# Patient Record
Sex: FEMALE | Race: BLACK OR AFRICAN AMERICAN | NOT HISPANIC OR LATINO | Employment: OTHER | URBAN - METROPOLITAN AREA
[De-identification: names, ages, dates, MRNs, and addresses within clinical notes are randomized per-mention and may not be internally consistent; named-entity substitution may affect disease eponyms.]

---

## 2017-01-01 ENCOUNTER — HOSPITAL ENCOUNTER (INPATIENT)
Facility: HOSPITAL | Age: 52
LOS: 6 days | DRG: 871 | End: 2017-05-25
Attending: EMERGENCY MEDICINE | Admitting: HOSPITALIST
Payer: MEDICAID

## 2017-01-01 VITALS
TEMPERATURE: 99 F | OXYGEN SATURATION: 95 % | RESPIRATION RATE: 20 BRPM | WEIGHT: 192 LBS | DIASTOLIC BLOOD PRESSURE: 75 MMHG | BODY MASS INDEX: 32.78 KG/M2 | HEIGHT: 64 IN | SYSTOLIC BLOOD PRESSURE: 129 MMHG | HEART RATE: 76 BPM

## 2017-01-01 DIAGNOSIS — I69.991 DYSPHAGIA AS LATE EFFECT OF CEREBROVASCULAR DISEASE: Chronic | ICD-10-CM

## 2017-01-01 DIAGNOSIS — K92.2 GI BLEED: ICD-10-CM

## 2017-01-01 DIAGNOSIS — R61 DIAPHORESIS: ICD-10-CM

## 2017-01-01 DIAGNOSIS — Z71.89 GOALS OF CARE, COUNSELING/DISCUSSION: ICD-10-CM

## 2017-01-01 DIAGNOSIS — N39.0 SEPSIS DUE TO URINARY TRACT INFECTION: ICD-10-CM

## 2017-01-01 DIAGNOSIS — N30.01 ACUTE CYSTITIS WITH HEMATURIA: ICD-10-CM

## 2017-01-01 DIAGNOSIS — R06.4 HYPERVENTILATING: ICD-10-CM

## 2017-01-01 DIAGNOSIS — G93.40 ACUTE ENCEPHALOPATHY: ICD-10-CM

## 2017-01-01 DIAGNOSIS — N30.00 ACUTE CYSTITIS WITHOUT HEMATURIA: Primary | ICD-10-CM

## 2017-01-01 DIAGNOSIS — A41.9 SEPSIS DUE TO URINARY TRACT INFECTION: ICD-10-CM

## 2017-01-01 DIAGNOSIS — I69.359 CVA, OLD, HEMIPARESIS: Chronic | ICD-10-CM

## 2017-01-01 DIAGNOSIS — A41.9 SEPSIS, DUE TO UNSPECIFIED ORGANISM: ICD-10-CM

## 2017-01-01 DIAGNOSIS — I48.0 PAROXYSMAL ATRIAL FIBRILLATION: Chronic | ICD-10-CM

## 2017-01-01 DIAGNOSIS — K29.01 GASTROINTESTINAL HEMORRHAGE ASSOCIATED WITH ACUTE GASTRITIS: ICD-10-CM

## 2017-01-01 DIAGNOSIS — Z93.1 PEG (PERCUTANEOUS ENDOSCOPIC GASTROSTOMY) STATUS: ICD-10-CM

## 2017-01-01 DIAGNOSIS — R56.9 SEIZURE-LIKE ACTIVITY: ICD-10-CM

## 2017-01-01 LAB
25(OH)D3+25(OH)D2 SERPL-MCNC: 34 NG/ML
ABO + RH BLD: NORMAL
ALBUMIN SERPL BCP-MCNC: 2.8 G/DL
ALBUMIN SERPL BCP-MCNC: 3 G/DL
ALBUMIN SERPL BCP-MCNC: 3.1 G/DL
ALBUMIN SERPL BCP-MCNC: 3.6 G/DL
ALLENS TEST: ABNORMAL
ALLENS TEST: ABNORMAL
ALLENS TEST: NORMAL
ALP SERPL-CCNC: 105 U/L
ALP SERPL-CCNC: 87 U/L
ALP SERPL-CCNC: 88 U/L
ALP SERPL-CCNC: 89 U/L
ALT SERPL W/O P-5'-P-CCNC: 18 U/L
ALT SERPL W/O P-5'-P-CCNC: 24 U/L
ALT SERPL W/O P-5'-P-CCNC: 24 U/L
ALT SERPL W/O P-5'-P-CCNC: 26 U/L
ANION GAP SERPL CALC-SCNC: 10 MMOL/L
ANION GAP SERPL CALC-SCNC: 14 MMOL/L
ANION GAP SERPL CALC-SCNC: 15 MMOL/L
ANION GAP SERPL CALC-SCNC: 17 MMOL/L
ANION GAP SERPL CALC-SCNC: 5 MMOL/L
ANION GAP SERPL CALC-SCNC: 7 MMOL/L
ANION GAP SERPL CALC-SCNC: 8 MMOL/L
ANISOCYTOSIS BLD QL SMEAR: SLIGHT
ANISOCYTOSIS BLD QL SMEAR: SLIGHT
APTT BLDCRRT: <21 SEC
AST SERPL-CCNC: 20 U/L
AST SERPL-CCNC: 26 U/L
AST SERPL-CCNC: 34 U/L
AST SERPL-CCNC: 42 U/L
B-HCG UR QL: NEGATIVE
BACTERIA #/AREA URNS AUTO: ABNORMAL /HPF
BACTERIA BLD CULT: NORMAL
BACTERIA BLD CULT: NORMAL
BACTERIA UR CULT: NORMAL
BASOPHILS # BLD AUTO: 0.01 K/UL
BASOPHILS # BLD AUTO: 0.02 K/UL
BASOPHILS # BLD AUTO: 0.02 K/UL
BASOPHILS # BLD AUTO: 0.03 K/UL
BASOPHILS # BLD AUTO: 0.03 K/UL
BASOPHILS # BLD AUTO: 0.04 K/UL
BASOPHILS NFR BLD: 0.1 %
BASOPHILS NFR BLD: 0.2 %
BASOPHILS NFR BLD: 0.3 %
BASOPHILS NFR BLD: 0.5 %
BASOPHILS NFR BLD: 0.5 %
BASOPHILS NFR BLD: 0.7 %
BILIRUB SERPL-MCNC: 0.4 MG/DL
BILIRUB SERPL-MCNC: 0.6 MG/DL
BILIRUB SERPL-MCNC: 0.7 MG/DL
BILIRUB SERPL-MCNC: 0.8 MG/DL
BILIRUB UR QL STRIP: NEGATIVE
BLD GP AB SCN CELLS X3 SERPL QL: NORMAL
BNP SERPL-MCNC: <10 PG/ML
BUN SERPL-MCNC: 10 MG/DL
BUN SERPL-MCNC: 11 MG/DL
BUN SERPL-MCNC: 16 MG/DL
BUN SERPL-MCNC: 5 MG/DL
BUN SERPL-MCNC: 9 MG/DL
BURR CELLS BLD QL SMEAR: ABNORMAL
CALCIUM SERPL-MCNC: 8.2 MG/DL
CALCIUM SERPL-MCNC: 8.5 MG/DL
CALCIUM SERPL-MCNC: 8.6 MG/DL
CALCIUM SERPL-MCNC: 8.9 MG/DL
CALCIUM SERPL-MCNC: 8.9 MG/DL
CALCIUM SERPL-MCNC: 9 MG/DL
CALCIUM SERPL-MCNC: 9.6 MG/DL
CHLORIDE SERPL-SCNC: 106 MMOL/L
CHLORIDE SERPL-SCNC: 109 MMOL/L
CHLORIDE SERPL-SCNC: 110 MMOL/L
CHLORIDE SERPL-SCNC: 111 MMOL/L
CHLORIDE SERPL-SCNC: 111 MMOL/L
CHLORIDE SERPL-SCNC: 112 MMOL/L
CHLORIDE SERPL-SCNC: 113 MMOL/L
CLARITY UR REFRACT.AUTO: ABNORMAL
CO2 SERPL-SCNC: 16 MMOL/L
CO2 SERPL-SCNC: 18 MMOL/L
CO2 SERPL-SCNC: 23 MMOL/L
CO2 SERPL-SCNC: 24 MMOL/L
CO2 SERPL-SCNC: 26 MMOL/L
CO2 SERPL-SCNC: 28 MMOL/L
CO2 SERPL-SCNC: 29 MMOL/L
COLOR UR AUTO: ABNORMAL
CORTIS SERPL-MCNC: 32.6 UG/DL
CREAT SERPL-MCNC: 0.6 MG/DL
CREAT SERPL-MCNC: 0.6 MG/DL
CREAT SERPL-MCNC: 0.7 MG/DL
CREAT SERPL-MCNC: 0.7 MG/DL
CREAT SERPL-MCNC: 0.8 MG/DL
CTP QC/QA: YES
DELSYS: ABNORMAL
DELSYS: ABNORMAL
DELSYS: NORMAL
DIFFERENTIAL METHOD: ABNORMAL
EOSINOPHIL # BLD AUTO: 0.1 K/UL
EOSINOPHIL # BLD AUTO: 0.4 K/UL
EOSINOPHIL # BLD AUTO: 0.5 K/UL
EOSINOPHIL NFR BLD: 0.5 %
EOSINOPHIL NFR BLD: 4.2 %
EOSINOPHIL NFR BLD: 6.7 %
EOSINOPHIL NFR BLD: 6.8 %
EOSINOPHIL NFR BLD: 8.8 %
EOSINOPHIL NFR BLD: 9.7 %
ERYTHROCYTE [DISTWIDTH] IN BLOOD BY AUTOMATED COUNT: 15.5 %
ERYTHROCYTE [DISTWIDTH] IN BLOOD BY AUTOMATED COUNT: 15.6 %
ERYTHROCYTE [DISTWIDTH] IN BLOOD BY AUTOMATED COUNT: 15.7 %
ERYTHROCYTE [DISTWIDTH] IN BLOOD BY AUTOMATED COUNT: 15.8 %
ERYTHROCYTE [DISTWIDTH] IN BLOOD BY AUTOMATED COUNT: 15.9 %
ERYTHROCYTE [DISTWIDTH] IN BLOOD BY AUTOMATED COUNT: 16 %
ERYTHROCYTE [DISTWIDTH] IN BLOOD BY AUTOMATED COUNT: 16.1 %
ERYTHROCYTE [DISTWIDTH] IN BLOOD BY AUTOMATED COUNT: 16.1 %
ERYTHROCYTE [SEDIMENTATION RATE] IN BLOOD BY WESTERGREN METHOD: 28 MM/H
ERYTHROCYTE [SEDIMENTATION RATE] IN BLOOD BY WESTERGREN METHOD: 28 MM/H
EST. GFR  (AFRICAN AMERICAN): >60 ML/MIN/1.73 M^2
EST. GFR  (NON AFRICAN AMERICAN): >60 ML/MIN/1.73 M^2
FIO2: 28
FIO2: 28
FLOW: 2
FLOW: 2
FLOW: 4.5
GIANT PLATELETS BLD QL SMEAR: PRESENT
GIANT PLATELETS BLD QL SMEAR: PRESENT
GLUCOSE SERPL-MCNC: 104 MG/DL
GLUCOSE SERPL-MCNC: 107 MG/DL
GLUCOSE SERPL-MCNC: 71 MG/DL
GLUCOSE SERPL-MCNC: 83 MG/DL
GLUCOSE SERPL-MCNC: 84 MG/DL
GLUCOSE SERPL-MCNC: 84 MG/DL
GLUCOSE SERPL-MCNC: 85 MG/DL
GLUCOSE UR QL STRIP: NEGATIVE
GRAM STN SPEC: NORMAL
GRAM STN SPEC: NORMAL
HCO3 UR-SCNC: 17.6 MMOL/L (ref 24–28)
HCO3 UR-SCNC: 27.6 MMOL/L (ref 24–28)
HCT VFR BLD AUTO: 30.1 %
HCT VFR BLD AUTO: 30.1 %
HCT VFR BLD AUTO: 32.4 %
HCT VFR BLD AUTO: 32.8 %
HCT VFR BLD AUTO: 32.8 %
HCT VFR BLD AUTO: 33.2 %
HCT VFR BLD AUTO: 33.5 %
HCT VFR BLD AUTO: 35.7 %
HCT VFR BLD AUTO: 37.8 %
HCT VFR BLD AUTO: 37.8 %
HCT VFR BLD AUTO: 37.9 %
HCT VFR BLD AUTO: 37.9 %
HCT VFR BLD AUTO: 38.5 %
HCT VFR BLD AUTO: 38.8 %
HCT VFR BLD AUTO: 38.9 %
HCT VFR BLD AUTO: 39.3 %
HCT VFR BLD AUTO: 39.8 %
HCT VFR BLD AUTO: 46.8 %
HCT VFR BLD AUTO: 47.6 %
HCT VFR BLD CALC: 37 %PCV (ref 36–54)
HGB BLD-MCNC: 10 G/DL
HGB BLD-MCNC: 10.1 G/DL
HGB BLD-MCNC: 10.1 G/DL
HGB BLD-MCNC: 10.3 G/DL
HGB BLD-MCNC: 10.9 G/DL
HGB BLD-MCNC: 11.7 G/DL
HGB BLD-MCNC: 11.8 G/DL
HGB BLD-MCNC: 12 G/DL
HGB BLD-MCNC: 12.1 G/DL
HGB BLD-MCNC: 12.2 G/DL
HGB BLD-MCNC: 12.2 G/DL
HGB BLD-MCNC: 14.6 G/DL
HGB BLD-MCNC: 14.8 G/DL
HGB BLD-MCNC: 9.5 G/DL
HGB BLD-MCNC: 9.5 G/DL
HGB UR QL STRIP: NEGATIVE
HYALINE CASTS UR QL AUTO: 0 /LPF
HYPOCHROMIA BLD QL SMEAR: ABNORMAL
HYPOCHROMIA BLD QL SMEAR: ABNORMAL
INR PPP: 1
KETONES UR QL STRIP: NEGATIVE
LACTATE SERPL-SCNC: 1.1 MMOL/L
LACTATE SERPL-SCNC: 1.5 MMOL/L
LACTATE SERPL-SCNC: 1.7 MMOL/L
LACTATE SERPL-SCNC: 2.7 MMOL/L
LACTATE SERPL-SCNC: 7.3 MMOL/L
LDH SERPL L TO P-CCNC: 0.89 MMOL/L (ref 0.36–1.25)
LDH SERPL L TO P-CCNC: 8.63 MMOL/L (ref 0.5–2.2)
LEUKOCYTE ESTERASE UR QL STRIP: ABNORMAL
LIPASE SERPL-CCNC: 43 U/L
LYMPHOCYTES # BLD AUTO: 1.1 K/UL
LYMPHOCYTES # BLD AUTO: 1.3 K/UL
LYMPHOCYTES # BLD AUTO: 1.4 K/UL
LYMPHOCYTES # BLD AUTO: 1.5 K/UL
LYMPHOCYTES # BLD AUTO: 1.7 K/UL
LYMPHOCYTES # BLD AUTO: 2 K/UL
LYMPHOCYTES NFR BLD: 11.4 %
LYMPHOCYTES NFR BLD: 24.1 %
LYMPHOCYTES NFR BLD: 27.3 %
LYMPHOCYTES NFR BLD: 34.3 %
LYMPHOCYTES NFR BLD: 34.4 %
LYMPHOCYTES NFR BLD: 7.9 %
MAGNESIUM SERPL-MCNC: 1.8 MG/DL
MAGNESIUM SERPL-MCNC: 2 MG/DL
MAGNESIUM SERPL-MCNC: 2 MG/DL
MAGNESIUM SERPL-MCNC: 2.3 MG/DL
MCH RBC QN AUTO: 26.2 PG
MCH RBC QN AUTO: 26.3 PG
MCH RBC QN AUTO: 26.4 PG
MCH RBC QN AUTO: 26.5 PG
MCH RBC QN AUTO: 26.6 PG
MCH RBC QN AUTO: 26.7 PG
MCH RBC QN AUTO: 26.8 PG
MCH RBC QN AUTO: 26.9 PG
MCH RBC QN AUTO: 26.9 PG
MCHC RBC AUTO-ENTMCNC: 30.3 %
MCHC RBC AUTO-ENTMCNC: 30.5 %
MCHC RBC AUTO-ENTMCNC: 30.7 %
MCHC RBC AUTO-ENTMCNC: 30.8 %
MCHC RBC AUTO-ENTMCNC: 30.8 %
MCHC RBC AUTO-ENTMCNC: 30.9 %
MCHC RBC AUTO-ENTMCNC: 31 %
MCHC RBC AUTO-ENTMCNC: 31.1 %
MCHC RBC AUTO-ENTMCNC: 31.2 %
MCHC RBC AUTO-ENTMCNC: 31.4 %
MCHC RBC AUTO-ENTMCNC: 31.6 %
MCHC RBC AUTO-ENTMCNC: 31.6 %
MCV RBC AUTO: 85 FL
MCV RBC AUTO: 86 FL
MCV RBC AUTO: 87 FL
METANEPH FREE SERPL-MCNC: <25 PG/ML
METANEPHS SERPL-MCNC: 43 PG/ML
MICROSCOPIC COMMENT: ABNORMAL
MODE: ABNORMAL
MODE: ABNORMAL
MODE: NORMAL
MONOCYTES # BLD AUTO: 0.3 K/UL
MONOCYTES # BLD AUTO: 0.4 K/UL
MONOCYTES # BLD AUTO: 0.6 K/UL
MONOCYTES # BLD AUTO: 0.7 K/UL
MONOCYTES # BLD AUTO: 0.8 K/UL
MONOCYTES # BLD AUTO: 1.2 K/UL
MONOCYTES NFR BLD: 10.4 %
MONOCYTES NFR BLD: 6.5 %
MONOCYTES NFR BLD: 7.1 %
MONOCYTES NFR BLD: 8.2 %
MONOCYTES NFR BLD: 8.4 %
MONOCYTES NFR BLD: 9.2 %
NEUTROPHILS # BLD AUTO: 1.8 K/UL
NEUTROPHILS # BLD AUTO: 15 K/UL
NEUTROPHILS # BLD AUTO: 3 K/UL
NEUTROPHILS # BLD AUTO: 3.5 K/UL
NEUTROPHILS # BLD AUTO: 3.5 K/UL
NEUTROPHILS # BLD AUTO: 7.3 K/UL
NEUTROPHILS NFR BLD: 47 %
NEUTROPHILS NFR BLD: 51 %
NEUTROPHILS NFR BLD: 55 %
NEUTROPHILS NFR BLD: 57.4 %
NEUTROPHILS NFR BLD: 76.1 %
NEUTROPHILS NFR BLD: 84.7 %
NITRITE UR QL STRIP: NEGATIVE
NORMETANEPH FREE SERPL-MCNC: 43 PG/ML
OVALOCYTES BLD QL SMEAR: ABNORMAL
PCO2 BLDA: 38.6 MMHG (ref 35–45)
PCO2 BLDA: 44.4 MMHG (ref 35–45)
PH SMN: 7.27 [PH] (ref 7.35–7.45)
PH SMN: 7.4 [PH] (ref 7.35–7.45)
PH UR STRIP: 8 [PH] (ref 5–8)
PHOSPHATE SERPL-MCNC: 2 MG/DL
PHOSPHATE SERPL-MCNC: 2.5 MG/DL
PHOSPHATE SERPL-MCNC: 2.8 MG/DL
PHOSPHATE SERPL-MCNC: 2.8 MG/DL
PHOSPHATE SERPL-MCNC: 2.9 MG/DL
PHOSPHATE SERPL-MCNC: 3 MG/DL
PHOSPHATE SERPL-MCNC: 3.2 MG/DL
PLATELET # BLD AUTO: 112 K/UL
PLATELET # BLD AUTO: 126 K/UL
PLATELET # BLD AUTO: 126 K/UL
PLATELET # BLD AUTO: 140 K/UL
PLATELET # BLD AUTO: 140 K/UL
PLATELET # BLD AUTO: 148 K/UL
PLATELET # BLD AUTO: 148 K/UL
PLATELET # BLD AUTO: 153 K/UL
PLATELET # BLD AUTO: 155 K/UL
PLATELET # BLD AUTO: 157 K/UL
PLATELET # BLD AUTO: 160 K/UL
PLATELET # BLD AUTO: 170 K/UL
PLATELET # BLD AUTO: 177 K/UL
PLATELET # BLD AUTO: 194 K/UL
PLATELET BLD QL SMEAR: ABNORMAL
PMV BLD AUTO: 12.2 FL
PMV BLD AUTO: 12.2 FL
PMV BLD AUTO: 12.4 FL
PMV BLD AUTO: 12.7 FL
PMV BLD AUTO: 12.8 FL
PMV BLD AUTO: 13 FL
PMV BLD AUTO: 13.1 FL
PMV BLD AUTO: 13.5 FL
PMV BLD AUTO: ABNORMAL FL
PO2 BLDA: 39 MMHG (ref 40–60)
PO2 BLDA: 76 MMHG (ref 80–100)
POC BE: -9 MMOL/L
POC BE: 3 MMOL/L
POC IONIZED CALCIUM: 1.12 MMOL/L (ref 1.06–1.42)
POC SATURATED O2: 66 % (ref 95–100)
POC SATURATED O2: 95 % (ref 95–100)
POC TCO2: 19 MMOL/L (ref 24–29)
POC TCO2: 29 MMOL/L (ref 23–27)
POCT GLUCOSE: 105 MG/DL (ref 70–110)
POCT GLUCOSE: 111 MG/DL (ref 70–110)
POCT GLUCOSE: 121 MG/DL (ref 70–110)
POCT GLUCOSE: 128 MG/DL (ref 70–110)
POCT GLUCOSE: 133 MG/DL (ref 70–110)
POCT GLUCOSE: 138 MG/DL (ref 70–110)
POCT GLUCOSE: 150 MG/DL (ref 70–110)
POCT GLUCOSE: 62 MG/DL (ref 70–110)
POCT GLUCOSE: 67 MG/DL (ref 70–110)
POCT GLUCOSE: 72 MG/DL (ref 70–110)
POCT GLUCOSE: 77 MG/DL (ref 70–110)
POCT GLUCOSE: 80 MG/DL (ref 70–110)
POCT GLUCOSE: 80 MG/DL (ref 70–110)
POCT GLUCOSE: 82 MG/DL (ref 70–110)
POCT GLUCOSE: 83 MG/DL (ref 70–110)
POCT GLUCOSE: 87 MG/DL (ref 70–110)
POCT GLUCOSE: 95 MG/DL (ref 70–110)
POCT GLUCOSE: 96 MG/DL (ref 70–110)
POCT GLUCOSE: 96 MG/DL (ref 70–110)
POIKILOCYTOSIS BLD QL SMEAR: SLIGHT
POIKILOCYTOSIS BLD QL SMEAR: SLIGHT
POLYCHROMASIA BLD QL SMEAR: ABNORMAL
POTASSIUM BLD-SCNC: 3.7 MMOL/L (ref 3.5–5.1)
POTASSIUM SERPL-SCNC: 3.3 MMOL/L
POTASSIUM SERPL-SCNC: 3.6 MMOL/L
POTASSIUM SERPL-SCNC: 3.9 MMOL/L
POTASSIUM SERPL-SCNC: 3.9 MMOL/L
POTASSIUM SERPL-SCNC: 4.1 MMOL/L
POTASSIUM SERPL-SCNC: 4.2 MMOL/L
POTASSIUM SERPL-SCNC: 5.3 MMOL/L
PROLACTIN SERPL IA-MCNC: 12.7 NG/ML
PROT SERPL-MCNC: 6.8 G/DL
PROT SERPL-MCNC: 7.3 G/DL
PROT SERPL-MCNC: 7.8 G/DL
PROT SERPL-MCNC: 8.7 G/DL
PROT UR QL STRIP: ABNORMAL
PROTHROMBIN TIME: 11 SEC
PYRIDOXAL SERPL-MCNC: 6 UG/L (ref 5–50)
RBC # BLD AUTO: 3.53 M/UL
RBC # BLD AUTO: 3.53 M/UL
RBC # BLD AUTO: 3.81 M/UL
RBC # BLD AUTO: 3.84 M/UL
RBC # BLD AUTO: 3.84 M/UL
RBC # BLD AUTO: 3.9 M/UL
RBC # BLD AUTO: 3.91 M/UL
RBC # BLD AUTO: 4.16 M/UL
RBC # BLD AUTO: 4.43 M/UL
RBC # BLD AUTO: 4.43 M/UL
RBC # BLD AUTO: 4.44 M/UL
RBC # BLD AUTO: 4.44 M/UL
RBC # BLD AUTO: 4.48 M/UL
RBC # BLD AUTO: 4.49 M/UL
RBC # BLD AUTO: 4.52 M/UL
RBC # BLD AUTO: 4.62 M/UL
RBC # BLD AUTO: 5.51 M/UL
RBC # BLD AUTO: 5.56 M/UL
RBC #/AREA URNS AUTO: 8 /HPF (ref 0–4)
SAMPLE: ABNORMAL
SAMPLE: ABNORMAL
SAMPLE: NORMAL
SITE: ABNORMAL
SITE: ABNORMAL
SITE: NORMAL
SODIUM BLD-SCNC: 146 MMOL/L (ref 136–145)
SODIUM SERPL-SCNC: 143 MMOL/L
SODIUM SERPL-SCNC: 145 MMOL/L
SODIUM SERPL-SCNC: 146 MMOL/L
SP GR UR STRIP: 1.03 (ref 1–1.03)
SP02: 95
SP02: 95
SP02: 98
SQUAMOUS #/AREA URNS AUTO: 3 /HPF
TROPONIN I SERPL DL<=0.01 NG/ML-MCNC: <0.006 NG/ML
TROPONIN I SERPL DL<=0.01 NG/ML-MCNC: <0.006 NG/ML
URN SPEC COLLECT METH UR: ABNORMAL
UROBILINOGEN UR STRIP-ACNC: NEGATIVE EU/DL
WBC # BLD AUTO: 12.38 K/UL
WBC # BLD AUTO: 13.02 K/UL
WBC # BLD AUTO: 17.64 K/UL
WBC # BLD AUTO: 21.72 K/UL
WBC # BLD AUTO: 3.81 K/UL
WBC # BLD AUTO: 5.95 K/UL
WBC # BLD AUTO: 6.05 K/UL
WBC # BLD AUTO: 6.05 K/UL
WBC # BLD AUTO: 6.17 K/UL
WBC # BLD AUTO: 6.17 K/UL
WBC # BLD AUTO: 6.33 K/UL
WBC # BLD AUTO: 6.73 K/UL
WBC # BLD AUTO: 6.91 K/UL
WBC # BLD AUTO: 6.91 K/UL
WBC # BLD AUTO: 6.99 K/UL
WBC # BLD AUTO: 6.99 K/UL
WBC # BLD AUTO: 8.31 K/UL
WBC # BLD AUTO: 9.67 K/UL
WBC #/AREA URNS AUTO: 40 /HPF (ref 0–5)

## 2017-01-01 PROCEDURE — 85025 COMPLETE CBC W/AUTO DIFF WBC: CPT

## 2017-01-01 PROCEDURE — 63600175 PHARM REV CODE 636 W HCPCS: Performed by: HOSPITALIST

## 2017-01-01 PROCEDURE — 93005 ELECTROCARDIOGRAM TRACING: CPT

## 2017-01-01 PROCEDURE — 99233 SBSQ HOSP IP/OBS HIGH 50: CPT | Mod: 25,,, | Performed by: INTERNAL MEDICINE

## 2017-01-01 PROCEDURE — 85730 THROMBOPLASTIN TIME PARTIAL: CPT

## 2017-01-01 PROCEDURE — 36556 INSERT NON-TUNNEL CV CATH: CPT | Mod: ,,, | Performed by: INTERNAL MEDICINE

## 2017-01-01 PROCEDURE — 96376 TX/PRO/DX INJ SAME DRUG ADON: CPT

## 2017-01-01 PROCEDURE — 25000003 PHARM REV CODE 250: Performed by: HOSPITALIST

## 2017-01-01 PROCEDURE — 99285 EMERGENCY DEPT VISIT HI MDM: CPT | Mod: 25

## 2017-01-01 PROCEDURE — 84100 ASSAY OF PHOSPHORUS: CPT

## 2017-01-01 PROCEDURE — 25500020 PHARM REV CODE 255: Performed by: EMERGENCY MEDICINE

## 2017-01-01 PROCEDURE — 99233 SBSQ HOSP IP/OBS HIGH 50: CPT | Mod: ,,, | Performed by: PSYCHIATRY & NEUROLOGY

## 2017-01-01 PROCEDURE — 96375 TX/PRO/DX INJ NEW DRUG ADDON: CPT

## 2017-01-01 PROCEDURE — 87186 SC STD MICRODIL/AGAR DIL: CPT

## 2017-01-01 PROCEDURE — 83605 ASSAY OF LACTIC ACID: CPT

## 2017-01-01 PROCEDURE — 96368 THER/DIAG CONCURRENT INF: CPT

## 2017-01-01 PROCEDURE — 96365 THER/PROPH/DIAG IV INF INIT: CPT

## 2017-01-01 PROCEDURE — 99232 SBSQ HOSP IP/OBS MODERATE 35: CPT | Mod: ,,, | Performed by: INTERNAL MEDICINE

## 2017-01-01 PROCEDURE — 81025 URINE PREGNANCY TEST: CPT | Performed by: EMERGENCY MEDICINE

## 2017-01-01 PROCEDURE — 86901 BLOOD TYPING SEROLOGIC RH(D): CPT

## 2017-01-01 PROCEDURE — 25000003 PHARM REV CODE 250: Performed by: INTERNAL MEDICINE

## 2017-01-01 PROCEDURE — 99233 SBSQ HOSP IP/OBS HIGH 50: CPT | Mod: ,,, | Performed by: INTERNAL MEDICINE

## 2017-01-01 PROCEDURE — 96366 THER/PROPH/DIAG IV INF ADDON: CPT

## 2017-01-01 PROCEDURE — 85027 COMPLETE CBC AUTOMATED: CPT | Mod: 91

## 2017-01-01 PROCEDURE — 95822 EEG COMA OR SLEEP ONLY: CPT

## 2017-01-01 PROCEDURE — 99291 CRITICAL CARE FIRST HOUR: CPT | Mod: ,,, | Performed by: EMERGENCY MEDICINE

## 2017-01-01 PROCEDURE — 86900 BLOOD TYPING SEROLOGIC ABO: CPT

## 2017-01-01 PROCEDURE — 36415 COLL VENOUS BLD VENIPUNCTURE: CPT

## 2017-01-01 PROCEDURE — C9113 INJ PANTOPRAZOLE SODIUM, VIA: HCPCS | Performed by: HOSPITALIST

## 2017-01-01 PROCEDURE — 99223 1ST HOSP IP/OBS HIGH 75: CPT | Mod: ,,, | Performed by: INTERNAL MEDICINE

## 2017-01-01 PROCEDURE — 25000003 PHARM REV CODE 250: Performed by: STUDENT IN AN ORGANIZED HEALTH CARE EDUCATION/TRAINING PROGRAM

## 2017-01-01 PROCEDURE — 80069 RENAL FUNCTION PANEL: CPT

## 2017-01-01 PROCEDURE — 84484 ASSAY OF TROPONIN QUANT: CPT

## 2017-01-01 PROCEDURE — 81001 URINALYSIS AUTO W/SCOPE: CPT

## 2017-01-01 PROCEDURE — 97802 MEDICAL NUTRITION INDIV IN: CPT

## 2017-01-01 PROCEDURE — 85027 COMPLETE CBC AUTOMATED: CPT

## 2017-01-01 PROCEDURE — 11000001 HC ACUTE MED/SURG PRIVATE ROOM

## 2017-01-01 PROCEDURE — 80053 COMPREHEN METABOLIC PANEL: CPT

## 2017-01-01 PROCEDURE — 99291 CRITICAL CARE FIRST HOUR: CPT | Mod: ,,, | Performed by: CLINICAL NURSE SPECIALIST

## 2017-01-01 PROCEDURE — 25000242 PHARM REV CODE 250 ALT 637 W/ HCPCS: Performed by: HOSPITALIST

## 2017-01-01 PROCEDURE — 63600175 PHARM REV CODE 636 W HCPCS: Performed by: INTERNAL MEDICINE

## 2017-01-01 PROCEDURE — 63600175 PHARM REV CODE 636 W HCPCS

## 2017-01-01 PROCEDURE — 83835 ASSAY OF METANEPHRINES: CPT

## 2017-01-01 PROCEDURE — 94640 AIRWAY INHALATION TREATMENT: CPT

## 2017-01-01 PROCEDURE — 02HV33Z INSERTION OF INFUSION DEVICE INTO SUPERIOR VENA CAVA, PERCUTANEOUS APPROACH: ICD-10-PCS | Performed by: INTERNAL MEDICINE

## 2017-01-01 PROCEDURE — 87086 URINE CULTURE/COLONY COUNT: CPT

## 2017-01-01 PROCEDURE — 99233 SBSQ HOSP IP/OBS HIGH 50: CPT | Mod: ,,, | Performed by: HOSPITALIST

## 2017-01-01 PROCEDURE — 25000003 PHARM REV CODE 250

## 2017-01-01 PROCEDURE — 84295 ASSAY OF SERUM SODIUM: CPT

## 2017-01-01 PROCEDURE — 87040 BLOOD CULTURE FOR BACTERIA: CPT

## 2017-01-01 PROCEDURE — 27000221 HC OXYGEN, UP TO 24 HOURS

## 2017-01-01 PROCEDURE — 25000003 PHARM REV CODE 250: Performed by: EMERGENCY MEDICINE

## 2017-01-01 PROCEDURE — 99239 HOSP IP/OBS DSCHRG MGMT >30: CPT | Mod: ,,, | Performed by: INTERNAL MEDICINE

## 2017-01-01 PROCEDURE — 80048 BASIC METABOLIC PNL TOTAL CA: CPT

## 2017-01-01 PROCEDURE — 87088 URINE BACTERIA CULTURE: CPT

## 2017-01-01 PROCEDURE — 63600175 PHARM REV CODE 636 W HCPCS: Performed by: EMERGENCY MEDICINE

## 2017-01-01 PROCEDURE — 94760 N-INVAS EAR/PLS OXIMETRY 1: CPT

## 2017-01-01 PROCEDURE — 87205 SMEAR GRAM STAIN: CPT

## 2017-01-01 PROCEDURE — 85014 HEMATOCRIT: CPT

## 2017-01-01 PROCEDURE — 83605 ASSAY OF LACTIC ACID: CPT | Mod: 91

## 2017-01-01 PROCEDURE — 85018 HEMOGLOBIN: CPT

## 2017-01-01 PROCEDURE — 93010 ELECTROCARDIOGRAM REPORT: CPT | Mod: ,,, | Performed by: INTERNAL MEDICINE

## 2017-01-01 PROCEDURE — 83735 ASSAY OF MAGNESIUM: CPT

## 2017-01-01 PROCEDURE — 82803 BLOOD GASES ANY COMBINATION: CPT

## 2017-01-01 PROCEDURE — 51702 INSERT TEMP BLADDER CATH: CPT

## 2017-01-01 PROCEDURE — C9113 INJ PANTOPRAZOLE SODIUM, VIA: HCPCS | Performed by: EMERGENCY MEDICINE

## 2017-01-01 PROCEDURE — 82533 TOTAL CORTISOL: CPT

## 2017-01-01 PROCEDURE — 84207 ASSAY OF VITAMIN B-6: CPT

## 2017-01-01 PROCEDURE — 83690 ASSAY OF LIPASE: CPT

## 2017-01-01 PROCEDURE — 84132 ASSAY OF SERUM POTASSIUM: CPT

## 2017-01-01 PROCEDURE — 99900035 HC TECH TIME PER 15 MIN (STAT)

## 2017-01-01 PROCEDURE — 20000000 HC ICU ROOM

## 2017-01-01 PROCEDURE — 36600 WITHDRAWAL OF ARTERIAL BLOOD: CPT

## 2017-01-01 PROCEDURE — 85610 PROTHROMBIN TIME: CPT

## 2017-01-01 PROCEDURE — 84146 ASSAY OF PROLACTIN: CPT

## 2017-01-01 PROCEDURE — 82330 ASSAY OF CALCIUM: CPT

## 2017-01-01 PROCEDURE — 95816 EEG AWAKE AND DROWSY: CPT | Mod: 26,,, | Performed by: PSYCHIATRY & NEUROLOGY

## 2017-01-01 PROCEDURE — 83880 ASSAY OF NATRIURETIC PEPTIDE: CPT

## 2017-01-01 PROCEDURE — 87077 CULTURE AEROBIC IDENTIFY: CPT

## 2017-01-01 PROCEDURE — 82306 VITAMIN D 25 HYDROXY: CPT

## 2017-01-01 RX ORDER — IBUPROFEN 200 MG
24 TABLET ORAL
Status: DISCONTINUED | OUTPATIENT
Start: 2017-01-01 | End: 2017-01-01 | Stop reason: HOSPADM

## 2017-01-01 RX ORDER — ONDANSETRON 2 MG/ML
INJECTION INTRAMUSCULAR; INTRAVENOUS
Status: COMPLETED
Start: 2017-01-01 | End: 2017-01-01

## 2017-01-01 RX ORDER — DEXTROSE MONOHYDRATE AND SODIUM CHLORIDE 5; .9 G/100ML; G/100ML
INJECTION, SOLUTION INTRAVENOUS CONTINUOUS
Status: DISCONTINUED | OUTPATIENT
Start: 2017-01-01 | End: 2017-01-01

## 2017-01-01 RX ORDER — LORAZEPAM 2 MG/ML
2 INJECTION INTRAMUSCULAR
Status: DISCONTINUED | OUTPATIENT
Start: 2017-01-01 | End: 2017-01-01

## 2017-01-01 RX ORDER — SODIUM BICARBONATE 1 MEQ/ML
10 SYRINGE (ML) INTRAVENOUS ONCE
Status: COMPLETED | OUTPATIENT
Start: 2017-01-01 | End: 2017-01-01

## 2017-01-01 RX ORDER — LEVETIRACETAM 5 MG/ML
500 INJECTION INTRAVASCULAR EVERY 12 HOURS
Status: DISCONTINUED | OUTPATIENT
Start: 2017-01-01 | End: 2017-01-01

## 2017-01-01 RX ORDER — HYDROMORPHONE HYDROCHLORIDE 1 MG/ML
1 INJECTION, SOLUTION INTRAMUSCULAR; INTRAVENOUS; SUBCUTANEOUS EVERY 4 HOURS PRN
Status: DISCONTINUED | OUTPATIENT
Start: 2017-01-01 | End: 2017-01-01

## 2017-01-01 RX ORDER — POTASSIUM CHLORIDE 20 MEQ/15ML
30 SOLUTION ORAL ONCE
Status: COMPLETED | OUTPATIENT
Start: 2017-01-01 | End: 2017-01-01

## 2017-01-01 RX ORDER — BACLOFEN 20 MG/1
20 TABLET ORAL EVERY 4 HOURS
Status: ON HOLD
Start: 2017-01-01 | End: 2018-01-01 | Stop reason: HOSPADM

## 2017-01-01 RX ORDER — IPRATROPIUM BROMIDE AND ALBUTEROL SULFATE 2.5; .5 MG/3ML; MG/3ML
SOLUTION RESPIRATORY (INHALATION)
Status: DISPENSED
Start: 2017-01-01 | End: 2017-01-01

## 2017-01-01 RX ORDER — PANTOPRAZOLE SODIUM 40 MG/1
FOR SUSPENSION ORAL
Status: ON HOLD
Start: 2017-01-01 | End: 2018-01-01 | Stop reason: HOSPADM

## 2017-01-01 RX ORDER — SODIUM CHLORIDE 9 MG/ML
INJECTION, SOLUTION INTRAVENOUS CONTINUOUS
Status: DISCONTINUED | OUTPATIENT
Start: 2017-01-01 | End: 2017-01-01

## 2017-01-01 RX ORDER — NAPROXEN SODIUM 220 MG/1
81 TABLET, FILM COATED ORAL DAILY
Status: DISCONTINUED | OUTPATIENT
Start: 2017-01-01 | End: 2017-01-01

## 2017-01-01 RX ORDER — BACLOFEN 20 MG/1
20 TABLET ORAL EVERY 4 HOURS
COMMUNITY
End: 2017-01-01

## 2017-01-01 RX ORDER — LEVETIRACETAM 500 MG/1
500 TABLET ORAL 2 TIMES DAILY
Status: DISCONTINUED | OUTPATIENT
Start: 2017-01-01 | End: 2017-01-01 | Stop reason: HOSPADM

## 2017-01-01 RX ORDER — DEXTROSE 50 % IN WATER (D50W) INTRAVENOUS SYRINGE
12.5
Status: DISCONTINUED | OUTPATIENT
Start: 2017-01-01 | End: 2017-01-01 | Stop reason: HOSPADM

## 2017-01-01 RX ORDER — LORAZEPAM 2 MG/ML
INJECTION INTRAMUSCULAR
Status: DISCONTINUED
Start: 2017-01-01 | End: 2017-01-01 | Stop reason: WASHOUT

## 2017-01-01 RX ORDER — ONDANSETRON 2 MG/ML
4 INJECTION INTRAMUSCULAR; INTRAVENOUS
Status: COMPLETED | OUTPATIENT
Start: 2017-01-01 | End: 2017-01-01

## 2017-01-01 RX ORDER — DEXTROSE 50 % IN WATER (D50W) INTRAVENOUS SYRINGE
25
Status: DISCONTINUED | OUTPATIENT
Start: 2017-01-01 | End: 2017-01-01 | Stop reason: HOSPADM

## 2017-01-01 RX ORDER — ACETAMINOPHEN 325 MG/1
650 TABLET ORAL EVERY 8 HOURS PRN
Status: DISCONTINUED | OUTPATIENT
Start: 2017-01-01 | End: 2017-01-01

## 2017-01-01 RX ORDER — NAPROXEN SODIUM 220 MG/1
81 TABLET, FILM COATED ORAL DAILY
Status: DISCONTINUED | OUTPATIENT
Start: 2017-01-01 | End: 2017-01-01 | Stop reason: HOSPADM

## 2017-01-01 RX ORDER — DEXTROSE MONOHYDRATE, SODIUM CHLORIDE, AND POTASSIUM CHLORIDE 50; 1.49; 9 G/1000ML; G/1000ML; G/1000ML
INJECTION, SOLUTION INTRAVENOUS CONTINUOUS
Status: DISCONTINUED | OUTPATIENT
Start: 2017-01-01 | End: 2017-01-01

## 2017-01-01 RX ORDER — RAMELTEON 8 MG/1
8 TABLET ORAL NIGHTLY PRN
Status: DISCONTINUED | OUTPATIENT
Start: 2017-01-01 | End: 2017-01-01 | Stop reason: HOSPADM

## 2017-01-01 RX ORDER — POTASSIUM CHLORIDE 20 MEQ/15ML
20 SOLUTION ORAL DAILY
Status: DISCONTINUED | OUTPATIENT
Start: 2017-01-01 | End: 2017-01-01

## 2017-01-01 RX ORDER — PANTOPRAZOLE SODIUM 40 MG/1
40 FOR SUSPENSION ORAL DAILY
Start: 2017-01-01 | End: 2017-01-01

## 2017-01-01 RX ORDER — DOXYCYCLINE HYCLATE 100 MG
100 TABLET ORAL EVERY 12 HOURS
Status: DISCONTINUED | OUTPATIENT
Start: 2017-01-01 | End: 2017-01-01 | Stop reason: HOSPADM

## 2017-01-01 RX ORDER — ACETAMINOPHEN 325 MG/1
TABLET ORAL
Status: COMPLETED
Start: 2017-01-01 | End: 2017-01-01

## 2017-01-01 RX ORDER — PANTOPRAZOLE SODIUM 40 MG/10ML
40 INJECTION, POWDER, LYOPHILIZED, FOR SOLUTION INTRAVENOUS 2 TIMES DAILY
Status: DISCONTINUED | OUTPATIENT
Start: 2017-01-01 | End: 2017-01-01

## 2017-01-01 RX ORDER — INDOMETHACIN 25 MG/1
10 CAPSULE ORAL ONCE
Status: DISCONTINUED | OUTPATIENT
Start: 2017-01-01 | End: 2017-01-01 | Stop reason: RX

## 2017-01-01 RX ORDER — ONDANSETRON 2 MG/ML
8 INJECTION INTRAMUSCULAR; INTRAVENOUS EVERY 8 HOURS PRN
Status: DISCONTINUED | OUTPATIENT
Start: 2017-01-01 | End: 2017-01-01 | Stop reason: HOSPADM

## 2017-01-01 RX ORDER — CHLORHEXIDINE GLUCONATE ORAL RINSE 1.2 MG/ML
15 SOLUTION DENTAL
Status: DISCONTINUED | OUTPATIENT
Start: 2017-01-01 | End: 2017-01-01 | Stop reason: HOSPADM

## 2017-01-01 RX ORDER — FERROUS SULFATE, DRIED 160(50) MG
1 TABLET, EXTENDED RELEASE ORAL 2 TIMES DAILY
Qty: 60 TABLET | Refills: 11 | Status: ON HOLD
Start: 2017-01-01 | End: 2018-01-01 | Stop reason: HOSPADM

## 2017-01-01 RX ORDER — LORAZEPAM 2 MG/ML
INJECTION INTRAMUSCULAR
Status: COMPLETED
Start: 2017-01-01 | End: 2017-01-01

## 2017-01-01 RX ORDER — ACETAMINOPHEN 650 MG/20.3ML
650 LIQUID ORAL EVERY 4 HOURS PRN
Status: DISCONTINUED | OUTPATIENT
Start: 2017-01-01 | End: 2017-01-01 | Stop reason: HOSPADM

## 2017-01-01 RX ORDER — GLUCAGON 1 MG
1 KIT INJECTION
Status: DISCONTINUED | OUTPATIENT
Start: 2017-01-01 | End: 2017-01-01 | Stop reason: HOSPADM

## 2017-01-01 RX ORDER — PANTOPRAZOLE SODIUM 40 MG/10ML
40 INJECTION, POWDER, LYOPHILIZED, FOR SOLUTION INTRAVENOUS
Status: COMPLETED | OUTPATIENT
Start: 2017-01-01 | End: 2017-01-01

## 2017-01-01 RX ORDER — DEXTROSE MONOHYDRATE AND SODIUM CHLORIDE 5; .45 G/100ML; G/100ML
1000 INJECTION, SOLUTION INTRAVENOUS CONTINUOUS
Status: DISCONTINUED | OUTPATIENT
Start: 2017-01-01 | End: 2017-01-01

## 2017-01-01 RX ORDER — PANTOPRAZOLE SODIUM 40 MG/1
40 FOR SUSPENSION ORAL DAILY
Status: DISCONTINUED | OUTPATIENT
Start: 2017-01-01 | End: 2017-01-01

## 2017-01-01 RX ORDER — LORAZEPAM 2 MG/ML
1 INJECTION INTRAMUSCULAR ONCE
Status: DISCONTINUED | OUTPATIENT
Start: 2017-01-01 | End: 2017-01-01

## 2017-01-01 RX ORDER — BACLOFEN 10 MG/1
20 TABLET ORAL 4 TIMES DAILY
Status: DISCONTINUED | OUTPATIENT
Start: 2017-01-01 | End: 2017-01-01 | Stop reason: HOSPADM

## 2017-01-01 RX ORDER — INDOMETHACIN 25 MG/1
10 CAPSULE ORAL
Status: DISCONTINUED | OUTPATIENT
Start: 2017-01-01 | End: 2017-01-01 | Stop reason: HOSPADM

## 2017-01-01 RX ORDER — PROPRANOLOL HYDROCHLORIDE 20 MG/5ML
20 SOLUTION ORAL EVERY 4 HOURS PRN
Status: DISCONTINUED | OUTPATIENT
Start: 2017-01-01 | End: 2017-01-01 | Stop reason: HOSPADM

## 2017-01-01 RX ORDER — CEFEPIME HYDROCHLORIDE 1 G/50ML
1 INJECTION, SOLUTION INTRAVENOUS
Status: DISCONTINUED | OUTPATIENT
Start: 2017-01-01 | End: 2017-01-01

## 2017-01-01 RX ORDER — LEVETIRACETAM 500 MG/1
500 TABLET ORAL 2 TIMES DAILY
Qty: 60 TABLET | Refills: 11 | Status: ON HOLD | OUTPATIENT
Start: 2017-01-01 | End: 2018-01-01 | Stop reason: HOSPADM

## 2017-01-01 RX ORDER — SIMVASTATIN 40 MG/1
40 TABLET, FILM COATED ORAL NIGHTLY
Status: DISCONTINUED | OUTPATIENT
Start: 2017-01-01 | End: 2017-01-01 | Stop reason: HOSPADM

## 2017-01-01 RX ORDER — PROPRANOLOL HYDROCHLORIDE 20 MG/5ML
10 SOLUTION ORAL EVERY 4 HOURS PRN
Status: ON HOLD
Start: 2017-01-01 | End: 2018-01-01 | Stop reason: HOSPADM

## 2017-01-01 RX ORDER — LEVETIRACETAM 500 MG/1
500 TABLET ORAL 2 TIMES DAILY
Status: DISCONTINUED | OUTPATIENT
Start: 2017-01-01 | End: 2017-01-01

## 2017-01-01 RX ORDER — CITALOPRAM 10 MG/1
10 TABLET ORAL DAILY
Status: DISCONTINUED | OUTPATIENT
Start: 2017-01-01 | End: 2017-01-01 | Stop reason: HOSPADM

## 2017-01-01 RX ORDER — ACETAMINOPHEN 10 MG/ML
1000 INJECTION, SOLUTION INTRAVENOUS ONCE
Status: COMPLETED | OUTPATIENT
Start: 2017-01-01 | End: 2017-01-01

## 2017-01-01 RX ORDER — PANTOPRAZOLE SODIUM 40 MG/1
40 FOR SUSPENSION ORAL 2 TIMES DAILY
Status: DISCONTINUED | OUTPATIENT
Start: 2017-01-01 | End: 2017-01-01 | Stop reason: HOSPADM

## 2017-01-01 RX ORDER — LORAZEPAM 2 MG/ML
1 CONCENTRATE ORAL EVERY 8 HOURS PRN
Status: DISCONTINUED | OUTPATIENT
Start: 2017-01-01 | End: 2017-01-01

## 2017-01-01 RX ORDER — ACETAMINOPHEN 650 MG/20.3ML
650 LIQUID ORAL
Status: DISCONTINUED | OUTPATIENT
Start: 2017-01-01 | End: 2017-01-01

## 2017-01-01 RX ORDER — IPRATROPIUM BROMIDE AND ALBUTEROL SULFATE 2.5; .5 MG/3ML; MG/3ML
3 SOLUTION RESPIRATORY (INHALATION) ONCE
Status: COMPLETED | OUTPATIENT
Start: 2017-01-01 | End: 2017-01-01

## 2017-01-01 RX ORDER — LORAZEPAM 2 MG/ML
2 INJECTION INTRAMUSCULAR ONCE
Status: DISCONTINUED | OUTPATIENT
Start: 2017-01-01 | End: 2017-01-01

## 2017-01-01 RX ORDER — CHLORHEXIDINE GLUCONATE ORAL RINSE 1.2 MG/ML
15 SOLUTION DENTAL
Refills: 0
Start: 2017-01-01 | End: 2017-01-01

## 2017-01-01 RX ORDER — IBUPROFEN 200 MG
16 TABLET ORAL
Status: DISCONTINUED | OUTPATIENT
Start: 2017-01-01 | End: 2017-01-01 | Stop reason: HOSPADM

## 2017-01-01 RX ORDER — SODIUM BICARBONATE 650 MG/1
1 TABLET ORAL ONCE
Status: DISCONTINUED | OUTPATIENT
Start: 2017-01-01 | End: 2017-01-01 | Stop reason: HOSPADM

## 2017-01-01 RX ORDER — SIMVASTATIN 40 MG/1
40 TABLET, FILM COATED ORAL NIGHTLY
Qty: 30 TABLET | Refills: 11 | Status: ON HOLD | OUTPATIENT
Start: 2017-01-01 | End: 2018-01-01 | Stop reason: HOSPADM

## 2017-01-01 RX ORDER — DOXYCYCLINE HYCLATE 100 MG
100 TABLET ORAL EVERY 12 HOURS
Status: DISCONTINUED | OUTPATIENT
Start: 2017-01-01 | End: 2017-01-01

## 2017-01-01 RX ORDER — SODIUM BICARBONATE 650 MG/1
1 TABLET ORAL ONCE
Status: DISCONTINUED | OUTPATIENT
Start: 2017-01-01 | End: 2017-01-01

## 2017-01-01 RX ORDER — ONDANSETRON 8 MG/1
8 TABLET, ORALLY DISINTEGRATING ORAL EVERY 8 HOURS PRN
Status: DISCONTINUED | OUTPATIENT
Start: 2017-01-01 | End: 2017-01-01 | Stop reason: HOSPADM

## 2017-01-01 RX ADMIN — PANTOPRAZOLE SODIUM 40 MG: 40 INJECTION, POWDER, FOR SOLUTION INTRAVENOUS at 08:05

## 2017-01-01 RX ADMIN — DEXTROSE MONOHYDRATE 12.5 G: 500 INJECTION PARENTERAL at 02:05

## 2017-01-01 RX ADMIN — SIMVASTATIN 40 MG: 40 TABLET, FILM COATED ORAL at 11:05

## 2017-01-01 RX ADMIN — DAPTOMYCIN 545 MG: 500 INJECTION, POWDER, LYOPHILIZED, FOR SOLUTION INTRAVENOUS at 11:05

## 2017-01-01 RX ADMIN — LORAZEPAM 2 MG: 2 INJECTION INTRAMUSCULAR; INTRAVENOUS at 11:05

## 2017-01-01 RX ADMIN — CEFEPIME HYDROCHLORIDE 1 G: 1 INJECTION, POWDER, FOR SOLUTION INTRAMUSCULAR; INTRAVENOUS at 07:05

## 2017-01-01 RX ADMIN — SODIUM CHLORIDE 1000 ML: 0.9 INJECTION, SOLUTION INTRAVENOUS at 04:05

## 2017-01-01 RX ADMIN — LEVETIRACETAM 500 MG: 5 INJECTION INTRAVENOUS at 11:05

## 2017-01-01 RX ADMIN — PANTOPRAZOLE SODIUM 40 MG: 40 INJECTION, POWDER, FOR SOLUTION INTRAVENOUS at 11:05

## 2017-01-01 RX ADMIN — DEXTROSE MONOHYDRATE 12.5 G: 500 INJECTION PARENTERAL at 07:05

## 2017-01-01 RX ADMIN — ACETAMINOPHEN 1000 MG: 10 INJECTION, SOLUTION INTRAVENOUS at 10:05

## 2017-01-01 RX ADMIN — SODIUM CHLORIDE, SODIUM LACTATE, POTASSIUM CHLORIDE, AND CALCIUM CHLORIDE 1000 ML: .6; .31; .03; .02 INJECTION, SOLUTION INTRAVENOUS at 06:05

## 2017-01-01 RX ADMIN — POTASSIUM CHLORIDE 30 MEQ: 20 SOLUTION ORAL at 04:05

## 2017-01-01 RX ADMIN — DOXYCYCLINE HYCLATE 100 MG: 100 TABLET, COATED ORAL at 08:05

## 2017-01-01 RX ADMIN — BACLOFEN 20 MG: 10 TABLET ORAL at 06:05

## 2017-01-01 RX ADMIN — LORAZEPAM 2 MG: 2 INJECTION INTRAMUSCULAR; INTRAVENOUS at 08:05

## 2017-01-01 RX ADMIN — CEFEPIME HYDROCHLORIDE 1 G: 1 INJECTION, POWDER, FOR SOLUTION INTRAMUSCULAR; INTRAVENOUS at 11:05

## 2017-01-01 RX ADMIN — Medication 1000 MG: at 01:05

## 2017-01-01 RX ADMIN — SODIUM CHLORIDE 1000 ML: 0.9 INJECTION, SOLUTION INTRAVENOUS at 02:05

## 2017-01-01 RX ADMIN — CEFEPIME HYDROCHLORIDE 1 G: 1 INJECTION, POWDER, FOR SOLUTION INTRAMUSCULAR; INTRAVENOUS at 03:05

## 2017-01-01 RX ADMIN — SODIUM BICARBONATE 10 MEQ: 84 INJECTION INTRAVENOUS at 11:05

## 2017-01-01 RX ADMIN — PANTOPRAZOLE SODIUM 40 MG: 40 INJECTION, POWDER, FOR SOLUTION INTRAVENOUS at 09:05

## 2017-01-01 RX ADMIN — ONDANSETRON 4 MG: 2 INJECTION INTRAMUSCULAR; INTRAVENOUS at 02:05

## 2017-01-01 RX ADMIN — ACETAMINOPHEN 650 MG: 160 SOLUTION ORAL at 08:05

## 2017-01-01 RX ADMIN — LORAZEPAM 2 MG: 2 INJECTION INTRAMUSCULAR; INTRAVENOUS at 12:05

## 2017-01-01 RX ADMIN — LORAZEPAM 1 MG: 2 SOLUTION, CONCENTRATE ORAL at 08:05

## 2017-01-01 RX ADMIN — LEVETIRACETAM 500 MG: 5 INJECTION INTRAVENOUS at 09:05

## 2017-01-01 RX ADMIN — ACETAMINOPHEN 650 MG: 325 TABLET ORAL at 02:05

## 2017-01-01 RX ADMIN — PANTOPRAZOLE SODIUM 40 MG: 40 GRANULE, DELAYED RELEASE ORAL at 11:05

## 2017-01-01 RX ADMIN — LORAZEPAM 1 MG: 2 SOLUTION, CONCENTRATE ORAL at 07:05

## 2017-01-01 RX ADMIN — DOXYCYCLINE HYCLATE 100 MG: 100 TABLET, COATED ORAL at 11:05

## 2017-01-01 RX ADMIN — ONDANSETRON 4 MG: 2 INJECTION INTRAMUSCULAR; INTRAVENOUS at 03:05

## 2017-01-01 RX ADMIN — BACLOFEN 20 MG: 10 TABLET ORAL at 11:05

## 2017-01-01 RX ADMIN — PANCRELIPASE 1 CAPSULE: 60000; 12000; 38000 CAPSULE, DELAYED RELEASE PELLETS ORAL at 11:05

## 2017-01-01 RX ADMIN — HYDROMORPHONE HYDROCHLORIDE 1 MG: 1 INJECTION, SOLUTION INTRAMUSCULAR; INTRAVENOUS; SUBCUTANEOUS at 03:05

## 2017-01-01 RX ADMIN — DAPTOMYCIN 525 MG: 500 INJECTION, POWDER, LYOPHILIZED, FOR SOLUTION INTRAVENOUS at 11:05

## 2017-01-01 RX ADMIN — LORAZEPAM 2 MG: 2 INJECTION INTRAMUSCULAR; INTRAVENOUS at 05:05

## 2017-01-01 RX ADMIN — LORAZEPAM 2 MG: 2 INJECTION INTRAMUSCULAR; INTRAVENOUS at 07:05

## 2017-01-01 RX ADMIN — CITALOPRAM HYDROBROMIDE 10 MG: 10 TABLET ORAL at 04:05

## 2017-01-01 RX ADMIN — DEXTROSE, SODIUM CHLORIDE, AND POTASSIUM CHLORIDE: 5; .9; .15 INJECTION INTRAVENOUS at 04:05

## 2017-01-01 RX ADMIN — IPRATROPIUM BROMIDE AND ALBUTEROL SULFATE 3 ML: .5; 3 SOLUTION RESPIRATORY (INHALATION) at 12:05

## 2017-01-01 RX ADMIN — PROPRANOLOL HYDROCHLORIDE 20 MG: 20 SOLUTION ORAL at 11:05

## 2017-01-01 RX ADMIN — CITALOPRAM HYDROBROMIDE 10 MG: 10 TABLET ORAL at 11:05

## 2017-01-01 RX ADMIN — ASPIRIN 81 MG CHEWABLE TABLET 81 MG: 81 TABLET CHEWABLE at 02:05

## 2017-01-01 RX ADMIN — LORAZEPAM 2 MG: 2 INJECTION INTRAMUSCULAR; INTRAVENOUS at 04:05

## 2017-01-01 RX ADMIN — SODIUM CHLORIDE 2000 ML: 0.9 INJECTION, SOLUTION INTRAVENOUS at 11:05

## 2017-01-01 RX ADMIN — RAMELTEON 8 MG: 8 TABLET, FILM COATED ORAL at 08:05

## 2017-01-01 RX ADMIN — PANTOPRAZOLE SODIUM 40 MG: 40 INJECTION, POWDER, FOR SOLUTION INTRAVENOUS at 10:05

## 2017-01-01 RX ADMIN — PANTOPRAZOLE SODIUM 40 MG: 40 GRANULE, DELAYED RELEASE ORAL at 08:05

## 2017-01-01 RX ADMIN — LORAZEPAM 2 MG: 2 INJECTION INTRAMUSCULAR at 11:05

## 2017-01-01 RX ADMIN — DEXTROSE MONOHYDRATE 12.5 G: 500 INJECTION PARENTERAL at 04:05

## 2017-01-01 RX ADMIN — ACETAMINOPHEN 650 MG: 160 SOLUTION ORAL at 03:05

## 2017-01-01 RX ADMIN — CITALOPRAM HYDROBROMIDE 10 MG: 10 TABLET ORAL at 08:05

## 2017-01-01 RX ADMIN — SODIUM CHLORIDE, SODIUM LACTATE, POTASSIUM CHLORIDE, AND CALCIUM CHLORIDE 1000 ML: .6; .31; .03; .02 INJECTION, SOLUTION INTRAVENOUS at 07:05

## 2017-01-01 RX ADMIN — POTASSIUM CHLORIDE 20 MEQ: 20 SOLUTION ORAL at 10:05

## 2017-01-01 RX ADMIN — DEXTROSE, SODIUM CHLORIDE, AND POTASSIUM CHLORIDE: 5; .9; .15 INJECTION INTRAVENOUS at 11:05

## 2017-01-01 RX ADMIN — PANTOPRAZOLE SODIUM 40 MG: 40 INJECTION, POWDER, FOR SOLUTION INTRAVENOUS at 03:05

## 2017-01-01 RX ADMIN — ONDANSETRON 8 MG: 2 INJECTION INTRAMUSCULAR; INTRAVENOUS at 08:05

## 2017-01-01 RX ADMIN — CEFTRIAXONE 2 G: 2 INJECTION, SOLUTION INTRAVENOUS at 12:05

## 2017-01-01 RX ADMIN — BACLOFEN 20 MG: 10 TABLET ORAL at 05:05

## 2017-01-01 RX ADMIN — IOHEXOL 100 ML: 350 INJECTION, SOLUTION INTRAVENOUS at 04:05

## 2017-01-01 RX ADMIN — DEXTROSE, SODIUM CHLORIDE, AND POTASSIUM CHLORIDE: 5; .9; .15 INJECTION INTRAVENOUS at 09:05

## 2017-01-01 RX ADMIN — AZTREONAM 2000 MG: 2 INJECTION, POWDER, LYOPHILIZED, FOR SOLUTION INTRAMUSCULAR; INTRAVENOUS at 02:05

## 2017-01-01 RX ADMIN — SODIUM CHLORIDE: 0.9 INJECTION, SOLUTION INTRAVENOUS at 12:05

## 2017-01-01 RX ADMIN — PANTOPRAZOLE SODIUM 40 MG: 40 GRANULE, DELAYED RELEASE ORAL at 04:05

## 2017-01-01 RX ADMIN — DAPTOMYCIN 905 MG: 500 INJECTION, POWDER, LYOPHILIZED, FOR SOLUTION INTRAVENOUS at 01:05

## 2017-05-19 NOTE — ED TRIAGE NOTES
"Patient arrived via EMS without family and pt is non verbal. Nursing home contacted and was told that pt has one episode of projectile vomiting that appeared to be coffee ground colored. Staff states she had another episode of the same 30 min later and that it was forceful enough to hit wall of room. They also state she has large BM this am that was "dark colored almost like she is taking iron tablets" - they also report she was diaphoretic but was afebrile this am. Daughter is aware mother is in ER.  "

## 2017-05-19 NOTE — ED NOTES
Projectile vomiting of large amt of liquid dark brown emesis.Incontinent of light brown soft stool .

## 2017-05-19 NOTE — ED NOTES
Eyes open and moving making eye contact. Non verbal .Both arms and legs contracted. Indwelling whitmore patent and draining. Alone Side rails up . Maintained on cardiac monitor .Gurgling respirations noted. Reported by nursing home that pt. Had projectile vomiting

## 2017-05-19 NOTE — ED PROVIDER NOTES
Encounter Date: 5/19/2017    SCRIBE #1 NOTE: I, Jenifer Burciaga, am scribing for, and in the presence of,  Dr. Royal. I have scribed the following portions of the note - the Resident attestation.       History     Chief Complaint   Patient presents with    Emesis     resident at [facility], staff reports 2 episodes of vomiting since 10am. patient disoriented/non-verbal per baseline     Review of patient's allergies indicates:   Allergen Reactions    Amoxicillin Other (See Comments)     Per daughter always allergic, unknown rxn     HPI Comments: Pt is a 52 year old female with hx of cva, HTN who presents with nausea and diaphoresis since this morning. The patient is nonverbal but per her nursing home care taker she she had 2 coffee ground emesis and dark stool this morning and was diaphoretic. However her vitals were WNL prior to being sent to the ER for evaluation.     Past Medical History:   Diagnosis Date    Anemia     iron def    Depression     Dysarthria     HTN (hypertension)     Hyperlipemia     Osteomyelitis     Seizures     Stroke      Past Surgical History:   Procedure Laterality Date    TOE AMPUTATION       History reviewed. No pertinent family history.  Social History   Substance Use Topics    Smoking status: Unknown If Ever Smoked    Smokeless tobacco: Not on file    Alcohol use No     Review of Systems   Unable to perform ROS: Patient nonverbal       Physical Exam   Initial Vitals   BP Pulse Resp Temp SpO2   05/19/17 1147 05/19/17 1147 05/19/17 1147 05/19/17 1147 --   119/87 89 22 100.7 °F (38.2 °C)      Physical Exam    Constitutional: She is diaphoretic. No distress.   HENT:   Head: Normocephalic and atraumatic.   Eyes: EOM are normal.   Cardiovascular: Normal rate. Exam reveals no gallop and no friction rub.    No murmur heard.  Pulmonary/Chest: No respiratory distress. She has no wheezes. She has no rhonchi. She has no rales. She exhibits no tenderness.   Abdominal:  Soft. Bowel sounds are normal. She exhibits no distension. There is no guarding.   Unable to evaluate for tenderness as pt is making grunting sounds prior and during the exam.    Genitourinary: Rectal exam shows guaiac positive stool. Guaiac positive stool. : Acceptable.  Genitourinary Comments: Prolapsed uterus   Musculoskeletal:   Lower extremities are contracted. Mild excoriations on the left knee healing well. No fluctuance or drainage.    Lymphadenopathy:     She has no cervical adenopathy.   Neurological: She is alert. No cranial nerve deficit.   Skin: Skin is warm. No rash and no abscess noted.         ED Course   Procedures  Labs Reviewed   CBC W/ AUTO DIFFERENTIAL - Abnormal; Notable for the following:        Result Value    WBC 17.64 (*)     RBC 5.51 (*)     MCH 26.5 (*)     MCHC 31.2 (*)     RDW 15.8 (*)     MPV 13.5 (*)     Gran # 15.0 (*)     Mono # 1.2 (*)     Gran% 84.7 (*)     Lymph% 7.9 (*)     All other components within normal limits   COMPREHENSIVE METABOLIC PANEL - Abnormal; Notable for the following:     Sodium 146 (*)     Total Protein 8.7 (*)     All other components within normal limits   URINALYSIS - Abnormal; Notable for the following:     Appearance, UA Cloudy (*)     Protein, UA 1+ (*)     Leukocytes, UA 2+ (*)     All other components within normal limits   URINALYSIS MICROSCOPIC - Abnormal; Notable for the following:     RBC, UA 8 (*)     WBC, UA 40 (*)     Bacteria, UA Many (*)     All other components within normal limits   LACTIC ACID, PLASMA - Abnormal; Notable for the following:     Lactate (Lactic Acid) 2.7 (*)     All other components within normal limits   ISTAT PROCEDURE - Abnormal; Notable for the following:     POC PO2 76 (*)     POC Sodium 146 (*)     POC TCO2 29 (*)     All other components within normal limits   GRAM STAIN   APTT   B-TYPE NATRIURETIC PEPTIDE   CORTISOL, RANDOM   LACTIC ACID, PLASMA   LIPASE   MAGNESIUM   PHOSPHORUS   PROTIME-INR    TROPONIN I   POCT URINE PREGNANCY   TYPE & SCREEN   ISTAT LACTATE             Medical Decision Making:   History:   Old Medical Records: I decided to obtain old medical records.  Clinical Tests:   Lab Tests: Reviewed and Ordered  Radiological Study: Reviewed and Ordered  Medical Tests: Reviewed and Ordered       APC / Resident Notes:   52 year old female BIBEMS for evaluation of diaphoresis and vomiting, febrile and tachypneic on arrival    DDX: SIRS(2/4), sepsis, UTI, CAP, HCAP, gastroenteritis, dehydration, electrolyte abnormality, UGIB, LGIB    Will evaluate with ct abdomen/pelvis, labs, cultures, antibiotics, fluids 30cc/kg, antiemetic, antipyretic, and I anticipate admission for further work up.    Joe Rubalcava MD   Emergency Medicine PGY 2   1:21 PM 5/19/2017    The patient has evidence of UTI per urinalysis. Her ct abdomen has no evidence of acute findings. Internal medicine has been consulted for inpatient IV antibiotics and hydration.     She had an episode of hypotension 96/52 which responded to IVF. Will continue to monitor.    Joe Rubalcava MD   Emergency Medicine PGY 2   7:13 PM 5/19/2017           Scribe Attestation:   Scribe #1: I performed the above scribed service and the documentation accurately describes the services I performed. I attest to the accuracy of the note.    Attending Attestation:   Physician Attestation Statement for Resident:  As the supervising MD   Physician Attestation Statement: I have personally seen and examined this patient.   I agree with the above history. -: Pt presents with vomiting from nursing home. Febrile.    As the supervising MD I agree with the above PE.   -: Abdominal exam challenging but w/ diffuse tenderness. No focal tenderness.   As the supervising MD I agree with the above treatment, course, plan, and disposition.   -: Will do septic evaluation and CT A/P.        Attending Critical Care:   Critical Care Times:   Direct Patient Care (initial evaluation,  reassessments, and time considering the case)................................................................22 minutes.   Additional History from reviewing old medical records or taking additional history from the family, EMS, PCP, etc.......................3 minutes.   Ordering, Reviewing, and Interpreting Diagnostic Studies...............................................................................................................3 minutes.   Documentation..................................................................................................................................................................................3 minutes.   Consultation with other Physicians. .................................................................................................................................................3 minutes.   ==============================================================  · Total Critical Care Time - exclusive of procedural time: 34 minutes.  ==============================================================    Physician Attestation for Scribe:  Physician Attestation Statement for Scribe #1: I, Dr. Royal, reviewed documentation, as scribed by Jenifer Burciaga in my presence, and it is both accurate and complete.                 ED Course     Clinical Impression:   The primary encounter diagnosis was Acute cystitis without hematuria. Diagnoses of Sepsis, due to unspecified organism, Acute cystitis with hematuria, Paroxysmal atrial fibrillation, CVA, old, hemiparesis, Dysphagia as late effect of cerebrovascular disease, Gastrointestinal hemorrhage associated with acute gastritis, PEG (percutaneous endoscopic gastrostomy) status, Sepsis due to urinary tract infection, Goals of care, counseling/discussion, Hyperventilating, and GI bleed were also pertinent to this visit.     [id number]     Joe Rubalcava MD  Resident  05/21/17 1123       Juan Ramon Royal MD  05/22/17 6156

## 2017-05-20 PROBLEM — K92.0 DARK EMESIS: Status: ACTIVE | Noted: 2017-01-01

## 2017-05-20 PROBLEM — Z71.89 GOALS OF CARE, COUNSELING/DISCUSSION: Status: ACTIVE | Noted: 2017-01-01

## 2017-05-20 PROBLEM — Z71.89 GOALS OF CARE, COUNSELING/DISCUSSION: Chronic | Status: ACTIVE | Noted: 2017-01-01

## 2017-05-20 PROBLEM — R06.4 HYPERVENTILATING: Status: ACTIVE | Noted: 2017-01-01

## 2017-05-20 NOTE — ASSESSMENT & PLAN NOTE
· qSOFA score of 3  · WBC 17 with 85% granylocytes  · BP fluctuating around 100, but patient on antihypertensive therapy  · Lactic acid 1.2 that increased to 2.7.  Will trend, and consult ICU if lactic continues to rise  · S/p 30mL/kg fluid resuscitation:  2,700mL  · Source likely urine, see acute cystitis with hematuria  · PICC team consulted for additional access

## 2017-05-20 NOTE — ASSESSMENT & PLAN NOTE
-patient suffered multiple strokes in past, last one in 2008   -nonverbal at baseline, wheelchair/bedbound

## 2017-05-20 NOTE — ASSESSMENT & PLAN NOTE
Anxiety  - likely secondary to anxiety  - Workup pending (CXR, EKG, troponin, lactic acid)  - will give ativan and tylenol now.

## 2017-05-20 NOTE — CONSULTS
[facility]  Infectious Disease  Consult Note    Patient Name: Jonathan Nieves  MRN: [id number]  Admission Date: 5/19/2017  Hospital Length of Stay: 1 days  Attending Physician: Jessica Mar MD  Primary Care Provider: Primary Doctor No     Isolation Status: Contact    Patient information was obtained from past medical records.      Consults  Assessment/Plan:     * Acute cystitis with hematuria  Concern for UTI based on presentation and labs findings. Given her hx agree with current antibiotics choices    -continue daptomycin  -continue CTX  -await cxs to de-escalate  -plan for 10 day course      Thank you for your consult. I will follow-up with patient. Please contact us if you have any additional questions.    Artemio Duke MD  Infectious Disease  [facility]    Subjective:     Principal Problem: Acute cystitis with hematuria    HPI: 53yo female with PMH of CVA with residual aphasia and dysphagia, s/p PEG tube placement and bed riddenness who comes to the ED from a nursing home for evaluation of hypotension and vomiting. The history was obtained from the chart. The nursing home called and informed the duaghter that her mother started to have projectile, coffee ground emesis earlier on the morning of admission, and a repeat episode later that afternoon. Pt has a history of UTIs with proteus and VRE. No indwelling whitmore. Also has a staghorn calculus which is stable on CT. Work up so far is concerning for UTI with some WBCs on UA and GPC in chains on urine GS. CXR and abd CT without obvious infectious source. Received aztreonam and vanco in the Ed and continued on CTX and daptomycin. Leukocytosis improved         Past Medical History:   Diagnosis Date    Anemia     iron def    Depression     Dysarthria     HTN (hypertension)     Hyperlipemia     Osteomyelitis     Seizures     Stroke        Past Surgical History:   Procedure Laterality Date    TOE AMPUTATION          Review of patient's allergies indicates:   Allergen Reactions    Amoxicillin Other (See Comments)     Per daughter always allergic, unknown rxn       Medications:  Prescriptions Prior to Admission   Medication Sig    ascorbic acid (VITAMIN C) 500 MG tablet 1 tablet (500 mg total) by Per G Tube route once daily.    aspirin 81 MG Chew 1 tablet (81 mg total) by Per G Tube route once daily.    calcium-vitamin D3 500 mg(1,250mg) -200 unit per tablet 1 tablet by Per G Tube route 2 (two) times daily.    citalopram (CELEXA) 10 MG tablet 1 tablet (10 mg total) by Per G Tube route once daily.    docusate (COLACE) 50 mg/5 mL liquid 5 mLs (50 mg total) by Per G Tube route once daily.    famotidine (PEPCID) 20 MG tablet 1 tablet (20 mg total) by Per G Tube route 2 (two) times daily.    lactulose (CEPHULAC) 10 gram packet Take 1 packet (10 g total) by mouth 3 (three) times daily.    metoprolol tartrate (LOPRESSOR) 25 MG tablet Take 12.5 mg by mouth 2 (two) times daily.    omeprazole-sodium bicarbonate (ZEGERID) 40-1,680 mg Pack 1 packet by PEG Tube route before breakfast.    ondansetron (ZOFRAN) 4 mg/5 mL solution 5 mLs (4 mg total) by Per G Tube route 2 (two) times daily as needed for Nausea.    oxycodone (ROXICODONE) 5 mg/5 mL Soln Take 5 mLs (5 mg total) by mouth every 6 (six) hours.    polyethylene glycol (GLYCOLAX) 17 gram/dose powder Take 17 g by mouth once daily.    ranitidine (ZANTAC) 150 MG capsule Take 150 mg by mouth 2 (two) times daily.    simvastatin (ZOCOR) 40 MG tablet Take 1 tablet (40 mg total) by mouth every evening.     Antibiotics     Start     Stop Route Frequency Ordered    05/19/17 2100  cefTRIAXone (ROCEPHIN) 2 g in dextrose 5 % 50 mL IVPB      -- IV Every 24 hours (non-standard times) 05/19/17 2029 05/20/17 2345  daptomycin (CUBICIN) 545 mg in sodium chloride 0.9% IVPB      -- IV Every 24 hours (non-standard times) 05/19/17 2249        Antifungals     None        Antivirals      None             There is no immunization history on file for this patient.    Family History     None        Social History     Social History    Marital status:      Spouse name: N/A    Number of children: N/A    Years of education: N/A     Social History Main Topics    Smoking status: Unknown If Ever Smoked    Smokeless tobacco: None    Alcohol use No    Drug use: No    Sexual activity: Not Asked     Other Topics Concern    None     Social History Narrative     Review of Systems   Unable to perform ROS: Mental status change     Objective:     Vital Signs (Most Recent):  Temp: 98.7 °F (37.1 °C) (05/20/17 0832)  Pulse: (!) 120 (05/20/17 1217)  Resp: 18 (05/20/17 1217)  BP: (!) 146/66 (05/20/17 0832)  SpO2: (!) 94 % (05/20/17 1217) Vital Signs (24h Range):  Temp:  [98.5 °F (36.9 °C)-100.8 °F (38.2 °C)] 98.7 °F (37.1 °C)  Pulse:  [] 120  Resp:  [16-32] 18  SpO2:  [90 %-99 %] 94 %  BP: ()/(53-68) 146/66     Weight: 87.1 kg (192 lb)  Body mass index is 32.96 kg/(m^2).    Estimated Creatinine Clearance: 98.2 mL/min (based on Cr of 0.7).    Physical Exam   Constitutional: She appears well-developed and well-nourished. No distress.   HENT:   Head: Normocephalic and atraumatic.   Eyes: EOM are normal. Pupils are equal, round, and reactive to light. No scleral icterus.   Neck: Normal range of motion. Neck supple. No thyromegaly present.   Cardiovascular: Normal rate, regular rhythm and normal heart sounds.    No murmur heard.  Pulmonary/Chest: Effort normal and breath sounds normal. No respiratory distress.   Abdominal: Soft. Bowel sounds are normal. She exhibits no distension. There is no tenderness.   PEG tube   Musculoskeletal: She exhibits deformity.   Severe contractures   Neurological:   Nonverbal   Skin: Skin is warm and dry. She is not diaphoretic.   Nursing note and vitals reviewed.      Significant Labs: All pertinent labs within the past 24 hours have been reviewed.    Significant  Imaging: I have reviewed all pertinent imaging results/findings within the past 24 hours.

## 2017-05-20 NOTE — ASSESSMENT & PLAN NOTE
· UA notable for many RBC, WBC, and bacteria  · Previous history of UTIs include Proteus and VRE  · No history of indwelling catheter - placed in the ED  · Check urine gram stain and cx  · S/p Aztreonam and Vanc in the ED because of Amoxicillin allergy.  Will give a dose of Ceftriaxone for now.  Pending gram stain results, might need to get ID involved for Linezolid because of history of VRE

## 2017-05-20 NOTE — SIGNIFICANT EVENT
Urine gram stain returned positive for gram positive cocci in chains.  Will call ID for approval for Daptomycin based on her history of VRE in 2014, which was intermediate to Linezolid.    Maggy Orourke MD  Med PGY3  [phone]

## 2017-05-20 NOTE — H&P
[facility]  Critical Care Medicine  History & Physical    Patient Name: Jonathan Nieves  MRN: [id number]  Admission Date: 5/19/2017  Hospital Length of Stay: 1 days  Code Status: Full Code  Attending Physician: Cuca Cisneros MD   Primary Care Provider: Primary Doctor No   Principal Problem: Acute cystitis with hematuria    Subjective:     HPI:  Mr. Jonathan Nieves is a 53yo female with history of CVA with residual aphasia and dysphagia, s/p PEG tube placement and bed riddenness, osteomyelitis s/p amputation of toe, who comes to the ED from [facility] for evaluation of hypotension and vomiting.  The history was obtained from the daughter and POA (Matias Nieves, [phone]).  She said that she doesn't know much - but she mentioned that the [facility] called and informed her that her mother started to have projectile, coffee ground emesis earlier this morning with diaphoresis, and a repeat episode later that afternoon - and that they were taking her to the ED.    ICU is consulted for lactic acid of 7.3. Patient has not been able to get more fluids or antibiotics because of lack of access. PICC team and anesthesia have attempted PIVs, but were unsuccessful. Patient nonverbal, contracted, diaphoretic, eyes track movements, but does not follow commands. Temperature 99.6, BP 98/56, HR 86, saturating 94% on 2L NC. Patient will be admitted to the MICU for central line placement and closer monitoring.     Per daughter on phone, patient suffered multiple strokes, last one in 2008 and since then has been nonverbal, with contractures of all extremities. She states patient has been at [facility] for the past 4-5 years. Does not know of any other medical conditions other prior strokes. Denies seizures. Daughter confirms patient is full code and gave consent for central line.       Hospital/ICU Course:  Pt was found to be septic due to a UTI and was started on  Rocephin and Daptomycin and IV fluids.  Pt greatly improved overnight. However this AM, pt began hyperventilating and became diaphoretic.  Vitals were stable with  and /90, o2 sats 96%. Glucose 87. CXR, EKG, troponin, and repeat lactic acid ordered. Ativan and tylenol also ordered via PEG as pt lost IV access.      Past Medical History:   Diagnosis Date    Anemia     iron def    Depression     Dysarthria     HTN (hypertension)     Hyperlipemia     Osteomyelitis     Seizures     Stroke        Past Surgical History:   Procedure Laterality Date    TOE AMPUTATION         Review of patient's allergies indicates:   Allergen Reactions    Amoxicillin Other (See Comments)     Per daughter always allergic, unknown rxn       Family History     None        Social History Main Topics    Smoking status: Unknown If Ever Smoked    Smokeless tobacco: Not on file    Alcohol use No    Drug use: No    Sexual activity: Not on file      Review of Systems   Unable to perform ROS: Patient nonverbal     Objective:     Vital Signs (Most Recent):  Temp: 99.6 °F (37.6 °C) (05/20/17 1200)  Pulse: 85 (05/20/17 1300)  Resp: 18 (05/20/17 1217)  BP: (!) 98/56 (05/20/17 1300)  SpO2: (!) 94 % (05/20/17 1217) Vital Signs (24h Range):  Temp:  [98.5 °F (36.9 °C)-100.8 °F (38.2 °C)] 99.6 °F (37.6 °C)  Pulse:  [] 85  Resp:  [16-32] 18  SpO2:  [94 %-99 %] 94 %  BP: ()/(53-90) 98/56   Weight: 87.1 kg (192 lb)  Body mass index is 32.96 kg/(m^2).      Intake/Output Summary (Last 24 hours) at 05/20/17 1514  Last data filed at 05/20/17 0400   Gross per 24 hour   Intake              100 ml   Output              250 ml   Net             -150 ml       Physical Exam   Constitutional: She appears well-developed and well-nourished. No distress.   HENT:   Head: Normocephalic.   Eyes: Conjunctivae are normal.   Cardiovascular: Normal rate, regular rhythm and normal heart sounds.  Exam reveals no gallop and no friction rub.    No  murmur heard.  Pulmonary/Chest:   Coarse breath sounds throughout, no wheezes or rales   Abdominal: Soft. She exhibits no distension. There is no tenderness.   PEG in place, clean    Musculoskeletal: She exhibits no edema.   All 4 extremities contracted, no movement. Patient tracks movement with eyes, but does not close eyes when asked to.    Neurological: She is alert.   Skin: Skin is warm. She is diaphoretic.   Psychiatric: She has a normal mood and affect.       Vents:     Lines/Drains/Airways     Drain                 Gastrostomy/Enterostomy 10/25/16 1401 Percutaneous endoscopic gastrostomy (PEG) midline feeding 207 days          Pressure Ulcer                 Pressure Ulcer 10/26/16 1502 Left elbow Stage  days          Peripheral Intravenous Line                 Peripheral IV - Single Lumen 05/19/17 Left Hand 1 day              Significant Labs:    CBC/Anemia Profile:    Recent Labs  Lab 05/19/17  2327 05/20/17  0831 05/20/17  1245   WBC 21.72* 13.02* 12.38   HGB 12.2  12.2 12.0 11.8*   HCT 39.8  39.3 38.8 38.9    140* 160   MCV 86 86 87   RDW 15.8* 16.0* 15.9*        Chemistries:    Recent Labs  Lab 05/19/17  1418 05/20/17  0831 05/20/17  1245   * 143 146*   K 4.2 3.9 3.6    112* 113*   CO2 26 23 16*   BUN 16 11 10   CREATININE 0.8 0.7 0.8   CALCIUM 9.6 8.2* 8.5*   ALBUMIN 3.6 2.8*  --    PROT 8.7* 6.8  --    BILITOT 0.6 0.7  --    ALKPHOS 105 87  --    ALT 24 18  --    AST 26 20  --    MG 2.0 2.0  --    PHOS 3.2 3.0  --        Imaging Results         CT Abdomen Pelvis With Contrast (Final result) Result time:  05/19/17 17:20:03    Final result by Jeronimo Echevarria MD (05/19/17 17:20:03)    Impression:        Stable left staghorn calculus with cystic dilatation of the lower pole calyces with overlying cortical thinning.    Absence of right kidney.  Right adrenal gland not definitively seen.    Multiple vertebral compression fractures, stable from the prior examination.    Additional  findings as above.  ______________________________________     Electronically signed by resident: MOIRA CHARLTON  Date:     05/19/17  Time:    17:02            As the supervising and teaching physician, I personally reviewed the images and resident's interpretation and I agree with the findings.          Electronically signed by: SERGIO PEDRAZA MD  Date:     05/19/17  Time:    17:20     Narrative:    Procedure comments: The patient was surveyed from the lung bases through the pelvis after the administration of 100 cc Omni 350 IV contrast  and data was reconstructed for coronal, sagittal, and axial images.    Comparison: CT abdomen pelvis 10/24/2016.    Findings:    There is minor left-sided atelectasis. There is no pleural fluid.  The visualized portions of the heart appear normal.    The liver is normal in size and attenuation with no focal hepatic abnormality.  Gallbladder surgically absent with clips in the gallbladder fossa.  There is no intra hepatic biliary ductal dilatation.  Mild prominence of common bile duct in keeping with prior instrumentation.    The spleen, pancreas, and left adrenal gland are unremarkable.  Right adrenal gland not seen. There is a gastrostomy, with balloon and tube in the stomach lumen.    The right kidney is absent. There is a 2.4 cm left lower pole staghorn nephrolith which produces cystic dilatation of the calyces with severe overlying cortical thinning.  The ureter is unremarkable. The uterus, and ovaries demonstrate no significant abnormality. The urinary bladder is nondistended with a De Leon catheter in its lumen.    The abdominal aorta is normal in course and caliber.    The visualized loops of small and large bowel show no evidence of obstruction or inflammation.  Moderate stool noted in the rectum.  There is no ascites, free fluid, or intraperitoneal free air. There is no evidence of lymph node enlargement in the abdomen or pelvis.    There multiple stable vertebral compression  fractures in comparison prior examination.  There are neuromuscular changes of the bilateral hips with generalized muscle atrophy.            X-Ray Chest AP Portable (Final result) Result time:  05/19/17 15:08:17    Final result by Blayne Loyd Jr., MD (05/19/17 15:08:17)    Impression:     See above      Electronically signed by: BLAYNE LOYD MD  Date:     05/19/17  Time:    15:08     Narrative:    Chest portable compared to October 2016.  Patient is rotated to the left.  Lungs are hypoaerated.  No confluent consolidation allowing for projection.  There is some parahilar alveolar filling on the left.              Significant Imaging:   Imaging Results         CT Abdomen Pelvis With Contrast (Final result) Result time:  05/19/17 17:20:03    Final result by Jeronimo Echevarria MD (05/19/17 17:20:03)    Impression:        Stable left staghorn calculus with cystic dilatation of the lower pole calyces with overlying cortical thinning.    Absence of right kidney.  Right adrenal gland not definitively seen.    Multiple vertebral compression fractures, stable from the prior examination.    Additional findings as above.  ______________________________________     Electronically signed by resident: JERONIMO ECHEVARRIA  Date:     05/19/17  Time:    17:02            As the supervising and teaching physician, I personally reviewed the images and resident's interpretation and I agree with the findings.          Electronically signed by: SERGIO PEDRAZA MD  Date:     05/19/17  Time:    17:20     Narrative:    Procedure comments: The patient was surveyed from the lung bases through the pelvis after the administration of 100 cc Omni 350 IV contrast  and data was reconstructed for coronal, sagittal, and axial images.    Comparison: CT abdomen pelvis 10/24/2016.    Findings:    There is minor left-sided atelectasis. There is no pleural fluid.  The visualized portions of the heart appear normal.    The liver is normal in size and attenuation  with no focal hepatic abnormality.  Gallbladder surgically absent with clips in the gallbladder fossa.  There is no intra hepatic biliary ductal dilatation.  Mild prominence of common bile duct in keeping with prior instrumentation.    The spleen, pancreas, and left adrenal gland are unremarkable.  Right adrenal gland not seen. There is a gastrostomy, with balloon and tube in the stomach lumen.    The right kidney is absent. There is a 2.4 cm left lower pole staghorn nephrolith which produces cystic dilatation of the calyces with severe overlying cortical thinning.  The ureter is unremarkable. The uterus, and ovaries demonstrate no significant abnormality. The urinary bladder is nondistended with a De Leon catheter in its lumen.    The abdominal aorta is normal in course and caliber.    The visualized loops of small and large bowel show no evidence of obstruction or inflammation.  Moderate stool noted in the rectum.  There is no ascites, free fluid, or intraperitoneal free air. There is no evidence of lymph node enlargement in the abdomen or pelvis.    There multiple stable vertebral compression fractures in comparison prior examination.  There are neuromuscular changes of the bilateral hips with generalized muscle atrophy.            X-Ray Chest AP Portable (Final result) Result time:  05/19/17 15:08:17    Final result by Nael Oliveros Jr., MD (05/19/17 15:08:17)    Impression:     See above      Electronically signed by: NAEL OLIVEROS MD  Date:     05/19/17  Time:    15:08     Narrative:    Chest portable compared to October 2016.  Patient is rotated to the left.  Lungs are hypoaerated.  No confluent consolidation allowing for projection.  There is some parahilar alveolar filling on the left.                Assessment/Plan:     Neuro  CVA, old, hemiparesis  -patient suffered multiple strokes in past, last one in 2008   -nonverbal at baseline, wheelchair/bedbound     Pulmonary  Hyperventilating  -patient  hyperventilates intermittently but maintains normal oxygen saturation   -appears to be related to anxiety   -CXR shows hypoventilation of lungs   -ABG ordered    Renal  * Acute cystitis with hematuria  -UA notable for many RBC, WBC, and bacteria  -Previous history of UTIs include Proteus and VRE  -No history of indwelling catheter - placed in the ED  -S/p Aztreonam and Vanc in the ED because of Amoxicillin allergy.  -ID consulted - recommends rocephin and daptomycin    -UCx growing Enterococcus, sensitivities pending     Fluids/Electrolytes/Nutrition/GI  GIB (gastrointestinal bleeding)  -Hgb 14.6 on admission but baseline around 11. 2 further episodes of coffee ground emesis in the ED  -Type and screen obtained  -Noted to have coffee ground emesis and FOBT +  -Was given Pantoprazole 40mg IV x 1 in the ED.   -GI consulted - no intervention given no signs of GIB  -no BM or emesis during hospitalization     Other  Sepsis due to urinary tract infection  -patient febrile with WBC 17, 85% granulocytes   -lactic acid on admission 1.2, but now 7.3   -received 2.7L NS in ED, but subsequently lost IV access  -admitted to ICU, central line placed   -2L LR, continue rocephin and daptomycin   -repeat lactate       Critical Care Medicine Daily Checklist:    A: Awake: RASS Goal/Actual Goal:    Actual: Krishnamurthy Agitation Sedation Scale (RASS): Agitated   B: Spontaneous Breathing Trial Performed?     C: SAT & SBT Coordinated?  N/A                      D: Delirium: CAM-ICU Overall CAM-ICU: Positive   E: Early Mobility Performed? No   F: Feeding Goal:    Status:     Current Diet Order   No orders of the defined types were placed in this encounter.      AS: Analgesia/Sedation N/A   T: Thromboembolic Prophylaxis Mechanical    H: HOB > 300 Yes   U: Stress Ulcer Prophylaxis (if needed) N/A   G: Glucose Control N/A   B: Bowel Function Stool Occurrence: 0   I: Indwelling Catheter (Lines & De Leon) Necessity De Leon, R CVL   D: De-escalation of  Antimicrobials/Pharmacotherapies Rocephin and daptomycin    Plan for the day/ETD Fluid resuscitation and abx     Code Status:  Family/Goals of Care: Full Code          Italia Dinero MD  Critical Care Medicine  [facility]

## 2017-05-20 NOTE — SUBJECTIVE & OBJECTIVE
Past Medical History:   Diagnosis Date    Anemia     iron def    Depression     Dysarthria     HTN (hypertension)     Hyperlipemia     Osteomyelitis     Seizures     Stroke        Past Surgical History:   Procedure Laterality Date    TOE AMPUTATION         Review of patient's allergies indicates:   Allergen Reactions    Amoxicillin Other (See Comments)     Per daughter always allergic, unknown rxn       No current facility-administered medications on file prior to encounter.      Current Outpatient Prescriptions on File Prior to Encounter   Medication Sig    ascorbic acid (VITAMIN C) 500 MG tablet 1 tablet (500 mg total) by Per G Tube route once daily.    aspirin 81 MG Chew 1 tablet (81 mg total) by Per G Tube route once daily.    calcium-vitamin D3 500 mg(1,250mg) -200 unit per tablet 1 tablet by Per G Tube route 2 (two) times daily.    citalopram (CELEXA) 10 MG tablet 1 tablet (10 mg total) by Per G Tube route once daily.    docusate (COLACE) 50 mg/5 mL liquid 5 mLs (50 mg total) by Per G Tube route once daily.    famotidine (PEPCID) 20 MG tablet 1 tablet (20 mg total) by Per G Tube route 2 (two) times daily.    lactulose (CEPHULAC) 10 gram packet Take 1 packet (10 g total) by mouth 3 (three) times daily.    metoprolol tartrate (LOPRESSOR) 25 MG tablet Take 12.5 mg by mouth 2 (two) times daily.    omeprazole-sodium bicarbonate (ZEGERID) 40-1,680 mg Pack 1 packet by PEG Tube route before breakfast.    ondansetron (ZOFRAN) 4 mg/5 mL solution 5 mLs (4 mg total) by Per G Tube route 2 (two) times daily as needed for Nausea.    oxycodone (ROXICODONE) 5 mg/5 mL Soln Take 5 mLs (5 mg total) by mouth every 6 (six) hours.    polyethylene glycol (GLYCOLAX) 17 gram/dose powder Take 17 g by mouth once daily.    ranitidine (ZANTAC) 150 MG capsule Take 150 mg by mouth 2 (two) times daily.    simvastatin (ZOCOR) 40 MG tablet Take 1 tablet (40 mg total) by mouth every evening.     Family History     None         Social History Main Topics    Smoking status: Unknown If Ever Smoked    Smokeless tobacco: Not on file    Alcohol use No    Drug use: No    Sexual activity: Not on file     Review of Systems   Constitutional: Positive for fever.   HENT: Negative.    Eyes: Negative.    Respiratory: Negative.    Cardiovascular: Negative.    Gastrointestinal: Positive for nausea and vomiting.   Endocrine: Negative.    Genitourinary: Negative.    Musculoskeletal: Negative.    Neurological: Positive for weakness.   Hematological: Negative.    Psychiatric/Behavioral: Negative.    All other systems reviewed and are negative.    Objective:     Vital Signs (Most Recent):  Temp: (!) 100.8 °F (38.2 °C) (05/19/17 1719)  Pulse: 76 (05/19/17 1950)  Resp: 16 (05/19/17 1950)  BP: 109/61 (05/19/17 1800)  SpO2: 95 % (05/19/17 1950) Vital Signs (24h Range):  Temp:  [100.7 °F (38.2 °C)-100.8 °F (38.2 °C)] 100.8 °F (38.2 °C)  Pulse:  [63-89] 76  Resp:  [16-32] 16  SpO2:  [90 %-95 %] 95 %  BP: ()/(53-87) 109/61     Weight: 90.7 kg (200 lb)  Body mass index is 34.33 kg/(m^2).    Physical Exam   Constitutional: She appears well-developed and well-nourished. No distress.   HENT:   Head: Normocephalic and atraumatic.   Eyes: EOM are normal. Pupils are equal, round, and reactive to light. No scleral icterus.   Neck: Normal range of motion. Neck supple. No thyromegaly present.   Cardiovascular: Normal rate, regular rhythm and normal heart sounds.    No murmur heard.  Pulmonary/Chest: Effort normal and breath sounds normal. No respiratory distress.   Abdominal: Soft. Bowel sounds are normal. She exhibits no distension. There is no tenderness.   PEG tube   Musculoskeletal: She exhibits deformity.   Diffuse contractures   Neurological:   Nonverbal   Skin: Skin is warm and dry. She is not diaphoretic.   Nursing note and vitals reviewed.       Significant Labs:   CBC:   Recent Labs  Lab 05/19/17  1418   WBC 17.64*   HGB 14.6   HCT 46.8         CMP:   Recent Labs  Lab 05/19/17  1418   *   K 4.2      CO2 26      BUN 16   CREATININE 0.8   CALCIUM 9.6   PROT 8.7*   ALBUMIN 3.6   BILITOT 0.6   ALKPHOS 105   AST 26   ALT 24   ANIONGAP 14   EGFRNONAA >60.0     Lactic Acid:   Recent Labs  Lab 05/19/17  1418 05/19/17  1725   LACTATE 1.7 2.7*       Significant Imaging: I have reviewed and interpreted all pertinent imaging results/findings within the past 24 hours.

## 2017-05-20 NOTE — ASSESSMENT & PLAN NOTE
· UA notable for many RBC, WBC, and bacteria  · Previous history of UTIs include Proteus and VRE  · No history of indwelling catheter - placed in the ED  · Check urine gram stain and cx - Many Gram positive cocci in chains  · S/p Aztreonam and Vanc in the ED because of Amoxicillin allergy.  Now on Rocephin and Daptomycin  · ID consulted

## 2017-05-20 NOTE — ASSESSMENT & PLAN NOTE
-patient febrile with WBC 17, 85% granulocytes   -lactic acid on admission 1.2, but now 7.3   -received 2.7L NS in ED, but subsequently lost IV access  -admitted to ICU, central line placed   -2L LR, continue rocephin and daptomycin   -repeat lactate

## 2017-05-20 NOTE — NURSING
Pt arrived to ICU at 1615. Vitals stable. Sats 100% on 2L. Critical care to bedside. Plan to put in central line for fluid resuscitation. Will continue to monitor.

## 2017-05-20 NOTE — ASSESSMENT & PLAN NOTE
· Hgb 14.6 today but baseline around 11.  2 further episodes of coffee ground emesis in the ED, will repeat H/H and trend q6h starting at midnight.  Type and screen obtained  · Hemoglobin elevation likely 2/2 dehydration as she looks dry  · Found to have coffee ground emesis and FOBT +  · Was given Pantoprazole 40mg IV x 1 in the ED.  Will give an additional 40mg IV x 1 to meet the 80mg IV and continue PPI IV BID  · Consult GI

## 2017-05-20 NOTE — ASSESSMENT & PLAN NOTE
Concern for UTI based on presentation and labs findings. Given her hx agree with current antibiotics choices    -continue daptomycin  -continue CTX  -await cxs to de-escalate  -plan for 10 day course

## 2017-05-20 NOTE — ED NOTES
Pt had episode of large, projective vomit. Foul, stool odor and coffee ground color. Med K paged for further orders.

## 2017-05-20 NOTE — SUBJECTIVE & OBJECTIVE
Interval History: Pt hyperventilating this AM.  Calms down some with encouragement, but appears very anxious.     Review of Systems   Unable to perform ROS: Patient nonverbal     Objective:     Vital Signs (Most Recent):  Temp: 98.7 °F (37.1 °C) (05/20/17 0832)  Pulse: (!) 120 (05/20/17 1217)  Resp: 18 (05/20/17 1217)  BP: (!) 146/66 (05/20/17 0832)  SpO2: (!) 94 % (05/20/17 1217) Vital Signs (24h Range):  Temp:  [98.5 °F (36.9 °C)-100.8 °F (38.2 °C)] 98.7 °F (37.1 °C)  Pulse:  [] 120  Resp:  [16-32] 18  SpO2:  [90 %-99 %] 94 %  BP: ()/(53-68) 146/66     Weight: 87.1 kg (192 lb)  Body mass index is 32.96 kg/(m^2).    Intake/Output Summary (Last 24 hours) at 05/20/17 1243  Last data filed at 05/20/17 0400   Gross per 24 hour   Intake              100 ml   Output              250 ml   Net             -150 ml      Physical Exam   Constitutional: She appears distressed.   HENT:   Head: Normocephalic and atraumatic.   Cardiovascular: Regular rhythm.    No murmur heard.  tachycardic   Pulmonary/Chest:   Hyperventilation at rate of 26-30. Pt with secretions and junky breath sounds.    Abdominal: Soft. Bowel sounds are normal. She exhibits no distension. There is no tenderness.   G tube in place   Neurological: She is alert.   Non- verbal   Skin: Skin is warm. No rash noted. She is diaphoretic.

## 2017-05-20 NOTE — ASSESSMENT & PLAN NOTE
-UA notable for many RBC, WBC, and bacteria  -Previous history of UTIs include Proteus and VRE  -No history of indwelling catheter - placed in the ED  -S/p Aztreonam and Vanc in the ED because of Amoxicillin allergy.  -ID consulted - recommends rocephin and daptomycin    -UCx growing Enterococcus, sensitivities pending

## 2017-05-20 NOTE — ASSESSMENT & PLAN NOTE
-patient hyperventilates intermittently but maintains normal oxygen saturation   -appears to be related to anxiety   -CXR shows hypoventilation of lungs   -ABG ordered

## 2017-05-20 NOTE — CONSULTS
CHRISTIANA AT BEDSIDE TO ATTEMPT MIDLINE PLACEMENT, PT HAS VERY LABORED BREATHING AND IS VERY CONTRACTED, THUS STOPPING US FROM FINDING A GOOD SITE FOR ACCESS. AFTER LOOKING A RIGHT CEPHALIC VEIN WAS ACCESSED, AFTER TWO ATTEMPTS WITH ANDREW MIX HOLDING PATIENT AND TRYING TO CALM HER, WE WERE UNABLE TO GAIN ACCESS FOR MIDLINE OR PIV AT THIS TIME. I SUGGESTED THAT THE PATIENT MAY HAVE TO BE UNDER SOME TYPE OF SEDATION FOR ACCESS BECAUSE SHE WAS VERY ANXIOUS. RN NOTIFIED, SHE STATED SHE WOULD LET THE DOCTOR KNOW, INFORMED TO RECONSULT IF NEEDED AGAIN.

## 2017-05-20 NOTE — NURSING
Patient hyperventilating, making grunting sounds.  at bedside. Patient VSS, saturating 95% , patient very diaphoretic and agitated. Stat respiratory treatment ordered.MD ordered 1 mg IV ativan,patient IV access lost. Patient gradually calming down. Stat lactic acid drawn,resulted 7.3.  notified. ICU consulted. Orders placed, Anaesthesia at bedside attempting to place an IV, Unsuccessful. Awaiting ICu consult to place a central line. Will continue to monitor. Patient calm at the moment, will continue to monitor,.

## 2017-05-20 NOTE — ASSESSMENT & PLAN NOTE
Admit doctor had a thorough discussion with the daughter regarding her mother's code status.  She mentions that she is the POA and that for right now, the patient is to be full code with all interventions, including central line and intubation.

## 2017-05-20 NOTE — PROGRESS NOTES
[facility] Hospital Medicine  Progress Note    Patient Name: Jonathan Nieves  MRN: [id number]  Patient Class: IP- Inpatient   Admission Date: 5/19/2017  Length of Stay: 1 days  Attending Physician: Jessica Mar MD  Primary Care Provider: Primary Doctor [facility] Medicine Team: [facility] Jessica Mar MD    Subjective:     Principal Problem:Acute cystitis with hematuria    HPI:  Mr. Jonathan Nieves is a 53yo female with history of CVA with residual aphasia and dysphagia, s/p PEG tube placement and bed riddenness who comes to the ED from [facility] for evaluation of hypotension and vomiting.  The history was obtained from the daughter and POA (Matias Nieves, [phone]).  She said that she doesn't know much - but she mentioned that the [facility] called and informed her that her mother started to have projectile, coffee ground emesis earlier this morning with diaphoresis, and a repeat episode later that afternoon - and that were taking her to the ED.    Hospital Course:  Pt was found to be septic due to a UTI and was started on Rocephin and Daptomycin and IV fluids.  Pt greatly improved overnight. However this AM, pt began hyperventilating and became diaphoretic.  Vitals were stable with  and /90, o2 sats 96%. Glucose 87. CXR, EKG, troponin, and repeat lactic acid ordered. Ativan and tylenol also ordered via PEG as pt lost IV access.     Interval History: Pt hyperventilating this AM.  Calms down some with encouragement, but appears very anxious.     Review of Systems   Unable to perform ROS: Patient nonverbal     Objective:     Vital Signs (Most Recent):  Temp: 98.7 °F (37.1 °C) (05/20/17 0832)  Pulse: (!) 120 (05/20/17 1217)  Resp: 18 (05/20/17 1217)  BP: (!) 146/66 (05/20/17 0832)  SpO2: (!) 94 % (05/20/17 1217) Vital Signs (24h Range):  Temp:  [98.5 °F (36.9 °C)-100.8 °F (38.2 °C)] 98.7 °F (37.1 °C)  Pulse:  [] 120  Resp:  [16-32]  18  SpO2:  [90 %-99 %] 94 %  BP: ()/(53-68) 146/66     Weight: 87.1 kg (192 lb)  Body mass index is 32.96 kg/(m^2).    Intake/Output Summary (Last 24 hours) at 05/20/17 1243  Last data filed at 05/20/17 0400   Gross per 24 hour   Intake              100 ml   Output              250 ml   Net             -150 ml      Physical Exam   Constitutional: She appears distressed.   HENT:   Head: Normocephalic and atraumatic.   Cardiovascular: Regular rhythm.    No murmur heard.  tachycardic   Pulmonary/Chest:   Hyperventilation at rate of 26-30. Pt with secretions and junky breath sounds.    Abdominal: Soft. Bowel sounds are normal. She exhibits no distension. There is no tenderness.   G tube in place   Neurological: She is alert.   Non- verbal   Skin: Skin is warm. No rash noted. She is diaphoretic.         Assessment/Plan:      * Acute cystitis with hematuria  · UA notable for many RBC, WBC, and bacteria  · Previous history of UTIs include Proteus and VRE  · No history of indwelling catheter - placed in the ED  · Check urine gram stain and cx - Many Gram positive cocci in chains  · S/p Aztreonam and Vanc in the ED because of Amoxicillin allergy.  Now on Rocephin and Daptomycin  · ID consulted      Sepsis due to urinary tract infection  · WBC 21->13  · BP fluctuating around 100, but patient on antihypertensive therapy  · Lactic acid 1.2 that increased to 2.7 - 1.5.   ·  S/p 30mL/kg fluid resuscitation:  2,700mL  · Source likely urine, see acute cystitis with hematuria  · PICC team consulted for additional access    GIB (gastrointestinal bleeding)  · Hgb 14.6 today but baseline around 11.  2 further episodes of coffee ground emesis in the ED, will repeat H/H and trend q6h starting at midnight.  Type and screen obtained  · Hemoglobin elevation likely 2/2 dehydration as she looks dry  · Found to have coffee ground emesis and FOBT +  · Was given Pantoprazole 40mg IV x 1 in the ED.  Will give an additional 40mg IV x 1 to  meet the 80mg IV and continue PPI IV BID  · Consult GI      Atrial fibrillation  · NSR on exam  · Hold Aspirin for now given coffee ground emesis  · Cardiac monitoring    CVA, old, hemiparesis  · Nonverbal at baseline      Dysphagia as late effect of cerebrovascular disease  · S/p PEG tube placement      PEG (percutaneous endoscopic gastrostomy) status  · 2/2 CVA  · Will hold PEG tube feeds with GIB      Hyperventilating  Anxiety  - likely secondary to anxiety  - Workup pending (CXR, EKG, troponin, lactic acid)  - will give ativan and tylenol now.       Goals of care, counseling/discussion  Admit doctor had a thorough discussion with the daughter regarding her mother's code status.  She mentions that she is the POA and that for right now, the patient is to be full code with all interventions, including central line and intubation.        VTE Risk Mitigation         Ordered     Medium Risk of VTE  Once      05/19/17 2007          Jessica Mar MD  Department of Hospital Medicine   [facility]

## 2017-05-20 NOTE — PROCEDURES
"Jonathan Nieves is a 52 y.o. female patient.    Temp: 98.6 °F (37 °C) (05/20/17 1615)  Pulse: 79 (05/20/17 1700)  Resp: (!) 23 (05/20/17 1700)  BP: (!) 94/54 (05/20/17 1700)  SpO2: 97 % (05/20/17 1700)  Weight: 87.1 kg (192 lb) (05/20/17 0228)  Height: 5' 4" (162.6 cm) (05/19/17 1147)       Central Line  Date/Time: 5/20/2017 6:25 PM  Location procedure was performed: [facility] CRITICAL CARE MEDICINE  Performed by: PAT DINERO  Supervising provider: Dr. Mishel Cisneros   Assisting provider: MISHEL CISNEROS  Pre-operative Diagnosis: urosepsis   Post-operative diagnosis: urosepsis  Consent Done: Yes  Time out: Immediately prior to procedure a "time out" was called to verify the correct patient, procedure, equipment, support staff and site/side marked as required.  Indications: vascular access and med administration  Anesthesia: local infiltration    Anesthesia:  Anesthesia: local infiltration  Anesthetic total: 10 mL  Preparation: skin prepped with ChloraPrep  Skin prep agent dried: skin prep agent completely dried prior to procedure  Sterile barriers: all five maximum sterile barriers used - cap, mask, sterile gown, sterile gloves, and large sterile sheet  Hand hygiene: hand hygiene performed prior to central venous catheter insertion  Location details: right internal jugular  Catheter size: 7 Fr  Catheter Length: 16cm    Ultrasound guidance: yes  Vessel Caliber: medium, patent, compressibility normal  Needle advanced into vessel with real time Ultrasound guidance.  Guidewire confirmed in vessel.  Sterile sheath used.  Manometry: Yes  Number of attempts: 3 (Attempted once by resident. Staff completed procedure.)  Complications: none  Estimated blood loss (mL): 5  Post-procedure: line sutured  Complications: No  Comments: CXR to be done           Pat Dinero  5/20/2017     I performed the procedure.   "

## 2017-05-20 NOTE — CONSULTS
[facility]  Gastroenterology  Consult Note    Patient Name: Jonathan Nieves  MRN: [id number]  Admission Date: 5/19/2017  Hospital Length of Stay: 1 days  Code Status: Full Code   Attending Provider: Jessica Mar MD   Consulting Provider: Nikolay Pressley MD  Primary Care Physician: Primary Doctor No  Principal Problem:Acute cystitis with hematuria    Inpatient consult to Gastroenterology  Consult performed by: NIKOLAY PRESSLEY  Consult ordered by: MIGUEL RUBY  Reason for consult: emesis        Subjective:     HPI:    Jonathan Nieves is a 52 y.o. female with history of CVA with residual aphasia and dysphagia, s/p PEG tube placement and bed riddenness who comes to the ED from [facility] for evaluation of hypotension and vomiting. The history is obtained from chart as patient is nonverbal. She had an episode of dark emesis and found to be hypotensive leading to hospital visit. In the ER, Hgb at baseline 12 with no changes, no bloody output from G-tube. No documented coffee ground emesis or melena in the hospital. She was also found to have UTI and elevated lactic acid concerning for urosepsis. GI consulted for further evaluation. She is on aspirin only at nursing home. CT A/P on admission showed staghorn calculus with moderate stool in rectum. No obstruction. No inflammation. Most recent EGD from 10/16 with PEG exchange and minor MWT.    Review of Systems:  Nonverbal.    Past Medical History: has a past medical history of Anemia; Depression; Dysarthria; HTN (hypertension); Hyperlipemia; Osteomyelitis; Seizures; and Stroke.    Past Surgical History: has a past surgical history that includes Toe amputation.    Family History: No colon cancer.     Allergies: Reviewed in EPIC.     Social History: Nursing home resident, bed bound.     Home medications:   No current facility-administered medications on file prior to encounter.      Current Outpatient  Prescriptions on File Prior to Encounter   Medication Sig Dispense Refill    ascorbic acid (VITAMIN C) 500 MG tablet 1 tablet (500 mg total) by Per G Tube route once daily.      aspirin 81 MG Chew 1 tablet (81 mg total) by Per G Tube route once daily.  0    calcium-vitamin D3 500 mg(1,250mg) -200 unit per tablet 1 tablet by Per G Tube route 2 (two) times daily. 60 tablet 11    citalopram (CELEXA) 10 MG tablet 1 tablet (10 mg total) by Per G Tube route once daily. 30 tablet 11    docusate (COLACE) 50 mg/5 mL liquid 5 mLs (50 mg total) by Per G Tube route once daily.  0    famotidine (PEPCID) 20 MG tablet 1 tablet (20 mg total) by Per G Tube route 2 (two) times daily. 60 tablet 11    lactulose (CEPHULAC) 10 gram packet Take 1 packet (10 g total) by mouth 3 (three) times daily. 30 each 0    metoprolol tartrate (LOPRESSOR) 25 MG tablet Take 12.5 mg by mouth 2 (two) times daily.      omeprazole-sodium bicarbonate (ZEGERID) 40-1,680 mg Pack 1 packet by PEG Tube route before breakfast. 30 each 2    ondansetron (ZOFRAN) 4 mg/5 mL solution 5 mLs (4 mg total) by Per G Tube route 2 (two) times daily as needed for Nausea. 50 mL 0    oxycodone (ROXICODONE) 5 mg/5 mL Soln Take 5 mLs (5 mg total) by mouth every 6 (six) hours. 15 mL 0    polyethylene glycol (GLYCOLAX) 17 gram/dose powder Take 17 g by mouth once daily. 1 Bottle 0    ranitidine (ZANTAC) 150 MG capsule Take 150 mg by mouth 2 (two) times daily.      simvastatin (ZOCOR) 40 MG tablet Take 1 tablet (40 mg total) by mouth every evening. 30 tablet 0       Scheduled Meds:    acetaminophen        albuterol-ipratropium 2.5mg-0.5mg/3mL        cefTRIAXone (ROCEPHIN) IVPB  2 g Intravenous Q24H    DAPTOmycin (CUBICIN)  IV  6 mg/kg Intravenous Q24H    lorazepam        pantoprazole  40 mg Intravenous BID       Continuous Infusions:    dextrose 5 % and 0.45 % NaCl         PRN Meds: acetaminophen, dextrose 50 % in water (D50W), dextrose 50 % in water (D50W),  glucagon (human recombinant), glucose, glucose, HYDROmorphone, lorazepam 2 mg/ml oral conc, ondansetron, ondansetron, ramelteon    Vital signs:  Temp:  [98.5 °F (36.9 °C)-100.8 °F (38.2 °C)]   Pulse:  []   Resp:  [16-32]   BP: ()/(53-68)   SpO2:  [90 %-99 %]     Physical exam:  General: No acute distress  HEENT: Head: Normocephalic, atraumatic.   Eyes: Sclera non-icteric. EOMI.   Neck: Supple  Heart: Regular rate and rhythm, no gallops  Lungs: Non labored breathing, no wheezing  Abdomen: Bowel sounds normal, no organomegaly, masses, or hernia. No tenderness, no rebound or guarding. No distention. G-tube site C/D/I  Rectal: Deferred  Extremities: Bilateral contractures  Neurologic: Unable to follow commands  Skin: No rash    Routine labs:    Recent Labs  Lab 05/20/17  0831   WBC 13.02*   HGB 12.0   HCT 38.8   *   MCV 86       Recent Labs  Lab 05/19/17  1418   INR 1.0   APTT <21.0       Recent Labs  Lab 05/20/17  0831      K 3.9   CO2 23   BUN 11   CREATININE 0.7       Recent Labs  Lab 05/20/17  0831   ALBUMIN 2.8*   ALKPHOS 87   ALT 18   AST 20   BILITOT 0.7     Imaging and prior endoscopies  As above    Assessment/Plan:     Jonathan Nieves is a 52 y.o. female with emesis. Likely due to UTI and developing sepsis. There is no evidence of GI bleeding and CT showed no evidence of intestinal obstruction.    PEG (percutaneous endoscopic gastrostomy) status  .    Dark emesis  .  Please call with questions and updates if any change in clinical status.     Thank you for your consult.     Cindy Paniagua MD  Gastroenterology  [facility]

## 2017-05-20 NOTE — H&P
[facility] Hospital Medicine  History & Physical    Patient Name: Jonathan Nieves  MRN: [id number]  Admission Date: 5/19/2017  Attending Physician: Jsesica Mar MD   Primary Care Provider: Primary Doctor [facility] [place] Medicine Team: [facility] MED  Maggy Orourke MD     Patient information was obtained from relative(s).     Subjective:     Principal Problem:Acute cystitis with hematuria    Chief Complaint:   Chief Complaint   Patient presents with    Emesis     resident at [facility], staff reports 2 episodes of vomiting since 10am. patient disoriented/non-verbal per baseline        HPI: Mr. Jonathan Nieves is a 53yo female with history of CVA with residual aphasia and dysphagia, s/p PEG tube placement and bed riddenness who comes to the ED from [facility] for evaluation of hypotension and vomiting.  The history was obtained from the daughter and POA (Matias Nieves, [phone]).  She said that she doesn't know much - but [facility] that her mother started to have projectile, coffee ground emesis earlier this morning, and a repeat episode later that afternoon - and that were taking her to the ED.    I had a thorough discussion with the daughter regarding her mother's code status.  She mentions that she is the POA and that for right now, the patient is to be full code with all interventions, including central line and intubation.  Additionally, the daughter is unaware of the patient's allergy to Amoxicillin, but says there was no shortness of breath, anaphylaxis, or throat swelling.      Past Medical History:   Diagnosis Date    Anemia     iron def    Depression     Dysarthria     HTN (hypertension)     Hyperlipemia     Osteomyelitis     Seizures     Stroke        Past Surgical History:   Procedure Laterality Date    TOE AMPUTATION         Review of patient's allergies indicates:   Allergen  Reactions    Amoxicillin Other (See Comments)     Per daughter always allergic, unknown rxn       No current facility-administered medications on file prior to encounter.      Current Outpatient Prescriptions on File Prior to Encounter   Medication Sig    ascorbic acid (VITAMIN C) 500 MG tablet 1 tablet (500 mg total) by Per G Tube route once daily.    aspirin 81 MG Chew 1 tablet (81 mg total) by Per G Tube route once daily.    calcium-vitamin D3 500 mg(1,250mg) -200 unit per tablet 1 tablet by Per G Tube route 2 (two) times daily.    citalopram (CELEXA) 10 MG tablet 1 tablet (10 mg total) by Per G Tube route once daily.    docusate (COLACE) 50 mg/5 mL liquid 5 mLs (50 mg total) by Per G Tube route once daily.    famotidine (PEPCID) 20 MG tablet 1 tablet (20 mg total) by Per G Tube route 2 (two) times daily.    lactulose (CEPHULAC) 10 gram packet Take 1 packet (10 g total) by mouth 3 (three) times daily.    metoprolol tartrate (LOPRESSOR) 25 MG tablet Take 12.5 mg by mouth 2 (two) times daily.    omeprazole-sodium bicarbonate (ZEGERID) 40-1,680 mg Pack 1 packet by PEG Tube route before breakfast.    ondansetron (ZOFRAN) 4 mg/5 mL solution 5 mLs (4 mg total) by Per G Tube route 2 (two) times daily as needed for Nausea.    oxycodone (ROXICODONE) 5 mg/5 mL Soln Take 5 mLs (5 mg total) by mouth every 6 (six) hours.    polyethylene glycol (GLYCOLAX) 17 gram/dose powder Take 17 g by mouth once daily.    ranitidine (ZANTAC) 150 MG capsule Take 150 mg by mouth 2 (two) times daily.    simvastatin (ZOCOR) 40 MG tablet Take 1 tablet (40 mg total) by mouth every evening.     Family History     None        Social History Main Topics    Smoking status: Unknown If Ever Smoked    Smokeless tobacco: Not on file    Alcohol use No    Drug use: No    Sexual activity: Not on file     Review of Systems   Constitutional: Positive for fever.   HENT: Negative.    Eyes: Negative.    Respiratory: Negative.     Cardiovascular: Negative.    Gastrointestinal: Positive for nausea and vomiting.   Endocrine: Negative.    Genitourinary: Negative.    Musculoskeletal: Negative.    Neurological: Positive for weakness.   Hematological: Negative.    Psychiatric/Behavioral: Negative.    All other systems reviewed and are negative.    Objective:     Vital Signs (Most Recent):  Temp: (!) 100.8 °F (38.2 °C) (05/19/17 1719)  Pulse: 76 (05/19/17 1950)  Resp: 16 (05/19/17 1950)  BP: 109/61 (05/19/17 1800)  SpO2: 95 % (05/19/17 1950) Vital Signs (24h Range):  Temp:  [100.7 °F (38.2 °C)-100.8 °F (38.2 °C)] 100.8 °F (38.2 °C)  Pulse:  [63-89] 76  Resp:  [16-32] 16  SpO2:  [90 %-95 %] 95 %  BP: ()/(53-87) 109/61     Weight: 90.7 kg (200 lb)  Body mass index is 34.33 kg/(m^2).    Physical Exam   Constitutional: She appears well-developed and well-nourished. No distress.   HENT:   Head: Normocephalic and atraumatic.   Eyes: EOM are normal. Pupils are equal, round, and reactive to light. No scleral icterus.   Neck: Normal range of motion. Neck supple. No thyromegaly present.   Cardiovascular: Normal rate, regular rhythm and normal heart sounds.    No murmur heard.  Pulmonary/Chest: Effort normal and breath sounds normal. No respiratory distress.   Abdominal: Soft. Bowel sounds are normal. She exhibits no distension. There is no tenderness.   PEG tube   Musculoskeletal: She exhibits deformity.   Diffuse contractures   Neurological:   Nonverbal   Skin: Skin is warm and dry. She is not diaphoretic.   Nursing note and vitals reviewed.       Significant Labs:   CBC:   Recent Labs  Lab 05/19/17  1418   WBC 17.64*   HGB 14.6   HCT 46.8        CMP:   Recent Labs  Lab 05/19/17  1418   *   K 4.2      CO2 26      BUN 16   CREATININE 0.8   CALCIUM 9.6   PROT 8.7*   ALBUMIN 3.6   BILITOT 0.6   ALKPHOS 105   AST 26   ALT 24   ANIONGAP 14   EGFRNONAA >60.0     Lactic Acid:   Recent Labs  Lab 05/19/17  1418 05/19/17  5635    LACTATE 1.7 2.7*       Significant Imaging: I have reviewed and interpreted all pertinent imaging results/findings within the past 24 hours.    Assessment/Plan:     * Acute cystitis with hematuria  · UA notable for many RBC, WBC, and bacteria  · Previous history of UTIs include Proteus and VRE  · No history of indwelling catheter - placed in the ED  · Check urine gram stain and cx  · S/p Aztreonam and Vanc in the ED because of Amoxicillin allergy.  Will give a dose of Ceftriaxone for now.  Pending gram stain results, might need to get ID involved for Linezolid because of history of VRE      Sepsis due to urinary tract infection  · qSOFA score of 3  · WBC 17 with 85% granylocytes  · BP fluctuating around 100, but patient on antihypertensive therapy  · Lactic acid 1.2 that increased to 2.7.  Will trend, and consult ICU if lactic continues to rise  · S/p 30mL/kg fluid resuscitation:  2,700mL  · Source likely urine, see acute cystitis with hematuria  · PICC team consulted for additional access    Atrial fibrillation  · NSR on exam  · Hold Aspirin for now given coffee ground emesis  · Cardiac monitoring    CVA, old, hemiparesis  · Nonverbal at baseline      Dysphagia as late effect of cerebrovascular disease  · S/p PEG tube placement      PEG (percutaneous endoscopic gastrostomy) status  · 2/2 CVA  · Will hold PEG tube meds with GIB      GIB (gastrointestinal bleeding)  · Hgb 14.6 today but baseline around 11.  2 further episodes of coffee ground emesis in the ED, will repeat H/H and trend q6h starting at midnight.  Type and screen obtained  · Hemoglobin elevation likely 2/2 dehydration as she looks dry  · Found to have coffee ground emesis and FOBT +  · Was given Pantoprazole 40mg IV x 1 in the ED.  Will give an additional 40mg IV x 1 to meet the 80mg IV and continue PPI IV BID  · Consult GI      VTE Risk Mitigation         Ordered     Medium Risk of VTE  Once      05/19/17 2007        Maggy Orourke MD  Department  [facility] Medicine   [facility]

## 2017-05-20 NOTE — SUBJECTIVE & OBJECTIVE
Past Medical History:   Diagnosis Date    Anemia     iron def    Depression     Dysarthria     HTN (hypertension)     Hyperlipemia     Osteomyelitis     Seizures     Stroke        Past Surgical History:   Procedure Laterality Date    TOE AMPUTATION         Review of patient's allergies indicates:   Allergen Reactions    Amoxicillin Other (See Comments)     Per daughter always allergic, unknown rxn       Family History     None        Social History Main Topics    Smoking status: Unknown If Ever Smoked    Smokeless tobacco: Not on file    Alcohol use No    Drug use: No    Sexual activity: Not on file      Review of Systems   Unable to perform ROS: Patient nonverbal     Objective:     Vital Signs (Most Recent):  Temp: 99.6 °F (37.6 °C) (05/20/17 1200)  Pulse: 85 (05/20/17 1300)  Resp: 18 (05/20/17 1217)  BP: (!) 98/56 (05/20/17 1300)  SpO2: (!) 94 % (05/20/17 1217) Vital Signs (24h Range):  Temp:  [98.5 °F (36.9 °C)-100.8 °F (38.2 °C)] 99.6 °F (37.6 °C)  Pulse:  [] 85  Resp:  [16-32] 18  SpO2:  [94 %-99 %] 94 %  BP: ()/(53-90) 98/56   Weight: 87.1 kg (192 lb)  Body mass index is 32.96 kg/(m^2).      Intake/Output Summary (Last 24 hours) at 05/20/17 1514  Last data filed at 05/20/17 0400   Gross per 24 hour   Intake              100 ml   Output              250 ml   Net             -150 ml       Physical Exam   Constitutional: She appears well-developed and well-nourished. No distress.   HENT:   Head: Normocephalic.   Eyes: Conjunctivae are normal.   Cardiovascular: Normal rate, regular rhythm and normal heart sounds.  Exam reveals no gallop and no friction rub.    No murmur heard.  Pulmonary/Chest:   Coarse breath sounds throughout, no wheezes or rales   Abdominal: Soft. She exhibits no distension. There is no tenderness.   PEG in place, clean    Musculoskeletal: She exhibits no edema.   All 4 extremities contracted, no movement. Patient tracks movement with eyes, but does not close eyes  when asked to.    Neurological: She is alert.   Skin: Skin is warm. She is diaphoretic.   Psychiatric: She has a normal mood and affect.       Vents:     Lines/Drains/Airways     Drain                 Gastrostomy/Enterostomy 10/25/16 1401 Percutaneous endoscopic gastrostomy (PEG) midline feeding 207 days          Pressure Ulcer                 Pressure Ulcer 10/26/16 1502 Left elbow Stage  days          Peripheral Intravenous Line                 Peripheral IV - Single Lumen 05/19/17 Left Hand 1 day              Significant Labs:    CBC/Anemia Profile:    Recent Labs  Lab 05/19/17  2327 05/20/17  0831 05/20/17  1245   WBC 21.72* 13.02* 12.38   HGB 12.2  12.2 12.0 11.8*   HCT 39.8  39.3 38.8 38.9    140* 160   MCV 86 86 87   RDW 15.8* 16.0* 15.9*        Chemistries:    Recent Labs  Lab 05/19/17  1418 05/20/17  0831 05/20/17  1245   * 143 146*   K 4.2 3.9 3.6    112* 113*   CO2 26 23 16*   BUN 16 11 10   CREATININE 0.8 0.7 0.8   CALCIUM 9.6 8.2* 8.5*   ALBUMIN 3.6 2.8*  --    PROT 8.7* 6.8  --    BILITOT 0.6 0.7  --    ALKPHOS 105 87  --    ALT 24 18  --    AST 26 20  --    MG 2.0 2.0  --    PHOS 3.2 3.0  --        Imaging Results         CT Abdomen Pelvis With Contrast (Final result) Result time:  05/19/17 17:20:03    Final result by Jeronimo Charlton MD (05/19/17 17:20:03)    Impression:        Stable left staghorn calculus with cystic dilatation of the lower pole calyces with overlying cortical thinning.    Absence of right kidney.  Right adrenal gland not definitively seen.    Multiple vertebral compression fractures, stable from the prior examination.    Additional findings as above.  ______________________________________     Electronically signed by resident: JERONIMO CHARLTON  Date:     05/19/17  Time:    17:02            As the supervising and teaching physician, I personally reviewed the images and resident's interpretation and I agree with the findings.          Electronically signed  by: SERGIO PEDRAZA MD  Date:     05/19/17  Time:    17:20     Narrative:    Procedure comments: The patient was surveyed from the lung bases through the pelvis after the administration of 100 cc Omni 350 IV contrast  and data was reconstructed for coronal, sagittal, and axial images.    Comparison: CT abdomen pelvis 10/24/2016.    Findings:    There is minor left-sided atelectasis. There is no pleural fluid.  The visualized portions of the heart appear normal.    The liver is normal in size and attenuation with no focal hepatic abnormality.  Gallbladder surgically absent with clips in the gallbladder fossa.  There is no intra hepatic biliary ductal dilatation.  Mild prominence of common bile duct in keeping with prior instrumentation.    The spleen, pancreas, and left adrenal gland are unremarkable.  Right adrenal gland not seen. There is a gastrostomy, with balloon and tube in the stomach lumen.    The right kidney is absent. There is a 2.4 cm left lower pole staghorn nephrolith which produces cystic dilatation of the calyces with severe overlying cortical thinning.  The ureter is unremarkable. The uterus, and ovaries demonstrate no significant abnormality. The urinary bladder is nondistended with a De Leon catheter in its lumen.    The abdominal aorta is normal in course and caliber.    The visualized loops of small and large bowel show no evidence of obstruction or inflammation.  Moderate stool noted in the rectum.  There is no ascites, free fluid, or intraperitoneal free air. There is no evidence of lymph node enlargement in the abdomen or pelvis.    There multiple stable vertebral compression fractures in comparison prior examination.  There are neuromuscular changes of the bilateral hips with generalized muscle atrophy.            X-Ray Chest AP Portable (Final result) Result time:  05/19/17 15:08:17    Final result by Blayne Oliveros Jr., MD (05/19/17 15:08:17)    Impression:     See above      Electronically  signed by: NAEL LOYD MD  Date:     05/19/17  Time:    15:08     Narrative:    Chest portable compared to October 2016.  Patient is rotated to the left.  Lungs are hypoaerated.  No confluent consolidation allowing for projection.  There is some parahilar alveolar filling on the left.              Significant Imaging:   Imaging Results         CT Abdomen Pelvis With Contrast (Final result) Result time:  05/19/17 17:20:03    Final result by Jeronimo Charlton MD (05/19/17 17:20:03)    Impression:        Stable left staghorn calculus with cystic dilatation of the lower pole calyces with overlying cortical thinning.    Absence of right kidney.  Right adrenal gland not definitively seen.    Multiple vertebral compression fractures, stable from the prior examination.    Additional findings as above.  ______________________________________     Electronically signed by resident: JERONIMO CHARLTON  Date:     05/19/17  Time:    17:02            As the supervising and teaching physician, I personally reviewed the images and resident's interpretation and I agree with the findings.          Electronically signed by: SERGIO PEDRAZA MD  Date:     05/19/17  Time:    17:20     Narrative:    Procedure comments: The patient was surveyed from the lung bases through the pelvis after the administration of 100 cc Omni 350 IV contrast  and data was reconstructed for coronal, sagittal, and axial images.    Comparison: CT abdomen pelvis 10/24/2016.    Findings:    There is minor left-sided atelectasis. There is no pleural fluid.  The visualized portions of the heart appear normal.    The liver is normal in size and attenuation with no focal hepatic abnormality.  Gallbladder surgically absent with clips in the gallbladder fossa.  There is no intra hepatic biliary ductal dilatation.  Mild prominence of common bile duct in keeping with prior instrumentation.    The spleen, pancreas, and left adrenal gland are unremarkable.  Right adrenal gland not  seen. There is a gastrostomy, with balloon and tube in the stomach lumen.    The right kidney is absent. There is a 2.4 cm left lower pole staghorn nephrolith which produces cystic dilatation of the calyces with severe overlying cortical thinning.  The ureter is unremarkable. The uterus, and ovaries demonstrate no significant abnormality. The urinary bladder is nondistended with a De Leon catheter in its lumen.    The abdominal aorta is normal in course and caliber.    The visualized loops of small and large bowel show no evidence of obstruction or inflammation.  Moderate stool noted in the rectum.  There is no ascites, free fluid, or intraperitoneal free air. There is no evidence of lymph node enlargement in the abdomen or pelvis.    There multiple stable vertebral compression fractures in comparison prior examination.  There are neuromuscular changes of the bilateral hips with generalized muscle atrophy.            X-Ray Chest AP Portable (Final result) Result time:  05/19/17 15:08:17    Final result by Nael Oliveros Jr., MD (05/19/17 15:08:17)    Impression:     See above      Electronically signed by: NAEL OLIVEROS MD  Date:     05/19/17  Time:    15:08     Narrative:    Chest portable compared to October 2016.  Patient is rotated to the left.  Lungs are hypoaerated.  No confluent consolidation allowing for projection.  There is some parahilar alveolar filling on the left.

## 2017-05-20 NOTE — ASSESSMENT & PLAN NOTE
-Hgb 14.6 on admission but baseline around 11. 2 further episodes of coffee ground emesis in the ED  -Type and screen obtained  -Noted to have coffee ground emesis and FOBT +  -Was given Pantoprazole 40mg IV x 1 in the ED.   -GI consulted - no intervention given no signs of GIB  -no BM or emesis during hospitalization

## 2017-05-20 NOTE — SUBJECTIVE & OBJECTIVE
Past Medical History:   Diagnosis Date    Anemia     iron def    Depression     Dysarthria     HTN (hypertension)     Hyperlipemia     Osteomyelitis     Seizures     Stroke        Past Surgical History:   Procedure Laterality Date    TOE AMPUTATION         Review of patient's allergies indicates:   Allergen Reactions    Amoxicillin Other (See Comments)     Per daughter always allergic, unknown rxn       Medications:  Prescriptions Prior to Admission   Medication Sig    ascorbic acid (VITAMIN C) 500 MG tablet 1 tablet (500 mg total) by Per G Tube route once daily.    aspirin 81 MG Chew 1 tablet (81 mg total) by Per G Tube route once daily.    calcium-vitamin D3 500 mg(1,250mg) -200 unit per tablet 1 tablet by Per G Tube route 2 (two) times daily.    citalopram (CELEXA) 10 MG tablet 1 tablet (10 mg total) by Per G Tube route once daily.    docusate (COLACE) 50 mg/5 mL liquid 5 mLs (50 mg total) by Per G Tube route once daily.    famotidine (PEPCID) 20 MG tablet 1 tablet (20 mg total) by Per G Tube route 2 (two) times daily.    lactulose (CEPHULAC) 10 gram packet Take 1 packet (10 g total) by mouth 3 (three) times daily.    metoprolol tartrate (LOPRESSOR) 25 MG tablet Take 12.5 mg by mouth 2 (two) times daily.    omeprazole-sodium bicarbonate (ZEGERID) 40-1,680 mg Pack 1 packet by PEG Tube route before breakfast.    ondansetron (ZOFRAN) 4 mg/5 mL solution 5 mLs (4 mg total) by Per G Tube route 2 (two) times daily as needed for Nausea.    oxycodone (ROXICODONE) 5 mg/5 mL Soln Take 5 mLs (5 mg total) by mouth every 6 (six) hours.    polyethylene glycol (GLYCOLAX) 17 gram/dose powder Take 17 g by mouth once daily.    ranitidine (ZANTAC) 150 MG capsule Take 150 mg by mouth 2 (two) times daily.    simvastatin (ZOCOR) 40 MG tablet Take 1 tablet (40 mg total) by mouth every evening.     Antibiotics     Start     Stop Route Frequency Ordered    05/19/17 2100  cefTRIAXone (ROCEPHIN) 2 g in dextrose 5 %  50 mL IVPB      -- IV Every 24 hours (non-standard times) 05/19/17 2029 05/20/17 2345  daptomycin (CUBICIN) 545 mg in sodium chloride 0.9% IVPB      -- IV Every 24 hours (non-standard times) 05/19/17 2249        Antifungals     None        Antivirals     None             There is no immunization history on file for this patient.    Family History     None        Social History     Social History    Marital status:      Spouse name: N/A    Number of children: N/A    Years of education: N/A     Social History Main Topics    Smoking status: Unknown If Ever Smoked    Smokeless tobacco: None    Alcohol use No    Drug use: No    Sexual activity: Not Asked     Other Topics Concern    None     Social History Narrative     Review of Systems   Unable to perform ROS: Mental status change     Objective:     Vital Signs (Most Recent):  Temp: 98.7 °F (37.1 °C) (05/20/17 0832)  Pulse: (!) 120 (05/20/17 1217)  Resp: 18 (05/20/17 1217)  BP: (!) 146/66 (05/20/17 0832)  SpO2: (!) 94 % (05/20/17 1217) Vital Signs (24h Range):  Temp:  [98.5 °F (36.9 °C)-100.8 °F (38.2 °C)] 98.7 °F (37.1 °C)  Pulse:  [] 120  Resp:  [16-32] 18  SpO2:  [90 %-99 %] 94 %  BP: ()/(53-68) 146/66     Weight: 87.1 kg (192 lb)  Body mass index is 32.96 kg/(m^2).    Estimated Creatinine Clearance: 98.2 mL/min (based on Cr of 0.7).    Physical Exam   Constitutional: She appears well-developed and well-nourished. No distress.   HENT:   Head: Normocephalic and atraumatic.   Eyes: EOM are normal. Pupils are equal, round, and reactive to light. No scleral icterus.   Neck: Normal range of motion. Neck supple. No thyromegaly present.   Cardiovascular: Normal rate, regular rhythm and normal heart sounds.    No murmur heard.  Pulmonary/Chest: Effort normal and breath sounds normal. No respiratory distress.   Abdominal: Soft. Bowel sounds are normal. She exhibits no distension. There is no tenderness.   PEG tube   Musculoskeletal: She  exhibits deformity.   Severe contractures   Neurological:   Nonverbal   Skin: Skin is warm and dry. She is not diaphoretic.   Nursing note and vitals reviewed.      Significant Labs: All pertinent labs within the past 24 hours have been reviewed.    Significant Imaging: I have reviewed all pertinent imaging results/findings within the past 24 hours.

## 2017-05-20 NOTE — PT/OT/SLP PROGRESS
Physical Therapy      Jonathan Nieves  MRN: [id number]    Patient not seen today secondary to Pt continues with bedrest orders. Did speak to MD in AM. MD stated she would remove orders after seeing pt if appropriate  . Will follow-up next scheduled session..    Maggy Kumar, PT

## 2017-05-21 PROBLEM — N30.00 ACUTE CYSTITIS WITHOUT HEMATURIA: Status: ACTIVE | Noted: 2017-01-01

## 2017-05-21 PROBLEM — K29.01 GASTROINTESTINAL HEMORRHAGE ASSOCIATED WITH ACUTE GASTRITIS: Status: ACTIVE | Noted: 2017-01-01

## 2017-05-21 NOTE — PROGRESS NOTES
[facility]  Critical Care Medicine  Progress Note    Patient Name: Jonathan Nieves  MRN: [id number]  Admission Date: 5/19/2017  Hospital Length of Stay: 2 days  Code Status: Full Code  Attending Provider: Cuca Cisneros MD  Primary Care Provider: Primary Doctor No   Principal Problem: Acute cystitis with hematuria    Subjective:     HPI:  No notes on file    Hospital/ICU Course:  No notes on file    Interval History/Significant Events:   No acute events overnight.  Patient's lactic acid has now normalized.  Was unable to use purewick last night 2/2 to poor patient positioning.    Review of Systems   Unable to perform ROS: Mental status change     Objective:     Vital Signs (Most Recent):  Temp: 97.8 °F (36.6 °C) (05/21/17 0700)  Pulse: 65 (05/21/17 0800)  Resp: 16 (05/21/17 0800)  BP: 109/63 (05/21/17 0800)  SpO2: 99 % (05/21/17 0800) Vital Signs (24h Range):  Temp:  [97.8 °F (36.6 °C)-99.6 °F (37.6 °C)] 97.8 °F (36.6 °C)  Pulse:  [] 65  Resp:  [9-35] 16  SpO2:  [94 %-100 %] 99 %  BP: ()/() 109/63   Weight: 87.1 kg (192 lb)  Body mass index is 32.96 kg/m².      Intake/Output Summary (Last 24 hours) at 05/21/17 0916  Last data filed at 05/21/17 0600   Gross per 24 hour   Intake             3700 ml   Output                0 ml   Net             3700 ml       Physical Exam   Constitutional: She appears well-developed and well-nourished. No distress.   Cardiovascular: Normal rate, regular rhythm and normal heart sounds.  Exam reveals no gallop and no friction rub.    No murmur heard.  Pulmonary/Chest:   Coarse breath sounds throughout, no wheezes or rales   Abdominal: Soft. Bowel sounds are normal. She exhibits no distension. There is no tenderness.   PEG in place, clean    Musculoskeletal: She exhibits no edema.   All 4 extremities contracted, no movement. Patient tracks movement with eyes, but does not close eyes when asked to.        Vents:     Lines/Drains/Airways      Central Venous Catheter Line                 Percutaneous Central Line Insertion/Assessment - triple lumen  05/20/17 1737 right internal jugular less than 1 day          Drain                 Gastrostomy/Enterostomy 10/25/16 1401 Percutaneous endoscopic gastrostomy (PEG) midline feeding 207 days          Pressure Ulcer                 Pressure Ulcer 10/26/16 1502 Left elbow Stage  days              Significant Labs:    CBC/Anemia Profile:    Recent Labs  Lab 05/20/17  0831 05/20/17  1245 05/20/17  2337   WBC 13.02* 12.38 9.67  9.67  9.67   HGB 12.0 11.8* 10.3*  10.3*  10.3*   HCT 38.8 38.9 33.5*  33.5*  33.5*   * 160 157  157  157   MCV 86 87 86  86  86   RDW 16.0* 15.9* 15.7*  15.7*  15.7*        Chemistries:    Recent Labs  Lab 05/19/17  1418 05/20/17  0831 05/20/17  1245 05/21/17  0330   * 143 146* 143   K 4.2 3.9 3.6 5.3*    112* 113* 110   CO2 26 23 16* 18*   BUN 16 11 10 9   CREATININE 0.8 0.7 0.8 0.8   CALCIUM 9.6 8.2* 8.5* 8.9   ALBUMIN 3.6 2.8*  --  3.0*   PROT 8.7* 6.8  --  7.8   BILITOT 0.6 0.7  --  0.8   ALKPHOS 105 87  --  89   ALT 24 18  --  24   AST 26 20  --  42*   MG 2.0 2.0  --  2.3   PHOS 3.2 3.0  --  2.8       Lactic Acid:   Recent Labs  Lab 05/19/17  2327 05/20/17  1245 05/20/17 2011   LACTATE 1.5 7.3* 1.1       Significant Imaging:  I have reviewed all pertinent imaging results/findings within the past 24 hours.    Assessment/Plan:     Neuro   CVA, old, hemiparesis    -patient suffered multiple strokes in past, last one in 2008   -nonverbal at baseline, wheelchair/bedbound         Pulmonary   Hyperventilating    -Patient hyperventilates intermittently but maintains normal oxygen saturation   -Appears to be related to anxiety   -CXR shows hypoventilation of lungs           Renal   * Acute cystitis with hematuria    -UA notable for many RBC, WBC, and bacteria  -Previous history of UTIs include Proteus and VRE  -No history of indwelling catheter - placed in  the ED  -S/p Aztreonam and Vanc in the ED because of Amoxicillin allergy.  -Per ID rec's DC cefepime and continue daptomycin  -UCx growing Enterococcus, sensitivities pending         Fluids/Electrolytes/Nutrition/GI   GIB (gastrointestinal bleeding)    -Hgb 14.6 on admission but baseline around 11. 2 further episodes of coffee ground emesis in the ED  -Type and screen obtained  -Noted to have coffee ground emesis and FOBT +  -Was given Pantoprazole 40mg IV x 1 in the ED.   -GI consulted - no intervention given no signs of GIB  -No BM or emesis during hospitalization        Other   Sepsis due to urinary tract infection    -Received 2.7L NS in ED, but subsequently lost IV access  -Admitted to ICU, central line placed   -Repeat lactate 1.0 from 7.3  -DC cefepime per ID  -Continue dapto           Critical Care Medicine Daily Checklist:    A: Awake: RASS Goal/Actual Goal: RASS Goal: 0-->alert and calm  Actual: Krishnamurthy Agitation Sedation Scale (RASS): Restless   B: Spontaneous Breathing Trial Performed?     C: SAT & SBT Coordinated?  N/A                      D: Delirium: CAM-ICU Overall CAM-ICU: Negative   E: Early Mobility Performed? Yes   F: Feeding Goal:    Status:     Current Diet Order   No orders of the defined types were placed in this encounter.      AS: Analgesia/Sedation N/A   T: Thromboembolic Prophylaxis As above   H: HOB > 300 Yes   U: Stress Ulcer Prophylaxis (if needed) N/A   G: Glucose Control N/A   B: Bowel Function Stool Occurrence: 0   I: Indwelling Catheter (Lines & De Leon) Necessity As above   D: De-escalation of Antimicrobials/Pharmacotherapies As above    Plan for the day/ETD Step down    Code Status:  Family/Goals of Care: Full Code         Critical secondary to Patient has a condition that poses threat to life and bodily function: Severe Respiratory Distress      Critical care was time spent personally by me on the following activities: development of treatment plan with patient or surrogate and  bedside caregivers, discussions with consultants, evaluation of patient's response to treatment, examination of patient, ordering and performing treatments and interventions, ordering and review of laboratory studies, ordering and review of radiographic studies, pulse oximetry, re-evaluation of patient's condition. This critical care time did not overlap with that of any other provider or involve time for any procedures.     Dwight Francois MD  Critical Care Medicine  [facility]

## 2017-05-21 NOTE — ASSESSMENT & PLAN NOTE
-Received 2.7L NS in ED, but subsequently lost IV access  -Admitted to ICU, central line placed   -Repeat lactate 1.0 from 7.3  -DC cefepime per ID  -Continue dapto

## 2017-05-21 NOTE — ASSESSMENT & PLAN NOTE
-UA notable for many RBC, WBC, and bacteria  -Previous history of UTIs include Proteus and VRE  -No history of indwelling catheter - placed in the ED  -S/p Aztreonam and Vanc in the ED because of Amoxicillin allergy.  -Per ID rec's DC cefepime and continue daptomycin  -UCx growing Enterococcus, sensitivities pending

## 2017-05-21 NOTE — PROGRESS NOTES
[facility]  Infectious Disease  Progress Note    Patient Name: Jonathan Nieves  MRN: [id number]  Admission Date: 5/19/2017  Length of Stay: 2 days  Attending Physician: Cuca Cisneros MD  Primary Care Provider: Primary Doctor No    Isolation Status: Contact  Assessment/Plan:      * Acute cystitis with hematuria    Concern for UTI based on presentation and labs findings. Given her hx agree with current antibiotics choices    -continue daptomycin  -would discontinue CTX and no indication based on cxs  -given her med list including a SSRI would avoid linezolid in this pt  -await final cxs  -plan for 10 day course          Thank you for your consult. I will follow-up with patient. Please contact us if you have any additional questions.    Artemio Duke MD  Infectious Disease  [facility]    Subjective:     Principal Problem:Acute cystitis with hematuria    HPI: No notes on file  Interval History: Pt was moved to the ICU for resp status but is now being stepped down. Enterococcus on urine cx    Review of Systems   Unable to perform ROS: Patient nonverbal     Objective:     Vital Signs (Most Recent):  Temp: 97.8 °F (36.6 °C) (05/21/17 0700)  Pulse: (!) 59 (05/21/17 1000)  Resp: 15 (05/21/17 1000)  BP: 109/63 (05/21/17 1000)  SpO2: 100 % (05/21/17 1000) Vital Signs (24h Range):  Temp:  [97.8 °F (36.6 °C)-99.6 °F (37.6 °C)] 97.8 °F (36.6 °C)  Pulse:  [] 59  Resp:  [9-35] 15  SpO2:  [94 %-100 %] 100 %  BP: ()/() 109/63     Weight: 87.1 kg (192 lb)  Body mass index is 32.96 kg/m².    Estimated Creatinine Clearance: 85.9 mL/min (based on Cr of 0.8).    Physical Exam   Constitutional: She appears well-developed and well-nourished. No distress.   HENT:   Head: Normocephalic and atraumatic.   Eyes: EOM are normal. Pupils are equal, round, and reactive to light. No scleral icterus.   Neck: Normal range of motion. Neck supple. No thyromegaly present.    Cardiovascular: Normal rate, regular rhythm and normal heart sounds.    No murmur heard.  Pulmonary/Chest: Effort normal and breath sounds normal. No respiratory distress.   Abdominal: Soft. Bowel sounds are normal. She exhibits no distension. There is no tenderness.   PEG tube   Musculoskeletal: She exhibits deformity.   Severe contractures   Neurological:   Nonverbal   Skin: Skin is warm and dry. She is not diaphoretic.   Nursing note and vitals reviewed.      Significant Labs: All pertinent labs within the past 24 hours have been reviewed.    Significant Imaging: I have reviewed all pertinent imaging results/findings within the past 24 hours.

## 2017-05-21 NOTE — PLAN OF CARE
Problem: Patient Care Overview  Goal: Individualization & Mutuality  Hx: CVA with residual aphasia and dysphagia,osteomyelitis s/p amputation of toe     Dx: hypovolemia     5/20: Admitted to ICU, line placed, 2 L LR bolus

## 2017-05-21 NOTE — ASSESSMENT & PLAN NOTE
Concern for UTI based on presentation and labs findings. Given her hx agree with current antibiotics choices    -continue daptomycin  -would discontinue CTX and no indication based on cxs  -given her med list including a SSRI would avoid linezolid in this pt  -await final cxs  -plan for 10 day course

## 2017-05-21 NOTE — SIGNIFICANT EVENT
Spoke with patient's five daughters and after an extensive discussion they are all in agreement they would like to keep their mother FULL code.  Of note, she does have one son, but per the daughters he is estranged and not involved in the patient's medical care.    Dwight Francois MD  PGY - 1  Pager: [phone]

## 2017-05-21 NOTE — PLAN OF CARE
Problem: Patient Care Overview  Goal: Plan of Care Review  Outcome: Ongoing (interventions implemented as appropriate)  Vs & assessment as documented. Uneventful night. Plan of care reviewed with patient

## 2017-05-21 NOTE — SUBJECTIVE & OBJECTIVE
Interval History/Significant Events:   No acute events overnight.  Patient's lactic acid has now normalized.  Was unable to use purewick last night 2/2 to poor patient positioning.    Review of Systems   Unable to perform ROS: Mental status change     Objective:     Vital Signs (Most Recent):  Temp: 97.8 °F (36.6 °C) (05/21/17 0700)  Pulse: 65 (05/21/17 0800)  Resp: 16 (05/21/17 0800)  BP: 109/63 (05/21/17 0800)  SpO2: 99 % (05/21/17 0800) Vital Signs (24h Range):  Temp:  [97.8 °F (36.6 °C)-99.6 °F (37.6 °C)] 97.8 °F (36.6 °C)  Pulse:  [] 65  Resp:  [9-35] 16  SpO2:  [94 %-100 %] 99 %  BP: ()/() 109/63   Weight: 87.1 kg (192 lb)  Body mass index is 32.96 kg/m².      Intake/Output Summary (Last 24 hours) at 05/21/17 0916  Last data filed at 05/21/17 0600   Gross per 24 hour   Intake             3700 ml   Output                0 ml   Net             3700 ml       Physical Exam   Constitutional: She appears well-developed and well-nourished. No distress.   Cardiovascular: Normal rate, regular rhythm and normal heart sounds.  Exam reveals no gallop and no friction rub.    No murmur heard.  Pulmonary/Chest:   Coarse breath sounds throughout, no wheezes or rales   Abdominal: Soft. Bowel sounds are normal. She exhibits no distension. There is no tenderness.   PEG in place, clean    Musculoskeletal: She exhibits no edema.   All 4 extremities contracted, no movement. Patient tracks movement with eyes, but does not close eyes when asked to.        Vents:     Lines/Drains/Airways     Central Venous Catheter Line                 Percutaneous Central Line Insertion/Assessment - triple lumen  05/20/17 1737 right internal jugular less than 1 day          Drain                 Gastrostomy/Enterostomy 10/25/16 1401 Percutaneous endoscopic gastrostomy (PEG) midline feeding 207 days          Pressure Ulcer                 Pressure Ulcer 10/26/16 1502 Left elbow Stage  days              Significant  Labs:    CBC/Anemia Profile:    Recent Labs  Lab 05/20/17  0831 05/20/17  1245 05/20/17  2337   WBC 13.02* 12.38 9.67  9.67  9.67   HGB 12.0 11.8* 10.3*  10.3*  10.3*   HCT 38.8 38.9 33.5*  33.5*  33.5*   * 160 157  157  157   MCV 86 87 86  86  86   RDW 16.0* 15.9* 15.7*  15.7*  15.7*        Chemistries:    Recent Labs  Lab 05/19/17  1418 05/20/17  0831 05/20/17  1245 05/21/17  0330   * 143 146* 143   K 4.2 3.9 3.6 5.3*    112* 113* 110   CO2 26 23 16* 18*   BUN 16 11 10 9   CREATININE 0.8 0.7 0.8 0.8   CALCIUM 9.6 8.2* 8.5* 8.9   ALBUMIN 3.6 2.8*  --  3.0*   PROT 8.7* 6.8  --  7.8   BILITOT 0.6 0.7  --  0.8   ALKPHOS 105 87  --  89   ALT 24 18  --  24   AST 26 20  --  42*   MG 2.0 2.0  --  2.3   PHOS 3.2 3.0  --  2.8       Lactic Acid:   Recent Labs  Lab 05/19/17  2327 05/20/17  1245 05/20/17 2011   LACTATE 1.5 7.3* 1.1       Significant Imaging:  I have reviewed all pertinent imaging results/findings within the past 24 hours.

## 2017-05-21 NOTE — PROGRESS NOTES
11:16 AM    Spoke to Karolyn haas/ [facility] medicine, patient to step down today pending bed availability. Transfer orders placed.     Valorie Summers MD  PGY-3  [phone]

## 2017-05-21 NOTE — SUBJECTIVE & OBJECTIVE
Interval History: Pt was moved to the ICU for resp status but is now being stepped down. Enterococcus on urine cx    Review of Systems   Unable to perform ROS: Patient nonverbal     Objective:     Vital Signs (Most Recent):  Temp: 97.8 °F (36.6 °C) (05/21/17 0700)  Pulse: (!) 59 (05/21/17 1000)  Resp: 15 (05/21/17 1000)  BP: 109/63 (05/21/17 1000)  SpO2: 100 % (05/21/17 1000) Vital Signs (24h Range):  Temp:  [97.8 °F (36.6 °C)-99.6 °F (37.6 °C)] 97.8 °F (36.6 °C)  Pulse:  [] 59  Resp:  [9-35] 15  SpO2:  [94 %-100 %] 100 %  BP: ()/() 109/63     Weight: 87.1 kg (192 lb)  Body mass index is 32.96 kg/m².    Estimated Creatinine Clearance: 85.9 mL/min (based on Cr of 0.8).    Physical Exam   Constitutional: She appears well-developed and well-nourished. No distress.   HENT:   Head: Normocephalic and atraumatic.   Eyes: EOM are normal. Pupils are equal, round, and reactive to light. No scleral icterus.   Neck: Normal range of motion. Neck supple. No thyromegaly present.   Cardiovascular: Normal rate, regular rhythm and normal heart sounds.    No murmur heard.  Pulmonary/Chest: Effort normal and breath sounds normal. No respiratory distress.   Abdominal: Soft. Bowel sounds are normal. She exhibits no distension. There is no tenderness.   PEG tube   Musculoskeletal: She exhibits deformity.   Severe contractures   Neurological:   Nonverbal   Skin: Skin is warm and dry. She is not diaphoretic.   Nursing note and vitals reviewed.      Significant Labs: All pertinent labs within the past 24 hours have been reviewed.    Significant Imaging: I have reviewed all pertinent imaging results/findings within the past 24 hours.

## 2017-05-21 NOTE — ASSESSMENT & PLAN NOTE
-Patient hyperventilates intermittently but maintains normal oxygen saturation   -Appears to be related to anxiety   -CXR shows hypoventilation of lungs

## 2017-05-21 NOTE — RESIDENT HANDOFF
Handoff     Primary Team: [facility] [place] [facility] Room Number: 6080/6080 A     Patient Name: Jonathan Nieves MRN: [id number]     Date of Birth: [id number] Allergies: Amoxicillin     Age: 52 y.o. Admit Date: 5/19/2017     Sex: female  BMI: Body mass index is 32.96 kg/m².     Code Status: Full Code        Illness Level (current clinical status): Watcher - No    Reason for Admission: Acute cystitis with hematuria    Brief HPI (pertinent PMH and diagnosis or differential diagnosis):   Mr. Jonathan Nieves is a 51yo female with history of CVA with residual aphasia and dysphagia, s/p PEG tube placement and bed riddenness, osteomyelitis s/p amputation of toe, who comes to the ED from [facility] for evaluation of hypotension and vomiting.  The history was obtained from the daughter and POA (Matias Nieves, [phone]).  She said that she doesn't know much - but she mentioned that the skilled nursing called and informed her that her mother started to have projectile, coffee ground emesis earlier this morning with diaphoresis, and a repeat episode later that afternoon - and that they were taking her to the ED.     ICU is consulted for lactic acid of 7.3. Patient has not been able to get more fluids or antibiotics because of lack of access. PICC team and anesthesia have attempted PIVs, but were unsuccessful. Patient nonverbal, contracted, diaphoretic, eyes track movements, but does not follow commands. Temperature 99.6, BP 98/56, HR 86, saturating 94% on 2L NC. Patient will be admitted to the MICU for central line placement and closer monitoring.      Per daughter on phone, patient suffered multiple strokes, last one in 2008 and since then has been nonverbal, with contractures of all extremities. She states patient has been at [facility] for the past 4-5 years. Does not know of any other medical conditions other prior strokes. Denies seizures. Daughter confirms patient is full code and gave consent  for central line.     Hospital Course (updated, brief assessment by system or problem, significant events):   Pt was found to be septic due to a UTI and was started on Rocephin and Daptomycin and IV fluids.  Pt greatly improved overnight. However this AM, pt began hyperventilating and became diaphoretic.  Vitals were stable with  and /90, o2 sats 96%. Glucose 87. CXR, EKG, troponin, and repeat lactic acid ordered. Ativan and tylenol also ordered via PEG as pt lost IV access. Patient started on cefepime and daptomycin 2/2 to h/o VRE and enterococcus on UCx.  The following day the cefepime was held 2/2 to ID rec's and she was stepped down to the floor.    Tasks (specific, using if-then statements):   1. Address code status with entire family present    Discharge Disposition: Nursing Facility

## 2017-05-22 PROBLEM — G93.40 ACUTE ENCEPHALOPATHY: Status: ACTIVE | Noted: 2017-01-01

## 2017-05-22 PROBLEM — R56.9 SEIZURE: Status: ACTIVE | Noted: 2017-01-01

## 2017-05-22 PROBLEM — R56.9 SEIZURE-LIKE ACTIVITY: Status: ACTIVE | Noted: 2017-01-01

## 2017-05-22 PROBLEM — R61 DIAPHORESIS: Status: ACTIVE | Noted: 2017-01-01

## 2017-05-22 NOTE — HPI
52 y.o. F with PMHx of multiple CVAs (most recent 2008) with residual aphasia/dysphagia/contractures (nonverbal at baseline) s/p PEG presented to ED 5/19 via EMS from [facility] after projectile coffee ground emesis, dark BM and hypotension. Found to have UTI, elevated lactic acid c/w urosepsis, started on Abx and IV fluids. Currently being treated for VRE UTI. Hospital course complicated by difficulty obtaining IV access. Collateral info obtained from daughter. She has been a resident at NH for 4-5 years and denies hx of seizures. Neuro consulted 5/22 after rapid response called at 11:21 after episode of diaphoresis, tachypnea, hypertension and tachycardia concerning for possible seizure. Ativan 2 mg given. EEG ordered. Last CT Head in 2014.

## 2017-05-22 NOTE — SUBJECTIVE & OBJECTIVE
Interval History: Pt was moved to the ICU for resp status but is now being stepped down. Enterococcus on urine cx pending final    Review of Systems   Unable to perform ROS: Patient nonverbal     Objective:     Vital Signs (Most Recent):  Temp: 98 °F (36.7 °C) (05/22/17 0900)  Pulse: 60 (05/22/17 0900)  Resp: 20 (05/22/17 0900)  BP: 110/63 (05/22/17 0900)  SpO2: 97 % (05/22/17 0900) Vital Signs (24h Range):  Temp:  [97.7 °F (36.5 °C)-99.1 °F (37.3 °C)] 98 °F (36.7 °C)  Pulse:  [] 60  Resp:  [18-37] 20  SpO2:  [97 %-100 %] 97 %  BP: ()/(53-76) 110/63     Weight: 87.1 kg (192 lb)  Body mass index is 32.96 kg/m².    Estimated Creatinine Clearance: 98.2 mL/min (based on Cr of 0.7).    Physical Exam   Constitutional: She appears well-developed and well-nourished. No distress.   HENT:   Head: Normocephalic and atraumatic.   Eyes: EOM are normal. Pupils are equal, round, and reactive to light. No scleral icterus.   Neck: Normal range of motion. Neck supple. No thyromegaly present.   Cardiovascular: Normal rate, regular rhythm and normal heart sounds.    No murmur heard.  Pulmonary/Chest: Effort normal and breath sounds normal. No respiratory distress.   Abdominal: Soft. Bowel sounds are normal. She exhibits no distension. There is no tenderness.   PEG tube   Musculoskeletal: She exhibits deformity.   Severe contractures   Neurological:   Nonverbal   Skin: Skin is warm and dry. She is not diaphoretic.   Nursing note and vitals reviewed.      Significant Labs: All pertinent labs within the past 24 hours have been reviewed.    Significant Imaging: I have reviewed all pertinent imaging results/findings within the past 24 hours.

## 2017-05-22 NOTE — SUBJECTIVE & OBJECTIVE
Past Medical History:   Diagnosis Date    Anemia     iron def    Depression     Dysarthria     HTN (hypertension)     Hyperlipemia     Osteomyelitis     Seizures     Stroke        Past Surgical History:   Procedure Laterality Date    TOE AMPUTATION       Review of patient's allergies indicates:   Allergen Reactions    Amoxicillin Other (See Comments)     Per daughter always allergic, unknown rxn     No current facility-administered medications on file prior to encounter.      Current Outpatient Prescriptions on File Prior to Encounter   Medication Sig    ascorbic acid (VITAMIN C) 500 MG tablet 1 tablet (500 mg total) by Per G Tube route once daily.    aspirin 81 MG Chew 1 tablet (81 mg total) by Per G Tube route once daily.    calcium-vitamin D3 500 mg(1,250mg) -200 unit per tablet 1 tablet by Per G Tube route 2 (two) times daily.    citalopram (CELEXA) 10 MG tablet 1 tablet (10 mg total) by Per G Tube route once daily.    docusate (COLACE) 50 mg/5 mL liquid 5 mLs (50 mg total) by Per G Tube route once daily.    famotidine (PEPCID) 20 MG tablet 1 tablet (20 mg total) by Per G Tube route 2 (two) times daily.    lactulose (CEPHULAC) 10 gram packet Take 1 packet (10 g total) by mouth 3 (three) times daily.    metoprolol tartrate (LOPRESSOR) 25 MG tablet Take 12.5 mg by mouth 2 (two) times daily.    omeprazole-sodium bicarbonate (ZEGERID) 40-1,680 mg Pack 1 packet by PEG Tube route before breakfast.    ondansetron (ZOFRAN) 4 mg/5 mL solution 5 mLs (4 mg total) by Per G Tube route 2 (two) times daily as needed for Nausea.    oxycodone (ROXICODONE) 5 mg/5 mL Soln Take 5 mLs (5 mg total) by mouth every 6 (six) hours.    polyethylene glycol (GLYCOLAX) 17 gram/dose powder Take 17 g by mouth once daily.    ranitidine (ZANTAC) 150 MG capsule Take 150 mg by mouth 2 (two) times daily.    simvastatin (ZOCOR) 40 MG tablet Take 1 tablet (40 mg total) by mouth every evening.     Family History     None         Social History Main Topics    Smoking status: Unknown If Ever Smoked    Smokeless tobacco: Not on file    Alcohol use No    Drug use: No    Sexual activity: Not on file     Review of Systems   Unable to perform ROS: Patient nonverbal     Objective:     Vital Signs (Most Recent):  Temp: 98.2 °F (36.8 °C) (05/22/17 1345)  Pulse: 63 (05/22/17 1500)  Resp: 16 (05/22/17 1345)  BP: 115/69 (05/22/17 1345)  SpO2: 98 % (05/22/17 1345) Vital Signs (24h Range):  Temp:  [98 °F (36.7 °C)-100.7 °F (38.2 °C)] 98.2 °F (36.8 °C)  Pulse:  [] 63  Resp:  [16-36] 16  SpO2:  [92 %-100 %] 98 %  BP: ()/(56-86) 115/69     Weight: 87.1 kg (192 lb)  Body mass index is 32.96 kg/m².    Physical Exam   Constitutional: No distress.   blt arms and legs contracted   Lying with head facing the left side   Does not track   HENT:   Head: Normocephalic and atraumatic.   Eyes: Conjunctivae are normal. Pupils are equal, round, and reactive to light.   Neck: Normal range of motion. Neck supple.   Pulmonary/Chest: Effort normal.     NEUROLOGICAL EXAMINATION:     MENTAL STATUS        Does not answer any orientation questions  Does not follow any simple commands  Nonverbal      CRANIAL NERVES     CN III, IV, VI   Pupils are equal, round, and reactive to light.  Right pupil: Shape: regular. Reactivity: brisk.   Left pupil: Shape: regular. Reactivity: brisk.   Nystagmus: none     MOTOR EXAM   Muscle bulk: decreased  Overall muscle tone: increased       Contractures noted x 4 extremities   Not spontaneously moving any limbs      REFLEXES        Brisk throughout      GAIT AND COORDINATION     Tremor   Resting tremor: absent       Coordination and gait unable to be assessed      Significant Labs:   Blood Culture: No results for input(s): LABBLOO in the last 48 hours.  CBC:   Recent Labs  Lab 05/22/17  0000 05/22/17  0528 05/22/17  1226   WBC 6.73 6.05  6.05  6.17  6.17 6.91  6.91   HGB 12.1 11.7*  11.7*  11.7*  11.7* 9.5*  9.5*   HCT  38.5 37.9  37.9  37.8  37.8 30.1*  30.1*    170  170  170  170 126*  126*     CMP:   Recent Labs  Lab 05/21/17  0330 05/22/17  0528   GLU 71 85    143   K 5.3* 3.3*    109   CO2 18* 24   BUN 9 5*   CREATININE 0.8 0.7   CALCIUM 8.9 8.6*   MG 2.3 1.8   PROT 7.8 7.3   ALBUMIN 3.0* 3.1*   BILITOT 0.8 0.4   ALKPHOS 89 88   AST 42* 34   ALT 24 26   ANIONGAP 15 10   EGFRNONAA >60.0 >60.0     Inflammatory Markers: No results for input(s): SEDRATE, CRP, PROCAL in the last 48 hours.  Prealbumin: No results for input(s): PREALBUMIN in the last 48 hours.  Respiratory Culture: No results for input(s): GSRESP, RESPIRATORYC in the last 48 hours.  Urine Culture: No results for input(s): LABURIN in the last 48 hours.  Urine Studies: No results for input(s): COLORU, APPEARANCEUA, PHUR, SPECGRAV, PROTEINUA, GLUCUA, KETONESU, BILIRUBINUA, OCCULTUA, NITRITE, UROBILINOGEN, LEUKOCYTESUR, RBCUA, WBCUA, BACTERIA, SQUAMEPITHEL, HYALINECASTS in the last 48 hours.    Invalid input(s): WRIGHTSUR  All pertinent lab results from the past 24 hours have been reviewed.    Significant Imaging: I have reviewed and interpreted all pertinent imaging results/findings within the past 24 hours.     Last head imaging-CT Head WO contrast (10/14/2014):  Severe microvascular disease and age advanced volume loss

## 2017-05-22 NOTE — PROGRESS NOTES
Rapid response called at 1121- pt was diaphoretic, tachypnic, hypertensive and tachycardic.  Team arrived, ordered 2mg ativan iv, VBG drawn from central line. 2000nl NS bolused for BP's 70's/40's.   NS infusing at 150 after boluses completed.  BP now 92/62. P 72, RR 20 02 97% on 5lnc.   No distress notied. MD ordered for pt to transfer to stepdown unit. CXR complete, will continue to monitor

## 2017-05-22 NOTE — HOSPITAL COURSE
Pt greatly improved overnight. However on 5/20, pt began hyperventilating and became diaphoretic.  Vitals were stable with  and /90, o2 sats 96%. Glucose 87. CXR, EKG, troponin normal, but lactic acid came back at 7.3 and patient has not been able to get more fluids or antibiotics because of lack of access. PICC team and anesthesia have attempted PIVs, but were unsuccessful. So pt was moved to ICU for closer monitoring and central line placement. EEG ordered at that time for suspected seizure. She remained stable while in ICU and stepped back down to medicine overnight on 5/21, but on 5/22 she had another episode of hyperventilation, diaphoresis, and limb contracture (worse than baseline). She was given 2 mg IV ativan and the episode quickly resolved. She was then mildly hypotensive which resolved with IV fluids. Repeat continuous EEG was ordered at that time and neurology was consulted and have recommended repeat head imaging with CT.

## 2017-05-22 NOTE — ASSESSMENT & PLAN NOTE
Concern for UTI based on presentation and labs findings. Given her hx agree with current antibiotics choices    -continue daptomycin  -given her med list including a SSRI would avoid linezolid in this pt  -await final cxs  -plan for 10 day course

## 2017-05-22 NOTE — SIGNIFICANT EVENT
Critical Care Outreach (CORE)  Critical Care Medicine  Consult Note      CORE Metrics:     Admit Date: 2017  LOS: 3  Code Status: Full Code   CC: AMS/seizure  Date of Consult: 2017  : 1965  Age: 52 y.o.  Weight:   Wt Readings from Last 1 Encounters:   17 87.1 kg (192 lb)     Race:   Sex: female  Bed: 1108/[facility]8 A:   MRN: [id number]  RRT Indication(s): Agitation/AMS/Seizure  Time CORE Call Received: 11.23  Time CORE at Bedside: 1127  Was the patient discharged from an ICU this admission? y ()  Was the patient discharged from a PACU within last 24 hours? n  Did the patient receive conscious sedation/general anesthesia within last 24 hours? n  Was the patient in the ED within the past 24 hours? n  Was the patient started on NIPPV within the past 24 hours? n  Did this progress into an ARC or CPA: n  Attending Physician: Jessica Mar MD  Primary Service: [facility]  Illness Category: Medical/Neurologic  Consult Requested By: Jessica Mar MD   Disposition: Stabilized    SUBJECTIVE:     History of Present Illness:  Ms Nieves is a 53yo female with history of multiple strokes with residual aphasia and dysphagia, s/p PEG tube placement and bed riddenness, osteomyelitis s/p amputation of toe, who presented to the ED from [facility] for evaluation of hypotension and vomiting on . Of note, she has been nonverbal, with contractures of all extremities since . Ms Nieves was admitted to ICU on  for lactic acid of 7.3 and possible sepsis and stepped down after a central line was placed and she was volume resuscitated.     Past Medical History:   Diagnosis Date    Anemia     iron def    Depression     Dysarthria     HTN (hypertension)     Hyperlipemia     Osteomyelitis     Seizures     Stroke       Past Surgical History:   Procedure Laterality Date    TOE AMPUTATION        Review of patient's allergies indicates:   Allergen Reactions     Amoxicillin Other (See Comments)     Per daughter always allergic, unknown rxn       History reviewed. No pertinent family history.   Social History   Substance Use Topics    Smoking status: Unknown If Ever Smoked    Smokeless tobacco: Not on file    Alcohol use No         Inpatient Medications:  Continuous Infusions:   sodium chloride 0.9% 150 mL/hr at 05/22/17 1245      Scheduled Meds:   DAPTOmycin (CUBICIN)  IV  6 mg/kg Intravenous Q24H    lorazepam  2 mg Intravenous Once    pantoprazole  40 mg Intravenous BID    potassium chloride 10%  20 mEq Per G Tube Daily      PRN Meds:.acetaminophen, dextrose 50 % in water (D50W), dextrose 50 % in water (D50W), glucagon (human recombinant), glucose, glucose, lorazepam 2 mg/ml oral conc, ondansetron, ondansetron, ramelteon     Review of Systems: JONH         OBJECTIVE:     Vital Signs (Most Recent):  Temp: 98.2 °F (36.8 °C) (05/22/17 1345)  Pulse: 62 (05/22/17 1345)  Resp: 16 (05/22/17 1345)  BP: 115/69 (05/22/17 1345)  SpO2: 98 % (05/22/17 1345) Vital Signs (24h Range):  Temp:  [98 °F (36.7 °C)-100.7 °F (38.2 °C)] 98.2 °F (36.8 °C)  Pulse:  [] 62  Resp:  [16-36] 16  SpO2:  [92 %-100 %] 98 %  BP: ()/(56-86) 115/69     I & O (24h):    Intake/Output Summary (Last 24 hours) at 05/22/17 1422  Last data filed at 05/22/17 0900   Gross per 24 hour   Intake              250 ml   Output                0 ml   Net              250 ml        Physical Exam:  GA: Eyes open, non-verbal.  Appears distressed, diaphoretic  HEENT: No visible oropharyngeal abnormalities.   Pulmonary: Tachypnea, positive accessory muscle use. Clear to auscultation A/P/L bilaterally. No wheezing, crackles, or rhonchi.  Cardiac: RRR, tachycardia  Abdominal: Bowel sounds present. Abdomen soft. PEG  Skin: No jaundice, rashes, or visible lesions.  Musculoskeletal: Extremities contracted  Extremities: No clubbing or cyanosis.  Neuro:  --GCS: E4 V1 M1  --Mental Status: Eyes open  --RASS:+2  --Pupils  3mm, PERRL.    Lines/Drains/Airway:       Percutaneous Central Line Insertion/Assessment - triple lumen  05/20/17 1737 right internal jugular (Active)   Dressing biopatch in place;dressing dry and intact 5/21/2017  5:06 PM   Securement secured w/ sutures 5/21/2017  5:06 PM   Additional Site Signs no erythema;no warmth;no edema;no pain;no palpable cord;no streak formation;no drainage 5/21/2017  5:06 PM   Distal Patency/Care flushed w/o difficulty;normal saline locked 5/21/2017  5:06 PM   Medial Patency/Care flushed w/o difficulty;normal saline locked 5/21/2017  5:06 PM   Proximal Patency/Care flushed w/o difficulty;normal saline locked 5/21/2017  5:06 PM   Waveform other (see comments) 5/21/2017  7:00 AM   Dressing Change Due 05/27/17 5/21/2017  7:00 AM   Daily Line Review Performed 5/21/2017  7:00 AM          Gastrostomy/Enterostomy 10/25/16 1401 Percutaneous endoscopic gastrostomy (PEG) midline feeding (Active)   Securement anchored to abdomen w/ adhesive device 5/21/2017  5:06 PM   Clamp Status/Tolerance unclamped 5/21/2017  5:06 PM   Dressing no dressing 5/21/2017  5:06 PM   Insertion Site no redness 5/21/2017  5:06 PM   Site Care site cleansed w/ soap and water;device rotatated 5/21/2017  7:00 AM   Flush/Irrigation flushed w/;water;no resistance met 5/21/2017  5:06 PM   Water Bolus (mL) 250 mL 5/21/2017 12:00 PM        Nutrition:  Current Diet Order   No orders of the defined types were placed in this encounter.         Labs:  CBC/Anemia Profile:     Recent Labs  Lab 05/22/17  0000 05/22/17  0528 05/22/17  1226   WBC 6.73 6.05  6.05  6.17  6.17 6.91  6.91   HGB 12.1 11.7*  11.7*  11.7*  11.7* 9.5*  9.5*   HCT 38.5 37.9  37.9  37.8  37.8 30.1*  30.1*    170  170  170  170 126*  126*   MCV 85 85  85  85  85 85  85   RDW 15.5* 15.5*  15.5*  15.7*  15.7* 15.6*  15.6*        Coags:     Recent Labs  Lab 05/19/17  1418   INR 1.0   APTT <21.0        Chemistries:     Recent Labs  Lab  05/20/17  0831 05/20/17  1245 05/21/17  0330 05/22/17  0528    146* 143 143   K 3.9 3.6 5.3* 3.3*   * 113* 110 109   CO2 23 16* 18* 24   BUN 11 10 9 5*   CREATININE 0.7 0.8 0.8 0.7   CALCIUM 8.2* 8.5* 8.9 8.6*   PROT 6.8  --  7.8 7.3   BILITOT 0.7  --  0.8 0.4   ALKPHOS 87  --  89 88   ALT 18  --  24 26   AST 20  --  42* 34   MG 2.0  --  2.3 1.8   PHOS 3.0  --  2.8 2.8        Urine Studies:   Lab Results   Component Value Date    COLORU Jojo 05/19/2017    APPEARANCEUA Cloudy (A) 05/19/2017    PHUR 8.0 05/19/2017    SPECGRAV 1.030 05/19/2017    PROTEINUA 1+ (A) 05/19/2017    GLUCUA Negative 05/19/2017    KETONESU Negative 05/19/2017    BILIRUBINUA Negative 05/19/2017    OCCULTUA Negative 05/19/2017    NITRITE Negative 05/19/2017    UROBILINOGEN Negative 05/19/2017    LEUKOCYTESUR 2+ (A) 05/19/2017    RBCUA 8 (H) 05/19/2017    WBCUA 40 (H) 05/19/2017    BACTERIA Many (A) 05/19/2017    SQUAMEPITHEL 3 05/19/2017    HYALINECASTS 0 05/19/2017        Lactic Acid:   Lab Results   Component Value Date    LACTATE 1.1 05/20/2017    LACTATE 7.3 (HH) 05/20/2017    LACTATE 1.5 05/19/2017        Cardiac Enzymes:   Recent Labs      05/20/17   1245   TROPONINI  <0.006        Arterial Blood Gas:   No results for input(s): PH, PCO2, HCO3, POCSATURATED, BE in the last 24 hours.    Invalid input(s):  PO2     Microbiology Results (last 7 days)     Procedure Component Value Units Date/Time    Urine culture [[id number]]  (Susceptibility) Collected:  05/19/17 1418    Order Status:  Completed Specimen:  Urine from Urine, Catheterized Updated:  05/22/17 1038     Urine Culture, Routine --     ENTEROCOCCUS FAECIUM VRE  >100,000 cfu/ml      Blood culture x two cultures. Draw prior to antibiotics. [[id number]] Collected:  05/19/17 1419    Order Status:  Completed Specimen:  Blood from Peripheral, Forearm, Right Updated:  05/21/17 2212     Blood Culture, Routine No Growth to date     Blood Culture, Routine No Growth to date     Blood  Culture, Routine No Growth to date    Narrative:       Aerobic and anaerobic    Blood culture x two cultures. Draw prior to antibiotics. [[id number]] Collected:  05/19/17 1417    Order Status:  Completed Specimen:  Blood from Peripheral, Forearm, Right Updated:  05/21/17 2212     Blood Culture, Routine No Growth to date     Blood Culture, Routine No Growth to date     Blood Culture, Routine No Growth to date    Narrative:       Aerobic and anaerobic    Gram stain [[id number]] Collected:  05/19/17 1418    Order Status:  Completed Specimen:  Urine from Urine, Catheterized Updated:  05/19/17 2213     Gram Stain Result No WBC's      Many Gram positive cocci in chains    Gram stain [[id number]]     Order Status:  Completed Specimen:  Urine           ASSESSMENT/PLAN:     Active Hospital Problems    Diagnosis    *Acute cystitis with hematuria    Acute cystitis without hematuria    Gastrointestinal hemorrhage associated with acute gastritis    Goals of care, counseling/discussion    Hyperventilating    Dark emesis    Sepsis due to urinary tract infection    PEG (percutaneous endoscopic gastrostomy) status    Paroxysmal atrial fibrillation    GIB (gastrointestinal bleeding)    CVA, old, hemiparesis    Dysphagia as late effect of cerebrovascular disease        Acute Encephalopathy/Seizure  Patient diaphoretic, tachypneic, foaming white oral secretions, and appears distressed  -- Ativan 2mg IV stat  -- Oral suction  -- Monitor for airway protection  --  Stat ABG  -- Tele/bedside monitor  -- 1L NS for hypotension (70s) systolic  -- Consider EEG  -- Consider transfer to Step down unit  -- Repeat chemistries and lactate    Coordinated care with primary team at bedside. No need for ICI at this time.  Please re-consult if condition deteriorates    Uninterrupted Critical Care/Counseling Time (not including procedures): 30 mins    Fiona Winterbottom APRN, CNS  Critical Care Medicine  [phone]

## 2017-05-22 NOTE — PLAN OF CARE
CM called patient's daughter for discharge planning.  Daughter in agreement with plan to discharge back to [facility] when time comes for discharge.    PCP:  Nursing home physician    Payor: MEDICAID / Plan: MEDICAID OF [place] / Product Type: [facility] /         05/22/17 1505   Discharge Assessment   Assessment Type Discharge Planning Assessment   Confirmed/corrected address and phone number on facesheet? Yes   Assessment information obtained from? Other  (Daughter )   Expected Length of Stay (days) 4   Communicated expected length of stay with patient/caregiver yes   Prior to hospitilization cognitive status: Unable to Assess  (nonverbal)   Prior to hospitalization functional status: Completely Dependent   Current cognitive status: Unable to Assess  (nonverbal)   Current Functional Status: Completely Dependent   Arrived From nursing home   Lives With facility resident   Able to Return to Prior Arrangements yes   Is patient able to care for self after discharge? No   How many people do you have in your home that can help with your care after discharge? other (see comments)  (Lives at [facility])   Who are your caregiver(s) and their phone number(s)? Matias Nieves - daughter [phone] or Kiana Fine - mother [phone]   Patient's perception of discharge disposition nursing home   Readmission Within The Last 30 Days no previous admission in last 30 days   Patient currently being followed by outpatient case management? No   Patient currently receives home health services? No   Does the patient currently use HME? Yes   Patient currently receives private duty nursing? N/A   Patient currently receives any other outside agency services? No   Equipment Currently Used at Home (Unsure of what equipment used, pt nonverbal)   Do you have any problems affording any of your prescribed medications? No   Is the patient taking medications as prescribed? yes   Do you have any financial concerns  preventing you from receiving the healthcare you need? No   Does the patient have transportation to healthcare appointments? Yes   Transportation Available agency transportation   On Dialysis? No   Does the patient receive services at the Coumadin Clinic? No   Discharge Plan A Return to nursing home   Discharge Plan B Return to Nursing Home   Patient/Family In Agreement With Plan yes

## 2017-05-22 NOTE — ASSESSMENT & PLAN NOTE
· Resolved  · WBC 21->13 ->6  · continue IV fluids  · PICC team consulted for additional access so that IJ can be removed.

## 2017-05-22 NOTE — HPI
Mr. Jonathan Nieves is a 53yo female with history of CVA with residual aphasia and dysphagia, s/p PEG tube placement and bed riddenness, osteomyelitis s/p amputation of toe, who comes to the ED from [facility] for evaluation of hypotension and vomiting.  The history was obtained from the daughter and POA (Matias Nieves, [phone]).  She said that she doesn't know much - but she mentioned that the retirement called and informed her that her mother started to have projectile, coffee ground emesis earlier this morning with diaphoresis, and a repeat episode later that afternoon - and that they were taking her to the ED.     In ED pt was found to be septic from UTI wasn was admitted to [facility] and started on Dapto and rocephin.

## 2017-05-22 NOTE — ASSESSMENT & PLAN NOTE
· H/H stable since admit.   · Found to have coffee ground emesis and FOBT +  · On PPI IV BID  · GI consulted and no intervention at this time.

## 2017-05-22 NOTE — SUBJECTIVE & OBJECTIVE
Interval History: Pt hyperventilating this AM.  Resolved with 2 mg IV ativan.   Review of Systems   Unable to perform ROS: Patient nonverbal     Objective:     Vital Signs (Most Recent):  Temp: (P) 97.9 °F (36.6 °C) (05/22/17 1658)  Pulse: (P) 61 (05/22/17 1658)  Resp: (P) 19 (05/22/17 1658)  BP: (P) 114/76 (05/22/17 1658)  SpO2: (P) 97 % (05/22/17 1658) Vital Signs (24h Range):  Temp:  [97.9 °F (36.6 °C)-100.7 °F (38.2 °C)] (P) 97.9 °F (36.6 °C)  Pulse:  [] (P) 61  Resp:  [16-36] (P) 19  SpO2:  [92 %-99 %] (P) 97 %  BP: ()/(56-86) (P) 114/76     Weight: 87.1 kg (192 lb)  Body mass index is 32.96 kg/m².    Intake/Output Summary (Last 24 hours) at 05/22/17 1708  Last data filed at 05/22/17 0900   Gross per 24 hour   Intake              250 ml   Output                0 ml   Net              250 ml      Physical Exam   Constitutional: She appears distressed.   HENT:   Head: Normocephalic and atraumatic.   Cardiovascular: Regular rhythm.    No murmur heard.  tachycardic   Pulmonary/Chest:   Hyperventilation at rate of 26-30. Pt with secretions and junky breath sounds.    Abdominal: Soft. Bowel sounds are normal. She exhibits no distension. There is no tenderness.   G tube in place   Neurological: She is alert.   Non- verbal   Skin: Skin is warm. No rash noted. She is diaphoretic.

## 2017-05-22 NOTE — PROGRESS NOTES
[facility]  Infectious Disease  Progress Note    Patient Name: Jonathan Nieves  MRN: [id number]  Admission Date: 5/19/2017  Length of Stay: 3 days  Attending Physician: Jessica Mar MD  Primary Care Provider: Primary Doctor No    Isolation Status: Contact  Assessment/Plan:      * Acute cystitis with hematuria    Concern for UTI based on presentation and labs findings. Given her hx agree with current antibiotics choices    -continue daptomycin  -given her med list including a SSRI would avoid linezolid in this pt  -await final cxs  -plan for 10 day course                Thank you for your consult. I will follow-up with patient. Please contact us if you have any additional questions.    Artemio Duke MD  Infectious Disease  [facility]    Subjective:     Principal Problem:Acute cystitis with hematuria    HPI: No notes on file  Interval History: Pt was moved to the ICU for resp status but is now being stepped down. Enterococcus on urine cx pending final    Review of Systems   Unable to perform ROS: Patient nonverbal     Objective:     Vital Signs (Most Recent):  Temp: 98 °F (36.7 °C) (05/22/17 0900)  Pulse: 60 (05/22/17 0900)  Resp: 20 (05/22/17 0900)  BP: 110/63 (05/22/17 0900)  SpO2: 97 % (05/22/17 0900) Vital Signs (24h Range):  Temp:  [97.7 °F (36.5 °C)-99.1 °F (37.3 °C)] 98 °F (36.7 °C)  Pulse:  [] 60  Resp:  [18-37] 20  SpO2:  [97 %-100 %] 97 %  BP: ()/(53-76) 110/63     Weight: 87.1 kg (192 lb)  Body mass index is 32.96 kg/m².    Estimated Creatinine Clearance: 98.2 mL/min (based on Cr of 0.7).    Physical Exam   Constitutional: She appears well-developed and well-nourished. No distress.   HENT:   Head: Normocephalic and atraumatic.   Eyes: EOM are normal. Pupils are equal, round, and reactive to light. No scleral icterus.   Neck: Normal range of motion. Neck supple. No thyromegaly present.   Cardiovascular: Normal rate, regular rhythm and normal heart  sounds.    No murmur heard.  Pulmonary/Chest: Effort normal and breath sounds normal. No respiratory distress.   Abdominal: Soft. Bowel sounds are normal. She exhibits no distension. There is no tenderness.   PEG tube   Musculoskeletal: She exhibits deformity.   Severe contractures   Neurological:   Nonverbal   Skin: Skin is warm and dry. She is not diaphoretic.   Nursing note and vitals reviewed.      Significant Labs: All pertinent labs within the past 24 hours have been reviewed.    Significant Imaging: I have reviewed all pertinent imaging results/findings within the past 24 hours.

## 2017-05-22 NOTE — PROGRESS NOTES
[facility] Hospital Medicine  Progress Note    Patient Name: Jonathan Nieves  MRN: [id number]  Patient Class: IP- Inpatient   Admission Date: 5/19/2017  Length of Stay: 3 days  Attending Physician: Jessica Mar MD  Primary Care Provider: Primary Doctor [facility] Medicine Team: [facility] Jessica Mar MD    Subjective:     Principal Problem:Acute cystitis with hematuria    HPI:  Mr. Jonathan Nieves is a 53yo female with history of CVA with residual aphasia and dysphagia, s/p PEG tube placement and bed riddenness, osteomyelitis s/p amputation of toe, who comes to the ED from [facility] for evaluation of hypotension and vomiting.  The history was obtained from the daughter and POA (Matias Nieves, [phone]).  She said that she doesn't know much - but she mentioned that the half-way called and informed her that her mother started to have projectile, coffee ground emesis earlier this morning with diaphoresis, and a repeat episode later that afternoon - and that they were taking her to the ED.     In ED pt was found to be septic from UTI wasn was admitted to [facility] and started on Dapto and rocephin.         Hospital Course:  Pt greatly improved overnight. However on 5/20, pt began hyperventilating and became diaphoretic.  Vitals were stable with  and /90, o2 sats 96%. Glucose 87. CXR, EKG, troponin normal, but lactic acid came back at 7.3 and patient has not been able to get more fluids or antibiotics because of lack of access. PICC team and anesthesia have attempted PIVs, but were unsuccessful. So pt was moved to ICU for closer monitoring and central line placement. EEG ordered at that time for suspected seizure. She remained stable while in ICU and stepped back down to medicine overnight on 5/21, but on 5/22 she had another episode of hyperventilation, diaphoresis, and limb contracture (worse than baseline). She was given 2 mg IV ativan and the  episode quickly resolved. She was then mildly hypotensive which resolved with IV fluids. Repeat continuous EEG was ordered at that time and neurology was consulted and have recommended repeat head imaging with CT.     Interval History: Pt hyperventilating this AM.  Resolved with 2 mg IV ativan.   Review of Systems   Unable to perform ROS: Patient nonverbal     Objective:     Vital Signs (Most Recent):  Temp: (P) 97.9 °F (36.6 °C) (05/22/17 1658)  Pulse: (P) 61 (05/22/17 1658)  Resp: (P) 19 (05/22/17 1658)  BP: (P) 114/76 (05/22/17 1658)  SpO2: (P) 97 % (05/22/17 1658) Vital Signs (24h Range):  Temp:  [97.9 °F (36.6 °C)-100.7 °F (38.2 °C)] (P) 97.9 °F (36.6 °C)  Pulse:  [] (P) 61  Resp:  [16-36] (P) 19  SpO2:  [92 %-99 %] (P) 97 %  BP: ()/(56-86) (P) 114/76     Weight: 87.1 kg (192 lb)  Body mass index is 32.96 kg/m².    Intake/Output Summary (Last 24 hours) at 05/22/17 1708  Last data filed at 05/22/17 0900   Gross per 24 hour   Intake              250 ml   Output                0 ml   Net              250 ml      Physical Exam   Constitutional: She appears distressed.   HENT:   Head: Normocephalic and atraumatic.   Cardiovascular: Regular rhythm.    No murmur heard.  tachycardic   Pulmonary/Chest:   Hyperventilation at rate of 26-30. Pt with secretions and junky breath sounds.    Abdominal: Soft. Bowel sounds are normal. She exhibits no distension. There is no tenderness.   G tube in place   Neurological: She is alert.   Non- verbal   Skin: Skin is warm. No rash noted. She is diaphoretic.         Assessment/Plan:      * Acute cystitis with hematuria    · UA notable for many RBC, WBC, and bacteria  · Previous history of UTIs include Proteus and VRE  · No history of indwelling catheter - placed in the ED  · Check urine gram stain and cx - VRE  · S/p Aztreonam and Vanc in the ED because of Amoxicillin allergy.  Now on Daptomycin per ID recs.           Seizure    On 5/20 and again on 5/22 she had another  episode of hyperventilation, diaphoresis, and limb contracture (worse than baseline). She was given 2 mg IV ativan and the episode quickly resolved. She was then mildly hypotensive which resolved with IV fluids. Repeat continuous EEG was ordered at that time and neurology was consulted and have recommended repeat head imaging with CT.             Sepsis due to urinary tract infection    · Resolved  · WBC 21->13 ->6  · continue IV fluids  · PICC team consulted for additional access so that IJ can be removed.         GIB (gastrointestinal bleeding)    · H/H stable since admit.   · Found to have coffee ground emesis and FOBT +  · On PPI IV BID  · GI consulted and no intervention at this time.           CVA, old, hemiparesis    · Nonverbal at baseline          Paroxysmal atrial fibrillation    · NSR on exam  · Hold Aspirin for now given coffee ground emesis  · Cardiac monitoring        Dysphagia as late effect of cerebrovascular disease    · S/p PEG tube placement          PEG (percutaneous endoscopic gastrostomy) status    · 2/2 CVA  · Will hold PEG tube feeds with GIB          Goals of care, counseling/discussion    Admit doctor had a thorough discussion with the daughter regarding her mother's code status.  She mentions that she is the POA and that for right now, the patient is to be full code with all interventions, including central line and intubation.              VTE Risk Mitigation         Ordered     Medium Risk of VTE  Once      05/19/17 2007          Jessica Mar MD  Department of Hospital Medicine   [facility]

## 2017-05-22 NOTE — PT/OT/SLP PROGRESS
Occupational Therapy      Jonathan Nieves  MRN: [id number]    Per chart review, pt was not evaluated by physical therapy 2/2 active bed rest orders. Per PT note, orders were discontinued and bed rest orders were to be removed only if pt was deemed appropriate for therapy by the MD. Per chart review 5/22/2017 pt still with active bed rest orders and not appropriate for OT evaluation. Orders will be discontinued and will require new orders for OT evaluation and treat if appropriate.    JANETTE Pro  5/22/2017  Pager: [phone]

## 2017-05-22 NOTE — ASSESSMENT & PLAN NOTE
· UA notable for many RBC, WBC, and bacteria  · Previous history of UTIs include Proteus and VRE  · No history of indwelling catheter - placed in the ED  · Check urine gram stain and cx - VRE  · S/p Aztreonam and Vanc in the ED because of Amoxicillin allergy.  Now on Daptomycin per ID recs.

## 2017-05-22 NOTE — CONSULTS
[facility]  Neurology  Consult Note    Patient Name: Jonathan Nieves  MRN: [id number]  Admission Date: 5/19/2017  Hospital Length of Stay: 3 days  Code Status: Full Code   Attending Provider: Dylan Mar MD   Consulting Provider: Monica Gutierrez PA-C  Primary Care Physician: Primary Doctor No  Principal Problem:Acute cystitis with hematuria    Inpatient consult to Neurology  Consult performed by: MONICA GUTIERREZ  Consult ordered by: DYLAN MAR         Subjective:     Chief Complaint:  Suspected seizures      HPI:   52 y.o. F with PMHx of multiple CVAs (most recent 2008) with residual aphasia/dysphagia/contractures (nonverbal at baseline) s/p PEG presented to ED 5/19 via EMS from [facility] after projectile coffee ground emesis, dark BM and hypotension. Found to have UTI, elevated lactic acid c/w urosepsis, started on Abx and IV fluids. Currently being treated for VRE UTI. Hospital course complicated by difficulty obtaining IV access. Collateral info obtained from daughter. She has been a resident at NH for 4-5 years and denies hx of seizures. Neuro consulted 5/22 after rapid response called at 11:21 after episode of diaphoresis, tachypnea, hypertension and tachycardia concerning for possible seizure. Ativan 2 mg given. EEG ordered. Last CT Head in 2014.      Past Medical History:   Diagnosis Date    Anemia     iron def    Depression     Dysarthria     HTN (hypertension)     Hyperlipemia     Osteomyelitis     Seizures     Stroke        Past Surgical History:   Procedure Laterality Date    TOE AMPUTATION       Review of patient's allergies indicates:   Allergen Reactions    Amoxicillin Other (See Comments)     Per daughter always allergic, unknown rxn     No current facility-administered medications on file prior to encounter.      Current Outpatient Prescriptions on File Prior to Encounter   Medication Sig    ascorbic acid (VITAMIN C) 500 MG tablet 1 tablet (500 mg  total) by Per G Tube route once daily.    aspirin 81 MG Chew 1 tablet (81 mg total) by Per G Tube route once daily.    calcium-vitamin D3 500 mg(1,250mg) -200 unit per tablet 1 tablet by Per G Tube route 2 (two) times daily.    citalopram (CELEXA) 10 MG tablet 1 tablet (10 mg total) by Per G Tube route once daily.    docusate (COLACE) 50 mg/5 mL liquid 5 mLs (50 mg total) by Per G Tube route once daily.    famotidine (PEPCID) 20 MG tablet 1 tablet (20 mg total) by Per G Tube route 2 (two) times daily.    lactulose (CEPHULAC) 10 gram packet Take 1 packet (10 g total) by mouth 3 (three) times daily.    metoprolol tartrate (LOPRESSOR) 25 MG tablet Take 12.5 mg by mouth 2 (two) times daily.    omeprazole-sodium bicarbonate (ZEGERID) 40-1,680 mg Pack 1 packet by PEG Tube route before breakfast.    ondansetron (ZOFRAN) 4 mg/5 mL solution 5 mLs (4 mg total) by Per G Tube route 2 (two) times daily as needed for Nausea.    oxycodone (ROXICODONE) 5 mg/5 mL Soln Take 5 mLs (5 mg total) by mouth every 6 (six) hours.    polyethylene glycol (GLYCOLAX) 17 gram/dose powder Take 17 g by mouth once daily.    ranitidine (ZANTAC) 150 MG capsule Take 150 mg by mouth 2 (two) times daily.    simvastatin (ZOCOR) 40 MG tablet Take 1 tablet (40 mg total) by mouth every evening.     Family History     None        Social History Main Topics    Smoking status: Unknown If Ever Smoked    Smokeless tobacco: Not on file    Alcohol use No    Drug use: No    Sexual activity: Not on file     Review of Systems   Unable to perform ROS: Patient nonverbal     Objective:     Vital Signs (Most Recent):  Temp: 98.2 °F (36.8 °C) (05/22/17 1345)  Pulse: 63 (05/22/17 1500)  Resp: 16 (05/22/17 1345)  BP: 115/69 (05/22/17 1345)  SpO2: 98 % (05/22/17 1345) Vital Signs (24h Range):  Temp:  [98 °F (36.7 °C)-100.7 °F (38.2 °C)] 98.2 °F (36.8 °C)  Pulse:  [] 63  Resp:  [16-36] 16  SpO2:  [92 %-100 %] 98 %  BP: ()/(56-86) 115/69      Weight: 87.1 kg (192 lb)  Body mass index is 32.96 kg/m².    Physical Exam   Constitutional: No distress.   blt arms and legs contracted   Lying with head facing the left side   Does not track   HENT:   Head: Normocephalic and atraumatic.   Eyes: Conjunctivae are normal. Pupils are equal, round, and reactive to light.   Neck: Normal range of motion. Neck supple.   Pulmonary/Chest: Effort normal.     NEUROLOGICAL EXAMINATION:     MENTAL STATUS        Does not answer any orientation questions  Does not follow any simple commands  Nonverbal      CRANIAL NERVES     CN III, IV, VI   Pupils are equal, round, and reactive to light.  Right pupil: Shape: regular. Reactivity: brisk.   Left pupil: Shape: regular. Reactivity: brisk.   Nystagmus: none     MOTOR EXAM   Muscle bulk: decreased  Overall muscle tone: increased       Contractures noted x 4 extremities   Not spontaneously moving any limbs      REFLEXES        Brisk throughout      GAIT AND COORDINATION     Tremor   Resting tremor: absent       Coordination and gait unable to be assessed      Significant Labs:   Blood Culture: No results for input(s): LABBLOO in the last 48 hours.  CBC:   Recent Labs  Lab 05/22/17  0000 05/22/17  0528 05/22/17  1226   WBC 6.73 6.05  6.05  6.17  6.17 6.91  6.91   HGB 12.1 11.7*  11.7*  11.7*  11.7* 9.5*  9.5*   HCT 38.5 37.9  37.9  37.8  37.8 30.1*  30.1*    170  170  170  170 126*  126*     CMP:   Recent Labs  Lab 05/21/17  0330 05/22/17  0528   GLU 71 85    143   K 5.3* 3.3*    109   CO2 18* 24   BUN 9 5*   CREATININE 0.8 0.7   CALCIUM 8.9 8.6*   MG 2.3 1.8   PROT 7.8 7.3   ALBUMIN 3.0* 3.1*   BILITOT 0.8 0.4   ALKPHOS 89 88   AST 42* 34   ALT 24 26   ANIONGAP 15 10   EGFRNONAA >60.0 >60.0     Inflammatory Markers: No results for input(s): SEDRATE, CRP, PROCAL in the last 48 hours.  Prealbumin: No results for input(s): PREALBUMIN in the last 48 hours.  Respiratory Culture: No results for input(s):  GSRESP, RESPIRATORYC in the last 48 hours.  Urine Culture: No results for input(s): LABURIN in the last 48 hours.  Urine Studies: No results for input(s): COLORU, APPEARANCEUA, PHUR, SPECGRAV, PROTEINUA, GLUCUA, KETONESU, BILIRUBINUA, OCCULTUA, NITRITE, UROBILINOGEN, LEUKOCYTESUR, RBCUA, WBCUA, BACTERIA, SQUAMEPITHEL, HYALINECASTS in the last 48 hours.    Invalid input(s): WRIGHTSUR  All pertinent lab results from the past 24 hours have been reviewed.    Significant Imaging: I have reviewed and interpreted all pertinent imaging results/findings within the past 24 hours.     Last head imaging-CT Head WO contrast (10/14/2014):  Severe microvascular disease and age advanced volume loss      Assessment and Plan:     Seizure-like activity    Rapid response called today for episode of diaphoresis, hypertension, tachycardia and tachypnea concerning for possible seizure. No reported convulsions, however patient nonverbal and no family at bedside. No hx of seizures.    >>Query provoked seizure in setting of UTI/encephalomalacia superimposed on poor cognitive reserve from prior CVAs    Recommendations:  -EEG pending, seizure precautions and ativan prn   -CT Head W WO contrast          Sepsis due to urinary tract infection    ID on board, currently on daptomycin              VTE Risk Mitigation         Ordered     Medium Risk of VTE  Once      05/19/17 2007        Dr. Olivas attestation to follow  Thank you for your consult. I will follow-up with patient. Please contact us if you have any additional questions.    Monica Gutierrez PA-C  General Neurology Consult  Neuro Consult [facility] # [id number]

## 2017-05-22 NOTE — CONSULTS
Evaluated both arm under U/S as best as possible but limited considering rigidity and contracted nature of patient's arms.  Unable to visualize an adequate vein for Midline or PICC line placement.

## 2017-05-22 NOTE — ASSESSMENT & PLAN NOTE
Rapid response called today for episode of diaphoresis, hypertension, tachycardia and tachypnea concerning for possible seizure. No reported convulsions, however patient nonverbal and no family at bedside. No hx of seizures.    >>Query provoked seizure in setting of UTI/encephalomalacia superimposed on poor cognitive reserve from prior CVAs    Recommendations:  -EEG pending, seizure precautions and ativan prn   -CT Head W WO contrast

## 2017-05-23 NOTE — SUBJECTIVE & OBJECTIVE
Interval History: Pt was moved to the ICU for resp status but is now being stepped down. Enterococcus on urine cx sensitive to doxy    Review of Systems   Unable to perform ROS: Patient nonverbal     Objective:     Vital Signs (Most Recent):  Temp: 97.4 °F (36.3 °C) (05/23/17 0400)  Pulse: 61 (05/23/17 0650)  Resp: 20 (05/23/17 0400)  BP: 123/80 (05/23/17 0400)  SpO2: 100 % (05/23/17 0400) Vital Signs (24h Range):  Temp:  [97.4 °F (36.3 °C)-100.7 °F (38.2 °C)] 97.4 °F (36.3 °C)  Pulse:  [] 61  Resp:  [16-42] 20  SpO2:  [92 %-100 %] 100 %  BP: ()/(60-86) 123/80     Weight: 87.1 kg (192 lb)  Body mass index is 32.96 kg/m².    Estimated Creatinine Clearance: 98.2 mL/min (based on Cr of 0.7).    Physical Exam   Constitutional: She appears well-developed and well-nourished. No distress.   HENT:   Head: Normocephalic and atraumatic.   Eyes: EOM are normal. Pupils are equal, round, and reactive to light. No scleral icterus.   Neck: Normal range of motion. Neck supple. No thyromegaly present.   Cardiovascular: Normal rate, regular rhythm and normal heart sounds.    No murmur heard.  Pulmonary/Chest: Effort normal and breath sounds normal. No respiratory distress.   Abdominal: Soft. Bowel sounds are normal. She exhibits no distension. There is no tenderness.   PEG tube   Musculoskeletal: She exhibits deformity.   Severe contractures   Neurological:   Nonverbal   Skin: Skin is warm and dry. She is not diaphoretic.   Nursing note and vitals reviewed.      Significant Labs: All pertinent labs within the past 24 hours have been reviewed.    Significant Imaging: I have reviewed all pertinent imaging results/findings within the past 24 hours.

## 2017-05-23 NOTE — PHYSICIAN QUERY
"PT Name: Jonathan Nieves  MR #: [id number]    Physician Query Form - Cause and Effect Relationship Clarification      CDS/: Ernesto Orlando               Contact information: [phone]    This form is a permanent document in the medical record.     Query Date: May 23, 2017    By submitting this query, we are merely seeking further clarification of documentation. Please utilize your independent clinical judgment when addressing the question(s) below.    The Medical record contains the following:  Supporting Clinical Findings   Location in record      Check urine gram stain and cx - Many Gram positive cocci in chains   ·          S/p Aztreonam and Vanc in the ED because of Amoxicillin allergy.  Now on Rocephin and Daptomycin   ·          ID consulted                                                                                                                                                                                      Progress Note 5/20     Sepsis due to urinary tract infection   ·          WBC 21->13   ·          BP fluctuating around 100, but patient on antihypertensive therapy   ·          Lactic acid 1.2 that increased to 2.7 - 1.5.   ·            S/p 30mL/kg fluid resuscitation:  2,700mL   ·          Source likely urine, see acute cystitis with hematuria                                                                                                                                                                                           Progress Note 5/20          Provider, please clarify if there is any correlation between __ Gram positive cocci___ and ____Sepsis due to urinary tract infection______________.           Are the conditions:     [  ] Due to or associated with each other     [  ] Unrelated to each other     [  ] Other (Please Specify): _________________________     [  ] Clinically Undetermined    Problem list includes "Sepsis due to VRE UTI" at the top                              "

## 2017-05-23 NOTE — ASSESSMENT & PLAN NOTE
Rapid response called today for episode of diaphoresis, hypertension, tachycardia and tachypnea concerning for possible seizure. No reported convulsions, however patient nonverbal and no family at bedside. No hx of seizures. Concern for provoked seizure in setting of UTI/encephalomalacia superimposed on poor cognitive reserve from prior CVAs. EEG 5/21 reviewed with Dr. Barillas--moderate nonspecific encephalopathy, no epileptiform discharges.CT Head limited 2/2 tolerance but unremarkable for acute intracranial pathology.    Recommendations:  -plasma metanephrines pending, continue further w/u of etiology of hyperventilation,diaphoretic, tachypneic, tachycardic episodes  -routine EEG without seizure discharges, but was never hooked up for extended recording and did not capture an event to correlate  If source of episodes continues to remain unknown and mental status does not improve, consider trial of Keppra 500 mg BID

## 2017-05-23 NOTE — SUBJECTIVE & OBJECTIVE
Subjective:     Interval History:  EEG from 5/21 (~20 minutes) reviewed with Dr. Barillas today and no recorded hyperventilation events  C/w moderate encephalopathy, no epileptiform discharges     Current Facility-Administered Medications   Medication Dose Route Frequency Provider Last Rate Last Dose    acetaminophen oral solution 650 mg  650 mg Per G Tube Q4H PRN Jessica Mar MD   650 mg at 05/21/17 2001    baclofen tablet 20 mg  20 mg Per G Tube QID Isaura Leslie MD        citalopram tablet 10 mg  10 mg Per G Tube Daily Isaura Leslie MD        dextrose 50 % in water (D50W) injection 12.5 g  12.5 g Intravenous PRN Maggy Orourke MD   12.5 g at 05/21/17 1429    dextrose 50 % in water (D50W) injection 25 g  25 g Intravenous PRN Maggy Orourke MD        doxycycline tablet 100 mg  100 mg Oral Q12H Artemio Duke MD   100 mg at 05/23/17 1130    glucagon (human recombinant) injection 1 mg  1 mg Intramuscular PRN Maggy Orourke MD        glucose chewable tablet 16 g  16 g Oral PRN Maggy Orourke MD        glucose chewable tablet 24 g  24 g Oral PRN Maggy Orourke MD        lorazepam injection 2 mg  2 mg Intravenous Once Italia Dinero MD        lorazepam injection 2 mg  2 mg Intravenous Q1H PRN Jessica Mar MD   2 mg at 05/23/17 1220    ondansetron disintegrating tablet 8 mg  8 mg Oral Q8H PRN Maggy Orourke MD        ondansetron injection 8 mg  8 mg Intravenous Q8H PRN Maggy Orourke MD   8 mg at 05/19/17 2030    pantoprazole suspension 40 mg  40 mg Per G Tube Daily Isaura Leslie MD        potassium chloride 10% solution 30 mEq  30 mEq Per G Tube Once Isaura Leslie MD        ramelteon tablet 8 mg  8 mg Oral Nightly PRN Maggy Orourke MD   8 mg at 05/22/17 2039    simvastatin tablet 40 mg  40 mg Per G Tube QHS Isaura Leslie MD         Review of Systems   Unable to perform ROS: Patient nonverbal     Objective:     Vital Signs (Most Recent):  Temp: 98.8 °F  (37.1 °C) (05/23/17 1100)  Pulse: 69 (05/23/17 1440)  Resp: (!) 22 (05/23/17 1100)  BP: 107/68 (05/23/17 1100)  SpO2: 100 % (05/23/17 1100) Vital Signs (24h Range):  Temp:  [97.4 °F (36.3 °C)-98.9 °F (37.2 °C)] 98.8 °F (37.1 °C)  Pulse:  [61-79] 69  Resp:  [19-42] 22  SpO2:  [92 %-100 %] 100 %  BP: (102-142)/(56-80) 107/68     Weight: 87.1 kg (192 lb)  Body mass index is 32.96 kg/m².    Physical Exam   Constitutional: She appears distressed.   Head facing left side   Does not track   HENT:   Head: Normocephalic and atraumatic.   Eyes: Conjunctivae are normal. Pupils are equal, round, and reactive to light.   Pulmonary/Chest: She is in respiratory distress.   Making grunting/moaning noises  tachypneic    Skin: She is not diaphoretic.     NEUROLOGICAL EXAMINATION:     CRANIAL NERVES     CN III, IV, VI   Pupils are equal, round, and reactive to light.    MOTOR EXAM        Intermittently slightly moves LE     Significant Labs:   Blood Culture: No results for input(s): LABBLOO in the last 48 hours.  CBC:   Recent Labs  Lab 05/22/17  1226 05/22/17  1749 05/23/17  0305   WBC 6.91  6.91 6.99  6.99 6.33  6.33  6.33  6.33  6.33  6.33   HGB 9.5*  9.5* 10.1*  10.1* 10.0*  10.0*  10.0*  10.0*  10.0*  10.0*   HCT 30.1*  30.1* 32.8*  32.8* 32.4*  32.4*  32.4*  32.4*  32.4*  32.4*   *  126* 148*  148* 160  160  160  160  160  160     CMP:   Recent Labs  Lab 05/22/17  0528   GLU 85      K 3.3*      CO2 24   BUN 5*   CREATININE 0.7   CALCIUM 8.6*   MG 1.8   PROT 7.3   ALBUMIN 3.1*   BILITOT 0.4   ALKPHOS 88   AST 34   ALT 26   ANIONGAP 10   EGFRNONAA >60.0     Inflammatory Markers: No results for input(s): SEDRATE, CRP, PROCAL in the last 48 hours.  Prealbumin: No results for input(s): PREALBUMIN in the last 48 hours.  Respiratory Culture: No results for input(s): GSRESP, RESPIRATORYC in the last 48 hours.  Urine Culture: No results for input(s): LABURIN in the last 48 hours.  Urine  Studies: No results for input(s): COLORU, APPEARANCEUA, PHUR, SPECGRAV, PROTEINUA, GLUCUA, KETONESU, BILIRUBINUA, OCCULTUA, NITRITE, UROBILINOGEN, LEUKOCYTESUR, RBCUA, WBCUA, BACTERIA, SQUAMEPITHEL, HYALINECASTS in the last 48 hours.    Invalid input(s): WRIGHTSUR  All pertinent lab results from the past 24 hours have been reviewed.    Plasma metanephrines pending     Significant Imaging: I have reviewed and interpreted all pertinent imaging results/findings within the past 24 hours.     EEG 5/21 ~20 minutes reviewed with Dr. Barillas  No hyperventilation episodes captured--study c/w moderate encephalopathy, no focality or epileptiform discharges

## 2017-05-23 NOTE — PLAN OF CARE
Patient resting quietly in bed when CM rounded. No family at the bedside. Order noted for 24 hr EEG. Plan to discharge patient back to [facility] when medically stable. Will continue to follow.

## 2017-05-23 NOTE — PROGRESS NOTES
[facility]  Neurology  Progress Note    Patient Name: Jonathan Nieves  MRN: [id number]  Admission Date: 5/19/2017  Hospital Length of Stay: 4 days  Code Status: Full Code   Attending Provider: Isaura Leslie MD  Primary Care Physician: Primary Doctor No   Principal Problem:Acute cystitis with hematuria      Subjective:     Interval History:  EEG from 5/21 (~20 minutes) reviewed with Dr. Barillas today and no recorded hyperventilation events  C/w moderate encephalopathy, no epileptiform discharges     Current Facility-Administered Medications   Medication Dose Route Frequency Provider Last Rate Last Dose    acetaminophen oral solution 650 mg  650 mg Per G Tube Q4H PRN Jessica Mar MD   650 mg at 05/21/17 2001    baclofen tablet 20 mg  20 mg Per G Tube QID Isaura Leslie MD        citalopram tablet 10 mg  10 mg Per G Tube Daily Isaura Leslie MD        dextrose 50 % in water (D50W) injection 12.5 g  12.5 g Intravenous PRN Maggy Orourke MD   12.5 g at 05/21/17 1429    dextrose 50 % in water (D50W) injection 25 g  25 g Intravenous PRN Maggy Orourke MD        doxycycline tablet 100 mg  100 mg Oral Q12H Artemio Duke MD   100 mg at 05/23/17 1130    glucagon (human recombinant) injection 1 mg  1 mg Intramuscular PRN Maggy Orourke MD        glucose chewable tablet 16 g  16 g Oral PRN Maggy Orourke MD        glucose chewable tablet 24 g  24 g Oral PRN Maggy Orourke MD        lorazepam injection 2 mg  2 mg Intravenous Once Italia Dinero MD        lorazepam injection 2 mg  2 mg Intravenous Q1H PRN Jessica Mar MD   2 mg at 05/23/17 1220    ondansetron disintegrating tablet 8 mg  8 mg Oral Q8H PRN Maggy Orourke MD        ondansetron injection 8 mg  8 mg Intravenous Q8H PRN Maggy Orourke MD   8 mg at 05/19/17 2030    pantoprazole suspension 40 mg  40 mg Per G Tube Daily Isaura Leslie MD        potassium chloride 10% solution 30 mEq  30 mEq Per  G Tube Once Isaura Leslie MD        ramelteon tablet 8 mg  8 mg Oral Nightly PRN Maggy Orourke MD   8 mg at 05/22/17 2039    simvastatin tablet 40 mg  40 mg Per G Tube QHS Isaura Leslie MD         Review of Systems   Unable to perform ROS: Patient nonverbal     Objective:     Vital Signs (Most Recent):  Temp: 98.8 °F (37.1 °C) (05/23/17 1100)  Pulse: 69 (05/23/17 1440)  Resp: (!) 22 (05/23/17 1100)  BP: 107/68 (05/23/17 1100)  SpO2: 100 % (05/23/17 1100) Vital Signs (24h Range):  Temp:  [97.4 °F (36.3 °C)-98.9 °F (37.2 °C)] 98.8 °F (37.1 °C)  Pulse:  [61-79] 69  Resp:  [19-42] 22  SpO2:  [92 %-100 %] 100 %  BP: (102-142)/(56-80) 107/68     Weight: 87.1 kg (192 lb)  Body mass index is 32.96 kg/m².    Physical Exam   Constitutional: She appears distressed.   Head facing left side   Does not track   HENT:   Head: Normocephalic and atraumatic.   Eyes: Conjunctivae are normal. Pupils are equal, round, and reactive to light.   Pulmonary/Chest: She is in respiratory distress.   Making grunting/moaning noises  tachypneic    Skin: She is not diaphoretic.     NEUROLOGICAL EXAMINATION:     CRANIAL NERVES     CN III, IV, VI   Pupils are equal, round, and reactive to light.    MOTOR EXAM        Intermittently slightly moves LE     Significant Labs:   Blood Culture: No results for input(s): LABBLOO in the last 48 hours.  CBC:   Recent Labs  Lab 05/22/17  1226 05/22/17  1749 05/23/17  0305   WBC 6.91  6.91 6.99  6.99 6.33  6.33  6.33  6.33  6.33  6.33   HGB 9.5*  9.5* 10.1*  10.1* 10.0*  10.0*  10.0*  10.0*  10.0*  10.0*   HCT 30.1*  30.1* 32.8*  32.8* 32.4*  32.4*  32.4*  32.4*  32.4*  32.4*   *  126* 148*  148* 160  160  160  160  160  160     CMP:   Recent Labs  Lab 05/22/17  0528   GLU 85      K 3.3*      CO2 24   BUN 5*   CREATININE 0.7   CALCIUM 8.6*   MG 1.8   PROT 7.3   ALBUMIN 3.1*   BILITOT 0.4   ALKPHOS 88   AST 34   ALT 26   ANIONGAP 10   EGFRNONAA >60.0      Inflammatory Markers: No results for input(s): SEDRATE, CRP, PROCAL in the last 48 hours.  Prealbumin: No results for input(s): PREALBUMIN in the last 48 hours.  Respiratory Culture: No results for input(s): GSRESP, RESPIRATORYC in the last 48 hours.  Urine Culture: No results for input(s): LABURIN in the last 48 hours.  Urine Studies: No results for input(s): COLORU, APPEARANCEUA, PHUR, SPECGRAV, PROTEINUA, GLUCUA, KETONESU, BILIRUBINUA, OCCULTUA, NITRITE, UROBILINOGEN, LEUKOCYTESUR, RBCUA, WBCUA, BACTERIA, SQUAMEPITHEL, HYALINECASTS in the last 48 hours.    Invalid input(s): WRIGHTSUR  All pertinent lab results from the past 24 hours have been reviewed.    Plasma metanephrines pending     Significant Imaging: I have reviewed and interpreted all pertinent imaging results/findings within the past 24 hours.     EEG 5/21 ~20 minutes reviewed with Dr. Barillas  No hyperventilation episodes captured--study c/w moderate encephalopathy, no focality or epileptiform discharges     Assessment and Plan:     Seizure-like activity    Rapid response called today for episode of diaphoresis, hypertension, tachycardia and tachypnea concerning for possible seizure. No reported convulsions, however patient nonverbal and no family at bedside. No hx of seizures. Concern for provoked seizure in setting of UTI/encephalomalacia superimposed on poor cognitive reserve from prior CVAs. EEG 5/21 reviewed with Dr. Barillas--moderate nonspecific encephalopathy, no epileptiform discharges.CT Head limited 2/2 tolerance but unremarkable for acute intracranial pathology.    Recommendations:  -plasma metanephrines pending, continue further w/u of etiology of hyperventilation,diaphoretic, tachypneic, tachycardic episodes  -routine EEG without seizure discharges, but was never hooked up for extended recording and did not capture an event to correlate  If source of episodes continues to remain unknown and mental status does not improve, consider trial  of Keppra 500 mg BID          Sepsis due to urinary tract infection    ID on board, currently on daptomycin              VTE Risk Mitigation         Ordered     Medium Risk of VTE  Once      05/19/17 2007        Dr. Flanagan attestation to follow  Please call with questions/clarifications    Monica Gutierrez PA-C  General Neurology Consult  Neuro Consult [facility] # [phone]

## 2017-05-23 NOTE — ASSESSMENT & PLAN NOTE
Concern for UTI based on presentation and labs findings.    -discontinue daptomycin  -given her med list including a SSRI would avoid linezolid in this pt  -would start on doxy 100 mg per peg BID (this will remove the need for picc and preserve dapto for future infections)  -plan for 10 day total course (including dapto days)

## 2017-05-23 NOTE — PROGRESS NOTES
[facility]  Infectious Disease  Progress Note    Patient Name: Jonathan Nieves  MRN: [id number]  Admission Date: 5/19/2017  Length of Stay: 4 days  Attending Physician: Isaura Leslie MD  Primary Care Provider: Primary Doctor No    Isolation Status: Contact  Assessment/Plan:      * Acute cystitis with hematuria    Concern for UTI based on presentation and labs findings.    -discontinue daptomycin  -given her med list including a SSRI would avoid linezolid in this pt  -would start on doxy 100 mg per peg BID (this will remove the need for picc and preserve dapto for future infections)  -plan for 10 day total course (including dapto days)          Thank you for your consult. I will sign off    Artemio Duke MD  Infectious Disease  [facility]    Subjective:     Principal Problem:Acute cystitis with hematuria    HPI: No notes on file  Interval History: Pt was moved to the ICU for resp status but is now being stepped down. Enterococcus on urine cx sensitive to doxy    Review of Systems   Unable to perform ROS: Patient nonverbal     Objective:     Vital Signs (Most Recent):  Temp: 97.4 °F (36.3 °C) (05/23/17 0400)  Pulse: 61 (05/23/17 0650)  Resp: 20 (05/23/17 0400)  BP: 123/80 (05/23/17 0400)  SpO2: 100 % (05/23/17 0400) Vital Signs (24h Range):  Temp:  [97.4 °F (36.3 °C)-100.7 °F (38.2 °C)] 97.4 °F (36.3 °C)  Pulse:  [] 61  Resp:  [16-42] 20  SpO2:  [92 %-100 %] 100 %  BP: ()/(60-86) 123/80     Weight: 87.1 kg (192 lb)  Body mass index is 32.96 kg/m².    Estimated Creatinine Clearance: 98.2 mL/min (based on Cr of 0.7).    Physical Exam   Constitutional: She appears well-developed and well-nourished. No distress.   HENT:   Head: Normocephalic and atraumatic.   Eyes: EOM are normal. Pupils are equal, round, and reactive to light. No scleral icterus.   Neck: Normal range of motion. Neck supple. No thyromegaly present.   Cardiovascular: Normal rate, regular rhythm and  normal heart sounds.    No murmur heard.  Pulmonary/Chest: Effort normal and breath sounds normal. No respiratory distress.   Abdominal: Soft. Bowel sounds are normal. She exhibits no distension. There is no tenderness.   PEG tube   Musculoskeletal: She exhibits deformity.   Severe contractures   Neurological:   Nonverbal   Skin: Skin is warm and dry. She is not diaphoretic.   Nursing note and vitals reviewed.      Significant Labs: All pertinent labs within the past 24 hours have been reviewed.    Significant Imaging: I have reviewed all pertinent imaging results/findings within the past 24 hours.

## 2017-05-23 NOTE — PROGRESS NOTES
[facility]  Neurology  Progress Note    Patient Name: Jonathan Nieves  MRN: [id number]  Admission Date: 5/19/2017  Hospital Length of Stay: 4 days  Code Status: Full Code   Attending Provider: Isaura Leslie MD  Primary Care Physician: Primary Doctor No   Principal Problem:Acute cystitis with hematuria    No new subjective & objective note has been filed under this hospital service since the last note was generated.    Assessment and Plan:     Seizure-like activity    Rapid response called today for episode of diaphoresis, hypertension, tachycardia and tachypnea concerning for possible seizure. No reported convulsions, however patient nonverbal and no family at bedside. No hx of seizures. Concern for provoked seizure in setting of UTI/encephalomalacia superimposed on poor cognitive reserve from prior CVAs. EEG 5/21 reviewed with Dr. Barillas--moderate nonspecific encephalopathy, no epileptiform discharges.CT Head limited 2/2 tolerance but unremarkable for acute intracranial pathology.    Recommendations:  -plasma metanephrines pending, continue further w/u of etiology of hyperventilation,diaphoretic, tachypneic, tachycardic episodes  -routine EEG without seizure discharges, but was never hooked up for extended recording and did not capture an event to correlate  If source of episodes continues to remain unknown and mental status does not improve, consider trial of Keppra 500 mg BID          Sepsis due to urinary tract infection    ID on board, currently on daptomycin                VTE Risk Mitigation         Ordered     Medium Risk of VTE  Once      05/19/17 2007      I have personally taken the history and examined the patient and agree with the PA's note as stated above.        Blayne Flanagan MD  Neurology  [facility]

## 2017-05-23 NOTE — PROGRESS NOTES
[facility]  Adult Nutrition  Progress Note    SUMMARY     Pt is non verbal and no family present at time of visit. Per RN, pt is tolerating TF at 30 mL/hr, she will increase TF to goal rate as pt tolerates. TF was held d/t possible seizure. Per chart review, IVF @ 150 mL/hr x 24 hrs. K 3.3, pt receiving KCl.    Recommendations     1. As medically able, increase TF to goal rate of 50 mL/hr x 24 hrs.   - This will provide 1800 kcals, 82 gm protein, and 917 mL free water. Meets >85% of EEN/EPN.   - Provide 220 mL free water flush QID to better meet fluid needs. Total fluid 1797 mL.     RD to monitor and follow up as appropriate.     Goals: Meet >85% of EEN/EPN.  Nutrition Goal Status: new  Communication of RD Recs: reviewed with RN (placed recs in sticky note)    Continuum of Care Plan    Nursing Team: obtain weekly wts.     Reason for Assessment    Reason for Assessment: new tube feeding  Diagnosis:  (cystitis hematuria)  Relevent Medical History: stroke, HTN, seizures   Interdisciplinary Rounds: did not attend  Level of Care: Nutrition Support  Nutrition Discharge Planning: TF through PEG     Nutrition Prescription Ordered    Current Diet Order: NPO  Nutrition Order Comments: TF runnning at 30 mL/hr, per RN TF was held and restarted, she will increase to goal rate as pt tolerates.   Current Nutrition Support Formula Ordered: Isosource 1.5  Current Nutrition Support Rate Ordered: 30 (ml)  Current Nutrition Support Frequency Ordered: mL/hr  Oral Nutrition Supplement: n/a     Evaluation of Received Nutrients/Fluid Intake    Enteral Calories (kcal): 1080  Enteral Protein (gm): 49  Enteral (Free Water) Fluid (mL): 550  Energy Calories Required: not meeting needs  % Kcal Needs: 60  Protein Required: not meeting needs  % Protein Needs: 70  IV Fluid (mL): 3600      Nutrition Risk Screen     Nutrition Risk Screen: tube feeding or parenteral nutrition, dysphagia or difficulty swallowing    Nutrition/Diet  History    Patient Reported Diet/Restrictions/Preferences:  (JONH)  Food Preferences: JONH  Factors Affecting Nutritional Intake: NPO    Labs/Tests/Procedures/Meds    Pertinent Labs Reviewed: reviewed  Pertinent Labs Comments: K 3.3  Pertinent Medications Reviewed: reviewed  Pertinent Medications Comments: IVF @ 150 mL, KCl, pantoprazole    Physical Findings    Overall Physical Appearance:  (JONH)  Tubes: gastrostomy tube  Oral/Mouth Cavity:  (unable to assess)  Skin:  (wdl, per chart review. )    Anthropometrics    Height (inches): 64.02 in  Weight Method: Bed Scale  Weight (kg): 87.1 kg  Ideal Body Weight (IBW), Female: 120.1 lb  % Ideal Body Weight, Female (lb): 159.88 lb  BMI (kg/m2): 32.94  BMI Grade: 30 - 34.9- obesity - grade I  Weight Loss:  (JONH)     Wt Readings from Last 10 Encounters:   05/20/17 87.1 kg (192 lb)   10/24/16 84.5 kg (186 lb 4.8 oz)   07/26/16 90.7 kg (200 lb)   09/26/15 70.3 kg (155 lb)   07/18/15 59 kg (130 lb)   03/07/15 70.5 kg (155 lb 6.4 oz)   12/24/14 66.2 kg (146 lb)   10/20/14 66.9 kg (147 lb 8 oz)   08/29/14 67.6 kg (149 lb)   06/16/14 75.8 kg (167 lb)     Estimated/Assessed Needs    Weight Used For Calorie Calculations: 87.1 kg (192 lb 0.3 oz)   Height (cm): 162.6 cm  Energy Need Method: Caroline-St Jeor (1800 kcal/d (PAL 1.25))  RMR (Caroline-St. Jeor Equation): 1459.7  Weight Used For Protein Calculations: 87.1 kg (192 lb 0.3 oz)  Protein Requirements: 70-87 gm/d (0.8-1.0 gm/kg of ABW)  Fluid Need Method: RDA Method (1800 mL/d or per MD, Janna WNL. )    Nutrition Diagnosis    Inadequate oral intake r/t inability to chew/swallow AEB NPO status and need for alternate nutrition.  Status: new     Monitor and Evaluation    Food and Nutrient Intake: energy intake  Food and Nutrient Adminstration: diet order, enteral and parenteral nutrition administration  Anthropometric Measurements: weight change  Biochemical Data, Medical Tests and Procedures: electrolyte and renal panel, glucose/endocrine  profile  Nutrition-Focused Physical Findings: overall appearance    Nutrition Risk    Level of Risk:  (f/u 2x/week)    Nutrition Follow-Up    RD Follow-up?: Yes     Promise Petersen, MICHAEL, LDN

## 2017-05-23 NOTE — PROCEDURES
DATE OF PROCEDURE:  05/21/2017    EEG NUMBER:  [id number]    REQUESTED BY:  Jessica Mar M.D.    LOCATION OF SERVICE:  6080    ELECTROENCEPHALOGRAM REPORT    METHODOLOGY:  Electroencephalographic (EEG) recording is recorded with   electrodes placed according to the International 10-20 placement system.  Thirty   two (32) channels of digital signal (sampling rate of 512/sec), including T1   and T2, were simultaneously recorded from the scalp and may include EKG, EMG,   and/or eye monitors.  Recording band pass was 0.1 to 512 Hz.  Digital video   recording of the patient is simultaneously recorded with the EEG.  The patient   is instructed to report clinical symptoms which may occur during the recording   session.  EEG and video recording are stored and archived in digital format.    Activation procedures, which include photic stimulation, hyperventilation and   instructing patients to perform simple tasks, are done in selected patients.    The EEG is displayed on a monitor screen and can be reviewed using different   montages.  Computer assisted-analysis is employed to detect spike and   electrographic seizure activity.  The entire record is submitted for computer   analysis.  The entire recording is visually reviewed, and the times identified   by computer analysis as being spikes or seizures are reviewed again.    Compressed spectral analysis (CSA) is also performed on the activity recorded   from each individual channel.  This is displayed as a power display of   frequencies from 0 to 30 Hz over time.  The CSA is reviewed looking for   asymmetries in power between homologous areas of the scalp, then compared with   the original EEG recording.    Grand Perfecta software was also utilized in the review of this study.  This software   suite analyzes the EEG recording in multiple domains.  Coherence and rhythmicity   are computed to identify EEG sections which may contain organized seizures.    Each channel undergoes  analysis to detect the presence of spike and sharp waves   which have special and morphological characteristics of epileptic activity.  The   routine EEG recording is converted from special into frequency domain.  This is   then displayed comparing homologous areas to identify areas of significant   asymmetry.  Algorithm to identify non-cortically generated artifact is used to   separate artifact from the EEG.    EEG FINDINGS:  Recording was obtained at the patient's bedside in the ICU.  The   patient was moving around and appeared to be intermittently interactive, but   other times was quiet and apparently asleep.  Waking background was   characterized by fairly irregular 7 Hz posterior dominant rhythm seen in the   occipital, parietal, central and temporal leads bilaterally.  There was a   prominent beta activity seen diffusely.  The background had some intermixed   theta activity and symmetrically over both hemispheres.  When the patient   becomes quiet, background changes and becomes a low voltage delta, theta and   beta mixture and punctuated by runs of 1 to 2 Hz delta, which is high amplitude,   but not maximum at the vertex.  The patient fluctuates back and forth between   these 2 patterns.  There was no evidence of lateralized or focal changes and   there was no spike or sharp wave activity seen.  The activation procedures were   not carried out.    IMPRESSION:  Abnormal EEG with generalized slowing indicative of moderate   nonspecific and nonfocal encephalopathy without evidence for focal changes nor   an epileptic process.    CLINICAL CORRELATION:  The patient is a 52-year-old female who has a history of   prior stroke with residual aphasia and dysphagia.  Also, has a history of   seizures.  She presented with vomiting and sepsis secondary to UTI.  She is   presently on no antiepileptic drugs.  This tracing shows significant generalized   cortical dysfunction and encephalopathy, but no clear lateralized  changes that   would relate to the history of prior stroke.          /kristi [id number] blank(s)        RR/HN  dd: 05/22/2017 15:50:50 (CDT)  td: 05/22/2017 16:51:09 (CDT)  Doc ID   #[id number]  Job ID #[id number]    CC:

## 2017-05-24 NOTE — PLAN OF CARE
Mis placed call to John in admissions at , l/m for her to return Sw's call and inform of d/c. Mis to follow. Pt has a clogged PEG and will not return to NH today, Mis left message with admissions.

## 2017-05-24 NOTE — PROGRESS NOTES
Dr. Leslie notified that patient's G tube is meeting resistance with tube feeding and water flushes. Tube feeding turned off.

## 2017-05-24 NOTE — PROGRESS NOTES
Progress Note  Hospital Medicine      Admit Date: 5/19/2017    SUBJECTIVE:     Follow-up For:  Acute cystitis with hematuria    HPI/Interval history: Patient with no new events    Review of Systems: Gen: no fever, no chills, Heart: no chest pain, palpitations; Resp: no SOB, no cough    OBJECTIVE:     Vital Signs Range (Last 24H):  Temp:  [98 °F (36.7 °C)-98.8 °F (37.1 °C)]   Pulse:  [69-91]   Resp:  [18-22]   BP: ()/(56-86)   SpO2:  [97 %-100 %]     Physical Exam:  General appearance: NAD, conversant  Neck: FROM, supple   Lungs: Clear to auscultation, no accessory muscle use  CV: RRR, no heave  Abdomen: Soft, non-tender; no masses or HSM  Extremities: No peripheral edema or digital cyanosis  Skin: no rash, lesions or ulcers  Psych: Alert and oriented to person, place and time      Recent Labs  Lab 05/20/17  0831 05/20/17  1245 05/21/17  0330 05/22/17  0528 05/23/17  0305    146* 143 143  --    K 3.9 3.6 5.3* 3.3*  --    * 113* 110 109  --    CO2 23 16* 18* 24  --    BUN 11 10 9 5*  --    CREATININE 0.7 0.8 0.8 0.7  --    GLU 84 83 71 85  --    CALCIUM 8.2* 8.5* 8.9 8.6*  --    MG 2.0  --  2.3 1.8  --    PHOS 3.0  --  2.8 2.8 2.0*       Recent Labs  Lab 05/19/17  1418 05/20/17  0831 05/21/17  0330 05/22/17  0528   ALKPHOS 105 87 89 88   ALT 24 18 24 26   AST 26 20 42* 34   ALBUMIN 3.6 2.8* 3.0* 3.1*   PROT 8.7* 6.8 7.8 7.3   BILITOT 0.6 0.7 0.8 0.4   INR 1.0  --   --   --          Recent Labs  Lab 05/22/17  0528 05/22/17  1226 05/22/17  1749 05/23/17  0305   WBC 6.05  6.05  6.17  6.17 6.91  6.91 6.99  6.99 6.33  6.33  6.33  6.33  6.33  6.33   HGB 11.7*  11.7*  11.7*  11.7* 9.5*  9.5* 10.1*  10.1* 10.0*  10.0*  10.0*  10.0*  10.0*  10.0*   HCT 37.9  37.9  37.8  37.8 30.1*  30.1* 32.8*  32.8* 32.4*  32.4*  32.4*  32.4*  32.4*  32.4*     170  170  170 126*  126* 148*  148* 160  160  160  160  160  160   GRAN 57.4  3.5  --   --  55.0  3.5   LYMPH 24.1   1.5  --   --  27.3  1.7   MONO 9.2  0.6  --   --  10.4  0.7         Recent Labs  Lab 05/22/17  0141 05/22/17  1705 05/22/17  2316 05/23/17  0442 05/23/17  1212 05/23/17  1739   POCTGLUCOSE 96 77 121* 150* 128* 133*       ASSESSMENT/PLAN:     Sepsis due to VRE UTI  Whitmore placed in ER and removed after arrival to floor. Admitted to ICU for central line access and closer monitoring. Initially started on ceftriaxone and daptomycin and IV fluids.  ID consulted and recommended doxycyline when cultures returned for total 10 days.     Seizure episodes  Autonomic instability due to severe strokes  On 5/20 and again on 5/22 she had another episode of hyperventilation, diaphoresis, and limb contracture (worse than baseline). Episodes would resolve with IV ativan. Neurology consulted. EEG did not show any seizure activity. We checked for free plasma metanephrines. Deferred 24 urine collection of catecholamines and metanephrines due to need for indwelling whitmore. Suspect patient with autonomic instability and will try to control these episodes with propranolol 10 mg through PEG prn.     Upper GI bleeding  Hematemesis  Found to have coffee ground emesis and FOBT+. Started on PPI IV BID. GI was consulted and did not recommend intervention due to Hgb remaining very steadily at 10. Will treat with PPI x 8 week and then continue on once daily empirically.     Previous stroke  quadriparesis  Aphasic  Dysphagia S/P PEG  Receives 24 hour care at nursing home. Require PEG tube feedings.     Paroxysmal atrial fibrillation  NSR through stay. Held Aspirin due to UGI bleed.

## 2017-05-24 NOTE — PLAN OF CARE
[facility]     Department of Hospital Medicine     [address] [place] [address]     [phone] after hours  [phone] fax       NURSING HOME ORDERS    05/24/2017    Admit to Nursing Home:  Regular Bed        Diagnoses:  Active Hospital Problems    Diagnosis  POA    *Acute cystitis with hematuria [N30.01]  Yes    Acute encephalopathy [G93.40]  Yes    Diaphoresis [R61]  Yes    Seizure-like activity [R56.9]  Yes    Acute cystitis without hematuria [N30.00]  Yes    Gastrointestinal hemorrhage associated with acute gastritis [K29.01]  Yes    Goals of care, counseling/discussion [Z71.89]  Not Applicable     Chronic    Seizure [R56.9]  Yes    Dark emesis [K92.0]  Yes    Sepsis due to urinary tract infection [A41.9, N39.0]  Yes    PEG (percutaneous endoscopic gastrostomy) status [Z93.1]  Not Applicable    Paroxysmal atrial fibrillation [I48.0]  Yes     Chronic    GIB (gastrointestinal bleeding) [K92.2]  Yes    CVA, old, hemiparesis [I69.359]  Not Applicable     Chronic    Dysphagia as late effect of cerebrovascular disease [I69.991]  Not Applicable     Chronic      Resolved Hospital Problems    Diagnosis Date Resolved POA   No resolved problems to display.       Patient is homebound due to:  Acute cystitis with hematuria    Allergies:  Review of patient's allergies indicates:   Allergen Reactions    Amoxicillin Other (See Comments)     Per daughter always allergic, unknown rxn       Vitals:  Every shift    Diet: isosource 1.5 pako 60 mL/h  Free water flush 200 mL QID    Acitivities:   - Up in a chair each morning as tolerated   - Ambulate with assistance to bathroom   - Scheduled walks once each shift (every 8 hours)   - May use walker, cane, or self-propelled wheelchair     LABS:  Per facility protocol    Nursing Precautions:     - Aspiration precautions:             - Total assistance with meals            -  Upright 90 degrees befor during and  after meals             -  Suction at bedside          - Fall precautions per nursing home protocol   - Seizure precaution per [facility] protocol   - Decubitus precautions:        -  for positioning   - Pressure reducing foam mattress   - Turn patient every two hours. Use wedge pillows to anchor patient    CONSULTS:     Nutrition to evaluate and recommend diet    MISCELLANEOUS CARE:    PEG Care:  Clean site every 24 hours     Routine Skin for Bedridden Patients:  Apply moisture barrier cream to all    skin folds and wet areas in perineal area daily and after baths and                           all bowel movements.    Medications: Discontinue all previous medication orders, if any. See new list below.     Jonathan Nieves   Home Medication Instructions JOAQUIN:[id number]    Printed on:05/24/17 6036   Medication Information                      aspirin 81 MG Chew  1 tablet (81 mg total) by Per G Tube route once daily.   MI or stroke prevention   baclofen (LIORESAL) 20 MG tablet  Take 1 tablet (20 mg total) by mouth every 4 (four) hours.   spasticity   calcium-vitamin D3 500 mg(1,250mg) -200 unit per tablet  1 tablet by Per G Tube route 2 (two) times daily.   Osteoporosis prevention   citalopram (CELEXA) 10 MG tablet  1 tablet (10 mg total) by Per G Tube route once daily.   depression   levetiracetam (KEPPRA) 500 MG Tab  Take 1 tablet (500 mg total) by mouth 2 (two) times daily.   Seizure disorder   metoprolol tartrate (LOPRESSOR) 25 MG tablet  Take 12.5 mg by mouth 2 (two) times daily.   HTN   pantoprazole (PROTONIX) 40 mg GrPS  1 packet (40 mg total) by Per G Tube route twice daily for eight weeks, then once daily indefinitely VIOLA   polyethylene glycol (GLYCOLAX) 17 gram/dose powder  Take 17 g by mouth once daily.   BM regimen   simvastatin (ZOCOR) 40 MG tablet  1 tablet (40 mg total) by Per G Tube route every evening.   hyperlipidemia   Doxycyline 100 mg BID through PEG for 8 doses  Enterococcal  UTI    Propranolol 10 mg through q4h through PEG for HR>120 or RR>25 or SBP>130 or diaphoresis. Hold for SBP <90    Peridex 5 mL swab mouth MWF        _________________________________  Isaura Leslie MD  05/24/2017

## 2017-05-24 NOTE — PT/OT/SLP PROGRESS
Occupational Therapy      Jonathan Nieves  MRN: [id number]    OT orders acknowledged. Pt is a baseline NH resident who is bed bound, dependent with mobility and ADLs and unable to follow commands. Pt inappropriate for skilled OT; MD notified and OT orders discontinued.     JANETTE Bhatti  5/24/2017

## 2017-05-24 NOTE — PT/OT/SLP PROGRESS
Physical Therapy      Jonathan Nieves  MRN: [id number]    PT orders acknowledged. Pt baseline NH resident, bed bound, dependent with mobility and ADLs and unable to follow commands. Pt inappropriate for skilled PT; MD notified. PT orders discontinued.     DEREJE GUAN, PT   5/24/2017

## 2017-05-24 NOTE — PLAN OF CARE
Sw uploaded all clinical information for Pt to d/c, Pt was resident at [facility]. Sw to follow with acceptance and return.

## 2017-05-25 NOTE — NURSING
Pt. Given lipase/amylapse capsule and sodium bicarbonate via peg tube not able to administer due to peg tube resistance,, peg tube tubing inflats each time solution injected into peg site, solution allowed to dwell 10 minutes into site still clotted

## 2017-05-25 NOTE — NURSING
Dr Hinkle pulled patient central line.   Report called to Ranul[facility] at [facility]. Left number for nurse to call with questions. Patient awaiting transportation. Daughter Matias Nieves called and notified of patient transfer.

## 2017-05-25 NOTE — PLAN OF CARE
Mis updated by John with [facility] that Pt is approved to return to the [facility]. Mis called [facility], spoke with Vero, arranged stretcher due CVA and bed bound status as Pt does not follow commands. Packet left with unit clerkenny Alvarez to transport with Pt. Pt ok to d/c.

## 2017-05-25 NOTE — PLAN OF CARE
Mis did updated John with admissions on Pt's medical status. Sw to follow. Mis also left message with John at  on .

## 2017-05-26 NOTE — PLAN OF CARE
05/26/17 0814   Final Note   Assessment Type Final Discharge Note   Discharge Disposition Nurse  ([facility])   Discharge planning education complete? No   What phone number can be called within the next 1-3 days to see how you are doing after discharge? [phone]   Hospital Follow Up  Appt(s) scheduled? No   Discharge plans and expectations educations in teach back method with documentation complete? No   [facility] Bedside [facility]? n/a   Discharge/Hospital Encounter Summary to (non-[facility]) PCP No   Referral to Outpatient Case Management complete? No   Referral to / orders for Home Health Complete? No   30 day supply of medicines given at discharge, if documented non-compliance / non-adherence? No   Any social issues identified prior to discharge? No   Did you assess the readiness or willingness of the family or caregiver to support self management of care? n/a     [facility]

## 2017-05-30 NOTE — PROGRESS NOTES
Progress Note  Hospital Medicine      Admit Date: 5/19/2017    SUBJECTIVE:     Follow-up For:  Acute cystitis with hematuria    HPI/Interval history: Patient with no new events    Review of Systems: Gen: no fever, no chills, Heart: no chest pain, palpitations; Resp: no SOB, no cough    OBJECTIVE:     Vital Signs  05/24/17 1210 98.1 °F (36.7 °C) 62 19 139/73 100 %     Physical Exam:  General appearance: NAD, conversant  Neck: FROM, supple   Lungs: Clear to auscultation, no accessory muscle use  CV: RRR, no heave  Abdomen: Soft, non-tender; no masses or HSM  Extremities: No peripheral edema or digital cyanosis  Skin: no rash, lesions or ulcers  Psych: Alert and oriented to person, place and time      Recent Labs  Lab 05/23/17  0305 05/24/17  1509 05/25/17  0633   NA  --  145 146*   K  --  4.1 3.9   CL  --  111* 111*   CO2  --  29 28   BUN  --  5* 5*   CREATININE  --  0.6 0.6   GLU  --  104 84   CALCIUM  --  8.9 9.0   PHOS 2.0* 2.5* 2.9       Recent Labs  Lab 05/24/17  1509 05/25/17  0633   ALBUMIN 2.8* 2.8*         Recent Labs  Lab 05/23/17  0305 05/24/17  1313 05/25/17  0633   WBC 6.33  6.33  6.33  6.33  6.33  6.33 3.81* 5.95   HGB 10.0*  10.0*  10.0*  10.0*  10.0*  10.0* 14.8 10.9*   HCT 32.4*  32.4*  32.4*  32.4*  32.4*  32.4* 47.6 35.7*     160  160  160  160  160 112* 177   GRAN 55.0  3.5 47.0  1.8 51.0  3.0   LYMPH 27.3  1.7 34.4  1.3 34.3  2.0   MONO 10.4  0.7 8.4  0.3 7.1  0.4         Recent Labs  Lab 05/24/17  1150 05/24/17  1630 05/24/17  2330 05/25/17  0923 05/25/17  1225 05/25/17  1639   POCTGLUCOSE 111* 105 96 83 72 82       ASSESSMENT/PLAN:     Sepsis due to VRE UTI  De Leon placed in ER and removed after arrival to floor. Admitted to ICU for central line access and closer monitoring. Initially started on ceftriaxone and daptomycin and IV fluids.  ID consulted and recommended doxycyline when cultures returned for total 10 days.     Seizure episodes  Autonomic instability  due to severe strokes  On 5/20 and again on 5/22 she had another episode of hyperventilation, diaphoresis, and limb contracture (worse than baseline). Episodes would resolve with IV ativan. Neurology consulted. EEG did not show any seizure activity. We checked for free plasma metanephrines. Deferred 24 urine collection of catecholamines and metanephrines due to need for indwelling whitmore. Suspect patient with autonomic instability and will try to control these episodes with propranolol 10 mg through PEG prn.     Upper GI bleeding  Hematemesis  Found to have coffee ground emesis and FOBT+. Started on PPI IV BID. GI was consulted and did not recommend intervention due to Hgb remaining very steadily at 10. Will treat with PPI x 8 week and then continue on once daily empirically.     Previous stroke  quadriparesis  Aphasic  Dysphagia S/P PEG  Receives 24 hour care at nursing home. Require PEG tube feedings.     Paroxysmal atrial fibrillation  NSR through stay. Held Aspirin due to UGI bleed.    Severe protein calorie malnutrition - continue isosource 1.5 pako 60 mL/h

## 2017-05-30 NOTE — DISCHARGE SUMMARY
[facility]  Discharge Summary  [facility]      Admit Date: 5/19/2017    Discharge Date and Time: 5/25/2017  6:14 PM    Discharge Provider: Isaura Leslie    Reason for Admission: Sepsis due to VRE UTI    Indwelling Lines/Drains at time of discharge:        Gastrostomy/Enterostomy 10/25/16 1401 Percutaneous endoscopic gastrostomy (PEG) midline feeding (Active)   Securement anchored to abdomen w/ adhesive device 5/25/2017  9:00 AM   Interventions Prior to Feeding patency checked 5/25/2017  9:00 AM   Feeding Type continuous 5/24/2017  8:00 AM   Clamp Status/Tolerance clamped 5/24/2017  7:45 PM   Feeding Action feeding held 5/24/2017  7:45 PM   Dressing dry and intact 5/24/2017  7:45 PM   Insertion Site no redness;no warmth;no drainage;no tenderness;no swelling 5/24/2017  7:45 PM   Site Care site cleansed w/ sterile normal saline 5/24/2017  7:45 PM   Flush/Irrigation resistance met;unable to instill irrigant/flush;physician notified 5/24/2017  7:45 PM   Current Rate (mL/hr) 50 mL/hr 5/24/2017  8:00 AM   Goal Rate (mL/hr) 50 mL/hr 5/24/2017  8:00 AM   Intake (mL) 50 mL 5/25/2017  2:00 PM   Water Bolus (mL) 100 mL 5/25/2017 11:00 AM   Tube Output(mL)(Include Discarded Residual) 0 mL 5/22/2017  8:37 PM   Intake (mL) - Breast Milk Tube Feeding 0 5/22/2017  8:37 PM   Tube Feeding Intake (mL) 300 5/24/2017  1:00 PM   Residual Amount (ml) 0 ml 5/24/2017  8:00 AM       Hospital Course (synopsis of major diagnoses, care, treatment, and services provided during the course of the hospital stay):     Sepsis due to VRE UTI  De Leon placed in ER and removed after arrival to floor. Admitted to ICU for central line access and closer monitoring. Initially started on ceftriaxone and daptomycin and IV fluids.  ID consulted and recommended doxycyline when cultures returned for total of 10 days.      Seizure episodes  Autonomic instability due to severe strokes  On 5/20 and again on 5/22 she had another episode of  hyperventilation, diaphoresis, and limb contracture (worse than baseline). Episodes would resolve with IV ativan. Neurology consulted. EEG did not show any seizure activity. We checked for free plasma metanephrines. Deferred 24 urine collection of catecholamines and metanephrines due to need for indwelling whitmore. Suspect patient with autonomic instability and started propranolol 10 mg through PEG prn. Neurology recommending treating for seizures empirically with levetiracetam 500 mg BID     Upper GI bleeding  Hematemesis  Found to have coffee ground emesis and FOBT+. Started on PPI IV BID. GI was consulted and did not recommend intervention due to Hgb remaining very steadily at 10. Will treat with PPI x 8 week and then continue on once daily empirically.      Previous stroke  quadriparesis  Aphasic  Dysphagia S/P PEG  Receives 24 hour care at nursing home. Require PEG tube feedings.      Paroxysmal atrial fibrillation  NSR through stay. Held Aspirin due to UGI bleed and restarted at discharge.     Severe protein calorie malnutrition - continue isosource 1.5 pako 60 mL/h. Free water flush 200 mL QID.     Halitosis - peridex oral swabs MWF    Gastrostomy tube dysfunction - Coke and creon+sodium bicarb did not work. We used endoscopic GI brush to manual declog the gastrostomy tube.    Consults: Gastroenterology, Neurosurgery, Gastroenterology and Infectious Disease    Final Diagnoses:    Principal Problem: Acute cystitis with hematuria   Secondary Diagnoses:   Active Hospital Problems    Diagnosis  POA    *Acute cystitis with hematuria [N30.01]  Yes    Acute encephalopathy [G93.40]  Yes    Diaphoresis [R61]  Yes    Seizure-like activity [R56.9]  Yes    Acute cystitis without hematuria [N30.00]  Yes    Gastrointestinal hemorrhage associated with acute gastritis [K29.01]  Yes    Goals of care, counseling/discussion [Z71.89]  Not Applicable     Chronic    Seizure [R56.9]  Yes    Dark emesis [K92.0]  Yes    Sepsis  due to urinary tract infection [A41.9, N39.0]  Yes    PEG (percutaneous endoscopic gastrostomy) status [Z93.1]  Not Applicable    Paroxysmal atrial fibrillation [I48.0]  Yes     Chronic    GIB (gastrointestinal bleeding) [K92.2]  Yes    CVA, old, hemiparesis [I69.359]  Not Applicable     Chronic    Dysphagia as late effect of cerebrovascular disease [I69.991]  Not Applicable     Chronic      Resolved Hospital Problems    Diagnosis Date Resolved POA   No resolved problems to display.       Discharged Condition: stable    Disposition: Nursing Facility    Follow Up/Patient Instructions:     Medications:  Reconciled Home Medications:   Discharge Medication List as of 5/25/2017  5:11 PM      START taking these medications    Details   chlorhexidine (PERIDEX) 0.12 % solution Use as directed 15 mLs in the mouth or throat every Mon, Wed, Fri., Starting Fri 5/26/2017, Until Fri 6/9/2017, No Print      levetiracetam (KEPPRA) 500 MG Tab Take 1 tablet (500 mg total) by mouth 2 (two) times daily., Starting Wed 5/24/2017, Until Thu 5/24/2018, Print      propranolol (INDERAL) 20 mg/5 mL (4 mg/mL) Soln Take 2.5 mLs (10 mg total) by mouth every 4 (four) hours as needed (SBP >180 or HR >120 or RR >30 or diaphoresis. Hold for SBP <90)., Starting Thu 5/25/2017, Until Fri 5/25/2018, No Print         CONTINUE these medications which have CHANGED    Details   baclofen (LIORESAL) 20 MG tablet Take 1 tablet (20 mg total) by mouth every 4 (four) hours., Starting Wed 5/24/2017, No Print      calcium-vitamin D3 500 mg(1,250mg) -200 unit per tablet 1 tablet by Per G Tube route 2 (two) times daily., Starting Wed 5/24/2017, Until Thu 5/24/2018, No Print      pantoprazole (PROTONIX) 40 mg GrPS Use twice daily for 8 weeks then start once daily indefinitely., No Print      simvastatin (ZOCOR) 40 MG tablet 1 tablet (40 mg total) by Per G Tube route every evening., Starting Wed 5/24/2017, Until Thu 5/24/2018, Print         CONTINUE these  medications which have NOT CHANGED    Details   aspirin 81 MG Chew 1 tablet (81 mg total) by Per G Tube route once daily., Starting 3/9/2015, Until Discontinued, OTC      citalopram (CELEXA) 10 MG tablet 1 tablet (10 mg total) by Per G Tube route once daily., Starting 10/27/2016, Until Fri 10/27/17, Print      metoprolol tartrate (LOPRESSOR) 25 MG tablet Take 12.5 mg by mouth 2 (two) times daily., Until Discontinued, Historical Med      polyethylene glycol (GLYCOLAX) 17 gram/dose powder Take 17 g by mouth once daily., Starting 9/26/2015, Until Discontinued, Print         STOP taking these medications       ascorbic acid (VITAMIN C) 500 MG tablet Comments:   Reason for Stopping:         docusate (COLACE) 50 mg/5 mL liquid Comments:   Reason for Stopping:         famotidine (PEPCID) 20 MG tablet Comments:   Reason for Stopping:         lactulose (CEPHULAC) 10 gram packet Comments:   Reason for Stopping:         omeprazole-sodium bicarbonate (ZEGERID) 40-1,680 mg Pack Comments:   Reason for Stopping:         ondansetron (ZOFRAN) 4 mg/5 mL solution Comments:   Reason for Stopping:         oxycodone (ROXICODONE) 5 mg/5 mL Soln Comments:   Reason for Stopping:         ranitidine (ZANTAC) 150 MG capsule Comments:   Reason for Stopping:             No discharge procedures on file.      I spent more than 30 minutes total on this discharge including seeing and examining patient, note writing, reconcilling medications, and discussion with other health providers on team.

## 2018-01-01 ENCOUNTER — HOSPITAL ENCOUNTER (INPATIENT)
Facility: HOSPITAL | Age: 53
LOS: 43 days | DRG: 870 | End: 2018-03-20
Attending: EMERGENCY MEDICINE | Admitting: INTERNAL MEDICINE
Payer: MEDICAID

## 2018-01-01 VITALS
WEIGHT: 179 LBS | TEMPERATURE: 97 F | BODY MASS INDEX: 27.13 KG/M2 | DIASTOLIC BLOOD PRESSURE: 47 MMHG | SYSTOLIC BLOOD PRESSURE: 79 MMHG | HEIGHT: 68 IN | OXYGEN SATURATION: 76 %

## 2018-01-01 DIAGNOSIS — A41.9 SEPSIS, DUE TO UNSPECIFIED ORGANISM: Primary | ICD-10-CM

## 2018-01-01 DIAGNOSIS — Z01.818 ENCOUNTER FOR INTUBATION: ICD-10-CM

## 2018-01-01 DIAGNOSIS — J96.01 ACUTE RESPIRATORY FAILURE WITH HYPOXIA: ICD-10-CM

## 2018-01-01 DIAGNOSIS — J69.0 ASPIRATION PNEUMONIA OF RIGHT LUNG, UNSPECIFIED ASPIRATION PNEUMONIA TYPE, UNSPECIFIED PART OF LUNG: ICD-10-CM

## 2018-01-01 DIAGNOSIS — Z45.2 ENCOUNTER FOR CENTRAL LINE PLACEMENT: ICD-10-CM

## 2018-01-01 DIAGNOSIS — Z78.9 INTUBATION OF AIRWAY PERFORMED WITHOUT DIFFICULTY: ICD-10-CM

## 2018-01-01 DIAGNOSIS — Z51.5 PALLIATIVE CARE ENCOUNTER: ICD-10-CM

## 2018-01-01 DIAGNOSIS — I69.991 DYSPHAGIA AS LATE EFFECT OF CEREBROVASCULAR DISEASE: Chronic | ICD-10-CM

## 2018-01-01 DIAGNOSIS — I69.359 CVA, OLD, HEMIPARESIS: Chronic | ICD-10-CM

## 2018-01-01 DIAGNOSIS — Z71.89 GOALS OF CARE, COUNSELING/DISCUSSION: Chronic | ICD-10-CM

## 2018-01-01 LAB
ALBUMIN SERPL BCP-MCNC: 1.6 G/DL
ALBUMIN SERPL BCP-MCNC: 1.7 G/DL
ALBUMIN SERPL BCP-MCNC: 1.7 G/DL
ALBUMIN SERPL BCP-MCNC: 1.8 G/DL
ALBUMIN SERPL BCP-MCNC: 1.8 G/DL
ALBUMIN SERPL BCP-MCNC: 1.9 G/DL
ALBUMIN SERPL BCP-MCNC: 1.9 G/DL
ALBUMIN SERPL BCP-MCNC: 2 G/DL
ALBUMIN SERPL BCP-MCNC: 2 G/DL
ALBUMIN SERPL BCP-MCNC: 2.1 G/DL
ALBUMIN SERPL BCP-MCNC: 2.1 G/DL
ALBUMIN SERPL BCP-MCNC: 2.2 G/DL
ALBUMIN SERPL BCP-MCNC: 2.2 G/DL
ALBUMIN SERPL BCP-MCNC: 2.4 G/DL
ALBUMIN SERPL BCP-MCNC: 2.4 G/DL
ALBUMIN SERPL BCP-MCNC: 2.5 G/DL
ALBUMIN SERPL BCP-MCNC: 2.6 G/DL
ALLENS TEST: ABNORMAL
ALP SERPL-CCNC: 111 U/L
ALP SERPL-CCNC: 111 U/L
ALP SERPL-CCNC: 112 U/L
ALP SERPL-CCNC: 136 U/L
ALP SERPL-CCNC: 139 U/L
ALP SERPL-CCNC: 141 U/L
ALP SERPL-CCNC: 144 U/L
ALP SERPL-CCNC: 155 U/L
ALP SERPL-CCNC: 161 U/L
ALP SERPL-CCNC: 163 U/L
ALP SERPL-CCNC: 190 U/L
ALP SERPL-CCNC: 203 U/L
ALP SERPL-CCNC: 223 U/L
ALP SERPL-CCNC: 255 U/L
ALP SERPL-CCNC: 325 U/L
ALP SERPL-CCNC: 83 U/L
ALP SERPL-CCNC: 99 U/L
ALT SERPL W/O P-5'-P-CCNC: 112 U/L
ALT SERPL W/O P-5'-P-CCNC: 115 U/L
ALT SERPL W/O P-5'-P-CCNC: 129 U/L
ALT SERPL W/O P-5'-P-CCNC: 13 U/L
ALT SERPL W/O P-5'-P-CCNC: 145 U/L
ALT SERPL W/O P-5'-P-CCNC: 150 U/L
ALT SERPL W/O P-5'-P-CCNC: 31 U/L
ALT SERPL W/O P-5'-P-CCNC: 32 U/L
ALT SERPL W/O P-5'-P-CCNC: 42 U/L
ALT SERPL W/O P-5'-P-CCNC: 49 U/L
ALT SERPL W/O P-5'-P-CCNC: 57 U/L
ALT SERPL W/O P-5'-P-CCNC: 62 U/L
ALT SERPL W/O P-5'-P-CCNC: 63 U/L
ALT SERPL W/O P-5'-P-CCNC: 73 U/L
ALT SERPL W/O P-5'-P-CCNC: 87 U/L
ALT SERPL W/O P-5'-P-CCNC: 94 U/L
ALT SERPL W/O P-5'-P-CCNC: 99 U/L
ANION GAP SERPL CALC-SCNC: 10 MMOL/L
ANION GAP SERPL CALC-SCNC: 10 MMOL/L
ANION GAP SERPL CALC-SCNC: 12 MMOL/L
ANION GAP SERPL CALC-SCNC: 5 MMOL/L
ANION GAP SERPL CALC-SCNC: 6 MMOL/L
ANION GAP SERPL CALC-SCNC: 7 MMOL/L
ANION GAP SERPL CALC-SCNC: 8 MMOL/L
ANION GAP SERPL CALC-SCNC: 9 MMOL/L
APTT BLDCRRT: 22.6 SEC
AST SERPL-CCNC: 102 U/L
AST SERPL-CCNC: 105 U/L
AST SERPL-CCNC: 144 U/L
AST SERPL-CCNC: 15 U/L
AST SERPL-CCNC: 20 U/L
AST SERPL-CCNC: 20 U/L
AST SERPL-CCNC: 22 U/L
AST SERPL-CCNC: 30 U/L
AST SERPL-CCNC: 40 U/L
AST SERPL-CCNC: 44 U/L
AST SERPL-CCNC: 44 U/L
AST SERPL-CCNC: 58 U/L
AST SERPL-CCNC: 61 U/L
AST SERPL-CCNC: 62 U/L
AST SERPL-CCNC: 66 U/L
AST SERPL-CCNC: 69 U/L
AST SERPL-CCNC: 71 U/L
BACTERIA #/AREA URNS AUTO: ABNORMAL /HPF
BACTERIA BLD CULT: NORMAL
BACTERIA SPEC AEROBE CULT: NORMAL
BACTERIA UR CULT: NO GROWTH
BACTERIA UR CULT: NORMAL
BASOPHILS # BLD AUTO: 0.01 K/UL
BASOPHILS # BLD AUTO: 0.02 K/UL
BASOPHILS # BLD AUTO: 0.03 K/UL
BASOPHILS # BLD AUTO: 0.04 K/UL
BASOPHILS # BLD AUTO: 0.05 K/UL
BASOPHILS NFR BLD: 0.1 %
BASOPHILS NFR BLD: 0.2 %
BASOPHILS NFR BLD: 0.3 %
BASOPHILS NFR BLD: 0.5 %
BASOPHILS NFR BLD: 0.6 %
BILIRUB SERPL-MCNC: 0.1 MG/DL
BILIRUB SERPL-MCNC: 0.2 MG/DL
BILIRUB SERPL-MCNC: 0.3 MG/DL
BILIRUB SERPL-MCNC: 0.4 MG/DL
BILIRUB SERPL-MCNC: 0.5 MG/DL
BILIRUB SERPL-MCNC: 0.6 MG/DL
BILIRUB SERPL-MCNC: 0.6 MG/DL
BILIRUB SERPL-MCNC: 0.7 MG/DL
BILIRUB SERPL-MCNC: 0.7 MG/DL
BILIRUB UR QL STRIP: NEGATIVE
BNP SERPL-MCNC: 53 PG/ML
BUN SERPL-MCNC: 10 MG/DL
BUN SERPL-MCNC: 11 MG/DL
BUN SERPL-MCNC: 13 MG/DL
BUN SERPL-MCNC: 19 MG/DL
BUN SERPL-MCNC: 2 MG/DL
BUN SERPL-MCNC: 30 MG/DL
BUN SERPL-MCNC: 30 MG/DL
BUN SERPL-MCNC: 32 MG/DL
BUN SERPL-MCNC: 4 MG/DL
BUN SERPL-MCNC: 6 MG/DL
BUN SERPL-MCNC: 6 MG/DL
BUN SERPL-MCNC: 8 MG/DL
BUN SERPL-MCNC: 9 MG/DL
C DIFF GDH STL QL: NEGATIVE
C DIFF TOX A+B STL QL IA: NEGATIVE
CA-I BLDV-SCNC: 0.91 MMOL/L
CALCIUM SERPL-MCNC: 7.8 MG/DL
CALCIUM SERPL-MCNC: 7.9 MG/DL
CALCIUM SERPL-MCNC: 7.9 MG/DL
CALCIUM SERPL-MCNC: 8 MG/DL
CALCIUM SERPL-MCNC: 8.1 MG/DL
CALCIUM SERPL-MCNC: 8.2 MG/DL
CALCIUM SERPL-MCNC: 8.2 MG/DL
CALCIUM SERPL-MCNC: 8.3 MG/DL
CALCIUM SERPL-MCNC: 8.4 MG/DL
CALCIUM SERPL-MCNC: 8.4 MG/DL
CALCIUM SERPL-MCNC: 8.5 MG/DL
CALCIUM SERPL-MCNC: 8.6 MG/DL
CALCIUM SERPL-MCNC: 8.7 MG/DL
CALCIUM SERPL-MCNC: 8.8 MG/DL
CHLORIDE SERPL-SCNC: 101 MMOL/L
CHLORIDE SERPL-SCNC: 102 MMOL/L
CHLORIDE SERPL-SCNC: 102 MMOL/L
CHLORIDE SERPL-SCNC: 103 MMOL/L
CHLORIDE SERPL-SCNC: 104 MMOL/L
CHLORIDE SERPL-SCNC: 105 MMOL/L
CHLORIDE SERPL-SCNC: 106 MMOL/L
CHLORIDE SERPL-SCNC: 108 MMOL/L
CHLORIDE SERPL-SCNC: 108 MMOL/L
CHLORIDE SERPL-SCNC: 109 MMOL/L
CHLORIDE SERPL-SCNC: 110 MMOL/L
CHLORIDE SERPL-SCNC: 111 MMOL/L
CHLORIDE SERPL-SCNC: 114 MMOL/L
CLARITY UR REFRACT.AUTO: ABNORMAL
CO2 SERPL-SCNC: 19 MMOL/L
CO2 SERPL-SCNC: 22 MMOL/L
CO2 SERPL-SCNC: 23 MMOL/L
CO2 SERPL-SCNC: 24 MMOL/L
CO2 SERPL-SCNC: 25 MMOL/L
CO2 SERPL-SCNC: 26 MMOL/L
CO2 SERPL-SCNC: 27 MMOL/L
CO2 SERPL-SCNC: 28 MMOL/L
CO2 SERPL-SCNC: 29 MMOL/L
CO2 SERPL-SCNC: 30 MMOL/L
COLOR UR AUTO: ABNORMAL
CORTIS SERPL-MCNC: 20.6 UG/DL
CREAT SERPL-MCNC: 0.5 MG/DL
CREAT SERPL-MCNC: 0.6 MG/DL
CREAT SERPL-MCNC: 0.7 MG/DL
DELSYS: ABNORMAL
DIFFERENTIAL METHOD: ABNORMAL
EOSINOPHIL # BLD AUTO: 0.1 K/UL
EOSINOPHIL # BLD AUTO: 0.1 K/UL
EOSINOPHIL # BLD AUTO: 0.2 K/UL
EOSINOPHIL # BLD AUTO: 0.3 K/UL
EOSINOPHIL # BLD AUTO: 0.4 K/UL
EOSINOPHIL # BLD AUTO: 0.6 K/UL
EOSINOPHIL NFR BLD: 0.3 %
EOSINOPHIL NFR BLD: 0.5 %
EOSINOPHIL NFR BLD: 1.6 %
EOSINOPHIL NFR BLD: 1.6 %
EOSINOPHIL NFR BLD: 1.7 %
EOSINOPHIL NFR BLD: 1.8 %
EOSINOPHIL NFR BLD: 1.9 %
EOSINOPHIL NFR BLD: 2.3 %
EOSINOPHIL NFR BLD: 2.5 %
EOSINOPHIL NFR BLD: 2.5 %
EOSINOPHIL NFR BLD: 2.6 %
EOSINOPHIL NFR BLD: 2.7 %
EOSINOPHIL NFR BLD: 2.9 %
EOSINOPHIL NFR BLD: 2.9 %
EOSINOPHIL NFR BLD: 3.3 %
EOSINOPHIL NFR BLD: 3.4 %
EOSINOPHIL NFR BLD: 4.3 %
ERYTHROCYTE [DISTWIDTH] IN BLOOD BY AUTOMATED COUNT: 16.6 %
ERYTHROCYTE [DISTWIDTH] IN BLOOD BY AUTOMATED COUNT: 16.7 %
ERYTHROCYTE [DISTWIDTH] IN BLOOD BY AUTOMATED COUNT: 16.8 %
ERYTHROCYTE [DISTWIDTH] IN BLOOD BY AUTOMATED COUNT: 16.9 %
ERYTHROCYTE [DISTWIDTH] IN BLOOD BY AUTOMATED COUNT: 17 %
ERYTHROCYTE [DISTWIDTH] IN BLOOD BY AUTOMATED COUNT: 17.2 %
ERYTHROCYTE [DISTWIDTH] IN BLOOD BY AUTOMATED COUNT: 17.5 %
ERYTHROCYTE [DISTWIDTH] IN BLOOD BY AUTOMATED COUNT: 17.9 %
ERYTHROCYTE [DISTWIDTH] IN BLOOD BY AUTOMATED COUNT: 18.4 %
ERYTHROCYTE [DISTWIDTH] IN BLOOD BY AUTOMATED COUNT: 18.6 %
ERYTHROCYTE [DISTWIDTH] IN BLOOD BY AUTOMATED COUNT: 18.6 %
ERYTHROCYTE [DISTWIDTH] IN BLOOD BY AUTOMATED COUNT: 18.7 %
ERYTHROCYTE [DISTWIDTH] IN BLOOD BY AUTOMATED COUNT: 18.7 %
ERYTHROCYTE [DISTWIDTH] IN BLOOD BY AUTOMATED COUNT: 18.8 %
ERYTHROCYTE [DISTWIDTH] IN BLOOD BY AUTOMATED COUNT: 18.9 %
ERYTHROCYTE [DISTWIDTH] IN BLOOD BY AUTOMATED COUNT: 18.9 %
ERYTHROCYTE [DISTWIDTH] IN BLOOD BY AUTOMATED COUNT: 19.2 %
ERYTHROCYTE [SEDIMENTATION RATE] IN BLOOD BY WESTERGREN METHOD: 14 MM/H
ERYTHROCYTE [SEDIMENTATION RATE] IN BLOOD BY WESTERGREN METHOD: 18 MM/H
EST. GFR  (AFRICAN AMERICAN): >60 ML/MIN/1.73 M^2
EST. GFR  (NON AFRICAN AMERICAN): >60 ML/MIN/1.73 M^2
FIO2: 100
FIO2: 30
FIO2: 40
FIO2: 80
GLUCOSE SERPL-MCNC: 100 MG/DL
GLUCOSE SERPL-MCNC: 102 MG/DL
GLUCOSE SERPL-MCNC: 102 MG/DL
GLUCOSE SERPL-MCNC: 106 MG/DL
GLUCOSE SERPL-MCNC: 109 MG/DL
GLUCOSE SERPL-MCNC: 110 MG/DL
GLUCOSE SERPL-MCNC: 111 MG/DL
GLUCOSE SERPL-MCNC: 112 MG/DL
GLUCOSE SERPL-MCNC: 112 MG/DL
GLUCOSE SERPL-MCNC: 119 MG/DL
GLUCOSE SERPL-MCNC: 124 MG/DL
GLUCOSE SERPL-MCNC: 61 MG/DL
GLUCOSE SERPL-MCNC: 76 MG/DL
GLUCOSE SERPL-MCNC: 81 MG/DL
GLUCOSE SERPL-MCNC: 83 MG/DL
GLUCOSE SERPL-MCNC: 83 MG/DL
GLUCOSE SERPL-MCNC: 84 MG/DL
GLUCOSE SERPL-MCNC: 86 MG/DL
GLUCOSE UR QL STRIP: NEGATIVE
GRAM STN SPEC: NORMAL
HCO3 UR-SCNC: 24.9 MMOL/L (ref 24–28)
HCO3 UR-SCNC: 25.8 MMOL/L (ref 24–28)
HCO3 UR-SCNC: 27.2 MMOL/L (ref 24–28)
HCO3 UR-SCNC: 29.8 MMOL/L (ref 24–28)
HCO3 UR-SCNC: 30.4 MMOL/L (ref 24–28)
HCO3 UR-SCNC: 31.6 MMOL/L (ref 24–28)
HCT VFR BLD AUTO: 26.3 %
HCT VFR BLD AUTO: 26.8 %
HCT VFR BLD AUTO: 27 %
HCT VFR BLD AUTO: 27.4 %
HCT VFR BLD AUTO: 27.5 %
HCT VFR BLD AUTO: 27.8 %
HCT VFR BLD AUTO: 28.2 %
HCT VFR BLD AUTO: 28.7 %
HCT VFR BLD AUTO: 29.4 %
HCT VFR BLD AUTO: 29.9 %
HCT VFR BLD AUTO: 30.9 %
HCT VFR BLD AUTO: 31.3 %
HCT VFR BLD AUTO: 32.5 %
HCT VFR BLD AUTO: 32.7 %
HCT VFR BLD AUTO: 32.9 %
HCT VFR BLD AUTO: 34.2 %
HCT VFR BLD AUTO: 40.2 %
HGB BLD-MCNC: 11.3 G/DL
HGB BLD-MCNC: 7.9 G/DL
HGB BLD-MCNC: 7.9 G/DL
HGB BLD-MCNC: 8 G/DL
HGB BLD-MCNC: 8 G/DL
HGB BLD-MCNC: 8.1 G/DL
HGB BLD-MCNC: 8.2 G/DL
HGB BLD-MCNC: 8.2 G/DL
HGB BLD-MCNC: 8.6 G/DL
HGB BLD-MCNC: 8.6 G/DL
HGB BLD-MCNC: 8.7 G/DL
HGB BLD-MCNC: 8.9 G/DL
HGB BLD-MCNC: 9 G/DL
HGB BLD-MCNC: 9.4 G/DL
HGB BLD-MCNC: 9.6 G/DL
HGB BLD-MCNC: 9.7 G/DL
HGB BLD-MCNC: 9.8 G/DL
HGB UR QL STRIP: ABNORMAL
HGB UR QL STRIP: NEGATIVE
HYALINE CASTS UR QL AUTO: 0 /LPF
IMM GRANULOCYTES # BLD AUTO: 0.02 K/UL
IMM GRANULOCYTES # BLD AUTO: 0.02 K/UL
IMM GRANULOCYTES # BLD AUTO: 0.04 K/UL
IMM GRANULOCYTES # BLD AUTO: 0.05 K/UL
IMM GRANULOCYTES # BLD AUTO: 0.08 K/UL
IMM GRANULOCYTES # BLD AUTO: 0.09 K/UL
IMM GRANULOCYTES # BLD AUTO: 0.09 K/UL
IMM GRANULOCYTES # BLD AUTO: 0.13 K/UL
IMM GRANULOCYTES # BLD AUTO: 0.16 K/UL
IMM GRANULOCYTES # BLD AUTO: 0.16 K/UL
IMM GRANULOCYTES # BLD AUTO: 0.17 K/UL
IMM GRANULOCYTES # BLD AUTO: 0.29 K/UL
IMM GRANULOCYTES # BLD AUTO: 0.33 K/UL
IMM GRANULOCYTES # BLD AUTO: 0.37 K/UL
IMM GRANULOCYTES NFR BLD AUTO: 0.3 %
IMM GRANULOCYTES NFR BLD AUTO: 0.3 %
IMM GRANULOCYTES NFR BLD AUTO: 0.4 %
IMM GRANULOCYTES NFR BLD AUTO: 0.5 %
IMM GRANULOCYTES NFR BLD AUTO: 0.7 %
IMM GRANULOCYTES NFR BLD AUTO: 0.7 %
IMM GRANULOCYTES NFR BLD AUTO: 0.8 %
IMM GRANULOCYTES NFR BLD AUTO: 0.8 %
IMM GRANULOCYTES NFR BLD AUTO: 1.1 %
IMM GRANULOCYTES NFR BLD AUTO: 1.1 %
IMM GRANULOCYTES NFR BLD AUTO: 1.3 %
IMM GRANULOCYTES NFR BLD AUTO: 2.1 %
IMM GRANULOCYTES NFR BLD AUTO: 2.5 %
IMM GRANULOCYTES NFR BLD AUTO: 3 %
INR PPP: 1
INR PPP: 1.1
KETONES UR QL STRIP: ABNORMAL
KETONES UR QL STRIP: NEGATIVE
LACTATE SERPL-SCNC: 0.9 MMOL/L
LACTATE SERPL-SCNC: 1.1 MMOL/L
LACTATE SERPL-SCNC: 1.3 MMOL/L
LACTATE SERPL-SCNC: 1.6 MMOL/L
LEUKOCYTE ESTERASE UR QL STRIP: ABNORMAL
LIPASE SERPL-CCNC: 25 U/L
LIPASE SERPL-CCNC: 37 U/L
LYMPHOCYTES # BLD AUTO: 1.4 K/UL
LYMPHOCYTES # BLD AUTO: 1.5 K/UL
LYMPHOCYTES # BLD AUTO: 1.6 K/UL
LYMPHOCYTES # BLD AUTO: 1.8 K/UL
LYMPHOCYTES # BLD AUTO: 1.8 K/UL
LYMPHOCYTES # BLD AUTO: 1.9 K/UL
LYMPHOCYTES # BLD AUTO: 2 K/UL
LYMPHOCYTES # BLD AUTO: 2 K/UL
LYMPHOCYTES # BLD AUTO: 2.1 K/UL
LYMPHOCYTES # BLD AUTO: 2.1 K/UL
LYMPHOCYTES # BLD AUTO: 2.3 K/UL
LYMPHOCYTES # BLD AUTO: 2.6 K/UL
LYMPHOCYTES # BLD AUTO: 2.7 K/UL
LYMPHOCYTES # BLD AUTO: 2.7 K/UL
LYMPHOCYTES # BLD AUTO: 3.1 K/UL
LYMPHOCYTES NFR BLD: 10 %
LYMPHOCYTES NFR BLD: 10.4 %
LYMPHOCYTES NFR BLD: 12.5 %
LYMPHOCYTES NFR BLD: 12.9 %
LYMPHOCYTES NFR BLD: 14 %
LYMPHOCYTES NFR BLD: 14.7 %
LYMPHOCYTES NFR BLD: 14.7 %
LYMPHOCYTES NFR BLD: 16 %
LYMPHOCYTES NFR BLD: 16.3 %
LYMPHOCYTES NFR BLD: 16.7 %
LYMPHOCYTES NFR BLD: 19 %
LYMPHOCYTES NFR BLD: 20.4 %
LYMPHOCYTES NFR BLD: 22.3 %
LYMPHOCYTES NFR BLD: 23.4 %
LYMPHOCYTES NFR BLD: 25.8 %
LYMPHOCYTES NFR BLD: 26.6 %
LYMPHOCYTES NFR BLD: 31.7 %
MAGNESIUM SERPL-MCNC: 1.5 MG/DL
MAGNESIUM SERPL-MCNC: 1.6 MG/DL
MAGNESIUM SERPL-MCNC: 1.7 MG/DL
MAGNESIUM SERPL-MCNC: 1.8 MG/DL
MAGNESIUM SERPL-MCNC: 1.8 MG/DL
MAGNESIUM SERPL-MCNC: 1.9 MG/DL
MAGNESIUM SERPL-MCNC: 1.9 MG/DL
MAGNESIUM SERPL-MCNC: 2 MG/DL
MAGNESIUM SERPL-MCNC: 2.1 MG/DL
MAGNESIUM SERPL-MCNC: 2.2 MG/DL
MAGNESIUM SERPL-MCNC: 2.2 MG/DL
MAGNESIUM SERPL-MCNC: 2.6 MG/DL
MCH RBC QN AUTO: 22.8 PG
MCH RBC QN AUTO: 23.1 PG
MCH RBC QN AUTO: 23.2 PG
MCH RBC QN AUTO: 23.3 PG
MCH RBC QN AUTO: 23.4 PG
MCH RBC QN AUTO: 23.4 PG
MCH RBC QN AUTO: 23.7 PG
MCH RBC QN AUTO: 23.8 PG
MCHC RBC AUTO-ENTMCNC: 28.1 G/DL
MCHC RBC AUTO-ENTMCNC: 28.4 G/DL
MCHC RBC AUTO-ENTMCNC: 28.8 G/DL
MCHC RBC AUTO-ENTMCNC: 28.8 G/DL
MCHC RBC AUTO-ENTMCNC: 28.9 G/DL
MCHC RBC AUTO-ENTMCNC: 29.1 G/DL
MCHC RBC AUTO-ENTMCNC: 29.3 G/DL
MCHC RBC AUTO-ENTMCNC: 29.3 G/DL
MCHC RBC AUTO-ENTMCNC: 29.4 G/DL
MCHC RBC AUTO-ENTMCNC: 29.8 G/DL
MCHC RBC AUTO-ENTMCNC: 29.9 G/DL
MCHC RBC AUTO-ENTMCNC: 29.9 G/DL
MCHC RBC AUTO-ENTMCNC: 30 G/DL
MCHC RBC AUTO-ENTMCNC: 30 G/DL
MCV RBC AUTO: 77 FL
MCV RBC AUTO: 77 FL
MCV RBC AUTO: 78 FL
MCV RBC AUTO: 79 FL
MCV RBC AUTO: 79 FL
MCV RBC AUTO: 80 FL
MCV RBC AUTO: 81 FL
MCV RBC AUTO: 81 FL
MCV RBC AUTO: 82 FL
MCV RBC AUTO: 84 FL
MICROSCOPIC COMMENT: ABNORMAL
MIN VOL: 10.5
MIN VOL: 7.9
MIN VOL: 8.87
MODE: ABNORMAL
MONOCYTES # BLD AUTO: 0.7 K/UL
MONOCYTES # BLD AUTO: 0.7 K/UL
MONOCYTES # BLD AUTO: 0.8 K/UL
MONOCYTES # BLD AUTO: 0.9 K/UL
MONOCYTES # BLD AUTO: 1 K/UL
MONOCYTES # BLD AUTO: 1 K/UL
MONOCYTES # BLD AUTO: 1.1 K/UL
MONOCYTES # BLD AUTO: 1.1 K/UL
MONOCYTES # BLD AUTO: 1.2 K/UL
MONOCYTES # BLD AUTO: 1.3 K/UL
MONOCYTES # BLD AUTO: 1.3 K/UL
MONOCYTES # BLD AUTO: 1.7 K/UL
MONOCYTES # BLD AUTO: 1.8 K/UL
MONOCYTES # BLD AUTO: 2 K/UL
MONOCYTES NFR BLD: 10.1 %
MONOCYTES NFR BLD: 10.3 %
MONOCYTES NFR BLD: 11.4 %
MONOCYTES NFR BLD: 13.6 %
MONOCYTES NFR BLD: 6.1 %
MONOCYTES NFR BLD: 6.8 %
MONOCYTES NFR BLD: 7.8 %
MONOCYTES NFR BLD: 8 %
MONOCYTES NFR BLD: 8.2 %
MONOCYTES NFR BLD: 8.3 %
MONOCYTES NFR BLD: 8.6 %
MONOCYTES NFR BLD: 8.9 %
MONOCYTES NFR BLD: 9 %
MONOCYTES NFR BLD: 9.1 %
MONOCYTES NFR BLD: 9.5 %
NEUTROPHILS # BLD AUTO: 10.1 K/UL
NEUTROPHILS # BLD AUTO: 11.7 K/UL
NEUTROPHILS # BLD AUTO: 13.6 K/UL
NEUTROPHILS # BLD AUTO: 13.9 K/UL
NEUTROPHILS # BLD AUTO: 16.1 K/UL
NEUTROPHILS # BLD AUTO: 3.4 K/UL
NEUTROPHILS # BLD AUTO: 3.5 K/UL
NEUTROPHILS # BLD AUTO: 4.7 K/UL
NEUTROPHILS # BLD AUTO: 7.5 K/UL
NEUTROPHILS # BLD AUTO: 7.6 K/UL
NEUTROPHILS # BLD AUTO: 7.9 K/UL
NEUTROPHILS # BLD AUTO: 8.6 K/UL
NEUTROPHILS # BLD AUTO: 8.8 K/UL
NEUTROPHILS # BLD AUTO: 9.1 K/UL
NEUTROPHILS # BLD AUTO: 9.2 K/UL
NEUTROPHILS # BLD AUTO: 9.6 K/UL
NEUTROPHILS # BLD AUTO: 9.7 K/UL
NEUTROPHILS NFR BLD: 51.1 %
NEUTROPHILS NFR BLD: 56.9 %
NEUTROPHILS NFR BLD: 59.9 %
NEUTROPHILS NFR BLD: 65.1 %
NEUTROPHILS NFR BLD: 67.1 %
NEUTROPHILS NFR BLD: 69.3 %
NEUTROPHILS NFR BLD: 69.4 %
NEUTROPHILS NFR BLD: 69.6 %
NEUTROPHILS NFR BLD: 70.5 %
NEUTROPHILS NFR BLD: 70.7 %
NEUTROPHILS NFR BLD: 71.5 %
NEUTROPHILS NFR BLD: 72 %
NEUTROPHILS NFR BLD: 73.6 %
NEUTROPHILS NFR BLD: 76.7 %
NEUTROPHILS NFR BLD: 77.7 %
NEUTROPHILS NFR BLD: 79.4 %
NEUTROPHILS NFR BLD: 79.8 %
NITRITE UR QL STRIP: NEGATIVE
NRBC BLD-RTO: 0 /100 WBC
NRBC BLD-RTO: 1 /100 WBC
PCO2 BLDA: 33.2 MMHG (ref 35–45)
PCO2 BLDA: 33.3 MMHG (ref 35–45)
PCO2 BLDA: 35.2 MMHG (ref 35–45)
PCO2 BLDA: 43 MMHG (ref 35–45)
PCO2 BLDA: 45.1 MMHG (ref 35–45)
PCO2 BLDA: 59.6 MMHG (ref 35–45)
PEEP: 10
PEEP: 5
PH SMN: 7.33 [PH] (ref 7.35–7.45)
PH SMN: 7.44 [PH] (ref 7.35–7.45)
PH SMN: 7.45 [PH] (ref 7.35–7.45)
PH SMN: 7.48 [PH] (ref 7.35–7.45)
PH SMN: 7.5 [PH] (ref 7.35–7.45)
PH SMN: 7.5 [PH] (ref 7.35–7.45)
PH UR STRIP: 5 [PH] (ref 5–8)
PH UR STRIP: 5 [PH] (ref 5–8)
PH UR STRIP: 6 [PH] (ref 5–8)
PH UR STRIP: 7 [PH] (ref 5–8)
PHOSPHATE SERPL-MCNC: 1.8 MG/DL
PHOSPHATE SERPL-MCNC: 2.3 MG/DL
PHOSPHATE SERPL-MCNC: 2.5 MG/DL
PHOSPHATE SERPL-MCNC: 2.5 MG/DL
PHOSPHATE SERPL-MCNC: 2.9 MG/DL
PHOSPHATE SERPL-MCNC: 3 MG/DL
PHOSPHATE SERPL-MCNC: 3.1 MG/DL
PHOSPHATE SERPL-MCNC: 3.3 MG/DL
PHOSPHATE SERPL-MCNC: 3.3 MG/DL
PHOSPHATE SERPL-MCNC: 3.6 MG/DL
PHOSPHATE SERPL-MCNC: 3.6 MG/DL
PHOSPHATE SERPL-MCNC: 3.8 MG/DL
PIP: 27
PIP: 28
PIP: 29
PIP: 29
PLATELET # BLD AUTO: 158 K/UL
PLATELET # BLD AUTO: 177 K/UL
PLATELET # BLD AUTO: 187 K/UL
PLATELET # BLD AUTO: 202 K/UL
PLATELET # BLD AUTO: 210 K/UL
PLATELET # BLD AUTO: 226 K/UL
PLATELET # BLD AUTO: 228 K/UL
PLATELET # BLD AUTO: 245 K/UL
PLATELET # BLD AUTO: 265 K/UL
PLATELET # BLD AUTO: 293 K/UL
PLATELET # BLD AUTO: 301 K/UL
PLATELET # BLD AUTO: 310 K/UL
PLATELET # BLD AUTO: 331 K/UL
PLATELET # BLD AUTO: 340 K/UL
PLATELET # BLD AUTO: 358 K/UL
PLATELET # BLD AUTO: 384 K/UL
PLATELET # BLD AUTO: 398 K/UL
PMV BLD AUTO: 11.5 FL
PMV BLD AUTO: 11.6 FL
PMV BLD AUTO: 11.8 FL
PMV BLD AUTO: 11.8 FL
PMV BLD AUTO: 12.2 FL
PMV BLD AUTO: 12.5 FL
PMV BLD AUTO: 12.9 FL
PMV BLD AUTO: 13 FL
PMV BLD AUTO: 13.6 FL
PMV BLD AUTO: ABNORMAL FL
PO2 BLDA: 31 MMHG (ref 40–60)
PO2 BLDA: 62 MMHG (ref 80–100)
PO2 BLDA: 65 MMHG (ref 80–100)
PO2 BLDA: 65 MMHG (ref 80–100)
PO2 BLDA: 68 MMHG (ref 80–100)
PO2 BLDA: 79 MMHG (ref 80–100)
POC BE: 1 MMOL/L
POC BE: 3 MMOL/L
POC BE: 4 MMOL/L
POC BE: 6 MMOL/L
POC SATURATED O2: 61 % (ref 95–100)
POC SATURATED O2: 93 % (ref 95–100)
POC SATURATED O2: 94 % (ref 95–100)
POC SATURATED O2: 95 % (ref 95–100)
POC TCO2: 26 MMOL/L (ref 23–27)
POC TCO2: 27 MMOL/L (ref 23–27)
POC TCO2: 28 MMOL/L (ref 23–27)
POC TCO2: 31 MMOL/L (ref 23–27)
POC TCO2: 32 MMOL/L (ref 24–29)
POC TCO2: 33 MMOL/L (ref 23–27)
POCT GLUCOSE: 100 MG/DL (ref 70–110)
POCT GLUCOSE: 100 MG/DL (ref 70–110)
POCT GLUCOSE: 101 MG/DL (ref 70–110)
POCT GLUCOSE: 101 MG/DL (ref 70–110)
POCT GLUCOSE: 102 MG/DL (ref 70–110)
POCT GLUCOSE: 103 MG/DL (ref 70–110)
POCT GLUCOSE: 103 MG/DL (ref 70–110)
POCT GLUCOSE: 104 MG/DL (ref 70–110)
POCT GLUCOSE: 106 MG/DL (ref 70–110)
POCT GLUCOSE: 107 MG/DL (ref 70–110)
POCT GLUCOSE: 107 MG/DL (ref 70–110)
POCT GLUCOSE: 108 MG/DL (ref 70–110)
POCT GLUCOSE: 109 MG/DL (ref 70–110)
POCT GLUCOSE: 110 MG/DL (ref 70–110)
POCT GLUCOSE: 110 MG/DL (ref 70–110)
POCT GLUCOSE: 111 MG/DL (ref 70–110)
POCT GLUCOSE: 113 MG/DL (ref 70–110)
POCT GLUCOSE: 116 MG/DL (ref 70–110)
POCT GLUCOSE: 117 MG/DL (ref 70–110)
POCT GLUCOSE: 119 MG/DL (ref 70–110)
POCT GLUCOSE: 123 MG/DL (ref 70–110)
POCT GLUCOSE: 59 MG/DL (ref 70–110)
POCT GLUCOSE: 60 MG/DL (ref 70–110)
POCT GLUCOSE: 61 MG/DL (ref 70–110)
POCT GLUCOSE: 63 MG/DL (ref 70–110)
POCT GLUCOSE: 65 MG/DL (ref 70–110)
POCT GLUCOSE: 66 MG/DL (ref 70–110)
POCT GLUCOSE: 68 MG/DL (ref 70–110)
POCT GLUCOSE: 68 MG/DL (ref 70–110)
POCT GLUCOSE: 69 MG/DL (ref 70–110)
POCT GLUCOSE: 70 MG/DL (ref 70–110)
POCT GLUCOSE: 70 MG/DL (ref 70–110)
POCT GLUCOSE: 71 MG/DL (ref 70–110)
POCT GLUCOSE: 71 MG/DL (ref 70–110)
POCT GLUCOSE: 74 MG/DL (ref 70–110)
POCT GLUCOSE: 75 MG/DL (ref 70–110)
POCT GLUCOSE: 75 MG/DL (ref 70–110)
POCT GLUCOSE: 76 MG/DL (ref 70–110)
POCT GLUCOSE: 77 MG/DL (ref 70–110)
POCT GLUCOSE: 77 MG/DL (ref 70–110)
POCT GLUCOSE: 78 MG/DL (ref 70–110)
POCT GLUCOSE: 78 MG/DL (ref 70–110)
POCT GLUCOSE: 80 MG/DL (ref 70–110)
POCT GLUCOSE: 81 MG/DL (ref 70–110)
POCT GLUCOSE: 81 MG/DL (ref 70–110)
POCT GLUCOSE: 82 MG/DL (ref 70–110)
POCT GLUCOSE: 84 MG/DL (ref 70–110)
POCT GLUCOSE: 84 MG/DL (ref 70–110)
POCT GLUCOSE: 85 MG/DL (ref 70–110)
POCT GLUCOSE: 86 MG/DL (ref 70–110)
POCT GLUCOSE: 86 MG/DL (ref 70–110)
POCT GLUCOSE: 87 MG/DL (ref 70–110)
POCT GLUCOSE: 88 MG/DL (ref 70–110)
POCT GLUCOSE: 89 MG/DL (ref 70–110)
POCT GLUCOSE: 89 MG/DL (ref 70–110)
POCT GLUCOSE: 90 MG/DL (ref 70–110)
POCT GLUCOSE: 91 MG/DL (ref 70–110)
POCT GLUCOSE: 92 MG/DL (ref 70–110)
POCT GLUCOSE: 94 MG/DL (ref 70–110)
POCT GLUCOSE: 95 MG/DL (ref 70–110)
POCT GLUCOSE: 96 MG/DL (ref 70–110)
POCT GLUCOSE: 97 MG/DL (ref 70–110)
POCT GLUCOSE: 97 MG/DL (ref 70–110)
POCT GLUCOSE: 98 MG/DL (ref 70–110)
POTASSIUM SERPL-SCNC: 2.4 MMOL/L
POTASSIUM SERPL-SCNC: 2.6 MMOL/L
POTASSIUM SERPL-SCNC: 2.9 MMOL/L
POTASSIUM SERPL-SCNC: 3.5 MMOL/L
POTASSIUM SERPL-SCNC: 3.8 MMOL/L
POTASSIUM SERPL-SCNC: 3.9 MMOL/L
POTASSIUM SERPL-SCNC: 4.1 MMOL/L
POTASSIUM SERPL-SCNC: 4.2 MMOL/L
POTASSIUM SERPL-SCNC: 4.3 MMOL/L
POTASSIUM SERPL-SCNC: 5.6 MMOL/L
PROCALCITONIN SERPL IA-MCNC: 0.15 NG/ML
PROCALCITONIN SERPL IA-MCNC: 0.33 NG/ML
PROT SERPL-MCNC: 6.2 G/DL
PROT SERPL-MCNC: 6.2 G/DL
PROT SERPL-MCNC: 6.4 G/DL
PROT SERPL-MCNC: 6.4 G/DL
PROT SERPL-MCNC: 6.5 G/DL
PROT SERPL-MCNC: 6.5 G/DL
PROT SERPL-MCNC: 6.7 G/DL
PROT SERPL-MCNC: 6.9 G/DL
PROT SERPL-MCNC: 7 G/DL
PROT SERPL-MCNC: 7.1 G/DL
PROT SERPL-MCNC: 7.2 G/DL
PROT SERPL-MCNC: 7.4 G/DL
PROT SERPL-MCNC: 7.5 G/DL
PROT SERPL-MCNC: 7.7 G/DL
PROT SERPL-MCNC: 7.9 G/DL
PROT SERPL-MCNC: 8 G/DL
PROT SERPL-MCNC: 8.2 G/DL
PROT UR QL STRIP: ABNORMAL
PROTHROMBIN TIME: 10.6 SEC
PROTHROMBIN TIME: 11.7 SEC
RBC # BLD AUTO: 3.41 M/UL
RBC # BLD AUTO: 3.42 M/UL
RBC # BLD AUTO: 3.43 M/UL
RBC # BLD AUTO: 3.47 M/UL
RBC # BLD AUTO: 3.48 M/UL
RBC # BLD AUTO: 3.5 M/UL
RBC # BLD AUTO: 3.53 M/UL
RBC # BLD AUTO: 3.62 M/UL
RBC # BLD AUTO: 3.73 M/UL
RBC # BLD AUTO: 3.77 M/UL
RBC # BLD AUTO: 3.82 M/UL
RBC # BLD AUTO: 3.86 M/UL
RBC # BLD AUTO: 4.06 M/UL
RBC # BLD AUTO: 4.09 M/UL
RBC # BLD AUTO: 4.13 M/UL
RBC # BLD AUTO: 4.29 M/UL
RBC # BLD AUTO: 4.89 M/UL
RBC #/AREA URNS AUTO: 1 /HPF (ref 0–4)
RBC #/AREA URNS AUTO: 2 /HPF (ref 0–4)
RBC #/AREA URNS AUTO: 5 /HPF (ref 0–4)
RBC #/AREA URNS AUTO: 9 /HPF (ref 0–4)
SAMPLE: ABNORMAL
SITE: ABNORMAL
SODIUM SERPL-SCNC: 136 MMOL/L
SODIUM SERPL-SCNC: 137 MMOL/L
SODIUM SERPL-SCNC: 138 MMOL/L
SODIUM SERPL-SCNC: 139 MMOL/L
SODIUM SERPL-SCNC: 140 MMOL/L
SODIUM SERPL-SCNC: 142 MMOL/L
SODIUM SERPL-SCNC: 143 MMOL/L
SODIUM SERPL-SCNC: 146 MMOL/L
SODIUM SERPL-SCNC: 147 MMOL/L
SP GR UR STRIP: 1.02 (ref 1–1.03)
SP GR UR STRIP: 1.03 (ref 1–1.03)
SP GR UR STRIP: >=1.03 (ref 1–1.03)
SP GR UR STRIP: >=1.03 (ref 1–1.03)
SP02: 93
SP02: 94
SP02: 96
SP02: 96
SP02: 97
SQUAMOUS #/AREA URNS AUTO: 0 /HPF
SQUAMOUS #/AREA URNS AUTO: 1 /HPF
SQUAMOUS #/AREA URNS AUTO: 3 /HPF
SQUAMOUS #/AREA URNS AUTO: 4 /HPF
TRI-PHOS CRY UR QL COMP ASSIST: ABNORMAL
TROPONIN I SERPL DL<=0.01 NG/ML-MCNC: 0.01 NG/ML
TSH SERPL DL<=0.005 MIU/L-ACNC: 3.56 UIU/ML
URN SPEC COLLECT METH UR: ABNORMAL
UROBILINOGEN UR STRIP-ACNC: 4 EU/DL
UROBILINOGEN UR STRIP-ACNC: 4 EU/DL
UROBILINOGEN UR STRIP-ACNC: NEGATIVE EU/DL
UROBILINOGEN UR STRIP-ACNC: NEGATIVE EU/DL
VANCOMYCIN SERPL-MCNC: 14.6 UG/ML
VANCOMYCIN TROUGH SERPL-MCNC: 21.5 UG/ML
VT: 380
VT: 400
WBC # BLD AUTO: 10.61 K/UL
WBC # BLD AUTO: 11.07 K/UL
WBC # BLD AUTO: 11.59 K/UL
WBC # BLD AUTO: 11.68 K/UL
WBC # BLD AUTO: 12.37 K/UL
WBC # BLD AUTO: 12.49 K/UL
WBC # BLD AUTO: 13.18 K/UL
WBC # BLD AUTO: 13.3 K/UL
WBC # BLD AUTO: 13.9 K/UL
WBC # BLD AUTO: 14.15 K/UL
WBC # BLD AUTO: 14.75 K/UL
WBC # BLD AUTO: 18.15 K/UL
WBC # BLD AUTO: 19.56 K/UL
WBC # BLD AUTO: 20.22 K/UL
WBC # BLD AUTO: 6.06 K/UL
WBC # BLD AUTO: 6.56 K/UL
WBC # BLD AUTO: 7.84 K/UL
WBC #/AREA URNS AUTO: 11 /HPF (ref 0–5)
WBC #/AREA URNS AUTO: 57 /HPF (ref 0–5)
WBC #/AREA URNS AUTO: >100 /HPF (ref 0–5)
WBC #/AREA URNS AUTO: >100 /HPF (ref 0–5)

## 2018-01-01 PROCEDURE — 25000003 PHARM REV CODE 250: Performed by: INTERNAL MEDICINE

## 2018-01-01 PROCEDURE — 94761 N-INVAS EAR/PLS OXIMETRY MLT: CPT

## 2018-01-01 PROCEDURE — 94003 VENT MGMT INPAT SUBQ DAY: CPT

## 2018-01-01 PROCEDURE — 25000242 PHARM REV CODE 250 ALT 637 W/ HCPCS: Performed by: INTERNAL MEDICINE

## 2018-01-01 PROCEDURE — 84100 ASSAY OF PHOSPHORUS: CPT

## 2018-01-01 PROCEDURE — 27000221 HC OXYGEN, UP TO 24 HOURS

## 2018-01-01 PROCEDURE — 83735 ASSAY OF MAGNESIUM: CPT

## 2018-01-01 PROCEDURE — 87040 BLOOD CULTURE FOR BACTERIA: CPT

## 2018-01-01 PROCEDURE — 63600175 PHARM REV CODE 636 W HCPCS: Performed by: STUDENT IN AN ORGANIZED HEALTH CARE EDUCATION/TRAINING PROGRAM

## 2018-01-01 PROCEDURE — 99291 CRITICAL CARE FIRST HOUR: CPT | Mod: ,,, | Performed by: INTERNAL MEDICINE

## 2018-01-01 PROCEDURE — 80053 COMPREHEN METABOLIC PANEL: CPT

## 2018-01-01 PROCEDURE — 85025 COMPLETE CBC W/AUTO DIFF WBC: CPT

## 2018-01-01 PROCEDURE — 25000003 PHARM REV CODE 250: Performed by: STUDENT IN AN ORGANIZED HEALTH CARE EDUCATION/TRAINING PROGRAM

## 2018-01-01 PROCEDURE — 63600175 PHARM REV CODE 636 W HCPCS: Performed by: INTERNAL MEDICINE

## 2018-01-01 PROCEDURE — 20000000 HC ICU ROOM

## 2018-01-01 PROCEDURE — 94002 VENT MGMT INPAT INIT DAY: CPT

## 2018-01-01 PROCEDURE — 27000190 HC CPAP FULL FACE MASK W/VALVE

## 2018-01-01 PROCEDURE — 25500020 PHARM REV CODE 255: Performed by: INTERNAL MEDICINE

## 2018-01-01 PROCEDURE — 87086 URINE CULTURE/COLONY COUNT: CPT

## 2018-01-01 PROCEDURE — 83735 ASSAY OF MAGNESIUM: CPT | Mod: 91

## 2018-01-01 PROCEDURE — 99222 1ST HOSP IP/OBS MODERATE 55: CPT | Mod: ,,, | Performed by: INTERNAL MEDICINE

## 2018-01-01 PROCEDURE — 99900026 HC AIRWAY MAINTENANCE (STAT)

## 2018-01-01 PROCEDURE — 99233 SBSQ HOSP IP/OBS HIGH 50: CPT | Mod: ,,, | Performed by: INTERNAL MEDICINE

## 2018-01-01 PROCEDURE — 83690 ASSAY OF LIPASE: CPT

## 2018-01-01 PROCEDURE — 27200966 HC CLOSED SUCTION SYSTEM

## 2018-01-01 PROCEDURE — 99900022

## 2018-01-01 PROCEDURE — 99232 SBSQ HOSP IP/OBS MODERATE 35: CPT | Mod: ,,, | Performed by: INTERNAL MEDICINE

## 2018-01-01 PROCEDURE — 63600175 PHARM REV CODE 636 W HCPCS: Performed by: EMERGENCY MEDICINE

## 2018-01-01 PROCEDURE — 81001 URINALYSIS AUTO W/SCOPE: CPT

## 2018-01-01 PROCEDURE — 84145 PROCALCITONIN (PCT): CPT

## 2018-01-01 PROCEDURE — 5A1955Z RESPIRATORY VENTILATION, GREATER THAN 96 CONSECUTIVE HOURS: ICD-10-PCS | Performed by: INTERNAL MEDICINE

## 2018-01-01 PROCEDURE — S0030 INJECTION, METRONIDAZOLE: HCPCS | Performed by: STUDENT IN AN ORGANIZED HEALTH CARE EDUCATION/TRAINING PROGRAM

## 2018-01-01 PROCEDURE — 97803 MED NUTRITION INDIV SUBSEQ: CPT

## 2018-01-01 PROCEDURE — 25000003 PHARM REV CODE 250

## 2018-01-01 PROCEDURE — 25000003 PHARM REV CODE 250: Performed by: EMERGENCY MEDICINE

## 2018-01-01 PROCEDURE — 94668 MNPJ CHEST WALL SBSQ: CPT

## 2018-01-01 PROCEDURE — 94640 AIRWAY INHALATION TREATMENT: CPT

## 2018-01-01 PROCEDURE — 99900035 HC TECH TIME PER 15 MIN (STAT)

## 2018-01-01 PROCEDURE — 76937 US GUIDE VASCULAR ACCESS: CPT

## 2018-01-01 PROCEDURE — 31500 INSERT EMERGENCY AIRWAY: CPT | Mod: 59,,, | Performed by: EMERGENCY MEDICINE

## 2018-01-01 PROCEDURE — 99233 SBSQ HOSP IP/OBS HIGH 50: CPT | Mod: 25,,, | Performed by: INTERNAL MEDICINE

## 2018-01-01 PROCEDURE — 80202 ASSAY OF VANCOMYCIN: CPT

## 2018-01-01 PROCEDURE — 83605 ASSAY OF LACTIC ACID: CPT

## 2018-01-01 PROCEDURE — 96374 THER/PROPH/DIAG INJ IV PUSH: CPT

## 2018-01-01 PROCEDURE — 80048 BASIC METABOLIC PNL TOTAL CA: CPT

## 2018-01-01 PROCEDURE — 94660 CPAP INITIATION&MGMT: CPT

## 2018-01-01 PROCEDURE — C1751 CATH, INF, PER/CENT/MIDLINE: HCPCS

## 2018-01-01 PROCEDURE — 25000242 PHARM REV CODE 250 ALT 637 W/ HCPCS: Performed by: STUDENT IN AN ORGANIZED HEALTH CARE EDUCATION/TRAINING PROGRAM

## 2018-01-01 PROCEDURE — 81001 URINALYSIS AUTO W/SCOPE: CPT | Mod: 91

## 2018-01-01 PROCEDURE — 85610 PROTHROMBIN TIME: CPT

## 2018-01-01 PROCEDURE — 31500 INSERT EMERGENCY AIRWAY: CPT

## 2018-01-01 PROCEDURE — 87070 CULTURE OTHR SPECIMN AEROBIC: CPT

## 2018-01-01 PROCEDURE — 31720 CLEARANCE OF AIRWAYS: CPT

## 2018-01-01 PROCEDURE — 87077 CULTURE AEROBIC IDENTIFY: CPT

## 2018-01-01 PROCEDURE — 30000890 MAYO MISCELLANEOUS TEST (REFLEX)

## 2018-01-01 PROCEDURE — 83880 ASSAY OF NATRIURETIC PEPTIDE: CPT

## 2018-01-01 PROCEDURE — 63600175 PHARM REV CODE 636 W HCPCS: Mod: JG | Performed by: STUDENT IN AN ORGANIZED HEALTH CARE EDUCATION/TRAINING PROGRAM

## 2018-01-01 PROCEDURE — 87088 URINE BACTERIA CULTURE: CPT

## 2018-01-01 PROCEDURE — S0028 INJECTION, FAMOTIDINE, 20 MG: HCPCS | Performed by: INTERNAL MEDICINE

## 2018-01-01 PROCEDURE — 85730 THROMBOPLASTIN TIME PARTIAL: CPT

## 2018-01-01 PROCEDURE — 99498 ADVNCD CARE PLAN ADDL 30 MIN: CPT | Mod: ,,, | Performed by: INTERNAL MEDICINE

## 2018-01-01 PROCEDURE — 82803 BLOOD GASES ANY COMBINATION: CPT

## 2018-01-01 PROCEDURE — 36600 WITHDRAWAL OF ARTERIAL BLOOD: CPT

## 2018-01-01 PROCEDURE — 87205 SMEAR GRAM STAIN: CPT

## 2018-01-01 PROCEDURE — 36569 INSJ PICC 5 YR+ W/O IMAGING: CPT

## 2018-01-01 PROCEDURE — 82330 ASSAY OF CALCIUM: CPT

## 2018-01-01 PROCEDURE — 87449 NOS EACH ORGANISM AG IA: CPT

## 2018-01-01 PROCEDURE — 93010 ELECTROCARDIOGRAM REPORT: CPT | Mod: ,,, | Performed by: INTERNAL MEDICINE

## 2018-01-01 PROCEDURE — 81406 MOPATH PROCEDURE LEVEL 7: CPT

## 2018-01-01 PROCEDURE — 27100171 HC OXYGEN HIGH FLOW UP TO 24 HOURS

## 2018-01-01 PROCEDURE — 84132 ASSAY OF SERUM POTASSIUM: CPT

## 2018-01-01 PROCEDURE — 99291 CRITICAL CARE FIRST HOUR: CPT | Mod: 25,,, | Performed by: EMERGENCY MEDICINE

## 2018-01-01 PROCEDURE — 36415 COLL VENOUS BLD VENIPUNCTURE: CPT

## 2018-01-01 PROCEDURE — 82533 TOTAL CORTISOL: CPT

## 2018-01-01 PROCEDURE — 99231 SBSQ HOSP IP/OBS SF/LOW 25: CPT | Mod: ,,, | Performed by: INTERNAL MEDICINE

## 2018-01-01 PROCEDURE — 96375 TX/PRO/DX INJ NEW DRUG ADDON: CPT

## 2018-01-01 PROCEDURE — 96361 HYDRATE IV INFUSION ADD-ON: CPT

## 2018-01-01 PROCEDURE — 51702 INSERT TEMP BLADDER CATH: CPT

## 2018-01-01 PROCEDURE — 99497 ADVNCD CARE PLAN 30 MIN: CPT | Mod: ,,, | Performed by: INTERNAL MEDICINE

## 2018-01-01 PROCEDURE — 80053 COMPREHEN METABOLIC PANEL: CPT | Mod: 91

## 2018-01-01 PROCEDURE — 25500020 PHARM REV CODE 255: Performed by: STUDENT IN AN ORGANIZED HEALTH CARE EDUCATION/TRAINING PROGRAM

## 2018-01-01 PROCEDURE — 83605 ASSAY OF LACTIC ACID: CPT | Mod: 91

## 2018-01-01 PROCEDURE — 02HV33Z INSERTION OF INFUSION DEVICE INTO SUPERIOR VENA CAVA, PERCUTANEOUS APPROACH: ICD-10-PCS | Performed by: EMERGENCY MEDICINE

## 2018-01-01 PROCEDURE — 36556 INSERT NON-TUNNEL CV CATH: CPT

## 2018-01-01 PROCEDURE — 36556 INSERT NON-TUNNEL CV CATH: CPT | Mod: 51,,, | Performed by: EMERGENCY MEDICINE

## 2018-01-01 PROCEDURE — 94667 MNPJ CHEST WALL 1ST: CPT

## 2018-01-01 PROCEDURE — 84484 ASSAY OF TROPONIN QUANT: CPT

## 2018-01-01 PROCEDURE — 84443 ASSAY THYROID STIM HORMONE: CPT

## 2018-01-01 PROCEDURE — 99900037 HC PT THERAPY SCREENING (STAT)

## 2018-01-01 PROCEDURE — 97802 MEDICAL NUTRITION INDIV IN: CPT

## 2018-01-01 PROCEDURE — 87186 SC STD MICRODIL/AGAR DIL: CPT

## 2018-01-01 PROCEDURE — 99291 CRITICAL CARE FIRST HOUR: CPT | Mod: 25

## 2018-01-01 PROCEDURE — 99233 SBSQ HOSP IP/OBS HIGH 50: CPT | Mod: ,,, | Performed by: PSYCHIATRY & NEUROLOGY

## 2018-01-01 PROCEDURE — 0BH18EZ INSERTION OF ENDOTRACHEAL AIRWAY INTO TRACHEA, VIA NATURAL OR ARTIFICIAL OPENING ENDOSCOPIC: ICD-10-PCS | Performed by: EMERGENCY MEDICINE

## 2018-01-01 RX ORDER — CEFEPIME HYDROCHLORIDE 2 G/1
2 INJECTION, POWDER, FOR SOLUTION INTRAVENOUS
Status: DISCONTINUED | OUTPATIENT
Start: 2018-01-01 | End: 2018-01-01

## 2018-01-01 RX ORDER — POTASSIUM CHLORIDE 20 MEQ/15ML
40 SOLUTION ORAL ONCE
Status: COMPLETED | OUTPATIENT
Start: 2018-01-01 | End: 2018-01-01

## 2018-01-01 RX ORDER — LEVETIRACETAM 100 MG/ML
500 SOLUTION ORAL 2 TIMES DAILY
Status: DISCONTINUED | OUTPATIENT
Start: 2018-01-01 | End: 2018-01-01

## 2018-01-01 RX ORDER — HEPARIN SODIUM 5000 [USP'U]/ML
5000 INJECTION, SOLUTION INTRAVENOUS; SUBCUTANEOUS EVERY 8 HOURS
Status: DISCONTINUED | OUTPATIENT
Start: 2018-01-01 | End: 2018-01-01

## 2018-01-01 RX ORDER — POTASSIUM CHLORIDE 20 MEQ/15ML
60 SOLUTION ORAL
Status: DISCONTINUED | OUTPATIENT
Start: 2018-01-01 | End: 2018-01-01

## 2018-01-01 RX ORDER — CEFTRIAXONE 1 G/1
1 INJECTION, POWDER, FOR SOLUTION INTRAMUSCULAR; INTRAVENOUS
Status: DISCONTINUED | OUTPATIENT
Start: 2018-01-01 | End: 2018-01-01

## 2018-01-01 RX ORDER — GLYCOPYRROLATE 0.2 MG/ML
0.1 INJECTION INTRAMUSCULAR; INTRAVENOUS 3 TIMES DAILY
Status: DISCONTINUED | OUTPATIENT
Start: 2018-01-01 | End: 2018-01-01

## 2018-01-01 RX ORDER — CEFEPIME HYDROCHLORIDE 2 G/1
2 INJECTION, POWDER, FOR SOLUTION INTRAVENOUS
Status: COMPLETED | OUTPATIENT
Start: 2018-01-01 | End: 2018-01-01

## 2018-01-01 RX ORDER — FAMOTIDINE 20 MG/1
20 TABLET, FILM COATED ORAL 2 TIMES DAILY
Status: DISCONTINUED | OUTPATIENT
Start: 2018-01-01 | End: 2018-01-01

## 2018-01-01 RX ORDER — GLYCOPYRROLATE 1 MG/5ML
2 SOLUTION ORAL DAILY
Status: DISCONTINUED | OUTPATIENT
Start: 2018-01-01 | End: 2018-01-01

## 2018-01-01 RX ORDER — SODIUM,POTASSIUM PHOSPHATES 280-250MG
2 POWDER IN PACKET (EA) ORAL
Status: DISCONTINUED | OUTPATIENT
Start: 2018-01-01 | End: 2018-01-01

## 2018-01-01 RX ORDER — FENTANYL CITRATE 50 UG/ML
50 INJECTION, SOLUTION INTRAMUSCULAR; INTRAVENOUS
Status: DISPENSED | OUTPATIENT
Start: 2018-01-01 | End: 2018-01-01

## 2018-01-01 RX ORDER — BACLOFEN 10 MG/1
10 TABLET ORAL 3 TIMES DAILY
Status: DISCONTINUED | OUTPATIENT
Start: 2018-01-01 | End: 2018-01-01

## 2018-01-01 RX ORDER — CITALOPRAM 10 MG/1
10 TABLET ORAL DAILY
Status: DISCONTINUED | OUTPATIENT
Start: 2018-01-01 | End: 2018-01-01

## 2018-01-01 RX ORDER — CHLORHEXIDINE GLUCONATE ORAL RINSE 1.2 MG/ML
15 SOLUTION DENTAL 2 TIMES DAILY
Status: DISCONTINUED | OUTPATIENT
Start: 2018-01-01 | End: 2018-01-01

## 2018-01-01 RX ORDER — NOREPINEPHRINE BITARTRATE/D5W 4MG/250ML
0.05 PLASTIC BAG, INJECTION (ML) INTRAVENOUS CONTINUOUS
Status: DISCONTINUED | OUTPATIENT
Start: 2018-01-01 | End: 2018-01-01

## 2018-01-01 RX ORDER — ALBUTEROL SULFATE 0.83 MG/ML
2.5 SOLUTION RESPIRATORY (INHALATION) EVERY 6 HOURS
Status: DISCONTINUED | OUTPATIENT
Start: 2018-01-01 | End: 2018-01-01

## 2018-01-01 RX ORDER — AMOXICILLIN 250 MG
1 CAPSULE ORAL DAILY
Status: DISCONTINUED | OUTPATIENT
Start: 2018-01-01 | End: 2018-01-01

## 2018-01-01 RX ORDER — ONDANSETRON 2 MG/ML
4 INJECTION INTRAMUSCULAR; INTRAVENOUS EVERY 8 HOURS PRN
Status: DISCONTINUED | OUTPATIENT
Start: 2018-01-01 | End: 2018-01-01 | Stop reason: HOSPADM

## 2018-01-01 RX ORDER — POLYETHYLENE GLYCOL 3350 17 G/17G
17 POWDER, FOR SOLUTION ORAL DAILY
Status: DISCONTINUED | OUTPATIENT
Start: 2018-01-01 | End: 2018-01-01

## 2018-01-01 RX ORDER — POTASSIUM CHLORIDE 20 MEQ/15ML
60 SOLUTION ORAL ONCE
Status: COMPLETED | OUTPATIENT
Start: 2018-01-01 | End: 2018-01-01

## 2018-01-01 RX ORDER — GLYCOPYRROLATE 0.2 MG/ML
0.1 INJECTION INTRAMUSCULAR; INTRAVENOUS ONCE
Status: COMPLETED | OUTPATIENT
Start: 2018-01-01 | End: 2018-01-01

## 2018-01-01 RX ORDER — SCOLOPAMINE TRANSDERMAL SYSTEM 1 MG/1
1 PATCH, EXTENDED RELEASE TRANSDERMAL
Status: DISCONTINUED | OUTPATIENT
Start: 2018-01-01 | End: 2018-01-01 | Stop reason: HOSPADM

## 2018-01-01 RX ORDER — DEXTROSE MONOHYDRATE 50 MG/ML
INJECTION, SOLUTION INTRAVENOUS CONTINUOUS
Status: DISCONTINUED | OUTPATIENT
Start: 2018-01-01 | End: 2018-01-01

## 2018-01-01 RX ORDER — FENTANYL CITRATE 50 UG/ML
50 INJECTION, SOLUTION INTRAMUSCULAR; INTRAVENOUS
Status: DISCONTINUED | OUTPATIENT
Start: 2018-01-01 | End: 2018-01-01

## 2018-01-01 RX ORDER — ETOMIDATE 2 MG/ML
INJECTION INTRAVENOUS
Status: COMPLETED
Start: 2018-01-01 | End: 2018-01-01

## 2018-01-01 RX ORDER — FENTANYL CITRATE-0.9 % NACL/PF 10 MCG/ML
25 PLASTIC BAG, INJECTION (ML) INTRAVENOUS CONTINUOUS
Status: DISCONTINUED | OUTPATIENT
Start: 2018-01-01 | End: 2018-01-01

## 2018-01-01 RX ORDER — SODIUM CHLORIDE 0.9 % (FLUSH) 0.9 %
3 SYRINGE (ML) INJECTION
Status: DISCONTINUED | OUTPATIENT
Start: 2018-01-01 | End: 2018-01-01 | Stop reason: HOSPADM

## 2018-01-01 RX ORDER — DEXTROSE 50 % IN WATER (D50W) INTRAVENOUS SYRINGE
Status: DISPENSED
Start: 2018-01-01 | End: 2018-01-01

## 2018-01-01 RX ORDER — DEXTROSE 50 % IN WATER (D50W) INTRAVENOUS SYRINGE
12.5
Status: DISCONTINUED | OUTPATIENT
Start: 2018-01-01 | End: 2018-01-01 | Stop reason: HOSPADM

## 2018-01-01 RX ORDER — LEVETIRACETAM 5 MG/ML
500 INJECTION INTRAVASCULAR EVERY 12 HOURS
Status: DISCONTINUED | OUTPATIENT
Start: 2018-01-01 | End: 2018-01-01

## 2018-01-01 RX ORDER — IPRATROPIUM BROMIDE 0.5 MG/2.5ML
0.5 SOLUTION RESPIRATORY (INHALATION) EVERY 6 HOURS
Status: DISCONTINUED | OUTPATIENT
Start: 2018-01-01 | End: 2018-01-01

## 2018-01-01 RX ORDER — FENTANYL CITRATE-0.9 % NACL/PF 10 MCG/ML
PLASTIC BAG, INJECTION (ML) INTRAVENOUS CONTINUOUS
Status: DISCONTINUED | OUTPATIENT
Start: 2018-01-01 | End: 2018-01-01

## 2018-01-01 RX ORDER — NOREPINEPHRINE BITARTRATE/D5W 4MG/250ML
PLASTIC BAG, INJECTION (ML) INTRAVENOUS
Status: COMPLETED
Start: 2018-01-01 | End: 2018-01-01

## 2018-01-01 RX ORDER — MAGNESIUM SULFATE HEPTAHYDRATE 40 MG/ML
2 INJECTION, SOLUTION INTRAVENOUS
Status: DISCONTINUED | OUTPATIENT
Start: 2018-01-01 | End: 2018-01-01

## 2018-01-01 RX ORDER — GLYCOPYRROLATE 1 MG/5ML
2 SOLUTION ORAL 3 TIMES DAILY
Status: COMPLETED | OUTPATIENT
Start: 2018-01-01 | End: 2018-01-01

## 2018-01-01 RX ORDER — ROCURONIUM BROMIDE 10 MG/ML
0.6 INJECTION, SOLUTION INTRAVENOUS
Status: COMPLETED | OUTPATIENT
Start: 2018-01-01 | End: 2018-01-01

## 2018-01-01 RX ORDER — MORPHINE SULFATE 2 MG/ML
2 INJECTION, SOLUTION INTRAMUSCULAR; INTRAVENOUS
Status: DISCONTINUED | OUTPATIENT
Start: 2018-01-01 | End: 2018-01-01

## 2018-01-01 RX ORDER — GLYCOPYRROLATE 0.2 MG/ML
0.2 INJECTION INTRAMUSCULAR; INTRAVENOUS 4 TIMES DAILY
Status: DISCONTINUED | OUTPATIENT
Start: 2018-01-01 | End: 2018-01-01 | Stop reason: HOSPADM

## 2018-01-01 RX ORDER — MIDAZOLAM HYDROCHLORIDE 1 MG/ML
2 INJECTION INTRAMUSCULAR; INTRAVENOUS EVERY 5 MIN PRN
Status: DISCONTINUED | OUTPATIENT
Start: 2018-01-01 | End: 2018-01-01 | Stop reason: HOSPADM

## 2018-01-01 RX ORDER — FENTANYL CITRATE 50 UG/ML
INJECTION, SOLUTION INTRAMUSCULAR; INTRAVENOUS
Status: DISCONTINUED
Start: 2018-01-01 | End: 2018-01-01 | Stop reason: WASHOUT

## 2018-01-01 RX ORDER — FUROSEMIDE 40 MG/1
40 TABLET ORAL ONCE
Status: COMPLETED | OUTPATIENT
Start: 2018-01-01 | End: 2018-01-01

## 2018-01-01 RX ORDER — LORAZEPAM 0.5 MG/1
1 TABLET ORAL EVERY 6 HOURS PRN
Status: DISCONTINUED | OUTPATIENT
Start: 2018-01-01 | End: 2018-01-01 | Stop reason: HOSPADM

## 2018-01-01 RX ORDER — GLYCOPYRROLATE 1 MG/5ML
2 SOLUTION ORAL 3 TIMES DAILY
Status: DISCONTINUED | OUTPATIENT
Start: 2018-01-01 | End: 2018-01-01

## 2018-01-01 RX ORDER — FAMOTIDINE 10 MG/ML
20 INJECTION INTRAVENOUS EVERY 12 HOURS
Status: DISCONTINUED | OUTPATIENT
Start: 2018-01-01 | End: 2018-01-01

## 2018-01-01 RX ORDER — IPRATROPIUM BROMIDE AND ALBUTEROL SULFATE 2.5; .5 MG/3ML; MG/3ML
3 SOLUTION RESPIRATORY (INHALATION) EVERY 6 HOURS
Status: DISCONTINUED | OUTPATIENT
Start: 2018-01-01 | End: 2018-01-01

## 2018-01-01 RX ORDER — MIDAZOLAM HYDROCHLORIDE 1 MG/ML
2 INJECTION INTRAMUSCULAR; INTRAVENOUS
Status: DISCONTINUED | OUTPATIENT
Start: 2018-01-01 | End: 2018-01-01

## 2018-01-01 RX ORDER — GLYCOPYRROLATE 0.2 MG/ML
0.1 INJECTION INTRAMUSCULAR; INTRAVENOUS DAILY
Status: DISCONTINUED | OUTPATIENT
Start: 2018-01-01 | End: 2018-01-01

## 2018-01-01 RX ORDER — FENTANYL CITRATE 50 UG/ML
25 INJECTION, SOLUTION INTRAMUSCULAR; INTRAVENOUS
Status: DISCONTINUED | OUTPATIENT
Start: 2018-01-01 | End: 2018-01-01

## 2018-01-01 RX ORDER — MAGNESIUM SULFATE HEPTAHYDRATE 40 MG/ML
4 INJECTION, SOLUTION INTRAVENOUS
Status: DISCONTINUED | OUTPATIENT
Start: 2018-01-01 | End: 2018-01-01

## 2018-01-01 RX ORDER — POTASSIUM CHLORIDE 20 MEQ/15ML
40 SOLUTION ORAL
Status: DISCONTINUED | OUTPATIENT
Start: 2018-01-01 | End: 2018-01-01

## 2018-01-01 RX ORDER — ONDANSETRON 2 MG/ML
8 INJECTION INTRAMUSCULAR; INTRAVENOUS
Status: COMPLETED | OUTPATIENT
Start: 2018-01-01 | End: 2018-01-01

## 2018-01-01 RX ORDER — FAMOTIDINE 10 MG/ML
20 INJECTION INTRAVENOUS 2 TIMES DAILY
Status: DISCONTINUED | OUTPATIENT
Start: 2018-01-01 | End: 2018-01-01

## 2018-01-01 RX ORDER — GLUCAGON 1 MG
1 KIT INJECTION
Status: DISCONTINUED | OUTPATIENT
Start: 2018-01-01 | End: 2018-01-01 | Stop reason: HOSPADM

## 2018-01-01 RX ORDER — FENTANYL CITRATE 50 UG/ML
50 INJECTION, SOLUTION INTRAMUSCULAR; INTRAVENOUS
Status: DISCONTINUED | OUTPATIENT
Start: 2018-01-01 | End: 2018-01-01 | Stop reason: HOSPADM

## 2018-01-01 RX ORDER — IPRATROPIUM BROMIDE AND ALBUTEROL SULFATE 2.5; .5 MG/3ML; MG/3ML
3 SOLUTION RESPIRATORY (INHALATION) EVERY 4 HOURS
Status: DISCONTINUED | OUTPATIENT
Start: 2018-01-01 | End: 2018-01-01

## 2018-01-01 RX ORDER — METRONIDAZOLE 500 MG/100ML
500 INJECTION, SOLUTION INTRAVENOUS
Status: DISCONTINUED | OUTPATIENT
Start: 2018-01-01 | End: 2018-01-01

## 2018-01-01 RX ORDER — FENTANYL CITRATE-0.9 % NACL/PF 10 MCG/ML
PLASTIC BAG, INJECTION (ML) INTRAVENOUS CONTINUOUS
Status: DISCONTINUED | OUTPATIENT
Start: 2018-01-01 | End: 2018-01-01 | Stop reason: HOSPADM

## 2018-01-01 RX ORDER — ETOMIDATE 2 MG/ML
10 INJECTION INTRAVENOUS
Status: COMPLETED | OUTPATIENT
Start: 2018-01-01 | End: 2018-01-01

## 2018-01-01 RX ORDER — GLYCOPYRROLATE 1 MG/5ML
1 SOLUTION ORAL 3 TIMES DAILY
Status: DISCONTINUED | OUTPATIENT
Start: 2018-01-01 | End: 2018-01-01

## 2018-01-01 RX ORDER — DEXTROSE MONOHYDRATE 50 MG/ML
INJECTION, SOLUTION INTRAVENOUS CONTINUOUS
Status: ACTIVE | OUTPATIENT
Start: 2018-01-01 | End: 2018-01-01

## 2018-01-01 RX ADMIN — VANCOMYCIN HCL-SODIUM CHLORIDE IV SOLN 1.5 GM/250ML-0.9% 1500 MG: 1.5-0.9/25 SOLUTION at 09:02

## 2018-01-01 RX ADMIN — FAMOTIDINE 20 MG: 20 TABLET, FILM COATED ORAL at 08:02

## 2018-01-01 RX ADMIN — HEPARIN SODIUM 5000 UNITS: 5000 INJECTION, SOLUTION INTRAVENOUS; SUBCUTANEOUS at 02:02

## 2018-01-01 RX ADMIN — IPRATROPIUM BROMIDE AND ALBUTEROL SULFATE 3 ML: .5; 3 SOLUTION RESPIRATORY (INHALATION) at 01:02

## 2018-01-01 RX ADMIN — CITALOPRAM HYDROBROMIDE 10 MG: 10 TABLET ORAL at 09:02

## 2018-01-01 RX ADMIN — HEPARIN SODIUM 5000 UNITS: 5000 INJECTION, SOLUTION INTRAVENOUS; SUBCUTANEOUS at 09:03

## 2018-01-01 RX ADMIN — BACLOFEN 10 MG: 10 TABLET ORAL at 02:02

## 2018-01-01 RX ADMIN — LEVETIRACETAM 500 MG: 500 SOLUTION ORAL at 09:02

## 2018-01-01 RX ADMIN — CHLORHEXIDINE GLUCONATE 15 ML: 1.2 RINSE ORAL at 08:02

## 2018-01-01 RX ADMIN — ONDANSETRON HYDROCHLORIDE 4 MG: 2 INJECTION, SOLUTION INTRAMUSCULAR; INTRAVENOUS at 03:02

## 2018-01-01 RX ADMIN — MAGNESIUM SULFATE HEPTAHYDRATE 2 G: 40 INJECTION, SOLUTION INTRAVENOUS at 08:02

## 2018-01-01 RX ADMIN — GLYCOPYRROLATE 0.2 MG: 0.2 INJECTION INTRAMUSCULAR; INTRAVENOUS at 12:03

## 2018-01-01 RX ADMIN — HEPARIN SODIUM 5000 UNITS: 5000 INJECTION, SOLUTION INTRAVENOUS; SUBCUTANEOUS at 02:03

## 2018-01-01 RX ADMIN — HEPARIN SODIUM 5000 UNITS: 5000 INJECTION, SOLUTION INTRAVENOUS; SUBCUTANEOUS at 05:02

## 2018-01-01 RX ADMIN — BACLOFEN 10 MG: 10 TABLET ORAL at 09:02

## 2018-01-01 RX ADMIN — BACLOFEN 10 MG: 10 TABLET ORAL at 04:02

## 2018-01-01 RX ADMIN — BACLOFEN 10 MG: 10 TABLET ORAL at 01:02

## 2018-01-01 RX ADMIN — POTASSIUM CHLORIDE 60 MEQ: 20 SOLUTION ORAL at 09:03

## 2018-01-01 RX ADMIN — CHLORHEXIDINE GLUCONATE 15 ML: 1.2 RINSE ORAL at 08:03

## 2018-01-01 RX ADMIN — SODIUM CHLORIDE 500 ML: 0.9 INJECTION, SOLUTION INTRAVENOUS at 05:02

## 2018-01-01 RX ADMIN — LEVETIRACETAM 500 MG: 500 SOLUTION ORAL at 08:02

## 2018-01-01 RX ADMIN — BACLOFEN 10 MG: 10 TABLET ORAL at 02:03

## 2018-01-01 RX ADMIN — HEPARIN SODIUM 5000 UNITS: 5000 INJECTION, SOLUTION INTRAVENOUS; SUBCUTANEOUS at 10:02

## 2018-01-01 RX ADMIN — GLYCOPYRROLATE 0.1 MG: 0.2 INJECTION INTRAMUSCULAR; INTRAVENOUS at 09:02

## 2018-01-01 RX ADMIN — FENTANYL CITRATE 50 MCG: 50 INJECTION, SOLUTION INTRAMUSCULAR; INTRAVENOUS at 02:03

## 2018-01-01 RX ADMIN — BACLOFEN 10 MG: 10 TABLET ORAL at 05:02

## 2018-01-01 RX ADMIN — POTASSIUM & SODIUM PHOSPHATES POWDER PACK 280-160-250 MG 2 PACKET: 280-160-250 PACK at 08:02

## 2018-01-01 RX ADMIN — FENTANYL CITRATE 25 MCG: 50 INJECTION INTRAMUSCULAR; INTRAVENOUS at 07:02

## 2018-01-01 RX ADMIN — SCOLOPAMINE TRANSDERMAL SYSTEM 1 PATCH: 1 PATCH, EXTENDED RELEASE TRANSDERMAL at 09:03

## 2018-01-01 RX ADMIN — CHLORHEXIDINE GLUCONATE 15 ML: 1.2 RINSE ORAL at 10:03

## 2018-01-01 RX ADMIN — HEPARIN SODIUM 5000 UNITS: 5000 INJECTION, SOLUTION INTRAVENOUS; SUBCUTANEOUS at 05:03

## 2018-01-01 RX ADMIN — IPRATROPIUM BROMIDE AND ALBUTEROL SULFATE 3 ML: .5; 3 SOLUTION RESPIRATORY (INHALATION) at 07:02

## 2018-01-01 RX ADMIN — LEVETIRACETAM 500 MG: 500 SOLUTION ORAL at 10:02

## 2018-01-01 RX ADMIN — IPRATROPIUM BROMIDE AND ALBUTEROL SULFATE 3 ML: .5; 3 SOLUTION RESPIRATORY (INHALATION) at 08:02

## 2018-01-01 RX ADMIN — FENTANYL CITRATE 50 MCG: 50 INJECTION INTRAMUSCULAR; INTRAVENOUS at 11:02

## 2018-01-01 RX ADMIN — FENTANYL CITRATE 50 MCG: 50 INJECTION INTRAMUSCULAR; INTRAVENOUS at 04:02

## 2018-01-01 RX ADMIN — FENTANYL CITRATE 50 MCG: 50 INJECTION INTRAMUSCULAR; INTRAVENOUS at 12:02

## 2018-01-01 RX ADMIN — IPRATROPIUM BROMIDE AND ALBUTEROL SULFATE 3 ML: .5; 3 SOLUTION RESPIRATORY (INHALATION) at 12:02

## 2018-01-01 RX ADMIN — IOHEXOL 100 ML: 350 INJECTION, SOLUTION INTRAVENOUS at 11:02

## 2018-01-01 RX ADMIN — CITALOPRAM HYDROBROMIDE 10 MG: 10 TABLET ORAL at 08:03

## 2018-01-01 RX ADMIN — IPRATROPIUM BROMIDE AND ALBUTEROL SULFATE 3 ML: .5; 3 SOLUTION RESPIRATORY (INHALATION) at 03:02

## 2018-01-01 RX ADMIN — STANDARDIZED SENNA CONCENTRATE AND DOCUSATE SODIUM 1 TABLET: 8.6; 5 TABLET, FILM COATED ORAL at 12:03

## 2018-01-01 RX ADMIN — GLYCOPYRROLATE 0.1 MG: 0.2 INJECTION INTRAMUSCULAR; INTRAVENOUS at 08:03

## 2018-01-01 RX ADMIN — BACLOFEN 10 MG: 10 TABLET ORAL at 06:02

## 2018-01-01 RX ADMIN — HEPARIN SODIUM 5000 UNITS: 5000 INJECTION, SOLUTION INTRAVENOUS; SUBCUTANEOUS at 06:02

## 2018-01-01 RX ADMIN — CITALOPRAM HYDROBROMIDE 10 MG: 10 TABLET ORAL at 08:02

## 2018-01-01 RX ADMIN — IPRATROPIUM BROMIDE 0.5 MG: 0.5 SOLUTION RESPIRATORY (INHALATION) at 01:02

## 2018-01-01 RX ADMIN — PIPERACILLIN AND TAZOBACTAM 4.5 G: 4; .5 INJECTION, POWDER, LYOPHILIZED, FOR SOLUTION INTRAVENOUS; PARENTERAL at 01:02

## 2018-01-01 RX ADMIN — METRONIDAZOLE 500 MG: 500 INJECTION, SOLUTION INTRAVENOUS at 09:02

## 2018-01-01 RX ADMIN — CHLORHEXIDINE GLUCONATE 15 ML: 1.2 RINSE ORAL at 09:03

## 2018-01-01 RX ADMIN — ALBUTEROL SULFATE 2.5 MG: 2.5 SOLUTION RESPIRATORY (INHALATION) at 12:02

## 2018-01-01 RX ADMIN — FAMOTIDINE 20 MG: 20 TABLET, FILM COATED ORAL at 09:03

## 2018-01-01 RX ADMIN — CEFTRIAXONE SODIUM 1 G: 1 INJECTION, POWDER, FOR SOLUTION INTRAMUSCULAR; INTRAVENOUS at 04:02

## 2018-01-01 RX ADMIN — Medication 2 MG: at 01:02

## 2018-01-01 RX ADMIN — SODIUM CHLORIDE 1000 ML: 0.9 INJECTION, SOLUTION INTRAVENOUS at 09:02

## 2018-01-01 RX ADMIN — PIPERACILLIN AND TAZOBACTAM 4.5 G: 4; .5 INJECTION, POWDER, LYOPHILIZED, FOR SOLUTION INTRAVENOUS; PARENTERAL at 02:03

## 2018-01-01 RX ADMIN — MIDAZOLAM HYDROCHLORIDE 2 MG/HR: 5 INJECTION, SOLUTION INTRAMUSCULAR; INTRAVENOUS at 12:03

## 2018-01-01 RX ADMIN — Medication 2 MG: at 09:02

## 2018-01-01 RX ADMIN — HEPARIN SODIUM 5000 UNITS: 5000 INJECTION, SOLUTION INTRAVENOUS; SUBCUTANEOUS at 06:03

## 2018-01-01 RX ADMIN — Medication 1 MG: at 03:02

## 2018-01-01 RX ADMIN — ROCURONIUM BROMIDE 90 MG: 10 INJECTION INTRAVENOUS at 11:02

## 2018-01-01 RX ADMIN — CEFTRIAXONE SODIUM 2 G: 2 INJECTION, POWDER, FOR SOLUTION INTRAMUSCULAR; INTRAVENOUS at 03:02

## 2018-01-01 RX ADMIN — SODIUM CHLORIDE 500 ML: 0.9 INJECTION, SOLUTION INTRAVENOUS at 08:02

## 2018-01-01 RX ADMIN — ALBUTEROL SULFATE 2.5 MG: 2.5 SOLUTION RESPIRATORY (INHALATION) at 01:02

## 2018-01-01 RX ADMIN — SCOLOPAMINE TRANSDERMAL SYSTEM 1 PATCH: 1 PATCH, EXTENDED RELEASE TRANSDERMAL at 09:02

## 2018-01-01 RX ADMIN — IPRATROPIUM BROMIDE AND ALBUTEROL SULFATE 3 ML: .5; 3 SOLUTION RESPIRATORY (INHALATION) at 02:02

## 2018-01-01 RX ADMIN — FENTANYL CITRATE 50 MCG: 50 INJECTION INTRAMUSCULAR; INTRAVENOUS at 08:02

## 2018-01-01 RX ADMIN — FAMOTIDINE 20 MG: 20 TABLET, FILM COATED ORAL at 09:02

## 2018-01-01 RX ADMIN — CHLORHEXIDINE GLUCONATE 15 ML: 1.2 RINSE ORAL at 09:02

## 2018-01-01 RX ADMIN — IPRATROPIUM BROMIDE 0.5 MG: 0.5 SOLUTION RESPIRATORY (INHALATION) at 08:02

## 2018-01-01 RX ADMIN — IOHEXOL 15 ML: 350 INJECTION, SOLUTION INTRAVENOUS at 08:02

## 2018-01-01 RX ADMIN — IPRATROPIUM BROMIDE AND ALBUTEROL SULFATE 3 ML: .5; 3 SOLUTION RESPIRATORY (INHALATION) at 11:02

## 2018-01-01 RX ADMIN — METRONIDAZOLE 500 MG: 500 INJECTION, SOLUTION INTRAVENOUS at 05:02

## 2018-01-01 RX ADMIN — FAMOTIDINE 20 MG: 20 TABLET, FILM COATED ORAL at 08:03

## 2018-01-01 RX ADMIN — GLYCOPYRROLATE 0.2 MG: 0.2 INJECTION INTRAMUSCULAR; INTRAVENOUS at 05:03

## 2018-01-01 RX ADMIN — METRONIDAZOLE 500 MG: 500 INJECTION, SOLUTION INTRAVENOUS at 02:02

## 2018-01-01 RX ADMIN — ALBUTEROL SULFATE 2.5 MG: 2.5 SOLUTION RESPIRATORY (INHALATION) at 07:02

## 2018-01-01 RX ADMIN — FENTANYL CITRATE 50 MCG: 50 INJECTION INTRAMUSCULAR; INTRAVENOUS at 02:02

## 2018-01-01 RX ADMIN — LEVETIRACETAM 500 MG: 500 SOLUTION ORAL at 09:03

## 2018-01-01 RX ADMIN — Medication 12 MCG/HR: at 10:03

## 2018-01-01 RX ADMIN — GLYCOPYRROLATE 0.1 MG: 0.2 INJECTION INTRAMUSCULAR; INTRAVENOUS at 10:02

## 2018-01-01 RX ADMIN — ETOMIDATE 10 MG: 2 INJECTION INTRAVENOUS at 11:02

## 2018-01-01 RX ADMIN — FAMOTIDINE 20 MG: 20 TABLET, FILM COATED ORAL at 10:02

## 2018-01-01 RX ADMIN — DEXTROSE: 5 SOLUTION INTRAVENOUS at 04:03

## 2018-01-01 RX ADMIN — PIPERACILLIN AND TAZOBACTAM 4.5 G: 4; .5 INJECTION, POWDER, LYOPHILIZED, FOR SOLUTION INTRAVENOUS; PARENTERAL at 05:02

## 2018-01-01 RX ADMIN — BACLOFEN 10 MG: 10 TABLET ORAL at 03:02

## 2018-01-01 RX ADMIN — CITALOPRAM HYDROBROMIDE 10 MG: 10 TABLET ORAL at 10:02

## 2018-01-01 RX ADMIN — SODIUM CHLORIDE 500 ML: 9 INJECTION, SOLUTION INTRAVENOUS at 06:03

## 2018-01-01 RX ADMIN — Medication 2 MG: at 02:02

## 2018-01-01 RX ADMIN — GLYCOPYRROLATE 0.2 MG: 0.2 INJECTION INTRAMUSCULAR; INTRAVENOUS at 06:03

## 2018-01-01 RX ADMIN — MAGNESIUM SULFATE IN WATER 2 G: 40 INJECTION, SOLUTION INTRAVENOUS at 07:02

## 2018-01-01 RX ADMIN — LEVETIRACETAM 500 MG: 5 INJECTION INTRAVENOUS at 09:02

## 2018-01-01 RX ADMIN — FAMOTIDINE 20 MG: 10 INJECTION, SOLUTION INTRAVENOUS at 08:02

## 2018-01-01 RX ADMIN — MAGNESIUM SULFATE IN WATER 2 G: 40 INJECTION, SOLUTION INTRAVENOUS at 06:02

## 2018-01-01 RX ADMIN — DEXTROSE: 5 SOLUTION INTRAVENOUS at 08:03

## 2018-01-01 RX ADMIN — ONDANSETRON HYDROCHLORIDE 4 MG: 2 INJECTION, SOLUTION INTRAMUSCULAR; INTRAVENOUS at 08:02

## 2018-01-01 RX ADMIN — ONDANSETRON HYDROCHLORIDE 4 MG: 2 INJECTION, SOLUTION INTRAMUSCULAR; INTRAVENOUS at 05:03

## 2018-01-01 RX ADMIN — DEXTROSE MONOHYDRATE 12.5 G: 25 INJECTION, SOLUTION INTRAVENOUS at 08:02

## 2018-01-01 RX ADMIN — CHLORHEXIDINE GLUCONATE 15 ML: 1.2 RINSE ORAL at 10:02

## 2018-01-01 RX ADMIN — HEPARIN SODIUM 5000 UNITS: 5000 INJECTION, SOLUTION INTRAVENOUS; SUBCUTANEOUS at 10:03

## 2018-01-01 RX ADMIN — SCOLOPAMINE TRANSDERMAL SYSTEM 1 PATCH: 1 PATCH, EXTENDED RELEASE TRANSDERMAL at 10:02

## 2018-01-01 RX ADMIN — POTASSIUM & SODIUM PHOSPHATES POWDER PACK 280-160-250 MG 2 PACKET: 280-160-250 PACK at 05:02

## 2018-01-01 RX ADMIN — BACLOFEN 10 MG: 10 TABLET ORAL at 10:02

## 2018-01-01 RX ADMIN — ONDANSETRON HYDROCHLORIDE 4 MG: 2 INJECTION, SOLUTION INTRAMUSCULAR; INTRAVENOUS at 03:03

## 2018-01-01 RX ADMIN — CEFEPIME 2 G: 2 INJECTION, POWDER, FOR SOLUTION INTRAVENOUS at 11:02

## 2018-01-01 RX ADMIN — DEXTROSE MONOHYDRATE: 5 INJECTION, SOLUTION INTRAVENOUS at 11:02

## 2018-01-01 RX ADMIN — Medication 1 MG: at 09:02

## 2018-01-01 RX ADMIN — CEFEPIME 2 G: 2 INJECTION, POWDER, FOR SOLUTION INTRAVENOUS at 08:02

## 2018-01-01 RX ADMIN — Medication 2 MG: at 08:02

## 2018-01-01 RX ADMIN — GLYCOPYRROLATE 0.2 MG: 0.2 INJECTION INTRAMUSCULAR; INTRAVENOUS at 08:03

## 2018-01-01 RX ADMIN — DEXTROSE MONOHYDRATE 25 G: 25 INJECTION, SOLUTION INTRAVENOUS at 06:03

## 2018-01-01 RX ADMIN — BACLOFEN 10 MG: 10 TABLET ORAL at 09:03

## 2018-01-01 RX ADMIN — FENTANYL CITRATE 50 MCG: 50 INJECTION INTRAMUSCULAR; INTRAVENOUS at 07:02

## 2018-01-01 RX ADMIN — METRONIDAZOLE 500 MG: 500 INJECTION, SOLUTION INTRAVENOUS at 01:02

## 2018-01-01 RX ADMIN — FENTANYL CITRATE 50 MCG: 50 INJECTION, SOLUTION INTRAMUSCULAR; INTRAVENOUS at 03:03

## 2018-01-01 RX ADMIN — HEPARIN SODIUM 5000 UNITS: 5000 INJECTION, SOLUTION INTRAVENOUS; SUBCUTANEOUS at 09:02

## 2018-01-01 RX ADMIN — CALCIUM GLUCONATE 2000 MG: 94 INJECTION, SOLUTION INTRAVENOUS at 10:02

## 2018-01-01 RX ADMIN — FUROSEMIDE 40 MG: 40 TABLET ORAL at 11:02

## 2018-01-01 RX ADMIN — FAMOTIDINE 20 MG: 10 INJECTION, SOLUTION INTRAVENOUS at 09:02

## 2018-01-01 RX ADMIN — GLYCOPYRROLATE 0.2 MG: 0.2 INJECTION INTRAMUSCULAR; INTRAVENOUS at 10:03

## 2018-01-01 RX ADMIN — HEPARIN SODIUM 5000 UNITS: 5000 INJECTION, SOLUTION INTRAVENOUS; SUBCUTANEOUS at 01:03

## 2018-01-01 RX ADMIN — FAMOTIDINE 20 MG: 20 TABLET, FILM COATED ORAL at 10:03

## 2018-01-01 RX ADMIN — CEFEPIME 2 G: 2 INJECTION, POWDER, FOR SOLUTION INTRAVENOUS at 02:02

## 2018-01-01 RX ADMIN — BACLOFEN 10 MG: 10 TABLET ORAL at 05:03

## 2018-01-01 RX ADMIN — HEPARIN SODIUM 5000 UNITS: 5000 INJECTION, SOLUTION INTRAVENOUS; SUBCUTANEOUS at 03:03

## 2018-01-01 RX ADMIN — DEXTROSE MONOHYDRATE 12.5 G: 25 INJECTION, SOLUTION INTRAVENOUS at 05:03

## 2018-01-01 RX ADMIN — SODIUM CHLORIDE 500 ML: 0.9 INJECTION, SOLUTION INTRAVENOUS at 04:03

## 2018-01-01 RX ADMIN — Medication 1 MG: at 05:02

## 2018-01-01 RX ADMIN — Medication 2500 MCG: at 05:02

## 2018-01-01 RX ADMIN — DEXTROSE MONOHYDRATE 12.5 G: 25 INJECTION, SOLUTION INTRAVENOUS at 07:02

## 2018-01-01 RX ADMIN — FENTANYL CITRATE 50 MCG: 50 INJECTION INTRAMUSCULAR; INTRAVENOUS at 10:02

## 2018-01-01 RX ADMIN — DEXTROSE MONOHYDRATE: 5 INJECTION, SOLUTION INTRAVENOUS at 06:02

## 2018-01-01 RX ADMIN — CITALOPRAM HYDROBROMIDE 10 MG: 10 TABLET ORAL at 07:02

## 2018-01-01 RX ADMIN — PIPERACILLIN AND TAZOBACTAM 4.5 G: 4; .5 INJECTION, POWDER, LYOPHILIZED, FOR SOLUTION INTRAVENOUS; PARENTERAL at 05:03

## 2018-01-01 RX ADMIN — MAGNESIUM SULFATE HEPTAHYDRATE 2 G: 500 INJECTION, SOLUTION INTRAMUSCULAR; INTRAVENOUS at 01:02

## 2018-01-01 RX ADMIN — Medication 0.06 MCG/KG/MIN: at 06:02

## 2018-01-01 RX ADMIN — FAMOTIDINE 20 MG: 20 TABLET, FILM COATED ORAL at 11:02

## 2018-01-01 RX ADMIN — GLYCOPYRROLATE 0.1 MG: 0.2 INJECTION INTRAMUSCULAR; INTRAVENOUS at 09:03

## 2018-01-01 RX ADMIN — ONDANSETRON HYDROCHLORIDE 4 MG: 2 INJECTION, SOLUTION INTRAMUSCULAR; INTRAVENOUS at 07:02

## 2018-01-01 RX ADMIN — Medication 175 MCG/HR: at 01:03

## 2018-01-01 RX ADMIN — GLYCOPYRROLATE 0.2 MG: 0.2 INJECTION INTRAMUSCULAR; INTRAVENOUS at 09:03

## 2018-01-01 RX ADMIN — FENTANYL CITRATE 25 MCG: 50 INJECTION INTRAMUSCULAR; INTRAVENOUS at 05:02

## 2018-01-01 RX ADMIN — Medication 125 MG: at 06:03

## 2018-01-01 RX ADMIN — MIDAZOLAM HYDROCHLORIDE 3 MG/HR: 5 INJECTION, SOLUTION INTRAMUSCULAR; INTRAVENOUS at 01:03

## 2018-01-01 RX ADMIN — LEVETIRACETAM 500 MG: 5 INJECTION INTRAVENOUS at 08:02

## 2018-01-01 RX ADMIN — Medication 2 MG: at 03:02

## 2018-01-01 RX ADMIN — Medication 1250 MG: at 11:02

## 2018-01-01 RX ADMIN — MIDAZOLAM HYDROCHLORIDE 2 MG/HR: 5 INJECTION, SOLUTION INTRAMUSCULAR; INTRAVENOUS at 04:03

## 2018-01-01 RX ADMIN — DEXTROSE: 5 SOLUTION INTRAVENOUS at 09:03

## 2018-01-01 RX ADMIN — FAMOTIDINE 20 MG: 20 TABLET, FILM COATED ORAL at 07:02

## 2018-01-01 RX ADMIN — MORPHINE SULFATE 0.5 MG/HR: 10 INJECTION INTRAVENOUS at 02:03

## 2018-01-01 RX ADMIN — IPRATROPIUM BROMIDE 0.5 MG: 0.5 SOLUTION RESPIRATORY (INHALATION) at 07:02

## 2018-01-01 RX ADMIN — Medication 100 MCG/HR: at 04:03

## 2018-01-01 RX ADMIN — FENTANYL CITRATE 100 MCG: 50 INJECTION INTRAMUSCULAR; INTRAVENOUS at 07:02

## 2018-01-01 RX ADMIN — Medication 2500 MCG: at 12:03

## 2018-01-01 RX ADMIN — LEVETIRACETAM 500 MG: 500 SOLUTION ORAL at 11:02

## 2018-01-01 RX ADMIN — GLYCOPYRROLATE 0.2 MG: 0.2 INJECTION INTRAMUSCULAR; INTRAVENOUS at 03:03

## 2018-01-01 RX ADMIN — SODIUM CHLORIDE 1000 ML: 0.9 INJECTION, SOLUTION INTRAVENOUS at 10:02

## 2018-01-01 RX ADMIN — GLYCOPYRROLATE 0.2 MG: 0.2 INJECTION INTRAMUSCULAR; INTRAVENOUS at 04:03

## 2018-01-01 RX ADMIN — DEXTROSE: 5 SOLUTION INTRAVENOUS at 11:03

## 2018-01-01 RX ADMIN — FENTANYL CITRATE: 50 INJECTION INTRAMUSCULAR; INTRAVENOUS at 08:02

## 2018-01-01 RX ADMIN — DAPTOMYCIN 490 MG: 500 INJECTION, POWDER, LYOPHILIZED, FOR SOLUTION INTRAVENOUS at 05:02

## 2018-01-01 RX ADMIN — HEPARIN SODIUM 5000 UNITS: 5000 INJECTION, SOLUTION INTRAVENOUS; SUBCUTANEOUS at 01:02

## 2018-01-01 RX ADMIN — BACLOFEN 10 MG: 10 TABLET ORAL at 11:02

## 2018-01-01 RX ADMIN — VANCOMYCIN HCL-SODIUM CHLORIDE IV SOLN 1.5 GM/250ML-0.9% 1500 MG: 1.5-0.9/25 SOLUTION at 12:02

## 2018-01-01 RX ADMIN — FENTANYL CITRATE 50 MCG: 50 INJECTION INTRAMUSCULAR; INTRAVENOUS at 05:02

## 2018-01-01 RX ADMIN — CEFTRIAXONE SODIUM 2 G: 2 INJECTION, POWDER, FOR SOLUTION INTRAMUSCULAR; INTRAVENOUS at 04:02

## 2018-01-01 RX ADMIN — MAGNESIUM SULFATE HEPTAHYDRATE 2 G: 500 INJECTION, SOLUTION INTRAMUSCULAR; INTRAVENOUS at 04:02

## 2018-01-01 RX ADMIN — IPRATROPIUM BROMIDE 0.5 MG: 0.5 SOLUTION RESPIRATORY (INHALATION) at 12:02

## 2018-01-01 RX ADMIN — POLYETHYLENE GLYCOL 3350 17 G: 17 POWDER, FOR SOLUTION ORAL at 12:03

## 2018-01-01 RX ADMIN — Medication 125 MG: at 12:03

## 2018-01-01 RX ADMIN — CITALOPRAM HYDROBROMIDE 10 MG: 10 TABLET ORAL at 11:02

## 2018-01-01 RX ADMIN — SODIUM CHLORIDE 500 ML: 0.9 INJECTION, SOLUTION INTRAVENOUS at 02:02

## 2018-01-01 RX ADMIN — SCOLOPAMINE TRANSDERMAL SYSTEM 1 PATCH: 1 PATCH, EXTENDED RELEASE TRANSDERMAL at 10:03

## 2018-01-01 RX ADMIN — LEVETIRACETAM 500 MG: 500 SOLUTION ORAL at 07:02

## 2018-01-01 RX ADMIN — MAGNESIUM SULFATE HEPTAHYDRATE 2 G: 40 INJECTION, SOLUTION INTRAVENOUS at 04:02

## 2018-01-01 RX ADMIN — POTASSIUM CHLORIDE 60 MEQ: 20 SOLUTION ORAL at 11:03

## 2018-01-01 RX ADMIN — FENTANYL CITRATE 50 MCG: 50 INJECTION, SOLUTION INTRAMUSCULAR; INTRAVENOUS at 11:02

## 2018-01-01 RX ADMIN — Medication 0.06 MCG/KG/MIN: at 12:02

## 2018-01-01 RX ADMIN — BACLOFEN 10 MG: 10 TABLET ORAL at 06:03

## 2018-01-01 RX ADMIN — FENTANYL CITRATE 50 MCG: 50 INJECTION INTRAMUSCULAR; INTRAVENOUS at 06:02

## 2018-01-01 RX ADMIN — Medication 2 MG: at 05:02

## 2018-01-01 RX ADMIN — FENTANYL CITRATE: 50 INJECTION INTRAMUSCULAR; INTRAVENOUS at 10:02

## 2018-01-01 RX ADMIN — Medication 0.02 MCG/KG/MIN: at 02:02

## 2018-01-01 RX ADMIN — SODIUM CHLORIDE 1000 ML: 0.9 INJECTION, SOLUTION INTRAVENOUS at 08:02

## 2018-01-01 RX ADMIN — FENTANYL CITRATE 50 MCG: 50 INJECTION INTRAMUSCULAR; INTRAVENOUS at 09:02

## 2018-01-01 RX ADMIN — SODIUM CHLORIDE 1000 ML: 0.9 INJECTION, SOLUTION INTRAVENOUS at 06:03

## 2018-01-01 RX ADMIN — Medication 200 MCG/HR: at 11:03

## 2018-01-01 RX ADMIN — Medication 2 MG: at 06:02

## 2018-01-01 RX ADMIN — FENTANYL CITRATE 25 MCG: 50 INJECTION INTRAMUSCULAR; INTRAVENOUS at 08:02

## 2018-01-01 RX ADMIN — MAGNESIUM SULFATE HEPTAHYDRATE 2 G: 500 INJECTION, SOLUTION INTRAMUSCULAR; INTRAVENOUS at 12:02

## 2018-01-01 RX ADMIN — Medication 37.5 MCG/HR: at 10:03

## 2018-01-01 RX ADMIN — SODIUM CHLORIDE 500 ML: 0.9 INJECTION, SOLUTION INTRAVENOUS at 07:02

## 2018-01-01 RX ADMIN — ONDANSETRON HYDROCHLORIDE 4 MG: 2 INJECTION, SOLUTION INTRAMUSCULAR; INTRAVENOUS at 09:03

## 2018-01-01 RX ADMIN — GLYCOPYRROLATE 0.2 MG: 0.2 INJECTION INTRAMUSCULAR; INTRAVENOUS at 01:03

## 2018-01-01 RX ADMIN — ALBUTEROL SULFATE 2.5 MG: 2.5 SOLUTION RESPIRATORY (INHALATION) at 08:02

## 2018-01-01 RX ADMIN — FENTANYL CITRATE: 50 INJECTION INTRAMUSCULAR; INTRAVENOUS at 12:02

## 2018-01-01 RX ADMIN — CEFEPIME 2 G: 2 INJECTION, POWDER, FOR SOLUTION INTRAVENOUS at 04:02

## 2018-01-01 RX ADMIN — VANCOMYCIN HYDROCHLORIDE 1000 MG: 1 INJECTION, POWDER, LYOPHILIZED, FOR SOLUTION INTRAVENOUS at 11:02

## 2018-01-01 RX ADMIN — DEXTROSE MONOHYDRATE: 5 INJECTION, SOLUTION INTRAVENOUS at 09:02

## 2018-01-01 RX ADMIN — CEFEPIME 2 G: 2 INJECTION, POWDER, FOR SOLUTION INTRAVENOUS at 05:02

## 2018-01-01 RX ADMIN — SODIUM CHLORIDE 500 ML: 9 INJECTION, SOLUTION INTRAVENOUS at 02:03

## 2018-01-01 RX ADMIN — ONDANSETRON 8 MG: 2 INJECTION INTRAMUSCULAR; INTRAVENOUS at 11:02

## 2018-01-01 RX ADMIN — CITALOPRAM HYDROBROMIDE 10 MG: 10 TABLET ORAL at 09:03

## 2018-01-01 RX ADMIN — LEVETIRACETAM 500 MG: 500 SOLUTION ORAL at 08:03

## 2018-01-01 RX ADMIN — IOHEXOL 15 ML: 350 INJECTION, SOLUTION INTRAVENOUS at 09:02

## 2018-01-01 RX ADMIN — DEXTROSE MONOHYDRATE 12.5 G: 25 INJECTION, SOLUTION INTRAVENOUS at 10:03

## 2018-01-01 RX ADMIN — SODIUM CHLORIDE 500 ML: 0.9 INJECTION, SOLUTION INTRAVENOUS at 05:03

## 2018-01-01 RX ADMIN — POTASSIUM CHLORIDE 40 MEQ: 20 SOLUTION ORAL at 06:02

## 2018-01-01 RX ADMIN — VANCOMYCIN HYDROCHLORIDE 1750 MG: 10 INJECTION, POWDER, LYOPHILIZED, FOR SOLUTION INTRAVENOUS at 06:03

## 2018-01-01 RX ADMIN — DEXTROSE MONOHYDRATE 12.5 G: 25 INJECTION, SOLUTION INTRAVENOUS at 11:02

## 2018-01-01 RX ADMIN — ONDANSETRON HYDROCHLORIDE 4 MG: 2 INJECTION, SOLUTION INTRAMUSCULAR; INTRAVENOUS at 06:03

## 2018-01-01 RX ADMIN — PIPERACILLIN AND TAZOBACTAM 4.5 G: 4; .5 INJECTION, POWDER, LYOPHILIZED, FOR SOLUTION INTRAVENOUS; PARENTERAL at 03:02

## 2018-01-01 RX ADMIN — METRONIDAZOLE 500 MG: 500 INJECTION, SOLUTION INTRAVENOUS at 06:02

## 2018-01-01 RX ADMIN — Medication 25 MCG/HR: at 11:03

## 2018-01-01 RX ADMIN — POTASSIUM CHLORIDE 40 MEQ: 20 SOLUTION ORAL at 12:03

## 2018-01-01 RX ADMIN — FENTANYL CITRATE 50 MCG: 50 INJECTION INTRAMUSCULAR; INTRAVENOUS at 03:02

## 2018-01-01 RX ADMIN — SODIUM CHLORIDE 500 ML: 0.9 INJECTION, SOLUTION INTRAVENOUS at 10:03

## 2018-02-05 PROBLEM — J96.01 ACUTE RESPIRATORY FAILURE WITH HYPOXIA: Status: ACTIVE | Noted: 2018-01-01

## 2018-02-05 PROBLEM — L89.320 UNSTAGEABLE PRESSURE ULCER OF LEFT BUTTOCK: Status: ACTIVE | Noted: 2018-01-01

## 2018-02-05 PROBLEM — Z78.9 INTUBATION OF AIRWAY PERFORMED WITHOUT DIFFICULTY: Status: ACTIVE | Noted: 2018-01-01

## 2018-02-05 PROBLEM — L89.96 PRESSURE INJURY OF DEEP TISSUE: Status: ACTIVE | Noted: 2018-01-01

## 2018-02-05 PROBLEM — R23.9 ALTERATION IN SKIN INTEGRITY DUE TO MOISTURE: Status: ACTIVE | Noted: 2018-01-01

## 2018-02-05 NOTE — SUBJECTIVE & OBJECTIVE
Past Medical History:   Diagnosis Date    Anemia     iron def    Depression     Dysarthria     HTN (hypertension)     Hyperlipemia     Osteomyelitis     Seizures     Stroke        Past Surgical History:   Procedure Laterality Date    TOE AMPUTATION         Review of patient's allergies indicates:   Allergen Reactions    Amoxicillin Other (See Comments)     Per daughter always allergic, unknown rxn       Medications:  Prescriptions Prior to Admission   Medication Sig    aspirin 81 MG Chew 1 tablet (81 mg total) by Per G Tube route once daily.    baclofen (LIORESAL) 20 MG tablet Take 1 tablet (20 mg total) by mouth every 4 (four) hours.    calcium-vitamin D3 500 mg(1,250mg) -200 unit per tablet 1 tablet by Per G Tube route 2 (two) times daily.    citalopram (CELEXA) 10 MG tablet 1 tablet (10 mg total) by Per G Tube route once daily.    levetiracetam (KEPPRA) 500 MG Tab Take 1 tablet (500 mg total) by mouth 2 (two) times daily.    metoprolol tartrate (LOPRESSOR) 25 MG tablet Take 12.5 mg by mouth 2 (two) times daily.    pantoprazole (PROTONIX) 40 mg GrPS Use twice daily for 8 weeks then start once daily indefinitely.    polyethylene glycol (GLYCOLAX) 17 gram/dose powder Take 17 g by mouth once daily.    propranolol (INDERAL) 20 mg/5 mL (4 mg/mL) Soln Take 2.5 mLs (10 mg total) by mouth every 4 (four) hours as needed (SBP >180 or HR >120 or RR >30 or diaphoresis. Hold for SBP <90).    simvastatin (ZOCOR) 40 MG tablet 1 tablet (40 mg total) by Per G Tube route every evening.     Antibiotics     Start     Stop Route Frequency Ordered    02/05/18 1000  vancomycin (VANCOCIN) 1,500 mg in sodium chloride 0.9% 250 mL IVPB      -- IV Every 12 hours (non-standard times) 02/05/18 0252    02/05/18 0800  ceFEPIme injection 2 g      -- IV Every 8 hours (non-standard times) 02/05/18 0308    02/05/18 0545  metronidazole IVPB 500 mg      -- IV Every 8 hours (non-standard times) 02/05/18 0441        Antifungals      None        Antivirals     None             There is no immunization history on file for this patient.    Family History     None        Social History     Social History    Marital status:      Spouse name: N/A    Number of children: N/A    Years of education: N/A     Social History Main Topics    Smoking status: Unknown If Ever Smoked    Smokeless tobacco: Never Used    Alcohol use No    Drug use: No    Sexual activity: Not Asked     Other Topics Concern    None     Social History Narrative    None     Review of Systems   Unable to perform ROS: Intubated     Objective:     Vital Signs (Most Recent):  Temp: (!) 100.6 °F (38.1 °C) (02/05/18 0800)  Pulse: 96 (02/05/18 1000)  Resp: 16 (02/05/18 1000)  BP: 103/66 (02/05/18 1000)  SpO2: (!) 92 % (02/05/18 1000) Vital Signs (24h Range):  Temp:  [98.9 °F (37.2 °C)-102.1 °F (38.9 °C)] 100.6 °F (38.1 °C)  Pulse:  [84-99] 96  Resp:  [16-24] 16  SpO2:  [90 %-100 %] 92 %  BP: ()/(56-77) 103/66     Weight: 81.7 kg (180 lb 1.9 oz)  Body mass index is 27.39 kg/m².    Estimated Creatinine Clearance: 103 mL/min (based on SCr of 0.7 mg/dL).    Physical Exam   Constitutional: She is sedated and intubated.   obese   HENT:   Head: Normocephalic and atraumatic.   Eyes: Pupils are equal, round, and reactive to light.   Neck: Normal range of motion. No JVD present.   Cardiovascular: Normal rate, regular rhythm and normal heart sounds.    Pulmonary/Chest: She is intubated. No respiratory distress. She has rhonchi in the right lower field.   Decreased breath sounds on left and right base   Abdominal: Soft. Bowel sounds are normal. She exhibits no distension. There is no tenderness.   PEG tube without surrounding erythema or purulent discharge   Musculoskeletal: She exhibits no edema.   R posterior thigh wound without purulent drainage. Clean and dry.    Left elbow ulcer without erythema or purulent drainage    Sacral decub clean and dry    R&L toes with skin  breakdown (not concerning for infectious process)   Neurological:   Eyes open and tracking. All 4 extremities contracted, no movement.     Vitals reviewed.      Significant Labs:   Microbiology Results (last 7 days)     Procedure Component Value Units Date/Time    Culture, Respiratory with Gram Stain [[id number]] Collected:  02/05/18 0420    Order Status:  Completed Specimen:  Respiratory from Endotracheal Aspirate Updated:  02/05/18 1056     Gram Stain (Respiratory) <10 epithelial cells per low power field.     Gram Stain (Respiratory) Many WBC's     Gram Stain (Respiratory) Rare Gram positive cocci     Gram Stain (Respiratory) Rare Gram positive rods    Urine culture [[id number]]     Order Status:  No result Specimen:  Urine     Blood culture x two cultures. Draw prior to antibiotics. [[id number]] Collected:  02/04/18 2200    Order Status:  Completed Specimen:  Blood from Peripheral, Hand, Left Updated:  02/05/18 0715     Blood Culture, Routine No Growth to date    Narrative:       Aerobic and anaerobic    Blood culture x two cultures. Draw prior to antibiotics. [[id number]] Collected:  02/04/18 2200    Order Status:  Completed Specimen:  Blood from Peripheral, Hand, Left Updated:  02/05/18 0715     Blood Culture, Routine No Growth to date    Narrative:       Aerobic and anaerobic    Urine culture [[id number]] Collected:  02/04/18 2213    Order Status:  No result Specimen:  Urine Updated:  02/04/18 2240          Significant Imaging: I have reviewed all pertinent imaging results/findings within the past 24 hours.

## 2018-02-05 NOTE — PROGRESS NOTES
Wound care consulted for skin breakdown to left elbow, right thigh, buttocks, feet.    The patient is non-verbal, contracted.  CHENCHO mattress, heels off-loaded, foam dressings to heels, pillow between legs, vent in place, turn q 2 hours.   Recommendations:  Adaptic dressing then aquacel foam dressing over right thigh/left elbow every Mon/Thurs  Triad ointment to buttocks BID and prn cleansing  Betadine to R & L foot wounds.   02/05/18 1045       Wound 02/05/18 0300 posterior Thigh   Date First Assessed/Time First Assessed: 02/05/18 0300   Pre-existing: Yes  Side: Right  Orientation: posterior  Location: Thigh   Wound Image    Wound WDL ex   Dressing Appearance Dry;Intact;Clean   Drainage Amount None   Appearance Red;Blistered   Tissue loss description Partial thickness   Red (%), Wound Tissue Color 100 %   Periwound Area Intact;Dry   Wound Edges Open;Jagged   Wound Length (cm) 5   Wound Width (cm) 6   Depth (cm) 0.1   Care Cleansed with:;Sterile normal saline   Dressing Changed;Non-adherent;Foam  (silicon dressing. aquacel foam)   Dressing Change Due 02/07/18       Wound 02/05/18 0300 Elbow   Date First Assessed/Time First Assessed: 02/05/18 0300   Pre-existing: Yes  Side: Left  Location: Elbow   Wound Image    Wound WDL ex   Dressing Appearance Clean;Intact;Dry   Drainage Amount Scant   Drainage Characteristics/Odor Clear   Appearance Red;Blistered   Red (%), Wound Tissue Color 100 %   Periwound Area Moist   Wound Edges Open   Wound Length (cm) 1.5   Wound Width (cm) 1.5   Depth (cm) 0.1   Care Cleansed with:;Sterile normal saline   Dressing Changed  (Adaptic silicon dressing, aquacel foam dressing)   Dressing Change Due 02/07/18       Wound 02/05/18 0300 upper Buttocks   Date First Assessed/Time First Assessed: 02/05/18 0300   Pre-existing: Yes  Side: (c)   Orientation: upper  Location: Buttocks   Wound Image    Wound WDL ex   Drainage Amount Scant   Drainage Characteristics/Odor Serosanguineous   Appearance  Red;Eschar   Black (%), Wound Tissue Color (100 on left)   Red (%), Wound Tissue Color (100 on right)   Periwound Area Intact   Wound Edges Open   Wound Length (cm) 3   Wound Width (cm) 1   Care Cleansed with:;Sterile normal saline;Applied:  (Triad ointment )       Pressure Injury 02/05/18 1045 Right lateral Foot Deep tissue injury   Date First Assessed/Time First Assessed: 02/05/18 1045   Pressure Injury Present on Admission: yes  Side: Right  Orientation: lateral  Location: (c) Foot  Staging: Deep tissue injury   Wound Image     Staging D   Dressing Appearance Intact   Drainage Amount None   Appearance Maroon;Blistered   Periwound Area Intact   Wound Edges Undefined   Wound Length (cm) 4  (plantar aspect= 2)   Wound Width (cm) 2  (plantar aspect =2)   Care Sterile normal saline   Dressing Changed;Foam       Pressure Injury 02/05/18 1045 Left medial Foot Deep tissue injury   Date First Assessed/Time First Assessed: 02/05/18 1045   Pressure Injury Present on Admission: yes  Side: Left  Orientation: medial  Location: (c) Foot  Staging: Deep tissue injury   Wound Image     Staging D   Dressing Appearance Dry;Intact   Drainage Amount Scant   Drainage Characteristics/Odor Clear   Appearance Maroon;Blistered;Red   Periwound Area Intact   Wound Edges Undefined   Wound Length (cm) (toe=2  medial - 4)   Wound Width (cm) (toe=1  medial= 2)   Care Cleansed with:;Sterile normal saline   Dressing Applied;Foam   Skin Interventions   Device Skin Pressure Protection absorbent pad utilized/changed;positioning supports utilized;pressure points protected;skin-to-device areas padded;skin-to-skin areas padded   Pressure Reduction Devices Foam padding utilized;Heel offloading device utilized;Pressure-redistributing mattress utilized   Pressure Reduction Techniques heels elevated off bed;positioned off wounds;pressure points protected   Skin Protection Tubing/devices free from skin contact;Skin-to-skin areas padded;Skin-to-device areas  padded;Skin sealant/moisture barrier

## 2018-02-05 NOTE — CONSULTS
"[facility]  Infectious Disease  Consult Note    Patient Name: Jonathan Nieves  MRN: [id number]  Admission Date: 2/4/2018  Hospital Length of Stay: 0 days  Attending Physician: Jeronimo Velez MD  Primary Care Provider: Primary Doctor No     Isolation Status: No active isolations    Patient information was obtained from past medical records and ER records.      Inpatient consult to Infectious Diseases  Consult performed by: MIRANDA BOATENG  Consult ordered by: DESTINEE VELASQUEZ        Assessment/Plan:     Aspiration pneumonia    This is a 51 yo female with history of dysphasia 2/2 CVA s/p PEG placement with episode of emesis and subsequent aspiration which has led to acute hypoxic respiratory failure. Feel based on the clinical timeline that patient likely has an aspiration pneumonitis rather than an infectious pneumonia especially given negative procalcitonin.     - Ok to treat with vanc, rocephin and flagyl during acute course but will be quick to de-escalate antibiotics  - Do not feel that we need to cover previous VRE organisms at this time (can consider if patient decompensates)  - Will continue to follow cultures  - If patient does not clinically improve, can consider bronchoscopy               Thank you for your consult. I will follow-up with patient. Please contact us if you have any additional questions.    Miranda Boateng MD  Infectious Disease  [facility]    Subjective:     Principal Problem: Acute respiratory failure with hypoxia    HPI: Mrs. Jonathan Nieves is a 51yo female with history of CVA with residual aphasia and dysphagia, non-verbal, s/p PEG tube placement who is bed ridden, osteomyelitis s/p amputation of toe, who comes to the ED from [facility] via EMS in respiratory distress. Per chart review, ED/EMS reported that the patient was noted to have "projectile vomiting" yesterday evening (24 hours prior to presentation), which was treated " with zofran. During the event there was suspicion the patient possibly aspirated. Today the patient was noted to be in resp distress with a fever.      Patient was satting 88% on 4L on EMS arrival, breathing 40 times per minute.  Improved to 96% on 15L NRB with RR high 20s.  Axillary temp 102. Patient was eventually intubated for increasing oxygen requirements. CT chest was done which was concerning for aspiration pneumonia.  UA concerning for UTI.      ID consulted for antibiotic recs for possible aspiration pneumonia and UTI in the wake of previous multi drug resistant organisms. Most recently patient was treated for Enterococcus faecium with linezolid in 5/17.     Past Medical History:   Diagnosis Date    Anemia     iron def    Depression     Dysarthria     HTN (hypertension)     Hyperlipemia     Osteomyelitis     Seizures     Stroke        Past Surgical History:   Procedure Laterality Date    TOE AMPUTATION         Review of patient's allergies indicates:   Allergen Reactions    Amoxicillin Other (See Comments)     Per daughter always allergic, unknown rxn       Medications:  Prescriptions Prior to Admission   Medication Sig    aspirin 81 MG Chew 1 tablet (81 mg total) by Per G Tube route once daily.    baclofen (LIORESAL) 20 MG tablet Take 1 tablet (20 mg total) by mouth every 4 (four) hours.    calcium-vitamin D3 500 mg(1,250mg) -200 unit per tablet 1 tablet by Per G Tube route 2 (two) times daily.    citalopram (CELEXA) 10 MG tablet 1 tablet (10 mg total) by Per G Tube route once daily.    levetiracetam (KEPPRA) 500 MG Tab Take 1 tablet (500 mg total) by mouth 2 (two) times daily.    metoprolol tartrate (LOPRESSOR) 25 MG tablet Take 12.5 mg by mouth 2 (two) times daily.    pantoprazole (PROTONIX) 40 mg GrPS Use twice daily for 8 weeks then start once daily indefinitely.    polyethylene glycol (GLYCOLAX) 17 gram/dose powder Take 17 g by mouth once daily.    propranolol (INDERAL) 20 mg/5 mL  (4 mg/mL) Soln Take 2.5 mLs (10 mg total) by mouth every 4 (four) hours as needed (SBP >180 or HR >120 or RR >30 or diaphoresis. Hold for SBP <90).    simvastatin (ZOCOR) 40 MG tablet 1 tablet (40 mg total) by Per G Tube route every evening.     Antibiotics     Start     Stop Route Frequency Ordered    02/05/18 1000  vancomycin (VANCOCIN) 1,500 mg in sodium chloride 0.9% 250 mL IVPB      -- IV Every 12 hours (non-standard times) 02/05/18 0252    02/05/18 0800  ceFEPIme injection 2 g      -- IV Every 8 hours (non-standard times) 02/05/18 0308    02/05/18 0545  metronidazole IVPB 500 mg      -- IV Every 8 hours (non-standard times) 02/05/18 0441        Antifungals     None        Antivirals     None             There is no immunization history on file for this patient.    Family History     None        Social History     Social History    Marital status:      Spouse name: N/A    Number of children: N/A    Years of education: N/A     Social History Main Topics    Smoking status: Unknown If Ever Smoked    Smokeless tobacco: Never Used    Alcohol use No    Drug use: No    Sexual activity: Not Asked     Other Topics Concern    None     Social History Narrative    None     Review of Systems   Unable to perform ROS: Intubated     Objective:     Vital Signs (Most Recent):  Temp: (!) 100.6 °F (38.1 °C) (02/05/18 0800)  Pulse: 96 (02/05/18 1000)  Resp: 16 (02/05/18 1000)  BP: 103/66 (02/05/18 1000)  SpO2: (!) 92 % (02/05/18 1000) Vital Signs (24h Range):  Temp:  [98.9 °F (37.2 °C)-102.1 °F (38.9 °C)] 100.6 °F (38.1 °C)  Pulse:  [84-99] 96  Resp:  [16-24] 16  SpO2:  [90 %-100 %] 92 %  BP: ()/(56-77) 103/66     Weight: 81.7 kg (180 lb 1.9 oz)  Body mass index is 27.39 kg/m².    Estimated Creatinine Clearance: 103 mL/min (based on SCr of 0.7 mg/dL).    Physical Exam   Constitutional: She is sedated and intubated.   obese   HENT:   Head: Normocephalic and atraumatic.   Eyes: Pupils are equal, round, and  reactive to light.   Neck: Normal range of motion. No JVD present.   Cardiovascular: Normal rate, regular rhythm and normal heart sounds.    Pulmonary/Chest: She is intubated. No respiratory distress. She has rhonchi in the right lower field.   Decreased breath sounds on left and right base   Abdominal: Soft. Bowel sounds are normal. She exhibits no distension. There is no tenderness.   PEG tube without surrounding erythema or purulent discharge   Musculoskeletal: She exhibits no edema.   R posterior thigh wound without purulent drainage. Clean and dry.    Left elbow ulcer without erythema or purulent drainage    Sacral decub clean and dry    R&L toes with skin breakdown (not concerning for infectious process)   Neurological:   Eyes open and tracking. All 4 extremities contracted, no movement.     Vitals reviewed.      Significant Labs:   Microbiology Results (last 7 days)     Procedure Component Value Units Date/Time    Culture, Respiratory with Gram Stain [[id number]] Collected:  02/05/18 0420    Order Status:  Completed Specimen:  Respiratory from Endotracheal Aspirate Updated:  02/05/18 1056     Gram Stain (Respiratory) <10 epithelial cells per low power field.     Gram Stain (Respiratory) Many WBC's     Gram Stain (Respiratory) Rare Gram positive cocci     Gram Stain (Respiratory) Rare Gram positive rods    Urine culture [[id number]]     Order Status:  No result Specimen:  Urine     Blood culture x two cultures. Draw prior to antibiotics. [[id number]] Collected:  02/04/18 2200    Order Status:  Completed Specimen:  Blood from Peripheral, Hand, Left Updated:  02/05/18 0715     Blood Culture, Routine No Growth to date    Narrative:       Aerobic and anaerobic    Blood culture x two cultures. Draw prior to antibiotics. [[id number]] Collected:  02/04/18 2200    Order Status:  Completed Specimen:  Blood from Peripheral, Hand, Left Updated:  02/05/18 0715     Blood Culture, Routine No Growth to date    Narrative:        Aerobic and anaerobic    Urine culture [[id number]] Collected:  02/04/18 2213    Order Status:  No result Specimen:  Urine Updated:  02/04/18 2240          Significant Imaging: I have reviewed all pertinent imaging results/findings within the past 24 hours.

## 2018-02-05 NOTE — HPI
"Mrs. Jonathan Nieves is a 51yo female with history of CVA with residual aphasia and dysphagia, non-verbal, s/p PEG tube placement who is bed ridden, osteomyelitis s/p amputation of toe, who comes to the ED from [facility] via EMS in respiratory distress. Per chart review, ED/EMS reported that the patient was noted to have "projectile vomiting" yesterday evening (24 hours prior to presentation), which was treated with zofran. During the event there was suspicion the patient possibly aspirated. Today the patient was noted to be in resp distress with a fever.      Patient was satting 88% on 4L on EMS arrival, breathing 40 times per minute.  Improved to 96% on 15L NRB with RR high 20s.  Axillary temp 102. Patient was eventually intubated for increasing oxygen requirements. CT chest was done which was concerning for aspiration pneumonia.  UA concerning for UTI.      ID consulted for antibiotic recs for possible aspiration pneumonia and UTI in the wake of previous multi drug resistant organisms. Most recently patient was treated for Enterococcus faecium with linezolid in 5/17.   "

## 2018-02-05 NOTE — SUBJECTIVE & OBJECTIVE
Past Medical History:   Diagnosis Date    Anemia     iron def    Depression     Dysarthria     HTN (hypertension)     Hyperlipemia     Osteomyelitis     Seizures     Stroke        Past Surgical History:   Procedure Laterality Date    TOE AMPUTATION         Review of patient's allergies indicates:   Allergen Reactions    Amoxicillin Other (See Comments)     Per daughter always allergic, unknown rxn       Family History     None        Social History Main Topics    Smoking status: Unknown If Ever Smoked    Smokeless tobacco: Never Used    Alcohol use No    Drug use: No    Sexual activity: Not on file      Review of Systems   Unable to perform ROS: Intubated     Objective:     Vital Signs (Most Recent):  Temp: 99 °F (37.2 °C) (02/05/18 0245)  Pulse: 86 (02/05/18 0303)  Resp: 18 (02/05/18 0303)  BP: 97/66 (02/05/18 0245)  SpO2: (!) 94 % (02/05/18 0303) Vital Signs (24h Range):  Temp:  [98.9 °F (37.2 °C)-102.1 °F (38.9 °C)] 99 °F (37.2 °C)  Pulse:  [84-93] 86  Resp:  [18-24] 18  SpO2:  [94 %-100 %] 94 %  BP: ()/(57-77) 97/66   Weight: 81.7 kg (180 lb 1.9 oz)  Body mass index is 27.39 kg/m².      Intake/Output Summary (Last 24 hours) at 02/05/18 0359  Last data filed at 02/05/18 0247   Gross per 24 hour   Intake                0 ml   Output              250 ml   Net             -250 ml       Physical Exam   Constitutional: She is sedated and intubated.   Eyes: Pupils are equal, round, and reactive to light.   Neck: No JVD present.   Cardiovascular: Normal rate, regular rhythm and normal heart sounds.    Pulmonary/Chest: She is intubated. No respiratory distress. She has rhonchi in the right lower field.   Abdominal: Soft. Bowel sounds are normal. She exhibits no distension. There is no tenderness.   Musculoskeletal: She exhibits no edema.   Neurological:   All 4 extremities contracted, no movement.  Intubated         Vents:  Vent Mode: A/C (02/05/18 0250)  Ventilator Initiated: Yes (02/04/18  2344)  Set Rate: 18 bmp (02/05/18 0250)  Vt Set: 400 mL (02/05/18 0250)  Pressure Support: 0 cmH20 (02/05/18 0250)  PEEP/CPAP: 5 cmH20 (02/05/18 0250)  Oxygen Concentration (%): 80 (02/05/18 0303)  Peak Airway Pressure: 32 cmH2O (02/05/18 0250)  Plateau Pressure: 0 cmH20 (02/05/18 0250)  Total Ve: 7.21 mL (02/05/18 0250)  Lines/Drains/Airways     Drain                 Urethral Catheter -- days         Gastrostomy/Enterostomy 10/25/16 1401 Percutaneous endoscopic gastrostomy (PEG) midline feeding 467 days          Airway                 Airway - Non-Surgical 02/04/18 2329 Endotracheal Tube less than 1 day          Pressure Ulcer                 Pressure Ulcer 10/26/16 1502 Left elbow Stage  days          Peripheral Intravenous Line                 Peripheral IV - Single Lumen 02/04/18 2209 Left Hand less than 1 day              Significant Labs:    CBC/Anemia Profile:    Recent Labs  Lab 02/04/18 2156 02/05/18  0105   WBC 18.15* 20.22*   HGB 11.3* 9.7*   HCT 40.2 34.2*    177   MCV 82 84   RDW 16.7* 16.6*        Chemistries:    Recent Labs  Lab 02/04/18 2156      K 5.6*      CO2 30*   BUN 30*   CREATININE 0.7   CALCIUM 8.8   ALBUMIN 2.0*   PROT 8.2   BILITOT 0.3   ALKPHOS 161*   ALT 73*   AST 58*   MG 2.6   PHOS 3.8       ABGs:   Recent Labs  Lab 02/05/18  0119   PH 7.333*   PCO2 59.6*   HCO3 31.6*   POCSATURATED 94*   BE 6     Blood Culture: No results for input(s): LABBLOO in the last 48 hours.  CMP:   Recent Labs  Lab 02/04/18 2156      K 5.6*      CO2 30*   *   BUN 30*   CREATININE 0.7   CALCIUM 8.8   PROT 8.2   ALBUMIN 2.0*   BILITOT 0.3   ALKPHOS 161*   AST 58*   ALT 73*   ANIONGAP 7*   EGFRNONAA >60.0     Lactic Acid:   Recent Labs  Lab 02/04/18  2156   LACTATE 1.1     Respiratory Culture: No results for input(s): GSRESP, RESPIRATORYC in the last 48 hours.  Urine Culture: No results for input(s): LABURIN in the last 48 hours.  Urine Studies:   Recent Labs  Lab  02/04/18  2213   COLORU Jojo   APPEARANCEUA Cloudy*   PHUR 6.0   SPECGRAV 1.030   PROTEINUA 2+*   GLUCUA Negative   KETONESU Negative   BILIRUBINUA Negative   OCCULTUA Negative   NITRITE Negative   UROBILINOGEN 4.0   LEUKOCYTESUR 3+*   RBCUA 5*   WBCUA >100*   BACTERIA Many*   SQUAMEPITHEL 4   HYALINECASTS 0     All pertinent labs within the past 24 hours have been reviewed.    Significant Imaging: I have reviewed all pertinent imaging results/findings within the past 24 hours.

## 2018-02-05 NOTE — HOSPITAL COURSE
2/5: Critical Care Consulted. Patient intubated in the ED.   2/6: Patient doing well, on SBT; will extubate maria e. Continue treatment for aspiration PNA & UTI.   2/7: abx deescalated, continue to treat for PNA and UTI. Required pressors overnight. Off this morning.   2/8-9: still with secretions and questionable mentation? Baseline?. Will try to dry the secretions and extubated today or maria e.    2/10: MRI completed. Family called, had family meeting with one daughter. Need to re-visit with all daughters. Records requested from [facility] and [facility].   02/11-13: neurology consulted, no acute changes, wbc and LFT trending up, keep monitoring.  2/14: Concerns for CADASIL vs Binswanger syndrome per Neuro. NOTCH3 genetic testing sent. Palliative consulted regarding goals of care.   02/15 pt is not tolerating tube feed, had two episodes of projectile vomiting, KUB 02/14 shows partial SOB and distended stomach.  02/16 CT abdomen was unremarkable, family meeting was done.  02/17 no more vomiting, tolerating 10 cc/hr tube feeds.  02/19 trial to advance tube feeding, had residual back to trickle feed will try to advance slowly. 02/19 PT became hypotensive this morning with MAPs of 50's started on levo, daughter was contacted regarding goals of care, pt status was changed to partial code upon family request.  02/20 Pt still requiring levo, try to wean off slowly, family did not show for the meeting.  02/21 family meeting was done yesterday, no acclation of care per daughter wishes.   02/25 Pt lost IV access,still unable to wean off the went, neurological status unchanged she is alert but not following commands, fails her SBT every morning.  2/27: Failed SBT. Family did not show up for Palliative Care meeting.  2/28: NAEON.   3/1: family no showed for meeting  3/2: ethics committee consult by palliative  3/3: spoke to daughter zainab, informed she was awaiting her sister to pick her up and they are coming to hospital. She no showed  again  3/4: morphine drip started as patient appeared to be in pain, stopped when patient became hypotensive.  3/5: will attempt to contact family for another meeting today  3/6: Family meeting held yesterday with staff and fellow, discussed poor prognosis of pt. Family in agreement to not escalate care; however, not ready for withdrawal of care. Will revist with family meeting possibly tomorrow.   3/7: Pt was restarted on TF, however did not tolerate.   3/9: hypoglycemic overnight, started on D5 gtt. Palliative had discussion with patient's daughter, zainab. Plan is for terminal extubation on 3/13.   3/12: remains on D5 increased the rate from 50 to 100 for threshold of -180, failed SBT today. plan for family tmw regarding withdrawal of life support  3/13: No acute events overnight.  Plan for extubation to NIPPV vs palliative sedation later on today.   3/14:  Family presented to bedside yesterday but deferred extubation until they attempted to contact their estranged brother in [place] via Facebook message.  If they do not hear back from him by the end of today, they will move forward with extubation as was originally planned for yesterday.  3/15:  Ms. Nieves's family did not show up yesterday afternoon and no word if they were able to contact the brother.  Patient started on scheduled glycopyrrolate 0.2 mg IV QID & fentanyl 25 mcg/hr gtt yesterday with cough assist q4h as well.  Tube feeds held yesterday.  3:16:  Ms. Nieves's family spoke with MICU team yesterday and per their wishes she was extubated to BiPAP yesterday which she has been tolerating well.  Her versed gtt was also increased yesterday for comfort.  Family not present at bedside  3/17:  Patient still tolerating BiPAP well but has failed attempts to wean any further.  Patient's family spoke with MICU yesterday and per discussion, they have come to accept the evenuality of her passing.  3/18: JOSEON. BiPAP weaned to 10/5. Will update family with any  changes in patients condition.  3/19: Family updated with current plan, and are agreeable to comfort care. Will convert patient from fentanyl to morphine and will work to step down from MICU.   3/20: Patient  at 9:34AM, may she rest in peace.

## 2018-02-05 NOTE — HPI
"Mr. Jonathan Nieves is a 53yo female with history of CVA with residual aphasia and dysphagia, non-verbal, s/p PEG tube placement and bed riddenness, osteomyelitis s/p amputation of toe, who comes to the ED from [facility] via EMS in respiratory distress. Per ED/EMS the patient was noted to have "projectile vomiting" yesterday evening (24 hours prior to presentation), which was treated with zofran. During the event there is suspicion the patient possible aspirated. Today the patient was noted to be in resp distress with a fever.     Patient was satting 88% on 4L on EMS arrival, breathing 40 times per minute.  Improved to 96% on 15L NRB with RR high 20s.  Axillary temp 102.    CT chest concerning for aspiration pneumonia.  UA concern for a UTI.       Patient intubated in the ED & Critical Care consulted.       "

## 2018-02-05 NOTE — CONSULTS
See admission to MICU full H&P.     12:08 AM  Roman Theodore MD  PGY-2 House Staff, Internal Medicine

## 2018-02-05 NOTE — ASSESSMENT & PLAN NOTE
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

## 2018-02-05 NOTE — ASSESSMENT & PLAN NOTE
Intubated and sedated    Recent Labs  Lab 02/05/18  0119   PH 7.333*   PCO2 59.6*   PO2 79*   HCO3 31.6*   POCSATURATED 94*   BE 6       - treating for aspiration & HCAP PNA

## 2018-02-05 NOTE — ED TRIAGE NOTES
Pt with hx CVA with hemiplegia and baseline nonverbal, presents to ED from [facility] for evaluation of suspected aspiration after episode of reported projectile vomiting that occurred the night of Jan 3rd. EMS reports patient was administered scheduled zofran yesterday and upon their arrival to the NH tonight patient was 88% on 4L NC. Pt arrived to ED on 15L NRB with whitmore and PEG tube in place. MD and RT at bedside to assess patient.     Pt placed on cardiac, o2, and bp monitoring.

## 2018-02-05 NOTE — PROGRESS NOTES
Received patient from ER intubated with a size 7.5 ET-tube secured at the lip (23cm ayla). Patient placed on Ventilator in Assist Control mode with the following settlings, AB=576, RR=18, peep=5, FIO2=80%

## 2018-02-05 NOTE — ASSESSMENT & PLAN NOTE
[facility]    [address] [facility] [address]    Phone:  [phone]    Fax:  [phone]       Thank You for choosing us for your health care visit. We are glad to serve you and happy to provide you with this summary of your visit. Please help us to ensure we have accurate records. If you find anything that needs to be changed, please let our staff know as soon as possible.          Your Demographic Information     Patient Name Sex     Danielle Sanchez Female 1956       Ethnic Group Patient Race    / Origin White      Your Visit Details     Date & Time Provider Department    2017 11:10 AM Atif Quesada MD [facility]      Your Upcoming Appointment*(Max 10)     2017 11:10 AM CST   Follow-up Visit with Atif Quesada MD   [facility] ([facility])    [address] [facility] [address]   [phone]            2017 10:40 AM CST   [facility]   [facility] ([facility])    [address]  [place] [address]   [phone]            2017  2:45 PM CDT   Follow-up Visit with Lashonda Flores MD   [facility] ([facility])    [address] [facility] [address]   [phone]            2017 10:00 AM CDT   Follow-up Visit with Lukas Regalado MD   [facility] ([facility])    [address] [address]   [phone]            2017 10:50 AM CDT   Follow-up Visit with Atif Quesada MD   [facility] ([facility])    [address] [address]   [phone]              Your To Do List     Follow-Up    Return in about 6  - continue keppra    months (around 7/17/2017), or if symptoms worsen or fail to improve, for Eczema fuv, Wart fuv.      Your Vitals Were     LMP Smoking Status                02/05/1990 Never Smoker          Medications Prescribed or Re-Ordered Today     None      Your Current Medications Are        Disp Refills Start End    ferrous sulfate 325 (65 FE) MG tablet        Sig - Route: Take 325 mg by mouth daily (with breakfast). - Oral    Class: Historical Med    mycophenolate (CELLCEPT) 500 MG tablet 30 tablet 5 1/13/2017     Sig - Route: Take 1 tablet by mouth 1 time. - Oral    Class: Eprescribe    hydroxychloroquine (PLAQUENIL) 200 MG tablet 30 tablet 5 1/13/2017     Sig - Route: Take 1 tablet by mouth daily. - Oral    Class: Eprescribe    esomeprazole (NEXIUM) 40 MG capsule 30 capsule 5 11/7/2016     Sig - Route: Take 1 capsule by mouth daily (before breakfast). - Oral    Class: Eprescribe    mometasone (ELOCON) 0.1 % cream 45 g 2 10/4/2016     Sig: Apply to affected area twice daily up to 2 weeks, Restart as needed.    Class: Eprescribe    verapamil (CALAN SR, ISOPTIN SR) 180 MG CR tablet 30 tablet 6 9/26/2016     Sig - Route: Take 1 tablet by mouth nightly. - Oral    Class: Eprescribe    albuterol (VENTOLIN HFA) 108 (90 BASE) MCG/ACT inhaler 1 Inhaler 1 9/12/2016     Sig - Route: Inhale 2 puffs into the lungs every 4 hours as needed for Shortness of Breath or Wheezing. - Inhalation    Class: Eprescribe    Notes to Pharmacy: NEEDS ASAP    triamcinolone (ARISTOCORT) 0.1 % cream 45 g 0 9/9/2016     Sig: Apply to rash bid    Class: Eprescribe    cholecalciferol (VITAMIN D3) 1000 UNITS tablet        Sig - Route: Take 4,000 Units by mouth daily. - Oral    Class: Historical Med    Omega-3 Fatty Acids (FISH OIL) 1000 MG capsule        Sig - Route: Take 2 g by mouth 2 times daily. - Oral    Class: Historical Med    furosemide (LASIX) 20 MG tablet 30 tablet 0 4/6/2015     Sig - Route: Take 1 tablet by mouth daily. - Oral    Class: Historical  Med    losartan (COZAAR) 100 MG tablet        Sig - Route: Take 100 mg by mouth daily. - Oral    Class: Historical Med      Allergies     Celery SWELLING    Throat swells    Codeine RASH    Lisinopril Cough      Immunizations History as of 1/17/2017     Name Date    Influenza 9/29/2015, 10/16/2013, 10/16/2012, 10/29/2010, 9/22/2009, 11/20/2008, 10/4/2007, 10/16/2006, 11/10/2004, 11/26/2003, 10/29/2002, 12/18/2001    Influenza Quadrivalent Preservative Free 10/26/2016, 10/2/2014 10:30 AM    Tdap 11/28/2012 10:55 AM      Problem List as of 1/17/2017     HTN (hypertension)    Hyperlipidemia    SLE (systemic lupus erythematosus)    Encounter for long-term (current) use of other medications    Abnormal urinalysis    Urine protein increased    Pain in left hip    Hypovitaminosis D    Fatigue    Bilateral knee pain    Pedal edema    Raynaud phenomenon    Low back pain    Left lumbar radiculopathy    Radicular pain of left lower extremity    Sciatica    Left knee pain    Foot drop, left    Left foot drop    Seasonal allergies    Abnormality of gait    Left hip pain    Trochanteric bursitis of left hip              Patient Instructions    Please apply Cream or Vaseline to moisturize skin.

## 2018-02-05 NOTE — H&P
"[facility]  Critical Care Medicine  History & Physical    Patient Name: Jonathan Nieves  MRN: [id number]  Admission Date: 2/4/2018  Hospital Length of Stay: 0 days  Code Status: Full Code  Attending Physician: Jeronimo Velez MD   Primary Care Provider: Primary Doctor No   Principal Problem: Acute respiratory failure with hypoxia    Subjective:     HPI:  Mr. Jonathan Nieves is a 51yo female with history of CVA with residual aphasia and dysphagia, non-verbal, s/p PEG tube placement and bed riddenness, osteomyelitis s/p amputation of toe, who comes to the ED from [facility] via EMS in respiratory distress. Per ED/EMS the patient was noted to have "projectile vomiting" yesterday evening (24 hours prior to presentation), which was treated with zofran. During the event there is suspicion the patient possible aspirated. Today the patient was noted to be in resp distress with a fever.     Patient was satting 88% on 4L on EMS arrival, breathing 40 times per minute.  Improved to 96% on 15L NRB with RR high 20s.  Axillary temp 102.    CT chest concerning for aspiration pneumonia.  UA concern for a UTI.       Patient intubated in the ED & Critical Care consulted.         Hospital/ICU Course:  No notes on file     Past Medical History:   Diagnosis Date    Anemia     iron def    Depression     Dysarthria     HTN (hypertension)     Hyperlipemia     Osteomyelitis     Seizures     Stroke        Past Surgical History:   Procedure Laterality Date    TOE AMPUTATION         Review of patient's allergies indicates:   Allergen Reactions    Amoxicillin Other (See Comments)     Per daughter always allergic, unknown rxn       Family History     None        Social History Main Topics    Smoking status: Unknown If Ever Smoked    Smokeless tobacco: Never Used    Alcohol use No    Drug use: No    Sexual activity: Not on file      Review of Systems   Unable to perform ROS: Intubated "     Objective:     Vital Signs (Most Recent):  Temp: 99 °F (37.2 °C) (02/05/18 0245)  Pulse: 86 (02/05/18 0303)  Resp: 18 (02/05/18 0303)  BP: 97/66 (02/05/18 0245)  SpO2: (!) 94 % (02/05/18 0303) Vital Signs (24h Range):  Temp:  [98.9 °F (37.2 °C)-102.1 °F (38.9 °C)] 99 °F (37.2 °C)  Pulse:  [84-93] 86  Resp:  [18-24] 18  SpO2:  [94 %-100 %] 94 %  BP: ()/(57-77) 97/66   Weight: 81.7 kg (180 lb 1.9 oz)  Body mass index is 27.39 kg/m².      Intake/Output Summary (Last 24 hours) at 02/05/18 0359  Last data filed at 02/05/18 0247   Gross per 24 hour   Intake                0 ml   Output              250 ml   Net             -250 ml       Physical Exam   Constitutional: She is sedated and intubated.   Eyes: Pupils are equal, round, and reactive to light.   Neck: No JVD present.   Cardiovascular: Normal rate, regular rhythm and normal heart sounds.    Pulmonary/Chest: She is intubated. No respiratory distress. She has rhonchi in the right lower field.   Abdominal: Soft. Bowel sounds are normal. She exhibits no distension. There is no tenderness.   Musculoskeletal: She exhibits no edema.   Neurological:   All 4 extremities contracted, no movement.  Intubated         Vents:  Vent Mode: A/C (02/05/18 0250)  Ventilator Initiated: Yes (02/04/18 2344)  Set Rate: 18 bmp (02/05/18 0250)  Vt Set: 400 mL (02/05/18 0250)  Pressure Support: 0 cmH20 (02/05/18 0250)  PEEP/CPAP: 5 cmH20 (02/05/18 0250)  Oxygen Concentration (%): 80 (02/05/18 0303)  Peak Airway Pressure: 32 cmH2O (02/05/18 0250)  Plateau Pressure: 0 cmH20 (02/05/18 0250)  Total Ve: 7.21 mL (02/05/18 0250)  Lines/Drains/Airways     Drain                 Urethral Catheter -- days         Gastrostomy/Enterostomy 10/25/16 1401 Percutaneous endoscopic gastrostomy (PEG) midline feeding 467 days          Airway                 Airway - Non-Surgical 02/04/18 2329 Endotracheal Tube less than 1 day          Pressure Ulcer                 Pressure Ulcer 10/26/16 1502 Left  elbow Stage  days          Peripheral Intravenous Line                 Peripheral IV - Single Lumen 02/04/18 2209 Left Hand less than 1 day              Significant Labs:    CBC/Anemia Profile:    Recent Labs  Lab 02/04/18 2156 02/05/18  0105   WBC 18.15* 20.22*   HGB 11.3* 9.7*   HCT 40.2 34.2*    177   MCV 82 84   RDW 16.7* 16.6*        Chemistries:    Recent Labs  Lab 02/04/18 2156      K 5.6*      CO2 30*   BUN 30*   CREATININE 0.7   CALCIUM 8.8   ALBUMIN 2.0*   PROT 8.2   BILITOT 0.3   ALKPHOS 161*   ALT 73*   AST 58*   MG 2.6   PHOS 3.8       ABGs:   Recent Labs  Lab 02/05/18  0119   PH 7.333*   PCO2 59.6*   HCO3 31.6*   POCSATURATED 94*   BE 6     Blood Culture: No results for input(s): LABBLOO in the last 48 hours.  CMP:   Recent Labs  Lab 02/04/18 2156      K 5.6*      CO2 30*   *   BUN 30*   CREATININE 0.7   CALCIUM 8.8   PROT 8.2   ALBUMIN 2.0*   BILITOT 0.3   ALKPHOS 161*   AST 58*   ALT 73*   ANIONGAP 7*   EGFRNONAA >60.0     Lactic Acid:   Recent Labs  Lab 02/04/18 2156   LACTATE 1.1     Respiratory Culture: No results for input(s): GSRESP, RESPIRATORYC in the last 48 hours.  Urine Culture: No results for input(s): LABURIN in the last 48 hours.  Urine Studies:   Recent Labs  Lab 02/04/18  2213   COLORU Jojo   APPEARANCEUA Cloudy*   PHUR 6.0   SPECGRAV 1.030   PROTEINUA 2+*   GLUCUA Negative   KETONESU Negative   BILIRUBINUA Negative   OCCULTUA Negative   NITRITE Negative   UROBILINOGEN 4.0   LEUKOCYTESUR 3+*   RBCUA 5*   WBCUA >100*   BACTERIA Many*   SQUAMEPITHEL 4   HYALINECASTS 0     All pertinent labs within the past 24 hours have been reviewed.    Significant Imaging: I have reviewed all pertinent imaging results/findings within the past 24 hours.    Assessment/Plan:     Neuro   Seizure-like activity    - continue keppra         Dysphagia as late effect of cerebrovascular disease    Last stoke in 2008        CVA, old, hemiparesis    -patient  suffered multiple strokes in past, last one in 2008   -nonverbal at baseline, wheelchair/bedbound        Pulmonary   * Acute respiratory failure with hypoxia    Intubated and sedated    Recent Labs  Lab 02/05/18  0119   PH 7.333*   PCO2 59.6*   PO2 79*   HCO3 31.6*   POCSATURATED 94*   BE 6       - treating for aspiration & HCAP PNA         Aspiration pneumonia    Extensive consolidation of the right lung and debris in the right mainstem bronchus and its branches consistent with aspiration pneumonia.    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     C: SAT & SBT Coordinated?  no                      D: Delirium: CAM-ICU     E: Early Mobility Performed? Yes   F: Feeding Goal:    Status:     Current Diet Order   Procedures    Diet NPO      AS: Analgesia/Sedation yes   T: Thromboembolic Prophylaxis yes   H: HOB > 300 Yes   U: Stress Ulcer Prophylaxis (if needed) yes   G: Glucose Control prn   B: Bowel Function     I: Indwelling Catheter (Lines & De [place]) Necessity yes   D: De-escalation of Antimicrobials/Pharmacotherapies no    Plan for the day/ETD ID consult    Code Status:  Family/Goals of Care: Full Code            Critical care was time spent personally by me on the following activities: development of treatment plan with patient or surrogate and bedside caregivers, discussions with consultants, evaluation of patient's response to  treatment, examination of patient, ordering and performing treatments and interventions, ordering and review of laboratory studies, ordering and review of radiographic studies, pulse oximetry, re-evaluation of patient's condition. This critical care time did not overlap with that of any other provider or involve time for any procedures.     Connor Cornelius MD  Critical Care Medicine  [facility]

## 2018-02-05 NOTE — CONSULTS
"  [facility]  Adult Nutrition  Consult Note    SUMMARY     Recommendations    1. TF recommendations: Isosource 1.5 @ 50 mL/hr to provide 1800 calories, 82 g of protein, 917 mL fluid.    - Hold for residuals >500 mL; additional fluid per MD.   2. RD to monitor & follow-up.    Goals: Meet % EEN, EPN  Nutrition Goal Status: new  Communication of RD Recs: reviewed with RN    Reason for Assessment    Reason for Assessment: physician consult  Diagnosis: other (see comments) (Resp fx.)  Relevent Medical History: HTN, HLD   Interdisciplinary Rounds: attended     General Information Comments: Pt presents from NH, PEG present, unsure of usual TF regimen. Pt intubated w/ no family at bedside.  Nutrition Discharge Planning: Unable to determine    Nutrition Prescription Ordered    Current Diet Order: NPO    Nutrition/Diet History    Patient Reported Diet/Restrictions/Preferences: other (see comments) (JONH; On TFs via PEG)     Factors Affecting Nutritional Intake: NPO, on mechanical ventilation    Labs/Tests/Procedures/Meds    Pertinent Labs Reviewed: reviewed, pertinent  Pertinent Labs Comments: Na 146, BUN 32, Gluc   Pertinent Medications Reviewed: reviewed, pertinent  Pertinent Medications Comments: Fentanyl    Physical Findings    Overall Physical Appearance: nourished, on ventilator support  Tubes: gastrostomy tube  Oral/Mouth Cavity: WDL  Skin: other (see comments) (Wounds)    Anthropometrics    Temp: 100.1 °F (37.8 °C)     Height: 5' 8" (172.7 cm)  Weight Method: Bed Scale  Weight: 81.7 kg (180 lb 1.9 oz)     Ideal Body Weight (IBW), Female: 140 lb     % Ideal Body Weight, Female (lb): 128.66 lb  BMI (Calculated): 27.4  BMI Grade: 25 - 29.9 - overweight     Usual Body Weight (UBW), kg:  (JONH)    Estimated/Assessed Needs    Weight Used For Calorie Calculations: 81.7 kg (180 lb 1.9 oz)      Energy Calorie Requirements (kcal): 1756 kcal/d  Energy Need Method: [facility]     Weight Used For " Protein Calculations: 81.7 kg (180 lb 1.9 oz)  Protein Requirements:  g/d (1.1-1.3 g/kg)     Fluid Need Method: RDA Method, other (see comments) (1 mL/kcal or per MD)    Assessment and Plan    Aspiration pneumonia      Nutrition Problem  Inadequate energy intake    Related to (etiology):   Inability to consume sufficient energy    Signs and Symptoms (as evidenced by):   NPO with no alternate means of nutrition     Nutrition Diagnosis Status:   New        Monitor and Evaluation    Food and Nutrient Intake: enteral nutrition intake  Food and Nutrient Adminstration: enteral and parenteral nutrition administration     Physical Activity and Function: nutrition-related ADLs and IADLs  Anthropometric Measurements: weight, weight change  Biochemical Data, Medical Tests and Procedures: lipid profile, inflammatory profile, glucose/endocrine profile, gastrointestinal profile, electrolyte and renal panel  Nutrition-Focused Physical Findings: overall appearance    Nutrition Risk    Level of Risk: other (see comments) (2x/week)    Nutrition Follow-Up    RD Follow-up?: Yes

## 2018-02-05 NOTE — ASSESSMENT & PLAN NOTE
Extensive consolidation of the right lung and debris in the right mainstem bronchus and its branches consistent with aspiration pneumonia.    - abx vanco & cefepime   - CBC mointor  - currently intubated

## 2018-02-05 NOTE — PLAN OF CARE
Problem: Patient Care Overview  Goal: Plan of Care Review  Outcome: Ongoing (interventions implemented as appropriate)  Recommendations     1. TF recommendations: Isosource 1.5 @ 50 mL/hr to provide 1800 calories, 82 g of protein, 917 mL fluid.               - Hold for residuals >500 mL; additional fluid per MD.   2. RD to monitor & follow-up.

## 2018-02-05 NOTE — ED TRIAGE NOTES
52 year old pt from [facility] presents to the ed with complaints of projectile vomiting leading to possible aspiration pt presents with sob. Pt presents to ems with respiratory distress awake but non verbal.

## 2018-02-05 NOTE — ED PROVIDER NOTES
"Encounter Date: 2/4/2018       History     Chief Complaint   Patient presents with    Respiratory Distress     [facility] from [facility] presents to ED with suspected aspiration following episode of reported projectile vomiting 3 Jan. Upon EMs arrival pt was 88% on 4L NC.      52F with h/o HTN, CVA with resultant non-verbal, refugio-plegic state presenting from nursing facility for aspiration event.  Patient from [facility] nursing facility who reportedly noted "projectile vomiting" yesterday evening (24 hours prior to presentation), which was treated with zofran.  This evening was noted to be in respiratory distress with fever.  Patient is non-verbal and minimally responsive at baseline, has PEG tube in place but listed as receiving nectar thick diet.  No further history available as no family present.  Patient with [place] paperwork listing her as full code.  Patient was satting 88% on 4L on EMS arrival, breathing 40 times per minute.  Improved to 96% on 15L NRB with RR high 20s.  Axillary temp 102.            Review of patient's allergies indicates:   Allergen Reactions    Amoxicillin Other (See Comments)     Per daughter always allergic, unknown rxn     Past Medical History:   Diagnosis Date    Anemia     iron def    Depression     Dysarthria     HTN (hypertension)     Hyperlipemia     Osteomyelitis     Seizures     Stroke      Past Surgical History:   Procedure Laterality Date    TOE AMPUTATION       History reviewed. No pertinent family history.  Social History   Substance Use Topics    Smoking status: Unknown If Ever Smoked    Smokeless tobacco: Never Used    Alcohol use No     Review of Systems   Unable to perform ROS: Patient nonverbal       Physical Exam     Initial Vitals [02/04/18 2146]   BP Pulse Resp Temp SpO2   105/67 93 (!) 24 (!) 102.1 °F (38.9 °C) 95 %      MAP       79.67         Physical Exam    Nursing note and vitals reviewed.  Constitutional: She is diaphoretic.   Chronically ill " appearing with acute respiratory distress  Skin warm / febrile and diaphoretic  Severe flexion contractures of all extremities with legs doubled under her.  Contracture of neck toward left side    Patient with minimal to no response to pain  Awake and intermittent groaning noises, no purposeful movement, no verbal phonation in response to questions or interaction   HENT:   Head: Atraumatic.   Thick oropharyngeal secreations   Eyes: Conjunctivae are normal. No scleral icterus.   Neck:   Neck contracted to L   Cardiovascular: Normal rate and regular rhythm.   No murmur heard.  Pulmonary/Chest: She is in respiratory distress. She has rhonchi.   Tachypnea to high 20s / low 30s  Coarse breath sounds / loud rhonchi throughout all lung fields   Abdominal: Soft. She exhibits no distension.   PEG tube in skin, not secured    Abdomen soft   Musculoskeletal:   Contractures as above   Neurological:   Mental status as above, no purposeful movement, minimal response to pain   Skin:   Warm, mildly diaphoretic         ED Course   Intubation  Date/Time: 2/5/2018 4:42 AM  Location procedure was performed: [facility] EMERGENCY DEPARTMENT  Performed by: OLIVIA FLANNERY  Authorized by: SOPHIA JEROME   Pre-operative diagnosis: aspiratrion pna  Post-operative diagnosis: aspiration pna  Consent Done: Emergent Situation  Indications: respiratory distress  Intubation method: direct  Patient status: paralyzed (RSI)  Preoxygenation: nonrebreather mask  Pretreatment meds: zofran.  Sedatives: etomidate  Paralytic: rocuronium  Laryngoscope size: Mac 4  Tube size: 7.5 mm  Tube type: cuffed  Number of attempts: 1  Cords visualized: yes  Post-procedure assessment: chest rise and CO2 detector  Breath sounds: equal and absent over the epigastrium  Cuff inflated: yes  ETT to lip: 23 cm  Tube secured with: ETT carrillo  Chest x-ray interpreted by me.  Chest x-ray findings: endotracheal tube too low  Tube repositioned: tube repositioned  "successfully  Patient tolerance: Patient tolerated the procedure well with no immediate complications    Central Line  Date/Time: 2/5/2018 4:48 AM  Location procedure was performed: [facility] EMERGENCY DEPARTMENT  Performed by: OLIVIA FLANNERY  Supervising provider: stephanie  Pre-operative Diagnosis: aspiration pna, sepsis  Post-operative diagnosis: aspiration pna, sepsis  Consent Done: Emergent Situation  Time out: Immediately prior to procedure a "time out" was called to verify the correct patient, procedure, equipment, support staff and site/side marked as required.  Indications: med administration and vascular access  Anesthesia: local infiltration    Anesthesia:  Local Anesthetic: lidocaine 1% without epinephrine  Anesthetic total: 3 mL  Preparation: skin prepped with ChloraPrep  Skin prep agent dried: skin prep agent completely dried prior to procedure  Sterile barriers: all five maximum sterile barriers used - cap, mask, sterile gown, sterile gloves, and large sterile sheet  Hand hygiene: hand hygiene performed prior to central venous catheter insertion  Location details: right internal jugular  Catheter type: triple lumen  Catheter size: 7 Fr  Catheter Length: 14cm    Ultrasound guidance: yes  Vessel Caliber: medium, patent, compressibility normal  Needle advanced into vessel with real time Ultrasound guidance.  Guidewire confirmed in vessel.  Sterile sheath used.  Manometry: No   Number of attempts: 1  Assessment: placement verified by x-ray and successful placement  Complications: none  Post-procedure: line sutured,  chlorhexidine patch,  sterile dressing applied and blood return through all ports    Critical Care  Date/Time: 2/5/2018 4:00 AM  Performed by: SOPHIA JEROME  Authorized by: SOPHIA JEROME   Direct patient critical care time: 10 minutes  Additional history critical care time: 10 minutes  Ordering / reviewing critical care time: 5 minutes  Documentation critical care time: 5 " minutes  Consulting other physicians critical care time: 5 minutes  Total critical care time (exclusive of procedural time) : 35 minutes  Critical care time was exclusive of separately billable procedures and treating other patients.  Critical care was necessary to treat or prevent imminent or life-threatening deterioration of the following conditions: sepsis and respiratory failure.  Critical care was time spent personally by me on the following activities: discussions with consultants, examination of patient, re-evaluation of patient's condition and evaluation of patient's response to treatment.        Labs Reviewed   CBC W/ AUTO DIFFERENTIAL - Abnormal; Notable for the following:        Result Value    WBC 18.15 (*)     Hemoglobin 11.3 (*)     MCH 23.1 (*)     MCHC 28.1 (*)     RDW 16.7 (*)     Gran # (ANC) 13.9 (*)     Immature Grans (Abs) 0.09 (*)     Mono # 1.7 (*)     Gran% 76.7 (*)     Lymph% 12.5 (*)     All other components within normal limits   COMPREHENSIVE METABOLIC PANEL - Abnormal; Notable for the following:     Potassium 5.6 (*)     CO2 30 (*)     Glucose 119 (*)     BUN, Bld 30 (*)     Albumin 2.0 (*)     Alkaline Phosphatase 161 (*)     AST 58 (*)     ALT 73 (*)     Anion Gap 7 (*)     All other components within normal limits   URINALYSIS, REFLEX TO URINE CULTURE - Abnormal; Notable for the following:     Appearance, UA Cloudy (*)     Protein, UA 2+ (*)     Leukocytes, UA 3+ (*)     All other components within normal limits    Narrative:     Preferred Collection Type->Urine, Clean Catch  1 cup of urine   URINALYSIS MICROSCOPIC - Abnormal; Notable for the following:     RBC, UA 5 (*)     WBC, UA >100 (*)     Bacteria, UA Many (*)     All other components within normal limits    Narrative:     Preferred Collection Type->Urine, Clean Catch  1 cup of urine   CULTURE, BLOOD   CULTURE, BLOOD   CULTURE, URINE   APTT   B-TYPE NATRIURETIC PEPTIDE   LACTIC ACID, PLASMA   LIPASE   MAGNESIUM   PHOSPHORUS    PROTIME-INR   PROCALCITONIN   TROPONIN I   TSH   CORTISOL, RANDOM     EKG Readings: (Independently Interpreted)   NSR at rate 94 with no ST segment or T wave changes                APC / Resident Notes:   HO3 MDM:  Patient presenting febrile and in respiratory distress with exam suggesting aspiration event.  Tachypneic to mid to high 20s on 15L NRB.  Mental status very poor and patient not protecting secretions.  Therefore, intubated emergently for airway protection and respiratory distress.  CXR with some concern for PTX---this was not seen on bedside ultrasound, rather saw small anterior effusion on R lung.  However, given decision to intubate, CT chest obtained which showed no PTX.  Intubation successful on first attempt.  Placed on lung protective ventilation, titrating FIO2 per O2 sats.  Patient with extensive contractures and difficult IV access.  Required central line placement for vascular access, which was successful.    Labs with leukocytosis to 20 but normal lactate.  K 5.6 but renal function wnl.  Received IVF and vanc 20mg/kg and cefepime here.  Admitted to ICU.      GIDEON Souza MD  PGY-3, [employer] EM  2/5/2018 4:51 AM             Attending Attestation:   Physician Attestation Statement for Resident:  As the supervising MD   Physician Attestation Statement: I have personally seen and examined this patient.   I agree with the above history. -:   As the supervising MD I agree with the above PE.    As the supervising MD I agree with the above treatment, course, plan, and disposition.  I have reviewed and agree with the residents interpretation of the following: lab data, x-rays and EKG.                    ED Course      Clinical Impression:   The primary encounter diagnosis was Sepsis, due to unspecified organism. Diagnoses of Intubation of airway performed without difficulty, Encounter for intubation, Encounter for central line placement, and Aspiration pneumonia of right lung, unspecified aspiration  pneumonia type, unspecified part of lung were also pertinent to this visit.    Disposition:   Disposition: Admitted  Condition: Critical                        Ita Souza MD  Resident  02/05/18 2002       Katie Garcia MD  02/21/18 9156

## 2018-02-05 NOTE — PLAN OF CARE
Payor: MEDICAID / Plan: MEDICAID OF [place] / Product Type: Government /     No future appointments.    Extended Emergency Contact Information  Primary Emergency Contact: Matias Nieves   [place]  Mobile Phone: [phone]  Relation: Daughter  Secondary Emergency Contact: Kiana Fine   [place]  Mobile Phone: [phone]  Relation: Mother       02/05/18 1223   Discharge Assessment   Assessment Type Discharge Planning Assessment   Confirmed/corrected address and phone number on facesheet? No   Assessment information obtained from? Medical Record   Expected Length of Stay (days) 5   Communicated expected length of stay with patient/caregiver no   Prior to hospitilization cognitive status: Unable to Assess   Prior to hospitalization functional status: Completely Dependent;Wheelchair Bound   Current cognitive status: Coma/Sedated/Intubated   Current Functional Status: Completely Dependent;Wheelchair Bound   Facility Arrived From: [facility]   Lives With facility resident   Able to Return to Prior Arrangements yes   Is patient able to care for self after discharge? No   Who are your caregiver(s) and their phone number(s)? Matias Nieves - daughter [phone] or Kiana iFne - mother [phone]   Patient's perception of discharge disposition nursing home   Readmission Within The Last 30 Days no previous admission in last 30 days   Patient currently being followed by outpatient case management? No   Patient currently receives any other outside agency services? No   Equipment Currently Used at Home ([facility] supplied equipment)   Do you have any problems affording any of your prescribed medications? No   Is the patient taking medications as prescribed? yes   Does the patient have transportation home? Yes   Transportation Available agency transportation   Does the patient receive services at the [facility]? No   Discharge Plan A Return to nursing home   Discharge Plan B New  Nursing Home placement - halfway care facility   Patient/Family In Agreement With Plan yes   Lisa Landers RN, BSN  Case Management  [facility]  Ext. [phone]

## 2018-02-05 NOTE — ASSESSMENT & PLAN NOTE
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 Feel based on the clinical timeline that patient likely has an aspiration pneumonitis rather than an infectious pneumonia especially given negative procalcitonin.     - Ok to treat with vanc, rocephin and flagyl during acute course but will be quick to de-escalate antibiotics  - Do not feel that we need to cover previous VRE organisms at this time (can consider if patient decompensates)  - Will continue to follow cultures  - If patient does not clinically improve, can consider bronchoscopy

## 2018-02-05 NOTE — CARE UPDATE
received call from md about a pts cefepime 2g being canceled. I explained to md pt cefepime 2g was double ordered and needed to be reschedule for a different time due to ordered saying q8. md stated he would have to perform sos.

## 2018-02-05 NOTE — ASSESSMENT & PLAN NOTE
-patient full code per ED; recommend Goals of care with primary team given patient's current functional status at baseline

## 2018-02-06 PROBLEM — E87.0 HYPERNATREMIA: Status: ACTIVE | Noted: 2018-01-01

## 2018-02-06 NOTE — ASSESSMENT & PLAN NOTE
- Growing >100,000 CFU of presumptive proteus  - Previous proteus cultures sensitive to cefepime  - Will de-escalate based upon sensitivities

## 2018-02-06 NOTE — ASSESSMENT & PLAN NOTE
Extensive consolidation of the right lung and debris in the right mainstem bronchus and its branches consistent with aspiration pneumonia.    - abx vanco & cefepime & flagyl   - CBC monitor down trending   - intubated with plan to extubate maria e

## 2018-02-06 NOTE — SUBJECTIVE & OBJECTIVE
Interval History:   O2 requirements significantly improving. Afebrile overnight    Review of Systems   Unable to perform ROS: Intubated     Objective:     Vital Signs (Most Recent):  Temp: 99.7 °F (37.6 °C) (02/06/18 1100)  Pulse: 79 (02/06/18 1156)  Resp: 18 (02/06/18 1156)  BP: (!) 90/55 (02/06/18 1100)  SpO2: (!) 90 % (02/06/18 1156) Vital Signs (24h Range):  Temp:  [98.4 °F (36.9 °C)-99.8 °F (37.7 °C)] 99.7 °F (37.6 °C)  Pulse:  [] 79  Resp:  [18-30] 18  SpO2:  [90 %-100 %] 90 %  BP: ()/(44-70) 90/55     Weight: 91.1 kg (200 lb 13.4 oz)  Body mass index is 30.54 kg/m².    Estimated Creatinine Clearance: 126.6 mL/min (based on SCr of 0.6 mg/dL).    Physical Exam   Constitutional:   obese   HENT:   Head: Normocephalic and atraumatic.   Eyes: Pupils are equal, round, and reactive to light.   Neck: Normal range of motion. No JVD present.   Cardiovascular: Normal rate, regular rhythm and normal heart sounds.    Pulmonary/Chest: No respiratory distress. She has rhonchi in the right lower field.   Decreased breath sounds on left and right base. Intubated   Abdominal: Soft. Bowel sounds are normal. She exhibits no distension. There is no tenderness.   PEG tube without surrounding erythema or purulent discharge   Musculoskeletal: She exhibits no edema.   R posterior thigh wound without purulent drainage. Clean and dry.    Left elbow ulcer without erythema or purulent drainage    Sacral decub clean and dry    R&L toes with skin breakdown (not concerning for infectious process)   Neurological:   Eyes open and tracking. All 4 extremities contracted, no movement.     Vitals reviewed.      Significant Labs:   Microbiology Results (last 7 days)     Procedure Component Value Units Date/Time    Culture, Respiratory with Gram Stain [[id number]] Collected:  02/05/18 9775    Order Status:  Completed Specimen:  Respiratory from Endotracheal Aspirate Updated:  02/06/18 1239     Respiratory Culture Normal respiratory lalit      Gram Stain (Respiratory) <10 epithelial cells per low power field.     Gram Stain (Respiratory) Many WBC's     Gram Stain (Respiratory) Rare Gram positive cocci     Gram Stain (Respiratory) Rare Gram positive rods    Urine culture [[id number]] Collected:  02/04/18 2213    Order Status:  Completed Specimen:  Urine Updated:  02/06/18 0828     Urine Culture, Routine --     PRESUMPTIVE PROTEUS SPECIES  >100,000 cfu/ml  Identification and susceptibility pending      Narrative:       Preferred Collection Type->Urine, Clean Catch  1 cup of urine    Blood culture x two cultures. Draw prior to antibiotics. [[id number]] Collected:  02/04/18 2200    Order Status:  Completed Specimen:  Blood from Peripheral, Hand, Left Updated:  02/06/18 0613     Blood Culture, Routine No Growth to date     Blood Culture, Routine No Growth to date    Narrative:       Aerobic and anaerobic    Blood culture x two cultures. Draw prior to antibiotics. [[id number]] Collected:  02/04/18 2200    Order Status:  Completed Specimen:  Blood from Peripheral, Hand, Left Updated:  02/06/18 0613     Blood Culture, Routine No Growth to date     Blood Culture, Routine No Growth to date    Narrative:       Aerobic and anaerobic    Urine culture [[id number]] Collected:  02/05/18 1202    Order Status:  Sent Specimen:  Urine from Urine, Catheterized Updated:  02/05/18 1418          Significant Imaging: I have reviewed all pertinent imaging results/findings within the past 24 hours.

## 2018-02-06 NOTE — PROGRESS NOTES
"[facility]  Critical Care Medicine  Progress Note    Patient Name: Jonathan Nieves  MRN: [id number]  Admission Date: 2/4/2018  Hospital Length of Stay: 1 days  Code Status: Full Code  Attending Provider: Erick Sales MD  Primary Care Provider: Primary Doctor No   Principal Problem: Acute respiratory failure with hypoxia    Subjective:     HPI:  Mr. Jonathan Nieves is a 51yo female with history of CVA with residual aphasia and dysphagia, non-verbal, s/p PEG tube placement and bed riddenness, osteomyelitis s/p amputation of toe, who comes to the ED from [facility] via EMS in respiratory distress. Per ED/EMS the patient was noted to have "projectile vomiting" yesterday evening (24 hours prior to presentation), which was treated with zofran. During the event there is suspicion the patient possible aspirated. Today the patient was noted to be in resp distress with a fever.     Patient was satting 88% on 4L on EMS arrival, breathing 40 times per minute.  Improved to 96% on 15L NRB with RR high 20s.  Axillary temp 102.    CT chest concerning for aspiration pneumonia.  UA concern for a UTI.       Patient intubated in the ED & Critical Care consulted.         Hospital/ICU Course:  2/5: Critical Care Consulted. Patient intubated in the ED.   2/6: Patient doing well, on SBT; will extubate maria e. Continue treatment for aspiration PNA & UTI.     Interval History/Significant Events:   NAEON. Patient mildly hypotensive, most like secondary to dehydration.   Bolus 500 NS.  Hypernatremia D5W @ 100ml/hr for 20 hours  Continue Abx tx.  Most likely extubate maria e.     Review of Systems   Unable to perform ROS: Intubated     Objective:     Vital Signs (Most Recent):  Temp: 99.7 °F (37.6 °C) (02/06/18 1100)  Pulse: 83 (02/06/18 1317)  Resp: 18 (02/06/18 1156)  BP: (!) 95/50 (02/06/18 1317)  SpO2: 97 % (02/06/18 1317) Vital Signs (24h Range):  Temp:  [98.4 °F (36.9 °C)-99.8 °F (37.7 °C)] 99.7 °F " (37.6 °C)  Pulse:  [] 83  Resp:  [18-30] 18  SpO2:  [90 %-100 %] 97 %  BP: ()/(44-70) 95/50   Weight: 91.1 kg (200 lb 13.4 oz)  Body mass index is 30.54 kg/m².      Intake/Output Summary (Last 24 hours) at 02/06/18 1346  Last data filed at 02/06/18 1300   Gross per 24 hour   Intake          2209.96 ml   Output              652 ml   Net          1557.96 ml       Physical Exam   Constitutional:   obese   HENT:   Head: Normocephalic and atraumatic.   Eyes: Pupils are equal, round, and reactive to light.   Neck: Normal range of motion. No JVD present.   Cardiovascular: Normal rate, regular rhythm and normal heart sounds.    Pulmonary/Chest: No respiratory distress. She has rhonchi in the right lower field.   Decreased breath sounds on left and right base. Intubated   Abdominal: Soft. Bowel sounds are normal. She exhibits no distension. There is no tenderness.   PEG tube without surrounding erythema or purulent discharge   Musculoskeletal: She exhibits no edema.   R posterior thigh wound without purulent drainage. Clean and dry.    Left elbow ulcer without erythema or purulent drainage       Neurological:   Eyes open and tracking. All 4 extremities contracted, no movement.     Vitals reviewed.      Vents:  Vent Mode: A/C (02/06/18 1305)  Ventilator Initiated: Yes (02/04/18 2344)  Set Rate: 18 bmp (02/06/18 1305)  Vt Set: 400 mL (02/06/18 1305)  Pressure Support: 0 cmH20 (02/06/18 1305)  PEEP/CPAP: 5 cmH20 (02/06/18 1305)  Oxygen Concentration (%): 45 (02/06/18 1317)  Peak Airway Pressure: 37 cmH2O (02/06/18 1305)  Plateau Pressure: 28 cmH20 (02/06/18 1305)  Total Ve: 7.83 mL (02/06/18 1305)  F/VT Ratio<105 (RSBI): (!) 44.23 (02/06/18 1156)  Lines/Drains/Airways     Central Venous Catheter Line                 Percutaneous Central Line Insertion/Assessment - triple lumen  02/04/18 2300 right internal jugular 1 day          Drain                 Gastrostomy/Enterostomy 10/25/16 2300 Percutaneous endoscopic  gastrostomy (PEG) midline feeding 469 days         Urethral Catheter 02/05/18 1006 Latex 1 day          Airway                 Airway - Non-Surgical 02/04/18 2329 Endotracheal Tube 1 day          Pressure Ulcer                 Pressure Ulcer 10/26/16 1502 Left elbow Stage  days         Pressure Injury 02/05/18 1045 Left medial Foot Deep tissue injury 1 day         Pressure Injury 02/05/18 1045 Right lateral Foot Deep tissue injury 1 day          Peripheral Intravenous Line                 Peripheral IV - Single Lumen 02/04/18 2209 Left Hand 1 day              Significant Labs:    CBC/Anemia Profile:    Recent Labs  Lab 02/04/18 2156 02/05/18 0105 02/06/18 0312   WBC 18.15* 20.22* 14.75*   HGB 11.3* 9.7* 8.2*   HCT 40.2 34.2* 28.2*    177 158   MCV 82 84 80*   RDW 16.7* 16.6* 16.8*        Chemistries:    Recent Labs  Lab 02/04/18 2156 02/05/18 0339 02/06/18 0312    146* 147*   K 5.6* 4.3 3.9    110 114*   CO2 30* 29 24   BUN 30* 32* 30*   CREATININE 0.7 0.7 0.6   CALCIUM 8.8 8.2* 8.1*   ALBUMIN 2.0* 1.7* 1.6*   PROT 8.2 7.0 6.2   BILITOT 0.3 0.6 0.5   ALKPHOS 161* 139* 112   ALT 73* 62* 42   AST 58* 44* 30   MG 2.6 2.0 2.1   PHOS 3.8 3.6 2.5*       ABGs:   Recent Labs  Lab 02/06/18 0319   PH 7.482*   PCO2 33.3*   HCO3 24.9   POCSATURATED 95   BE 1     Blood Culture:   Recent Labs  Lab 02/04/18 2200   LABBLOO No Growth to date  No Growth to date  No Growth to date  No Growth to date     CMP:   Recent Labs  Lab 02/04/18 2156 02/05/18 0339 02/06/18 0312    146* 147*   K 5.6* 4.3 3.9    110 114*   CO2 30* 29 24   * 124* 83   BUN 30* 32* 30*   CREATININE 0.7 0.7 0.6   CALCIUM 8.8 8.2* 8.1*   PROT 8.2 7.0 6.2   ALBUMIN 2.0* 1.7* 1.6*   BILITOT 0.3 0.6 0.5   ALKPHOS 161* 139* 112   AST 58* 44* 30   ALT 73* 62* 42   ANIONGAP 7* 7* 9   EGFRNONAA >60.0 >60.0 >60.0     Coagulation:   Recent Labs  Lab 02/04/18  2156 02/05/18  0105   INR 1.0 1.1   APTT 22.6  --       Lactic Acid:   Recent Labs  Lab 02/04/18  2156 02/05/18  0339 02/05/18  0605   LACTATE 1.1 1.3 1.6     Respiratory Culture:   Recent Labs  Lab 02/05/18  0420   GSRESP <10 epithelial cells per low power field.  Many WBC's  Rare Gram positive cocci  Rare Gram positive rods   RESPIRATORYC Normal respiratory lalit     Urine Culture:   Recent Labs  Lab 02/04/18  2213   LABURIN PRESUMPTIVE PROTEUS SPECIES>100,000 cfu/mlIdentification and susceptibility pending     Urine Studies:   Recent Labs  Lab 02/05/18  1201   COLORU Jojo   APPEARANCEUA Hazy*   PHUR 5.0   SPECGRAV >=1.030*   PROTEINUA 1+*   GLUCUA Negative   KETONESU Negative   BILIRUBINUA Negative   OCCULTUA Negative   NITRITE Negative   UROBILINOGEN Negative   LEUKOCYTESUR Trace*   RBCUA 1   WBCUA 11*   BACTERIA Moderate*   SQUAMEPITHEL 0   HYALINECASTS 0       Significant Imaging:  I have reviewed all pertinent imaging results/findings within the past 24 hours.    Assessment/Plan:     Neuro   Seizure-like activity    - continue keppra         Dysphagia as late effect of cerebrovascular disease    Last stoke in 2008        CVA, old, hemiparesis    -patient suffered multiple strokes in past, last one in 2008   -nonverbal at baseline, wheelchair/bedbound        Pulmonary   * Acute respiratory failure with hypoxia    Intubated and sedated    - treating for aspiration   - vanco/flagyl/cefepime   - sputum cultures rare gram positive & negative   - blood cultures NGTD          Aspiration pneumonia    Extensive consolidation of the right lung and debris in the right mainstem bronchus and its branches consistent with aspiration pneumonia.    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     C: SAT & SBT Coordinated?  yes                      D: Delirium: CAM-ICU Overall CAM-ICU: Negative   E: Early Mobility Performed? No   F: Feeding Goal: Goals: Meet % EEN, EPN  Status: Nutrition Goal Status: new   Current Diet Order   Procedures    Diet NPO      AS: Analgesia/Sedation no   T: Thromboembolic Prophylaxis yes   H: HOB > 300 Yes   U: Stress Ulcer Prophylaxis (if needed) yes   G: Glucose Control prn   B: Bowel Function Stool Occurrence: 0   I: Indwelling Catheter (Lines & De [place]) Necessity yes   D: De-escalation of Antimicrobials/Pharmacotherapies no    Plan for the day/ETD Continue abx    Code Status:  Family/Goals of Care: Full Code            Critical care was time spent personally by me on the following activities: development of treatment plan with patient or surrogate and bedside caregivers, discussions with consultants, evaluation of patient's response to treatment, examination of patient, ordering and performing treatments and interventions, ordering and review of laboratory studies, ordering and review of radiographic studies, pulse oximetry, re-evaluation of patient's condition. This critical care time did not overlap with that of any other provider or involve time for any procedures.     Connor Cornelius MD  Critical Care Medicine  [facility]

## 2018-02-06 NOTE — PROGRESS NOTES
"[facility]  Infectious Disease  Progress Note    Patient Name: Jonathan Nieves  MRN: [id number]  Admission Date: 2/4/2018  Length of Stay: 1 days  Attending Physician: Erick Sales MD  Primary Care Provider: Primary Doctor No    Isolation Status: No active isolations  Assessment/Plan:      UTI (urinary tract infection)    - Growing >100,000 CFU of presumptive proteus  - Previous proteus cultures sensitive to rocephin  - Will de-escalate based upon sensitivities         Aspiration pneumonia    This is a 53 yo female with history of dysphasia 2/2 CVA s/p PEG placement with episode of emesis and subsequent aspiration which has led to acute hypoxic respiratory failure. Feel based on the clinical timeline that patient likely has an aspiration pneumonitis rather than an infectious pneumonia especially given negative procalcitonin.     - Will remove vancomycin and flagyl  - Can continue rocephin for now  - Do not feel that we need to cover previous VRE organisms at this time (can consider if patient decompensates)  - Does not need contact precautions for previous VRE  - Will continue to follow cultures  - If patient does not clinically improve, can consider bronchoscopy               Thank you for your consult. I will follow-up with patient. Please contact us if you have any additional questions.    Miranda Gunter MD  Infectious Disease  [facility]    Subjective:     Principal Problem:Acute respiratory failure with hypoxia    HPI: Mrs. Jonathan Nieves is a 51yo female with history of CVA with residual aphasia and dysphagia, non-verbal, s/p PEG tube placement who is bed ridden, osteomyelitis s/p amputation of toe, who comes to the ED from [facility] via EMS in respiratory distress. Per chart review, ED/EMS reported that the patient was noted to have "projectile vomiting" yesterday evening (24 hours prior to presentation), which was treated with zofran. During " the event there was suspicion the patient possibly aspirated. Today the patient was noted to be in resp distress with a fever.      Patient was satting 88% on 4L on EMS arrival, breathing 40 times per minute.  Improved to 96% on 15L NRB with RR high 20s.  Axillary temp 102. Patient was eventually intubated for increasing oxygen requirements. CT chest was done which was concerning for aspiration pneumonia.  UA concerning for UTI.      ID consulted for antibiotic recs for possible aspiration pneumonia and UTI in the wake of previous multi drug resistant organisms. Most recently patient was treated for Enterococcus faecium with linezolid in 5/17.   Interval History:   O2 requirements significantly improving. Afebrile overnight    Review of Systems   Unable to perform ROS: Intubated     Objective:     Vital Signs (Most Recent):  Temp: 99.7 °F (37.6 °C) (02/06/18 1100)  Pulse: 79 (02/06/18 1156)  Resp: 18 (02/06/18 1156)  BP: (!) 90/55 (02/06/18 1100)  SpO2: (!) 90 % (02/06/18 1156) Vital Signs (24h Range):  Temp:  [98.4 °F (36.9 °C)-99.8 °F (37.7 °C)] 99.7 °F (37.6 °C)  Pulse:  [] 79  Resp:  [18-30] 18  SpO2:  [90 %-100 %] 90 %  BP: ()/(44-70) 90/55     Weight: 91.1 kg (200 lb 13.4 oz)  Body mass index is 30.54 kg/m².    Estimated Creatinine Clearance: 126.6 mL/min (based on SCr of 0.6 mg/dL).    Physical Exam   Constitutional:   obese   HENT:   Head: Normocephalic and atraumatic.   Eyes: Pupils are equal, round, and reactive to light.   Neck: Normal range of motion. No JVD present.   Cardiovascular: Normal rate, regular rhythm and normal heart sounds.    Pulmonary/Chest: No respiratory distress. She has rhonchi in the right lower field.   Decreased breath sounds on left and right base. Intubated   Abdominal: Soft. Bowel sounds are normal. She exhibits no distension. There is no tenderness.   PEG tube without surrounding erythema or purulent discharge   Musculoskeletal: She exhibits no edema.   R posterior  thigh wound without purulent drainage. Clean and dry.    Left elbow ulcer without erythema or purulent drainage    Sacral decub clean and dry    R&L toes with skin breakdown (not concerning for infectious process)   Neurological:   Eyes open and tracking. All 4 extremities contracted, no movement.     Vitals reviewed.      Significant Labs:   Microbiology Results (last 7 days)     Procedure Component Value Units Date/Time    Culture, Respiratory with Gram Stain [[id number]] Collected:  02/05/18 0420    Order Status:  Completed Specimen:  Respiratory from Endotracheal Aspirate Updated:  02/06/18 1239     Respiratory Culture Normal respiratory lalit     Gram Stain (Respiratory) <10 epithelial cells per low power field.     Gram Stain (Respiratory) Many WBC's     Gram Stain (Respiratory) Rare Gram positive cocci     Gram Stain (Respiratory) Rare Gram positive rods    Urine culture [[id number]] Collected:  02/04/18 2213    Order Status:  Completed Specimen:  Urine Updated:  02/06/18 0828     Urine Culture, Routine --     PRESUMPTIVE PROTEUS SPECIES  >100,000 cfu/ml  Identification and susceptibility pending      Narrative:       Preferred Collection Type->Urine, Clean Catch  1 cup of urine    Blood culture x two cultures. Draw prior to antibiotics. [[id number]] Collected:  02/04/18 2200    Order Status:  Completed Specimen:  Blood from Peripheral, Hand, Left Updated:  02/06/18 0613     Blood Culture, Routine No Growth to date     Blood Culture, Routine No Growth to date    Narrative:       Aerobic and anaerobic    Blood culture x two cultures. Draw prior to antibiotics. [[id number]] Collected:  02/04/18 2200    Order Status:  Completed Specimen:  Blood from Peripheral, Hand, Left Updated:  02/06/18 0613     Blood Culture, Routine No Growth to date     Blood Culture, Routine No Growth to date    Narrative:       Aerobic and anaerobic    Urine culture [[id number]] Collected:  02/05/18 1202    Order Status:  Sent Specimen:   Urine from Urine, Catheterized Updated:  02/05/18 1418          Significant Imaging: I have reviewed all pertinent imaging results/findings within the past 24 hours.

## 2018-02-06 NOTE — PLAN OF CARE
Problem: Patient Care Overview  Goal: Plan of Care Review  Outcome: Ongoing (interventions implemented as appropriate)  Patient remains intubated on A/C mode of ventilation, FiO2 40%, 10 of PEEP, and a rate of 18.  Gtts:  Fentanyl gtt @ 75 mcg/hr.  No acute events overnight.  UOP 20-30 mL/hr.  500mL NS bolus administered at the beginning of the shift.  Safety maintained overnight.  Patient remained free from falls, injury, and further skin breakdown.  Plan of care reviewed with the patient; no evidence of learning.

## 2018-02-06 NOTE — SUBJECTIVE & OBJECTIVE
Interval History/Significant Events:   NAEON. Patient mildly hypotensive, most like secondary to dehydration.   Bolus 500 NS.  Hypernatremia D5W @ 100ml/hr for 20 hours  Continue Abx tx.  Most likely extubate maria e.     Review of Systems   Unable to perform ROS: Intubated     Objective:     Vital Signs (Most Recent):  Temp: 99.7 °F (37.6 °C) (02/06/18 1100)  Pulse: 83 (02/06/18 1317)  Resp: 18 (02/06/18 1156)  BP: (!) 95/50 (02/06/18 1317)  SpO2: 97 % (02/06/18 1317) Vital Signs (24h Range):  Temp:  [98.4 °F (36.9 °C)-99.8 °F (37.7 °C)] 99.7 °F (37.6 °C)  Pulse:  [] 83  Resp:  [18-30] 18  SpO2:  [90 %-100 %] 97 %  BP: ()/(44-70) 95/50   Weight: 91.1 kg (200 lb 13.4 oz)  Body mass index is 30.54 kg/m².      Intake/Output Summary (Last 24 hours) at 02/06/18 1346  Last data filed at 02/06/18 1300   Gross per 24 hour   Intake          2209.96 ml   Output              652 ml   Net          1557.96 ml       Physical Exam   Constitutional:   obese   HENT:   Head: Normocephalic and atraumatic.   Eyes: Pupils are equal, round, and reactive to light.   Neck: Normal range of motion. No JVD present.   Cardiovascular: Normal rate, regular rhythm and normal heart sounds.    Pulmonary/Chest: No respiratory distress. She has rhonchi in the right lower field.   Decreased breath sounds on left and right base. Intubated   Abdominal: Soft. Bowel sounds are normal. She exhibits no distension. There is no tenderness.   PEG tube without surrounding erythema or purulent discharge   Musculoskeletal: She exhibits no edema.   R posterior thigh wound without purulent drainage. Clean and dry.    Left elbow ulcer without erythema or purulent drainage       Neurological:   Eyes open and tracking. All 4 extremities contracted, no movement.     Vitals reviewed.      Vents:  Vent Mode: A/C (02/06/18 1305)  Ventilator Initiated: Yes (02/04/18 5806)  Set Rate: 18 bmp (02/06/18 1305)  Vt Set: 400 mL (02/06/18 1305)  Pressure Support: 0 cmH20  (02/06/18 1305)  PEEP/CPAP: 5 cmH20 (02/06/18 1305)  Oxygen Concentration (%): 45 (02/06/18 1317)  Peak Airway Pressure: 37 cmH2O (02/06/18 1305)  Plateau Pressure: 28 cmH20 (02/06/18 1305)  Total Ve: 7.83 mL (02/06/18 1305)  F/VT Ratio<105 (RSBI): (!) 44.23 (02/06/18 1156)  Lines/Drains/Airways     Central Venous Catheter Line                 Percutaneous Central Line Insertion/Assessment - triple lumen  02/04/18 2300 right internal jugular 1 day          Drain                 Gastrostomy/Enterostomy 10/25/16 1401 Percutaneous endoscopic gastrostomy (PEG) midline feeding 469 days         Urethral Catheter 02/05/18 1006 Latex 1 day          Airway                 Airway - Non-Surgical 02/04/18 2329 Endotracheal Tube 1 day          Pressure Ulcer                 Pressure Ulcer 10/26/16 1502 Left elbow Stage  days         Pressure Injury 02/05/18 1045 Left medial Foot Deep tissue injury 1 day         Pressure Injury 02/05/18 1045 Right lateral Foot Deep tissue injury 1 day          Peripheral Intravenous Line                 Peripheral IV - Single Lumen 02/04/18 2209 Left Hand 1 day              Significant Labs:    CBC/Anemia Profile:    Recent Labs  Lab 02/04/18 2156 02/05/18  0105 02/06/18  0312   WBC 18.15* 20.22* 14.75*   HGB 11.3* 9.7* 8.2*   HCT 40.2 34.2* 28.2*    177 158   MCV 82 84 80*   RDW 16.7* 16.6* 16.8*        Chemistries:    Recent Labs  Lab 02/04/18 2156 02/05/18  0339 02/06/18  0312    146* 147*   K 5.6* 4.3 3.9    110 114*   CO2 30* 29 24   BUN 30* 32* 30*   CREATININE 0.7 0.7 0.6   CALCIUM 8.8 8.2* 8.1*   ALBUMIN 2.0* 1.7* 1.6*   PROT 8.2 7.0 6.2   BILITOT 0.3 0.6 0.5   ALKPHOS 161* 139* 112   ALT 73* 62* 42   AST 58* 44* 30   MG 2.6 2.0 2.1   PHOS 3.8 3.6 2.5*       ABGs:   Recent Labs  Lab 02/06/18  0319   PH 7.482*   PCO2 33.3*   HCO3 24.9   POCSATURATED 95   BE 1     Blood Culture:   Recent Labs  Lab 02/04/18  2200   LABBLOO No Growth to date  No Growth to date   No Growth to date  No Growth to date     CMP:   Recent Labs  Lab 02/04/18 2156 02/05/18 0339 02/06/18  0312    146* 147*   K 5.6* 4.3 3.9    110 114*   CO2 30* 29 24   * 124* 83   BUN 30* 32* 30*   CREATININE 0.7 0.7 0.6   CALCIUM 8.8 8.2* 8.1*   PROT 8.2 7.0 6.2   ALBUMIN 2.0* 1.7* 1.6*   BILITOT 0.3 0.6 0.5   ALKPHOS 161* 139* 112   AST 58* 44* 30   ALT 73* 62* 42   ANIONGAP 7* 7* 9   EGFRNONAA >60.0 >60.0 >60.0     Coagulation:   Recent Labs  Lab 02/04/18 2156 02/05/18  0105   INR 1.0 1.1   APTT 22.6  --      Lactic Acid:   Recent Labs  Lab 02/04/18 2156 02/05/18  0339 02/05/18  0605   LACTATE 1.1 1.3 1.6     Respiratory Culture:   Recent Labs  Lab 02/05/18  0420   GSRESP <10 epithelial cells per low power field.  Many WBC's  Rare Gram positive cocci  Rare Gram positive rods   RESPIRATORYC Normal respiratory lalit     Urine Culture:   Recent Labs  Lab 02/04/18  2213   LABURIN PRESUMPTIVE PROTEUS SPECIES>100,000 cfu/mlIdentification and susceptibility pending     Urine Studies:   Recent Labs  Lab 02/05/18  1201   COLORU Jojo   APPEARANCEUA Hazy*   PHUR 5.0   SPECGRAV >=1.030*   PROTEINUA 1+*   GLUCUA Negative   KETONESU Negative   BILIRUBINUA Negative   OCCULTUA Negative   NITRITE Negative   UROBILINOGEN Negative   LEUKOCYTESUR Trace*   RBCUA 1   WBCUA 11*   BACTERIA Moderate*   SQUAMEPITHEL 0   HYALINECASTS 0       Significant Imaging:  I have reviewed all pertinent imaging results/findings within the past 24 hours.

## 2018-02-06 NOTE — ASSESSMENT & PLAN NOTE
Intubated and sedated    - treating for aspiration   - vanco/flagyl/cefepime   - sputum cultures rare gram positive & negative   - blood cultures NGTD

## 2018-02-06 NOTE — PLAN OF CARE
Pt was transferred from ER to [facility] Rm [id number] just before 0300 this AM . Spoke with MD Theodore with [facility] when pt arrived. MD did not come to see pt during shift.  No acute events during shift. See vital signs and assessments in flowsheets. See below for updates...    Pulmonary: intubated in ER , ETT measured @ 23 to Rt lip. Vent settings: Fio2 80%, peep 5, Vt 400. Lung sounds coarse/diminished with coarse crackles throughout.    Cardiovascular: NSR 80-90s, sys BP 80-90s, map >65, continuous cardiac monitoring    Neurological: pt has stroke hx and is unable to move limbs spontaneously, all limbs are contracted & rigid, she does withdraw from pain, pt fluctuates between being alert to drowsy & arousing to voice, JONH orientation due to pt being aphasic and intubated    Gastrointestinal: Rt upper quad PEG tube, with purulent drainage at site, MD placed consult to infectious disease , no BM during shift, BS active in all 4 quads, stomach round/obese, non distended     Genitourinary: 16 Ukrainian pre-existing whitmore in place from nursing home, whitmore care done, 250 cc UOP during shift, sally/concentrated/cloudy urine, no orders to place new whitmore     Endocrine: normoglycemic    Integumentary/Other: wound care consulted for several stage 2, unstage. Wounds & DTIs. Placed aquacel foam dressings on wounds. Pt turned Q2 hours and pressure points protected. Urinalysis & respiratory culture sent. AM labs drawn, no replacements ordered at this time.    Infusions: fentanyl & abx     Plan of care communicated and reviewed with Jonathan Nieves. No evidence of learning. ER nurse spoke with pt's family when she was transferred. They did not wish to come to the hospital at this time. Will continue to monitor.

## 2018-02-06 NOTE — ASSESSMENT & PLAN NOTE
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 Feel based on the clinical timeline that patient likely has an aspiration pneumonitis rather than an infectious pneumonia especially given negative procalcitonin.     - Will remove vancomycin and flagyl  - Can continue rocephin for now  - Do not feel that we need to cover previous VRE organisms at this time (can consider if patient decompensates)  - Does not need contact precautions for previous VRE  - Will continue to follow cultures  - If patient does not clinically improve, can consider bronchoscopy

## 2018-02-06 NOTE — NURSING
"Updated Critical care team of low urine output of 50ml in last 3 hours and bp when pt is on her back of 79/53 (63).  Team states," they will be by to assess pt"  "

## 2018-02-06 NOTE — NURSING
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 Ne new orders were received.   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 No new orders.  verbalized that pt didn't need to be in contact precautions.

## 2018-02-07 PROBLEM — E87.0 HYPERNATREMIA: Status: ACTIVE | Noted: 2018-01-01

## 2018-02-07 NOTE — ASSESSMENT & PLAN NOTE
Intubated and sedated    - deescalating abx; on rocephin   - sputum cultures rare gram positive & negative   - blood cultures NGTD

## 2018-02-07 NOTE — ASSESSMENT & PLAN NOTE
- Growing >100,000 CFU of presumptive proteus  - Continue rocephin for a 7 day course (can de-escalate further if needed)

## 2018-02-07 NOTE — SUBJECTIVE & OBJECTIVE
Interval History/Significant Events:   Abx deescalated, continue to treat for PNA and UTI. Required pressors overnight. Off this morning.   Hypernatremia resolving, enteral free water pushes.   Most likely extubate today/maria e.      Review of Systems   Unable to perform ROS: Intubated     Objective:     Vital Signs (Most Recent):  Temp: 98.4 °F (36.9 °C) (02/07/18 0715)  Pulse: 72 (02/07/18 1333)  Resp: (!) 30 (02/07/18 1115)  BP: (!) 90/55 (02/07/18 1300)  SpO2: (!) 94 % (02/07/18 1333) Vital Signs (24h Range):  Temp:  [97.7 °F (36.5 °C)-99 °F (37.2 °C)] 98.4 °F (36.9 °C)  Pulse:  [] 72  Resp:  [18-30] 30  SpO2:  [90 %-100 %] 94 %  BP: ()/(46-81) 90/55   Weight: 91.1 kg (200 lb 13.4 oz)  Body mass index is 30.54 kg/m².      Intake/Output Summary (Last 24 hours) at 02/07/18 1407  Last data filed at 02/07/18 1300   Gross per 24 hour   Intake          3719.38 ml   Output              733 ml   Net          2986.38 ml       Physical Exam   Constitutional:   obese   HENT:   Head: Normocephalic and atraumatic.   Eyes: Pupils are equal, round, and reactive to light.   Neck: Normal range of motion. No JVD present.   Cardiovascular: Normal rate, regular rhythm and normal heart sounds.    Pulmonary/Chest: No respiratory distress. She has rhonchi in the right lower field.   Decreased breath sounds on left and right base. Intubated   Abdominal: Soft. Bowel sounds are normal. She exhibits no distension. There is no tenderness.   PEG tube without surrounding erythema or purulent discharge   Musculoskeletal: She exhibits no edema.   R posterior thigh wound without purulent drainage. Clean and dry.    Left elbow ulcer without erythema or purulent drainage       Neurological:   Eyes open and tracking. All 4 extremities contracted, no movement.     Vitals reviewed.      Vents:  Vent Mode: A/C (02/07/18 1333)  Ventilator Initiated: Yes (02/04/18 8234)  Set Rate: 18 bmp (02/07/18 1333)  Vt Set: 400 mL (02/07/18  1333)  Pressure Support: 0 cmH20 (02/07/18 1333)  PEEP/CPAP: 5 cmH20 (02/07/18 1333)  Oxygen Concentration (%): 30 (02/07/18 1333)  Peak Airway Pressure: 31 cmH2O (02/07/18 1333)  Plateau Pressure: 26 cmH20 (02/07/18 1333)  Total Ve: 7.81 mL (02/07/18 1333)  F/VT Ratio<105 (RSBI): (!) 86.46 (02/07/18 1115)  Lines/Drains/Airways     Central Venous Catheter Line                 Percutaneous Central Line Insertion/Assessment - triple lumen  02/04/18 2300 right internal jugular 2 days          Drain                 Gastrostomy/Enterostomy 10/25/16 1401 Percutaneous endoscopic gastrostomy (PEG) midline feeding 470 days         Urethral Catheter 02/05/18 1006 Latex 2 days          Airway                 Airway - Non-Surgical 02/04/18 2329 Endotracheal Tube 2 days          Pressure Ulcer                 Pressure Ulcer 10/26/16 1502 Left elbow Stage  days         Pressure Injury 02/05/18 1045 Left medial Foot Deep tissue injury 2 days         Pressure Injury 02/05/18 1045 Right lateral Foot Deep tissue injury 2 days          Peripheral Intravenous Line                 Peripheral IV - Single Lumen 02/04/18 2209 Left Hand 2 days              Significant Labs:    CBC/Anemia Profile:    Recent Labs  Lab 02/06/18  0312 02/07/18  0832   WBC 14.75* 19.56*   HGB 8.2* 9.0*   HCT 28.2* 31.3*    228   MCV 80* 81*   RDW 16.8* 17.0*        Chemistries:    Recent Labs  Lab 02/06/18  0312 02/07/18  0445   * 142   K 3.9 3.5   * 111*   CO2 24 19*   BUN 30* 19   CREATININE 0.6 0.5   CALCIUM 8.1* 7.8*   ALBUMIN 1.6* 1.7*   PROT 6.2 6.5   BILITOT 0.5 0.2   ALKPHOS 112 111   ALT 42 32   AST 30 20   MG 2.1 1.7   PHOS 2.5* 2.5*       ABGs:     Recent Labs  Lab 02/06/18  0319   PH 7.482*   PCO2 33.3*   HCO3 24.9   POCSATURATED 95   BE 1     Blood Culture: No results for input(s): LABBLOO in the last 48 hours.  CMP:     Recent Labs  Lab 02/06/18  0312 02/07/18  0445   * 142   K 3.9 3.5   * 111*   CO2 24 19*    GLU 83 112*   BUN 30* 19   CREATININE 0.6 0.5   CALCIUM 8.1* 7.8*   PROT 6.2 6.5   ALBUMIN 1.6* 1.7*   BILITOT 0.5 0.2   ALKPHOS 112 111   AST 30 20   ALT 42 32   ANIONGAP 9 12   EGFRNONAA >60.0 >60.0     Coagulation: No results for input(s): PT, INR, APTT in the last 48 hours.  Lactic Acid: No results for input(s): LACTATE in the last 48 hours.  Respiratory Culture: No results for input(s): GSRESP, RESPIRATORYC in the last 48 hours.  Urine Culture: No results for input(s): LABURIN in the last 48 hours.  Urine Studies: No results for input(s): COLORU, APPEARANCEUA, PHUR, SPECGRAV, PROTEINUA, GLUCUA, KETONESU, BILIRUBINUA, OCCULTUA, NITRITE, UROBILINOGEN, LEUKOCYTESUR, RBCUA, WBCUA, BACTERIA, SQUAMEPITHEL, HYALINECASTS in the last 48 hours.    Invalid input(s): MOLINA    Significant Imaging:  I have reviewed all pertinent imaging results/findings within the past 24 hours.

## 2018-02-07 NOTE — ASSESSMENT & PLAN NOTE
UA (Bacteria, WBC, leukocyte positive)    - History of VRE   - Urine cultures proteus, Identification and susceptibility pending  - Rocephin

## 2018-02-07 NOTE — PROGRESS NOTES
"[facility]  Infectious Disease  Progress Note    Patient Name: Jonathan Nieves  MRN: [id number]  Admission Date: 2/4/2018  Length of Stay: 2 days  Attending Physician: Erick Sales MD  Primary Care Provider: Primary Doctor No    Isolation Status: No active isolations  Assessment/Plan:      UTI (urinary tract infection)    - Growing >100,000 CFU of presumptive proteus  - Continue rocephin for a 7 day course (can de-escalate further if needed)          Aspiration pneumonia    This is a 53 yo female with history of dysphasia 2/2 CVA s/p PEG placement with episode of emesis and subsequent aspiration which has led to acute hypoxic respiratory failure. Feel based on the clinical timeline that patient likely has an aspiration pneumonitis rather than an infectious pneumonia especially given negative procalcitonin.     - Recommend frequent deep suctioning  - Do not feel that we need to cover previous VRE organisms at this time (can consider if patient decompensates)  - Does not need contact precautions for previous VRE  - Will continue to follow cultures from afar  - If patient does not clinically improve, can consider bronchoscopy               Thank you for your consult. Will follow from afar.     Miranda Gunter MD  Infectious Disease  [facility]    Subjective:     Principal Problem:Acute respiratory failure with hypoxia    HPI: Mrs. Jonathan Nieves is a 51yo female with history of CVA with residual aphasia and dysphagia, non-verbal, s/p PEG tube placement who is bed ridden, osteomyelitis s/p amputation of toe, who comes to the ED from [facility] via EMS in respiratory distress. Per chart review, ED/EMS reported that the patient was noted to have "projectile vomiting" yesterday evening (24 hours prior to presentation), which was treated with zofran. During the event there was suspicion the patient possibly aspirated. Today the patient was noted to be in resp " distress with a fever.      Patient was satting 88% on 4L on EMS arrival, breathing 40 times per minute.  Improved to 96% on 15L NRB with RR high 20s.  Axillary temp 102. Patient was eventually intubated for increasing oxygen requirements. CT chest was done which was concerning for aspiration pneumonia.  UA concerning for UTI.      ID consulted for antibiotic recs for possible aspiration pneumonia and UTI in the wake of previous multi drug resistant organisms. Most recently patient was treated for Enterococcus faecium with linezolid in 5/17.   No new subjective & objective note has been filed under this hospital service since the last note was generated.

## 2018-02-07 NOTE — PLAN OF CARE
Problem: Patient Care Overview  Goal: Plan of Care Review  Patient unable to participate in care planning

## 2018-02-07 NOTE — ASSESSMENT & PLAN NOTE
Extensive consolidation of the right lung and debris in the right mainstem bronchus and its branches consistent with aspiration pneumonia.    - added rocephin   - stopped vanco & cefepime & flagyl   - CBC monitor down trending   - intubated with plan to extubate maria e

## 2018-02-07 NOTE — ASSESSMENT & PLAN NOTE
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 Feel based on the clinical timeline that patient likely has an aspiration pneumonitis rather than an infectious pneumonia especially given negative procalcitonin.     - Recommend frequent deep suctioning  - Do not feel that we need to cover previous VRE organisms at this time (can consider if patient decompensates)  - Does not need contact precautions for previous VRE  - Will continue to follow cultures from afar  - If patient does not clinically improve, can consider bronchoscopy

## 2018-02-07 NOTE — PROGRESS NOTES
"[facility]  Critical Care Medicine  Progress Note    Patient Name: Jonathan Nieves  MRN: [id number]  Admission Date: 2/4/2018  Hospital Length of Stay: 2 days  Code Status: Full Code  Attending Provider: Erick Sales MD  Primary Care Provider: Primary Doctor No   Principal Problem: Acute respiratory failure with hypoxia    Subjective:     HPI:  Mr. Jonathan Nieves is a 53yo female with history of CVA with residual aphasia and dysphagia, non-verbal, s/p PEG tube placement and bed riddenness, osteomyelitis s/p amputation of toe, who comes to the ED from [facility] via EMS in respiratory distress. Per ED/EMS the patient was noted to have "projectile vomiting" yesterday evening (24 hours prior to presentation), which was treated with zofran. During the event there is suspicion the patient possible aspirated. Today the patient was noted to be in resp distress with a fever.     Patient was satting 88% on 4L on EMS arrival, breathing 40 times per minute.  Improved to 96% on 15L NRB with RR high 20s.  Axillary temp 102.    CT chest concerning for aspiration pneumonia.  UA concern for a UTI.       Patient intubated in the ED & Critical Care consulted.         Hospital/ICU Course:  2/5: Critical Care Consulted. Patient intubated in the ED.   2/6: Patient doing well, on SBT; will extubate maria e. Continue treatment for aspiration PNA & UTI.   2/7: abx deescalated, continue to treat for PNA and UTI. Required pressors overnight. Off this morning.     No new subjective & objective note has been filed under this hospital service since the last note was generated.    Assessment/Plan:     Neuro   Seizure-like activity    - continue keppra         Dysphagia as late effect of cerebrovascular disease    Last stoke in 2008        CVA, old, hemiparesis    -patient suffered multiple strokes in past, last one in 2008   -nonverbal at baseline, wheelchair/bedbound        Pulmonary   * Acute " respiratory failure with hypoxia    Intubated and sedated    - deescalating abx; on rocephin   - sputum cultures rare gram positive & negative   - blood cultures NGTD          Aspiration pneumonia    Extensive consolidation of the right lung and debris in the right mainstem bronchus and its branches consistent with aspiration pneumonia.    - added rocephin   - stopped vanco & cefepime & flagyl   - CBC monitor down trending   - intubated with plan to extubate maria e           Renal/   Hypernatremia      - BMP continue to monitor   - resolving 142 on 2/7  - added 250 free water pushes         UTI (urinary tract infection)    UA (Bacteria, WBC, leukocyte positive)    - History of VRE   - Urine cultures proteus, Identification and susceptibility pending  - Rocephin         GI   PEG (percutaneous endoscopic gastrostomy) status    Tube feeds.         Other   Goals of care, counseling/discussion    -patient full code per ED; recommend Goals of care with primary team given patient's current functional status at baseline           Critical Care Daily Checklist:    A: Awake: RASS Goal/Actual Goal:    Actual: Krishnamurthy Agitation Sedation Scale (RASS): Alert and calm   B: Spontaneous Breathing Trial Performed?     C: SAT & SBT Coordinated?  yes                      D: Delirium: CAM-ICU Overall CAM-ICU: Negative   E: Early Mobility Performed? No   F: Feeding Goal: Goals: Meet % EEN, EPN  Status: Nutrition Goal Status: new   Current Diet Order   Procedures    Diet NPO      AS: Analgesia/Sedation no   T: Thromboembolic Prophylaxis yes   H: HOB > 300 Yes   U: Stress Ulcer Prophylaxis (if needed) yes   G: Glucose Control prn   B: Bowel Function Stool Occurrence: 0   I: Indwelling Catheter (Lines & De Leon) Necessity yes   D: De-escalation of Antimicrobials/Pharmacotherapies no    Plan for the day/ETD Possible extubation today    Code Status:  Family/Goals of Care: Full Code           Critical care was time spent personally by me  on the following activities: development of treatment plan with patient or surrogate and bedside caregivers, discussions with consultants, evaluation of patient's response to treatment, examination of patient, ordering and performing treatments and interventions, ordering and review of laboratory studies, ordering and review of radiographic studies, pulse oximetry, re-evaluation of patient's condition. This critical care time did not overlap with that of any other provider or involve time for any procedures.     Connor Cornelius MD  Critical Care Medicine  [facility]

## 2018-02-08 NOTE — PLAN OF CARE
Problem: Patient Care Overview  Goal: Plan of Care Review  Outcome: Ongoing (interventions implemented as appropriate)  No acute events throughout day. See vital signs and assessments in flowsheets. See below for updates on today's progress.     Pulmonary: Patient remains intubated, ETT 23@ lip, 30%/400/16/5%. Coarse LS bilaterally and copious secretions. PRN breathing txs provided. Sats > 92%.     Cardiovascular: NSR HR 60-90s, -90s/50s, no ectopies noted.     Neurological: Opens eye spontaneously, does not follow commands, withdraws x4. Pupils equal and reactive @ 3.     Gastrointestinal: Normoactive BSx4. NPO. 1 large formed BM.     Genitourinary: De Leon UOP 30-35cc/hr, sally. De Leon care provided.    Endocrine: Normoglycemic    Integumentary/Other: Several wounds and DTIs (sacral, heel and elbow dressings changed), LUQ PEG(dressing changed), R IJ TLC. Patient remains contracted    Infusions: None.     Patient progressing towards goals as tolerated, plan of care communicated and reviewed with Jonathan Nieves and family. All concerns addressed. Will continue to monitor.

## 2018-02-08 NOTE — PLAN OF CARE
Patient remains intubated s/p aspiration event.  Plan for daily SBTs.  Patient from [facility] with plan to return once medically appropriate.  CM will continue to follow.       02/08/18 1217   Right Care Assessment   Can the patient answer the patient profile reliably? No, cognitively impaired   How often would a person be available to care for the patient? Whenever needed  (facility resident)   Describe the patient's ability to walk at the present time. Does not walk or unable to take any steps at all   How does the patient rate their overall health at the present time? (JONH)   Number of comorbid conditions (as recorded on the chart) Five or more   During the past month, has the patient often been bothered by feeling down, depressed or hopeless? (JONH)   During the past month, has the patient often been bothered by little interest or pleasure in doing things? (JONH)   Lisa Landers RN, BSN  Case Management  [facility]  Ext. [phone]

## 2018-02-08 NOTE — PHYSICIAN QUERY
"PT Name: oJnathan Nieves  MR #: [id number]     Physician Query Form - Documentation Clarification      CDS/: Kaykay Nance RN CDI       Contact information: [email or web address]  [phone]    This form is a permanent document in the medical record.     Query Date: February 8, 2018    By submitting this query, we are merely seeking further clarification of documentation. Please utilize your independent clinical judgment when addressing the question(s) below.    The Medical record reflects the following:    Supporting Clinical Findings Location in Medical Record       Wound 02/05/18 0300 upper Buttocks   Date First Assessed/Time First Assessed: 02/05/18 0300   Pre-existing: Yes  Side: (c)   Orientation: upper  Location: Buttocks   Wound Image    Wound WDL ex   Drainage Amount Scant   Drainage Characteristics/Odor Serosanguineous   Appearance Red;Eschar   Black (%), Wound Tissue Color (100 on left)   Red (%), Wound Tissue Color (100 on right)   Periwound Area Intact   Wound Edges Open   Wound Length (cm) 3   Wound Width (cm) 1       Wound care note  2/5 212 PM     Wound care consulted for skin breakdown to left elbow, right thigh, buttocks, feet.    The patient is non-verbal, contracted.  CHENCHO mattress, heels off-loaded, foam dressings to heels, pillow between legs, vent in place, turn q 2 hours.      RN progress note 2/5 212 pm                                                                            Doctor, Please specify diagnosis or diagnoses associated with above clinical findings.                        Doctor, please further specify "Upper buttocks wounds."    Provider Use Only        1)   [  ] Left upper buttocks Pressure Ulcer/Pressure injury - Further specify:                                Stage: ______.                               POA (Present on admit)                                Status:  (  ) Yes; (  ) No; (  ) Undeterminable            [  ] Other: specify ___________________            [ x ] " Clinically undetermined                2)   [  ]   Right upper buttocks Pressure Ulcer/Pressure injury - Further specify:                                Stage: ______.                               POA (Present on admit)                                Status:  (  ) Yes; (  ) No; (  ) Undeterminable              [  ] Other: specify ___________________              [x  ] Clinically undetermined                                                                                                             [  ] Clinically undetermined

## 2018-02-08 NOTE — PROGRESS NOTES
[facility]  Adult Nutrition  Consult Note    SUMMARY     Recommendations    1. Continue current TF regimen of Isosource 1.5 @ 50 mL/hr to provide 1800 calories, 82 g of protein, 917 mL fluid.    - Hold for residuals >500 mL; additional fluid per MD.   2. RD to monitor & follow-up.    Goals: Meet % EEN, EPN  Nutrition Goal Status: goal met  Communication of RD Recs: reviewed with RN    Reason for Assessment    Reason for Assessment: RD follow-up  Diagnosis: other (see comments) (Resp fx.)  Relevent Medical History: HTN, HLD   Interdisciplinary Rounds: attended     General Information Comments: Pt presents from NH, unsure of usual TF regimen. Intubated w/ no family at bedside, tolerating current TF regimen.  Nutrition Discharge Planning: Unable to determine    Nutrition Prescription Ordered    Current Diet Order: NPO     Current Nutrition Support Formula Ordered: Isosource 1.5  Current Nutrition Support Rate Ordered: 50 (ml)  Current Nutrition Support Frequency Ordered: mL/hr    Evaluation of Received Nutrients/Fluid Intake    Enteral Calories (kcal): 1800  Enteral Protein (gm): 82  Enteral (Free Water) Fluid (mL): 917     Free Water Flush Fluid (mL): 1000     Energy Calories Required: meeting needs  % Kcal Needs: 103%     Protein Required: meeting needs  % Protein Needs: 91%     Fluid Required: meeting needs     Tolerance: tolerating    Nutrition/Diet History    Patient Reported Diet/Restrictions/Preferences: other (see comments) (JONH; On TFs via PEG)     Factors Affecting Nutritional Intake: NPO, on mechanical ventilation    Labs/Tests/Procedures/Meds    Pertinent Labs Reviewed: reviewed, pertinent  Pertinent Labs Comments: Gluc   Pertinent Medications Reviewed: reviewed, pertinent  Pertinent Medications Comments: -    Physical Findings    Overall Physical Appearance: nourished, on ventilator support  Tubes: gastrostomy tube  Oral/Mouth Cavity: WDL  Skin: other (see comments)  "(Wounds)    Anthropometrics    Temp: 98.9 °F (37.2 °C)     Height: 5' 8" (172.7 cm)  Weight Method: Bed Scale  Weight: 91.1 kg (200 lb 13.4 oz)     Ideal Body Weight (IBW), Female: 140 lb  % Ideal Body Weight, Female (lb): 143.46 lb     BMI (Calculated): 30.6  BMI Grade: 30 - 34.9- obesity - grade I     Usual Body Weight (UBW), kg:  (JONH)    Estimated/Assessed Needs    Weight Used For Calorie Calculations: 91.1 kg (200 lb 13.4 oz)      Energy Calorie Requirements (kcal): 1752 kcal/d  Energy Need Method: [facility]     Weight Used For Protein Calculations: 91.1 kg (200 lb 13.4 oz)  Protein Requirements:  g/d (1.1-1.3 g/kg)     Fluid Need Method: RDA Method, other (see comments) (1 mL/kcal or per MD)    Assessment and Plan    Aspiration pneumonia      Nutrition Problem  Inadequate energy intake    Related to (etiology):   Inability to consume sufficient energy    Signs and Symptoms (as evidenced by):   NPO with no alternate means of nutrition     Nutrition Diagnosis Status:   New        Monitor and Evaluation    Food and Nutrient Intake: enteral nutrition intake  Food and Nutrient Adminstration: enteral and parenteral nutrition administration     Physical Activity and Function: nutrition-related ADLs and IADLs  Anthropometric Measurements: weight, weight change  Biochemical Data, Medical Tests and Procedures: lipid profile, inflammatory profile, glucose/endocrine profile, gastrointestinal profile, electrolyte and renal panel  Nutrition-Focused Physical Findings: overall appearance    Nutrition Risk    Level of Risk: other (see comments) (1x/week)    Nutrition Follow-Up    RD Follow-up?: Yes    "

## 2018-02-08 NOTE — PHYSICIAN QUERY
"PT Name: Jonathan Nieves  MR #: [id number]    Physician Query Form - Cause and Effect Relationship Clarification      CDS/: Kaykay Nance RN CDI         Contact information: [email or web address]   [phone]    This form is a permanent document in the medical record.     Query Date: February 8, 2018    By submitting this query, we are merely seeking further clarification of documentation. Please utilize your independent clinical judgment when addressing the question(s) below.    The Medical record contains the following:  Supporting Clinical Findings   Location in record      UTI (urinary tract infection)    UA (Bacteria, WBC, leukocyte positive)     -           History of VRE   -           Urine cultures proteus, Identification and susceptibility pending  -           Rocephin                                                                                                                                                                             Critcal care note 2/7 208 pm       Urine Culture, Routine 2/4   PROTEUS MIRABILIS   >100,000 cfu/ml                                                                                                                                                                                      Lab results         Provider, please clarify if there is any correlation between "UTI" and "Proteus Mirabilis".           Are the conditions:     [ x ] Due to or associated with each other     [  ] Unrelated to each other     [  ] Other (Please Specify): _________________________     [  ] Clinically Undetermined                                                                                                                                                                                              "

## 2018-02-08 NOTE — PHYSICIAN QUERY
"PT Name: Jonathan Nieves  MR #: [id number]     Physician Query Form - Documentation Clarification      CDS/: Kaykay Nance RN CDI    Contact information: [email or web address] [phone]    This form is a permanent document in the medical record.     Query Date: February 8, 2018    By submitting this query, we are merely seeking further clarification of documentation. Please utilize your independent clinical judgment when addressing the question(s) below.    The Medical record reflects the following:    Supporting Clinical Findings Location in Medical Record     Wound 02/05/18 0300 posterior Thigh   Date First Assessed/Time First Assessed: 02/05/18 0300   Pre-existing: Yes  Side: Right  Orientation: posterior  Location: Thigh           Wound care notes 2/5 212 pm     Recommendations:  Adaptic dressing then aquacel foam  dressing over right thigh every Mon/Thur    CHENCHO mattress,  turn q 2 hours.       Wound care notes 2/5 212 pm   R posterior thigh wound without purulent drainage. Clean and dry.   ID consult 2/5 312 pm                                                                            Doctor, Please specify diagnosis or diagnoses associated with above clinical findings.          Doctor, please further specify "right posterior thigh wound."      Provider Use Only     [  ] Right posterior thigh Pressure Ulcer/Pressure injury - Further specify:                                Stage: ______.                               POA (Present on admit)                               Status:  (  ) Yes; (  ) No; (  ) Undeterminable     [  ] Other: specify ___________________     [  ] Clinically undetermined                                                                                                             [x  ] Clinically undetermined            "

## 2018-02-08 NOTE — PHYSICIAN QUERY
"PT Name: Jonathan Nieves  MR #: [id number]     Physician Query Form - Documentation Clarification      CDS/: Kaykay Nance RN CDI      Contact information:  [email or web address]  [phone]    This form is a permanent document in the medical record.     Query Date: February 8, 2018    By submitting this query, we are merely seeking further clarification of documentation. Please utilize your independent clinical judgment when addressing the question(s) below.    The Medical record reflects the following:    Supporting Clinical Findings Location in Medical Record       Wound 02/05/18 0300 Elbow   Date First Assessed/Time First Assessed: 02/05/18 0300   Pre-existing: Yes  Side: Left  Location: Elbow   Wound Image    Wound WDL ex   Dressing Appearance Clean;Intact;Dry   Drainage Amount Scant   Drainage Characteristics/Odor Clear   Appearance Red;Blistered   Red (%), Wound Tissue Color 100 %   Periwound Area Moist   Wound Edges Open   Wound Length (cm) 1.5   Wound Width (cm) 1.5   Depth (cm) 0.1       Wound care note  2/5 212 pm     Adaptic dressing then aquacel foam dressing overleft elbow every Mon/Thurs      Wound care note  2/5 212 pm   Pressure ulcer 1/26/1626/16/1502 Left elbow Stage III   H&P   Left elbow ulcer without erythema or purulent drainage   ID progress note 2/7 408 pm                                                                            Doctor, Please specify diagnosis or diagnoses associated with above clinical findings.               Doctor, please further specify "Pressure ulcer left elbow."      Provider Use Only        [ x ] Pressure ulcer left elbow - Further specify:                                Stage: _3_____.                               POA (Present on admit)                                Status:  (  ) Yes; (  ) No; (x  ) Undeterminable         [  ] Other: specify ___________________         [  ] Clinically undetermined                                                                "                                              [  ] Clinically undetermined

## 2018-02-08 NOTE — PLAN OF CARE
Patient's chart reviewed. WBC now within normal limits. Patient remains afebrile and vent settings improving. Cultures all NGTD except for urinary proteus. Will sign off at this time. Please feel free to call with any further questions.     Miranda Gunter MD, PGY3  Pager [phone]  Discussed with Dr. Duke

## 2018-02-08 NOTE — PLAN OF CARE
Problem: Ventilation, Mechanical Invasive (Adult)  Goal: Signs and Symptoms of Listed Potential Problems Will be Absent, Minimized or Managed (Ventilation, Mechanical Invasive)  Signs and symptoms of listed potential problems will be absent, minimized or managed by discharge/transition of care (reference Ventilation, Mechanical Invasive (Adult) CPG).   Outcome: Ongoing (interventions implemented as appropriate)  Patient unable to participate in care planning at this time, and no family available.

## 2018-02-09 NOTE — PLAN OF CARE
Problem: Patient Care Overview  Goal: Plan of Care Review  Patient's vital signs stable at this time. Patient continues on vent  AC, 5  PEEP, 21% FiO2 with SaO2 currently at 97% and no s/s of respiratory distress noted. Tube feeds currently at  Goal of 50cc/hr, minimal residuals noted. Patient turned Q2H. Wound care done per order. Plan of care reviewed with patient. Will continue to monitor closely.

## 2018-02-09 NOTE — PROGRESS NOTES
"[facility]  Critical Care Medicine  Progress Note    Patient Name: Jonathan Nieves  MRN: [id number]  Admission Date: 2/4/2018  Hospital Length of Stay: 3 days  Code Status: Full Code  Attending Provider: Jeronimo Velez MD  Primary Care Provider: Primary Doctor No   Principal Problem: Acute respiratory failure with hypoxia    Subjective:     HPI:  Mr. Jonathan Nieves is a 51yo female with history of CVA with residual aphasia and dysphagia, non-verbal, s/p PEG tube placement and bed riddenness, osteomyelitis s/p amputation of toe, who comes to the ED from [facility] via EMS in respiratory distress. Per ED/EMS the patient was noted to have "projectile vomiting" yesterday evening (24 hours prior to presentation), which was treated with zofran. During the event there is suspicion the patient possible aspirated. Today the patient was noted to be in resp distress with a fever.     Patient was satting 88% on 4L on EMS arrival, breathing 40 times per minute.  Improved to 96% on 15L NRB with RR high 20s.  Axillary temp 102.    CT chest concerning for aspiration pneumonia.  UA concern for a UTI.       Patient intubated in the ED & Critical Care consulted.         Hospital/ICU Course:  2/5: Critical Care Consulted. Patient intubated in the ED.   2/6: Patient doing well, on SBT; will extubate maria e. Continue treatment for aspiration PNA & UTI.   2/7: abx deescalated, continue to treat for PNA and UTI. Required pressors overnight. Off this morning.   2/8: still with secretions and questionable mentation? Baseline?. Will try to dry the secretions and extubated today or maria e.          Review of Systems   Unable to perform ROS: Intubated     Objective:     Vital Signs (Most Recent):  Temp: 98.8 °F (37.1 °C) (02/08/18 1300)  Pulse: 75 (02/08/18 1830)  Resp: (!) 25 (02/08/18 1309)  BP: (!) 95/55 (02/08/18 1800)  SpO2: 97 % (02/08/18 1830) Vital Signs (24h Range):  Temp:  [98.8 °F (37.1 °C)-99.3 °F " (37.4 °C)] 98.8 °F (37.1 °C)  Pulse:  [] 75  Resp:  [19-32] 25  SpO2:  [95 %-100 %] 97 %  BP: ()/(50-69) 95/55   Weight: 91.1 kg (200 lb 13.4 oz)  Body mass index is 30.54 kg/m².      Intake/Output Summary (Last 24 hours) at 02/08/18 1835  Last data filed at 02/08/18 1800   Gross per 24 hour   Intake             2270 ml   Output              650 ml   Net             1620 ml       Physical Exam   Constitutional:   obese   HENT:   Head: Normocephalic and atraumatic.   Eyes: Pupils are equal, round, and reactive to light.   Neck: Normal range of motion. No JVD present.   Cardiovascular: Normal rate, regular rhythm and normal heart sounds.    Pulmonary/Chest: No respiratory distress. She has rhonchi in the right lower field.   Decreased breath sounds on left and right base. Intubated   Abdominal: Soft. Bowel sounds are normal. She exhibits no distension. There is no tenderness.   PEG tube without surrounding erythema or purulent discharge   Musculoskeletal: She exhibits no edema.   R posterior thigh wound without purulent drainage. Clean and dry.    Left elbow ulcer without erythema or purulent drainage       Neurological:   Eyes open and tracking. All 4 extremities contracted, no movement.     Vitals reviewed.      Vents:  Vent Mode: A/C (02/08/18 1820)  Ventilator Initiated: Yes (02/04/18 2344)  Set Rate: 18 bmp (02/08/18 1820)  Vt Set: 400 mL (02/08/18 1820)  Pressure Support: 0 cmH20 (02/08/18 1820)  PEEP/CPAP: 5 cmH20 (02/08/18 1820)  Oxygen Concentration (%): 21 (02/08/18 1830)  Peak Airway Pressure: 34 cmH2O (02/08/18 1820)  Plateau Pressure: 26 cmH20 (02/08/18 1820)  Total Ve: 8.19 mL (02/08/18 1820)  F/VT Ratio<105 (RSBI): (!) 56.05 (02/08/18 1309)  Lines/Drains/Airways     Central Venous Catheter Line                 Percutaneous Central Line Insertion/Assessment - triple lumen  02/04/18 2300 right internal jugular 3 days          Drain                 Gastrostomy/Enterostomy 10/25/16 1405  Percutaneous endoscopic gastrostomy (PEG) midline feeding 471 days         Urethral Catheter 02/05/18 1006 Latex 3 days          Airway                 Airway - Non-Surgical 02/04/18 2329 Endotracheal Tube 3 days          Pressure Ulcer                 Pressure Injury 02/05/18 1045 Left medial Foot Deep tissue injury 3 days         Pressure Injury 02/05/18 1045 Right lateral Foot Deep tissue injury 3 days          Peripheral Intravenous Line                 Peripheral IV - Single Lumen 02/04/18 2209 Left Hand 3 days              Significant Labs:    CBC/Anemia Profile:    Recent Labs  Lab 02/07/18  0832 02/08/18  0350   WBC 19.56* 11.07   HGB 9.0* 7.9*   HCT 31.3* 26.3*    187   MCV 81* 77*   RDW 17.0* 16.9*        Chemistries:    Recent Labs  Lab 02/07/18  0445 02/08/18  0350    140   K 3.5 3.9   * 108   CO2 19* 27   BUN 19 13   CREATININE 0.5 0.5   CALCIUM 7.8* 7.9*   ALBUMIN 1.7* 1.8*   PROT 6.5 6.5   BILITOT 0.2 0.3   ALKPHOS 111 325*   ALT 32 57*   AST 20 69*   MG 1.7 2.2   PHOS 2.5* 1.8*       All pertinent labs within the past 24 hours have been reviewed.        Assessment/Plan:     Neuro   Seizure-like activity    - continue keppra         Dysphagia as late effect of cerebrovascular disease    Last stoke in 2008        CVA, old, hemiparesis    -patient suffered multiple strokes in past, last one in 2008   -nonverbal at baseline, wheelchair/bedbound        Pulmonary   * Acute respiratory failure with hypoxia    Intubated and sedated    - deescalating abx; on rocephin   - sputum cultures rare gram positive & negative   - blood cultures NGTD          Aspiration pneumonia    Extensive consolidation of the right lung and debris in the right mainstem bronchus and its branches consistent with aspiration pneumonia.    - added rocephin   - stopped vanco & cefepime & flagyl   - CBC monitor down trending   - intubated with plan to extubate maria e           Renal/   Hypernatremia      - BMP continue to  monitor   - resolving 142 on 2/7  - added 250 free water pushes         UTI (urinary tract infection)    UA (Bacteria, WBC, leukocyte positive)    - History of VRE   - Urine cultures proteus, Identification and susceptibility pending  - Rocephin         GI   PEG (percutaneous endoscopic gastrostomy) status    Tube feeds.         Other   Goals of care, counseling/discussion    -patient full code per ED; recommend Goals of care with primary team given patient's current functional status at baseline           GRETTA Bhandari  Critical Care Medicine  [facility]

## 2018-02-09 NOTE — PLAN OF CARE
Problem: Patient Care Overview  Goal: Plan of Care Review  Outcome: Ongoing (interventions implemented as appropriate)  Pt remains on ventilator settings: A/C 18, FiO2 21%, PEEP 5; sats %. Pt failed SBT this AM. VSS. Alert and unable to follow commands; withdraws from painful stimulus. Free from fall/injury through shift. Repositioned q2h to prevent further skin breakdown. Tube feeds running at goal of 50cc/hr; no residuals/issues. De Leon in place; UOP adequate. Labs/accuchecks monitored; electrolytes replaced per orders. Plan is to wean ventilator support as appropriate. Plan of care reviewed with pt. See flowsheet for full assessment details.

## 2018-02-09 NOTE — PROGRESS NOTES
"[facility]  Critical Care Medicine  Progress Note    Patient Name: Jonathan Nieves  MRN: [id number]  Admission Date: 2/4/2018  Hospital Length of Stay: 4 days  Code Status: Full Code  Attending Provider: Jeronimo Velez MD  Primary Care Provider: Primary Doctor No   Principal Problem: Acute respiratory failure with hypoxia    Subjective:     HPI:  Mr. Jonathan Nieves is a 51yo female with history of CVA with residual aphasia and dysphagia, non-verbal, s/p PEG tube placement and bed riddenness, osteomyelitis s/p amputation of toe, who comes to the ED from [facility] via EMS in respiratory distress. Per ED/EMS the patient was noted to have "projectile vomiting" yesterday evening (24 hours prior to presentation), which was treated with zofran. During the event there is suspicion the patient possible aspirated. Today the patient was noted to be in resp distress with a fever.     Patient was satting 88% on 4L on EMS arrival, breathing 40 times per minute.  Improved to 96% on 15L NRB with RR high 20s.  Axillary temp 102.    CT chest concerning for aspiration pneumonia.  UA concern for a UTI.       Patient intubated in the ED & Critical Care consulted.         Hospital/ICU Course:  2/5: Critical Care Consulted. Patient intubated in the ED.   2/6: Patient doing well, on SBT; will extubate maria e. Continue treatment for aspiration PNA & UTI.   2/7: abx deescalated, continue to treat for PNA and UTI. Required pressors overnight. Off this morning.   2/8-9: still with secretions and questionable mentation? Baseline?. Will try to dry the secretions and extubated today or maria e.      Interval History/Significant Events:   NAEON. Failed SBT will not extubated. Increase glycopyrrolate to help reduce secretions.     Review of Systems   Unable to perform ROS: Intubated     Objective:     Vital Signs (Most Recent):  Temp: 99.4 °F (37.4 °C) (02/09/18 1100)  Pulse: 84 (02/09/18 1324)  Resp: 19 " (02/09/18 1206)  BP: (!) 94/53 (02/09/18 1300)  SpO2: 97 % (02/09/18 1324) Vital Signs (24h Range):  Temp:  [98.2 °F (36.8 °C)-99.5 °F (37.5 °C)] 99.4 °F (37.4 °C)  Pulse:  [] 84  Resp:  [18-28] 19  SpO2:  [93 %-100 %] 97 %  BP: ()/(51-90) 94/53   Weight: 91.5 kg (201 lb 11.5 oz)  Body mass index is 30.67 kg/m².      Intake/Output Summary (Last 24 hours) at 02/09/18 1359  Last data filed at 02/09/18 1300   Gross per 24 hour   Intake             1765 ml   Output              980 ml   Net              785 ml       Physical Exam   Constitutional:   obese   HENT:   Head: Normocephalic and atraumatic.   Eyes: Pupils are equal, round, and reactive to light.   Neck: Normal range of motion. No JVD present.   Cardiovascular: Normal rate, regular rhythm and normal heart sounds.    Pulmonary/Chest: No respiratory distress. She has rhonchi in the right lower field.   Decreased breath sounds on left and right base. Intubated   Abdominal: Soft. Bowel sounds are normal. She exhibits no distension. There is no tenderness.   PEG tube without surrounding erythema or purulent discharge   Musculoskeletal: She exhibits no edema.   R posterior thigh wound without purulent drainage. Clean and dry.    Left elbow ulcer without erythema or purulent drainage       Neurological:   Eyes open and tracking. All 4 extremities contracted, no movement.     Vitals reviewed.      Vents:  Vent Mode: A/C (02/09/18 1324)  Ventilator Initiated: Yes (02/04/18 2344)  Set Rate: 18 bmp (02/09/18 1324)  Vt Set: 400 mL (02/09/18 1324)  Pressure Support: 0 cmH20 (02/09/18 1324)  PEEP/CPAP: 5 cmH20 (02/09/18 1324)  Oxygen Concentration (%): 21 (02/09/18 1324)  Peak Airway Pressure: 39 cmH2O (02/09/18 1324)  Plateau Pressure: 26 cmH20 (02/09/18 1324)  Total Ve: 8.35 mL (02/09/18 1324)  F/VT Ratio<105 (RSBI): (!) 36.97 (02/09/18 0736)  Lines/Drains/Airways     Central Venous Catheter Line                 Percutaneous Central Line Insertion/Assessment -  triple lumen  02/04/18 2300 right internal jugular 4 days          Drain                 Gastrostomy/Enterostomy 10/25/16 1401 Percutaneous endoscopic gastrostomy (PEG) midline feeding 472 days         Urethral Catheter 02/05/18 1006 Latex 4 days          Airway                 Airway - Non-Surgical 02/04/18 2329 Endotracheal Tube 4 days          Pressure Ulcer                 Pressure Injury 02/05/18 1045 Left medial Foot Deep tissue injury 4 days         Pressure Injury 02/05/18 1045 Right lateral Foot Deep tissue injury 4 days              Significant Labs:    CBC/Anemia Profile:    Recent Labs  Lab 02/08/18  0350 02/09/18  0259   WBC 11.07 12.37   HGB 7.9* 7.9*   HCT 26.3* 27.0*    202   MCV 77* 78*   RDW 16.9* 17.2*        Chemistries:    Recent Labs  Lab 02/08/18  0350 02/09/18  0259    140   K 3.9 3.9    106   CO2 27 27   BUN 13 10   CREATININE 0.5 0.5   CALCIUM 7.9* 8.1*   ALBUMIN 1.8* 1.9*   PROT 6.5 6.4   BILITOT 0.3 0.2   ALKPHOS 325* 255*   ALT 57* 63*   AST 69* 66*   MG 2.2 2.0   PHOS 1.8* 2.3*       ABGs:   Recent Labs  Lab 02/08/18  0521   PH 7.499*   PCO2 33.2*   HCO3 25.8   POCSATURATED 94*   BE 3     Blood Culture: No results for input(s): LABBLOO in the last 48 hours.  CMP:   Recent Labs  Lab 02/08/18  0350 02/09/18  0259    140   K 3.9 3.9    106   CO2 27 27   * 109   BUN 13 10   CREATININE 0.5 0.5   CALCIUM 7.9* 8.1*   PROT 6.5 6.4   ALBUMIN 1.8* 1.9*   BILITOT 0.3 0.2   ALKPHOS 325* 255*   AST 69* 66*   ALT 57* 63*   ANIONGAP 5* 7*   EGFRNONAA >60.0 >60.0     Coagulation: No results for input(s): PT, INR, APTT in the last 48 hours.  Lipid Panel: No results for input(s): CHOL, HDL, LDLCALC, TRIG, CHOLHDL in the last 48 hours.  Urine Culture: No results for input(s): LABURIN in the last 48 hours.  All pertinent labs within the past 24 hours have been reviewed.    Significant Imaging:  I have reviewed all pertinent imaging results/findings within the past 24  hours.    Assessment/Plan:     Neuro   Seizure-like activity    - continue keppra         Dysphagia as late effect of cerebrovascular disease    Last stoke in 2008        CVA, old, hemiparesis    -patient suffered multiple strokes in past, last one in 2008   -nonverbal at baseline, wheelchair/bedbound        Pulmonary   * Acute respiratory failure with hypoxia    Intubated and sedated    - deescalating abx; on rocephin   - sputum cultures rare gram positive & negative   - blood cultures NGTD          Aspiration pneumonia    Extensive consolidation of the right lung and debris in the right mainstem bronchus and its branches consistent with aspiration pneumonia.    - added rocephin   - stopped vanco & cefepime & flagyl   - CBC monitor down trending   - intubated with plan to extubate maria e           Renal/   Hypernatremia      - BMP continue to monitor   - resolving 142 on 2/7  - added 250 free water pushes         UTI (urinary tract infection)    UA (Bacteria, WBC, leukocyte positive)    - History of VRE   - Urine cultures proteus, Identification and susceptibility pending  - Rocephin         GI   PEG (percutaneous endoscopic gastrostomy) status    Tube feeds.         Other   Goals of care, counseling/discussion    -patient full code per ED; recommend Goals of care with primary team given patient's current functional status at baseline           Critical Care Daily Checklist:    A: Awake: RASS Goal/Actual Goal: RASS Goal: 0-->alert and calm  Actual: Krishnamurthy Agitation Sedation Scale (RASS): Alert and calm   B: Spontaneous Breathing Trial Performed?     C: SAT & SBT Coordinated?  Failed sbt                      D: Delirium: CAM-ICU Overall CAM-ICU: Negative   E: Early Mobility Performed? Yes   F: Feeding Goal: Goals: Meet % EEN, EPN  Status: Nutrition Goal Status: goal met   Current Diet Order   Procedures    Diet NPO      AS: Analgesia/Sedation no   T: Thromboembolic Prophylaxis yes   H: HOB > 300 Yes   U:  Stress Ulcer Prophylaxis (if needed) yes   G: Glucose Control prn   B: Bowel Function Stool Occurrence: 1   I: Indwelling Catheter (Lines & De Leon) Necessity yes   D: De-escalation of Antimicrobials/Pharmacotherapies no    Plan for the day/ETD Continue tx    Code Status:  Family/Goals of Care: Full Code            Critical care was time spent personally by me on the following activities: development of treatment plan with patient or surrogate and bedside caregivers, discussions with consultants, evaluation of patient's response to treatment, examination of patient, ordering and performing treatments and interventions, ordering and review of laboratory studies, ordering and review of radiographic studies, pulse oximetry, re-evaluation of patient's condition. This critical care time did not overlap with that of any other provider or involve time for any procedures.     Connor Cornelius MD  Critical Care Medicine  [facility]

## 2018-02-09 NOTE — SUBJECTIVE & OBJECTIVE
Review of Systems   Unable to perform ROS: Intubated     Objective:     Vital Signs (Most Recent):  Temp: 98.8 °F (37.1 °C) (02/08/18 1300)  Pulse: 75 (02/08/18 1830)  Resp: (!) 25 (02/08/18 1309)  BP: (!) 95/55 (02/08/18 1800)  SpO2: 97 % (02/08/18 1830) Vital Signs (24h Range):  Temp:  [98.8 °F (37.1 °C)-99.3 °F (37.4 °C)] 98.8 °F (37.1 °C)  Pulse:  [] 75  Resp:  [19-32] 25  SpO2:  [95 %-100 %] 97 %  BP: ()/(50-69) 95/55   Weight: 91.1 kg (200 lb 13.4 oz)  Body mass index is 30.54 kg/m².      Intake/Output Summary (Last 24 hours) at 02/08/18 1835  Last data filed at 02/08/18 1800   Gross per 24 hour   Intake             2270 ml   Output              650 ml   Net             1620 ml       Physical Exam   Constitutional:   obese   HENT:   Head: Normocephalic and atraumatic.   Eyes: Pupils are equal, round, and reactive to light.   Neck: Normal range of motion. No JVD present.   Cardiovascular: Normal rate, regular rhythm and normal heart sounds.    Pulmonary/Chest: No respiratory distress. She has rhonchi in the right lower field.   Decreased breath sounds on left and right base. Intubated   Abdominal: Soft. Bowel sounds are normal. She exhibits no distension. There is no tenderness.   PEG tube without surrounding erythema or purulent discharge   Musculoskeletal: She exhibits no edema.   R posterior thigh wound without purulent drainage. Clean and dry.    Left elbow ulcer without erythema or purulent drainage       Neurological:   Eyes open and tracking. All 4 extremities contracted, no movement.     Vitals reviewed.      Vents:  Vent Mode: A/C (02/08/18 1820)  Ventilator Initiated: Yes (02/04/18 2344)  Set Rate: 18 bmp (02/08/18 1820)  Vt Set: 400 mL (02/08/18 1820)  Pressure Support: 0 cmH20 (02/08/18 1820)  PEEP/CPAP: 5 cmH20 (02/08/18 1820)  Oxygen Concentration (%): 21 (02/08/18 1830)  Peak Airway Pressure: 34 cmH2O (02/08/18 1820)  Plateau Pressure: 26 cmH20 (02/08/18 1820)  Total Ve: 8.19 mL  (02/08/18 1820)  F/VT Ratio<105 (RSBI): (!) 56.05 (02/08/18 1309)  Lines/Drains/Airways     Central Venous Catheter Line                 Percutaneous Central Line Insertion/Assessment - triple lumen  02/04/18 2300 right internal jugular 3 days          Drain                 Gastrostomy/Enterostomy 10/25/16 1401 Percutaneous endoscopic gastrostomy (PEG) midline feeding 471 days         Urethral Catheter 02/05/18 1006 Latex 3 days          Airway                 Airway - Non-Surgical 02/04/18 2329 Endotracheal Tube 3 days          Pressure Ulcer                 Pressure Injury 02/05/18 1045 Left medial Foot Deep tissue injury 3 days         Pressure Injury 02/05/18 1045 Right lateral Foot Deep tissue injury 3 days          Peripheral Intravenous Line                 Peripheral IV - Single Lumen 02/04/18 2209 Left Hand 3 days              Significant Labs:    CBC/Anemia Profile:    Recent Labs  Lab 02/07/18  0832 02/08/18  0350   WBC 19.56* 11.07   HGB 9.0* 7.9*   HCT 31.3* 26.3*    187   MCV 81* 77*   RDW 17.0* 16.9*        Chemistries:    Recent Labs  Lab 02/07/18  0445 02/08/18  0350    140   K 3.5 3.9   * 108   CO2 19* 27   BUN 19 13   CREATININE 0.5 0.5   CALCIUM 7.8* 7.9*   ALBUMIN 1.7* 1.8*   PROT 6.5 6.5   BILITOT 0.2 0.3   ALKPHOS 111 325*   ALT 32 57*   AST 20 69*   MG 1.7 2.2   PHOS 2.5* 1.8*       All pertinent labs within the past 24 hours have been reviewed.

## 2018-02-09 NOTE — SUBJECTIVE & OBJECTIVE
Interval History/Significant Events:   NAEON. Failed SBT will not extubated. Increase glycopyrrolate to help reduce secretions.     Review of Systems   Unable to perform ROS: Intubated     Objective:     Vital Signs (Most Recent):  Temp: 99.4 °F (37.4 °C) (02/09/18 1100)  Pulse: 84 (02/09/18 1324)  Resp: 19 (02/09/18 1206)  BP: (!) 94/53 (02/09/18 1300)  SpO2: 97 % (02/09/18 1324) Vital Signs (24h Range):  Temp:  [98.2 °F (36.8 °C)-99.5 °F (37.5 °C)] 99.4 °F (37.4 °C)  Pulse:  [] 84  Resp:  [18-28] 19  SpO2:  [93 %-100 %] 97 %  BP: ()/(51-90) 94/53   Weight: 91.5 kg (201 lb 11.5 oz)  Body mass index is 30.67 kg/m².      Intake/Output Summary (Last 24 hours) at 02/09/18 1359  Last data filed at 02/09/18 1300   Gross per 24 hour   Intake             1765 ml   Output              980 ml   Net              785 ml       Physical Exam   Constitutional:   obese   HENT:   Head: Normocephalic and atraumatic.   Eyes: Pupils are equal, round, and reactive to light.   Neck: Normal range of motion. No JVD present.   Cardiovascular: Normal rate, regular rhythm and normal heart sounds.    Pulmonary/Chest: No respiratory distress. She has rhonchi in the right lower field.   Decreased breath sounds on left and right base. Intubated   Abdominal: Soft. Bowel sounds are normal. She exhibits no distension. There is no tenderness.   PEG tube without surrounding erythema or purulent discharge   Musculoskeletal: She exhibits no edema.   R posterior thigh wound without purulent drainage. Clean and dry.    Left elbow ulcer without erythema or purulent drainage       Neurological:   Eyes open and tracking. All 4 extremities contracted, no movement.     Vitals reviewed.      Vents:  Vent Mode: A/C (02/09/18 1324)  Ventilator Initiated: Yes (02/04/18 2344)  Set Rate: 18 bmp (02/09/18 1324)  Vt Set: 400 mL (02/09/18 1324)  Pressure Support: 0 cmH20 (02/09/18 1324)  PEEP/CPAP: 5 cmH20 (02/09/18 1324)  Oxygen Concentration (%): 21  (02/09/18 1324)  Peak Airway Pressure: 39 cmH2O (02/09/18 1324)  Plateau Pressure: 26 cmH20 (02/09/18 1324)  Total Ve: 8.35 mL (02/09/18 1324)  F/VT Ratio<105 (RSBI): (!) 36.97 (02/09/18 0736)  Lines/Drains/Airways     Central Venous Catheter Line                 Percutaneous Central Line Insertion/Assessment - triple lumen  02/04/18 2300 right internal jugular 4 days          Drain                 Gastrostomy/Enterostomy 10/25/16 1401 Percutaneous endoscopic gastrostomy (PEG) midline feeding 472 days         Urethral Catheter 02/05/18 1006 Latex 4 days          Airway                 Airway - Non-Surgical 02/04/18 2329 Endotracheal Tube 4 days          Pressure Ulcer                 Pressure Injury 02/05/18 1045 Left medial Foot Deep tissue injury 4 days         Pressure Injury 02/05/18 1045 Right lateral Foot Deep tissue injury 4 days              Significant Labs:    CBC/Anemia Profile:    Recent Labs  Lab 02/08/18 0350 02/09/18  0259   WBC 11.07 12.37   HGB 7.9* 7.9*   HCT 26.3* 27.0*    202   MCV 77* 78*   RDW 16.9* 17.2*        Chemistries:    Recent Labs  Lab 02/08/18 0350 02/09/18  0259    140   K 3.9 3.9    106   CO2 27 27   BUN 13 10   CREATININE 0.5 0.5   CALCIUM 7.9* 8.1*   ALBUMIN 1.8* 1.9*   PROT 6.5 6.4   BILITOT 0.3 0.2   ALKPHOS 325* 255*   ALT 57* 63*   AST 69* 66*   MG 2.2 2.0   PHOS 1.8* 2.3*       ABGs:   Recent Labs  Lab 02/08/18  0521   PH 7.499*   PCO2 33.2*   HCO3 25.8   POCSATURATED 94*   BE 3     Blood Culture: No results for input(s): LABBLOO in the last 48 hours.  CMP:   Recent Labs  Lab 02/08/18  0350 02/09/18  0259    140   K 3.9 3.9    106   CO2 27 27   * 109   BUN 13 10   CREATININE 0.5 0.5   CALCIUM 7.9* 8.1*   PROT 6.5 6.4   ALBUMIN 1.8* 1.9*   BILITOT 0.3 0.2   ALKPHOS 325* 255*   AST 69* 66*   ALT 57* 63*   ANIONGAP 5* 7*   EGFRNONAA >60.0 >60.0     Coagulation: No results for input(s): PT, INR, APTT in the last 48 hours.  Lipid Panel: No  results for input(s): CHOL, HDL, LDLCALC, TRIG, CHOLHDL in the last 48 hours.  Urine Culture: No results for input(s): LABURIN in the last 48 hours.  All pertinent labs within the past 24 hours have been reviewed.    Significant Imaging:  I have reviewed all pertinent imaging results/findings within the past 24 hours.

## 2018-02-10 NOTE — PLAN OF CARE
Problem: Patient Care Overview  Goal: Plan of Care Review  Outcome: Ongoing (interventions implemented as appropriate)  Pt remains in CMICU 3088. No acute events overnight. VSS. RASS 0 to -1 with PRN Fentanyl IVPs. Pt does not follow commands. SR 70-80s. BP stable. Afebrile. Intubated with ETT size 7.5 at 25 cm at lip, A/C R 18, Vt 400, FiO2 21%, PEEP 5, tolerating well. Coarse breath sounds noted. PEG tube in place. TF at 50cc/hr, goal is 50cc/hr, tolerating well with no residuals. No BM overnight. De Leon in place, UOA. Skin alterations treated per wound care recommendations. Pain controlled. R IJ TLC. KVO gtts. POC discussed with Jonathan Nieves. Unable to assess understanding at this time. WCTM.

## 2018-02-10 NOTE — ASSESSMENT & PLAN NOTE
-patient suffered multiple strokes in past, last one in 2008   -nonverbal at baseline, wheelchair/bedbound    -- MRI (2/10) for further evaluation

## 2018-02-10 NOTE — PLAN OF CARE
Problem: Patient Care Overview  Goal: Plan of Care Review  Outcome: Ongoing (interventions implemented as appropriate)  Patient unable to be involved in care planning at this time, and no family available.

## 2018-02-10 NOTE — ASSESSMENT & PLAN NOTE
Intubated    - on rocephin tx 10 days   - sputum cultures rare gram positive & negative   - blood cultures NGTD

## 2018-02-10 NOTE — SUBJECTIVE & OBJECTIVE
Interval History/Significant Events:   NAEON. Patient with increased RR/work of breathing/ shallow breathing. Will obtain MRI. Not ideal for extubation today.     Review of Systems   Unable to perform ROS: Intubated     Objective:     Vital Signs (Most Recent):  Temp: 99.4 °F (37.4 °C) (02/10/18 0730)  Pulse: 79 (02/10/18 1323)  Resp: (!) 21 (02/10/18 1323)  BP: 95/62 (02/10/18 1200)  SpO2: 97 % (02/10/18 1323) Vital Signs (24h Range):  Temp:  [98.6 °F (37 °C)-99.5 °F (37.5 °C)] 99.4 °F (37.4 °C)  Pulse:  [] 79  Resp:  [19-28] 21  SpO2:  [89 %-100 %] 97 %  BP: ()/(47-93) 95/62   Weight: 91.5 kg (201 lb 11.5 oz)  Body mass index is 30.67 kg/m².      Intake/Output Summary (Last 24 hours) at 02/10/18 1359  Last data filed at 02/10/18 1200   Gross per 24 hour   Intake             2405 ml   Output              755 ml   Net             1650 ml       Physical Exam   Constitutional:   obese   HENT:   Head: Normocephalic and atraumatic.   Eyes: Pupils are equal, round, and reactive to light.   Neck: Normal range of motion. No JVD present.   Cardiovascular: Normal rate, regular rhythm and normal heart sounds.    Pulmonary/Chest: No respiratory distress. She has rhonchi in the right lower field.   Decreased breath sounds on left and right base. Intubated   Abdominal: Soft. Bowel sounds are normal. She exhibits no distension. There is no tenderness.   PEG tube without surrounding erythema or purulent discharge   Musculoskeletal: She exhibits no edema.   R posterior thigh wound without purulent drainage. Clean and dry.    Left elbow ulcer without erythema or purulent drainage       Neurological:   Eyes open and tracking. All 4 extremities contracted, no movement.     Vitals reviewed.      Vents:  Vent Mode: A/C (02/10/18 1323)  Ventilator Initiated: Yes (02/04/18 1431)  Set Rate: 18 bmp (02/10/18 1323)  Vt Set: 400 mL (02/10/18 1323)  Pressure Support: 0 cmH20 (02/10/18 1323)  PEEP/CPAP: 5 cmH20 (02/10/18  1323)  Oxygen Concentration (%): 21 (02/10/18 1323)  Peak Airway Pressure: 37 cmH2O (02/10/18 1323)  Plateau Pressure: 26 cmH20 (02/10/18 1323)  Total Ve: 8.88 mL (02/10/18 1323)  F/VT Ratio<105 (RSBI): (!) 48.72 (02/10/18 1323)  Lines/Drains/Airways     Central Venous Catheter Line                 Percutaneous Central Line Insertion/Assessment - triple lumen  02/04/18 2300 right internal jugular 5 days          Drain                 Gastrostomy/Enterostomy 10/25/16 1401 Percutaneous endoscopic gastrostomy (PEG) midline feeding 473 days         Urethral Catheter 02/05/18 1006 Latex 5 days          Airway                 Airway - Non-Surgical 02/04/18 2329 Endotracheal Tube 5 days          Pressure Ulcer                 Pressure Injury 02/05/18 1045 Left medial Foot Deep tissue injury 5 days         Pressure Injury 02/05/18 1045 Right lateral Foot Deep tissue injury 5 days              Significant Labs:    CBC/Anemia Profile:    Recent Labs  Lab 02/09/18  0259 02/10/18  0334   WBC 12.37 13.30*   HGB 7.9* 8.0*   HCT 27.0* 27.5*    226   MCV 78* 80*   RDW 17.2* 17.5*        Chemistries:    Recent Labs  Lab 02/09/18  0259 02/10/18  0334    139   K 3.9 4.1    108   CO2 27 24   BUN 10 8   CREATININE 0.5 0.5   CALCIUM 8.1* 8.1*   ALBUMIN 1.9* 1.8*   PROT 6.4 6.2   BILITOT 0.2 0.1   ALKPHOS 255* 223*   ALT 63* 94*   AST 66* 102*   MG 2.0 1.8   PHOS 2.3* 2.9       Blood Culture: No results for input(s): LABBLOO in the last 48 hours.  CMP:   Recent Labs  Lab 02/09/18  0259 02/10/18  0334    139   K 3.9 4.1    108   CO2 27 24    102   BUN 10 8   CREATININE 0.5 0.5   CALCIUM 8.1* 8.1*   PROT 6.4 6.2   ALBUMIN 1.9* 1.8*   BILITOT 0.2 0.1   ALKPHOS 255* 223*   AST 66* 102*   ALT 63* 94*   ANIONGAP 7* 7*   EGFRNONAA >60.0 >60.0     Lactic Acid: No results for input(s): LACTATE in the last 48 hours.  Urine Culture: No results for input(s): LABURIN in the last 48 hours.  Urine Studies: No  results for input(s): COLORU, APPEARANCEUA, PHUR, SPECGRAV, PROTEINUA, GLUCUA, KETONESU, BILIRUBINUA, OCCULTUA, NITRITE, UROBILINOGEN, LEUKOCYTESUR, RBCUA, WBCUA, BACTERIA, SQUAMEPITHEL, HYALINECASTS in the last 48 hours.    Invalid input(s): MOLINA    Significant Imaging:  I have reviewed all pertinent imaging results/findings within the past 24 hours.

## 2018-02-10 NOTE — PROGRESS NOTES
"[facility]  Critical Care Medicine  Progress Note    Patient Name: Jonathan Nieves  MRN: [id number]  Admission Date: 2/4/2018  Hospital Length of Stay: 5 days  Code Status: Full Code  Attending Provider: Jeronimo Velez MD  Primary Care Provider: Primary Doctor No   Principal Problem: Acute respiratory failure with hypoxia    Subjective:     HPI:  Mr. Jonathan Nieves is a 51yo female with history of CVA with residual aphasia and dysphagia, non-verbal, s/p PEG tube placement and bed riddenness, osteomyelitis s/p amputation of toe, who comes to the ED from [facility] via EMS in respiratory distress. Per ED/EMS the patient was noted to have "projectile vomiting" yesterday evening (24 hours prior to presentation), which was treated with zofran. During the event there is suspicion the patient possible aspirated. Today the patient was noted to be in resp distress with a fever.     Patient was satting 88% on 4L on EMS arrival, breathing 40 times per minute.  Improved to 96% on 15L NRB with RR high 20s.  Axillary temp 102.    CT chest concerning for aspiration pneumonia.  UA concern for a UTI.       Patient intubated in the ED & Critical Care consulted.         Hospital/ICU Course:  2/5: Critical Care Consulted. Patient intubated in the ED.   2/6: Patient doing well, on SBT; will extubate maria e. Continue treatment for aspiration PNA & UTI.   2/7: abx deescalated, continue to treat for PNA and UTI. Required pressors overnight. Off this morning.   2/8-9: still with secretions and questionable mentation? Baseline?. Will try to dry the secretions and extubated today or maria e.      Interval History/Significant Events:   NAEON. Patient with increased RR/work of breathing/ shallow breathing. Will obtain MRI. Not ideal for extubation today.     Review of Systems   Unable to perform ROS: Intubated     Objective:     Vital Signs (Most Recent):  Temp: 99.4 °F (37.4 °C) (02/10/18 0730)  Pulse: 79 " (02/10/18 1323)  Resp: (!) 21 (02/10/18 1323)  BP: 95/62 (02/10/18 1200)  SpO2: 97 % (02/10/18 1323) Vital Signs (24h Range):  Temp:  [98.6 °F (37 °C)-99.5 °F (37.5 °C)] 99.4 °F (37.4 °C)  Pulse:  [] 79  Resp:  [19-28] 21  SpO2:  [89 %-100 %] 97 %  BP: ()/(47-93) 95/62   Weight: 91.5 kg (201 lb 11.5 oz)  Body mass index is 30.67 kg/m².      Intake/Output Summary (Last 24 hours) at 02/10/18 1359  Last data filed at 02/10/18 1200   Gross per 24 hour   Intake             2405 ml   Output              755 ml   Net             1650 ml       Physical Exam   Constitutional:   obese   HENT:   Head: Normocephalic and atraumatic.   Eyes: Pupils are equal, round, and reactive to light.   Neck: Normal range of motion. No JVD present.   Cardiovascular: Normal rate, regular rhythm and normal heart sounds.    Pulmonary/Chest: No respiratory distress. She has rhonchi in the right lower field.   Decreased breath sounds on left and right base. Intubated   Abdominal: Soft. Bowel sounds are normal. She exhibits no distension. There is no tenderness.   PEG tube without surrounding erythema or purulent discharge   Musculoskeletal: She exhibits no edema.   R posterior thigh wound without purulent drainage. Clean and dry.    Left elbow ulcer without erythema or purulent drainage       Neurological:   Eyes open and tracking. All 4 extremities contracted, no movement.     Vitals reviewed.      Vents:  Vent Mode: A/C (02/10/18 1323)  Ventilator Initiated: Yes (02/04/18 2344)  Set Rate: 18 bmp (02/10/18 1323)  Vt Set: 400 mL (02/10/18 1323)  Pressure Support: 0 cmH20 (02/10/18 1323)  PEEP/CPAP: 5 cmH20 (02/10/18 1323)  Oxygen Concentration (%): 21 (02/10/18 1323)  Peak Airway Pressure: 37 cmH2O (02/10/18 1323)  Plateau Pressure: 26 cmH20 (02/10/18 1323)  Total Ve: 8.88 mL (02/10/18 1323)  F/VT Ratio<105 (RSBI): (!) 48.72 (02/10/18 1323)  Lines/Drains/Airways     Central Venous Catheter Line                 Percutaneous Central Line  Insertion/Assessment - triple lumen  02/04/18 2300 right internal jugular 5 days          Drain                 Gastrostomy/Enterostomy 10/25/16 1401 Percutaneous endoscopic gastrostomy (PEG) midline feeding 473 days         Urethral Catheter 02/05/18 1006 Latex 5 days          Airway                 Airway - Non-Surgical 02/04/18 2329 Endotracheal Tube 5 days          Pressure Ulcer                 Pressure Injury 02/05/18 1045 Left medial Foot Deep tissue injury 5 days         Pressure Injury 02/05/18 1045 Right lateral Foot Deep tissue injury 5 days              Significant Labs:    CBC/Anemia Profile:    Recent Labs  Lab 02/09/18  0259 02/10/18  0334   WBC 12.37 13.30*   HGB 7.9* 8.0*   HCT 27.0* 27.5*    226   MCV 78* 80*   RDW 17.2* 17.5*        Chemistries:    Recent Labs  Lab 02/09/18  0259 02/10/18  0334    139   K 3.9 4.1    108   CO2 27 24   BUN 10 8   CREATININE 0.5 0.5   CALCIUM 8.1* 8.1*   ALBUMIN 1.9* 1.8*   PROT 6.4 6.2   BILITOT 0.2 0.1   ALKPHOS 255* 223*   ALT 63* 94*   AST 66* 102*   MG 2.0 1.8   PHOS 2.3* 2.9       Blood Culture: No results for input(s): LABBLOO in the last 48 hours.  CMP:   Recent Labs  Lab 02/09/18  0259 02/10/18  0334    139   K 3.9 4.1    108   CO2 27 24    102   BUN 10 8   CREATININE 0.5 0.5   CALCIUM 8.1* 8.1*   PROT 6.4 6.2   ALBUMIN 1.9* 1.8*   BILITOT 0.2 0.1   ALKPHOS 255* 223*   AST 66* 102*   ALT 63* 94*   ANIONGAP 7* 7*   EGFRNONAA >60.0 >60.0     Lactic Acid: No results for input(s): LACTATE in the last 48 hours.  Urine Culture: No results for input(s): LABURIN in the last 48 hours.  Urine Studies: No results for input(s): COLORU, APPEARANCEUA, PHUR, SPECGRAV, PROTEINUA, GLUCUA, KETONESU, BILIRUBINUA, OCCULTUA, NITRITE, UROBILINOGEN, LEUKOCYTESUR, RBCUA, WBCUA, BACTERIA, SQUAMEPITHEL, HYALINECASTS in the last 48 hours.    Invalid input(s): MOLINA    Significant Imaging:  I have reviewed all pertinent imaging results/findings  within the past 24 hours.    Assessment/Plan:     Neuro   Seizure-like activity    - continue keppra         Dysphagia as late effect of cerebrovascular disease    Last stoke in 2008        CVA, old, hemiparesis    -patient suffered multiple strokes in past, last one in 2008   -nonverbal at baseline, wheelchair/bedbound    -- MRI (2/10) for further evaluation         Pulmonary   * Acute respiratory failure with hypoxia    Intubated    - on rocephin tx 10 days   - sputum cultures rare gram positive & negative   - blood cultures NGTD          Aspiration pneumonia    Extensive consolidation of the right lung and debris in the right mainstem bronchus and its branches consistent with aspiration pneumonia.    - added rocephin   - stopped vanco & cefepime & flagyl   - CBC monitor down trending   - intubated with plan to extubate maria e           Renal/   Hypernatremia      - BMP continue to monitor   - resolving 142 on 2/7  - added 250 free water pushes     Resolved         UTI (urinary tract infection)    UA (Bacteria, WBC, leukocyte positive)    - History of VRE   - Urine cultures proteus, Identification and susceptibility pending  - Rocephin         GI   PEG (percutaneous endoscopic gastrostomy) status    Tube feeds.         Other   Goals of care, counseling/discussion    -patient full code per ED; recommend Goals of care with primary team given patient's current functional status at baseline           Critical Care Daily Checklist:    A: Awake: RASS Goal/Actual Goal: RASS Goal: 0-->alert and calm  Actual: Krishnamurthy Agitation Sedation Scale (RASS): Agitated   B: Spontaneous Breathing Trial Performed?     C: SAT & SBT Coordinated?  no                      D: Delirium: CAM-ICU Overall CAM-ICU: Positive   E: Early Mobility Performed? No   F: Feeding Goal: Goals: Meet % EEN, EPN  Status: Nutrition Goal Status: goal met   Current Diet Order   Procedures    Diet NPO      AS: Analgesia/Sedation no   T: Thromboembolic  Prophylaxis yes   H: HOB > 300 Yes   U: Stress Ulcer Prophylaxis (if needed) yes   G: Glucose Control prn   B: Bowel Function Stool Occurrence: 1   I: Indwelling Catheter (Lines & De Leon) Necessity yes   D: De-escalation of Antimicrobials/Pharmacotherapies no    Plan for the day/ETD MRI    Code Status:  Family/Goals of Care: Full Code          Critical care was time spent personally by me on the following activities: development of treatment plan with patient or surrogate and bedside caregivers, discussions with consultants, evaluation of patient's response to treatment, examination of patient, ordering and performing treatments and interventions, ordering and review of laboratory studies, ordering and review of radiographic studies, pulse oximetry, re-evaluation of patient's condition. This critical care time did not overlap with that of any other provider or involve time for any procedures.     Connor Cornelius MD  Critical Care Medicine  [facility]

## 2018-02-11 NOTE — ASSESSMENT & PLAN NOTE
-patient full code per ED; recommend Goals of care with primary team given patient's current functional status at baseline    - Goals of care discussion started 2/11/18  - Re-visit goals of care with all daughters; possibly 2/12/18

## 2018-02-11 NOTE — PROGRESS NOTES
"[facility]  Critical Care Medicine  Progress Note    Patient Name: Jonathan Nieves  MRN: [id number]  Admission Date: 2/4/2018  Hospital Length of Stay: 6 days  Code Status: Full Code  Attending Provider: Jeronimo Velez MD  Primary Care Provider: Primary Doctor No   Principal Problem: Acute respiratory failure with hypoxia    Subjective:     HPI:  Mr. Jonathan Nieves is a 51yo female with history of CVA with residual aphasia and dysphagia, non-verbal, s/p PEG tube placement and bed riddenness, osteomyelitis s/p amputation of toe, who comes to the ED from [facility] via EMS in respiratory distress. Per ED/EMS the patient was noted to have "projectile vomiting" yesterday evening (24 hours prior to presentation), which was treated with zofran. During the event there is suspicion the patient possible aspirated. Today the patient was noted to be in resp distress with a fever.     Patient was satting 88% on 4L on EMS arrival, breathing 40 times per minute.  Improved to 96% on 15L NRB with RR high 20s.  Axillary temp 102.    CT chest concerning for aspiration pneumonia.  UA concern for a UTI.       Patient intubated in the ED & Critical Care consulted.       Hospital/ICU Course:  2/5: Critical Care Consulted. Patient intubated in the ED.   2/6: Patient doing well, on SBT; will extubate maria e. Continue treatment for aspiration PNA & UTI.   2/7: abx deescalated, continue to treat for PNA and UTI. Required pressors overnight. Off this morning.   2/8-9: still with secretions and questionable mentation? Baseline?. Will try to dry the secretions and extubated today or maria e.    2/10: MRI completed. Family called, had family meeting with one daughter. Need to re-visit with all daughters. Records requested from [facility] and [facility].     Interval History/Significant Events:   NAEON. MRI done; no acute processes identified.  Family called, had family meeting with one daughter. Need to re-visit with all " colt. Records requested from [facility] and [facility].    Review of Systems   Unable to perform ROS: Intubated     Objective:     Vital Signs (Most Recent):  Temp: 98.8 °F (37.1 °C) (02/11/18 0730)  Pulse: 73 (02/11/18 1630)  Resp: (!) 22 (02/11/18 0305)  BP: 98/65 (02/11/18 1630)  SpO2: 96 % (02/11/18 1630) Vital Signs (24h Range):  Temp:  [98.6 °F (37 °C)-98.8 °F (37.1 °C)] 98.8 °F (37.1 °C)  Pulse:  [72-86] 73  Resp:  [19-24] 22  SpO2:  [90 %-100 %] 96 %  BP: ()/(57-81) 98/65   Weight: 91.5 kg (201 lb 11.5 oz)  Body mass index is 30.67 kg/m².      Intake/Output Summary (Last 24 hours) at 02/11/18 1707  Last data filed at 02/11/18 1600   Gross per 24 hour   Intake             2082 ml   Output             1155 ml   Net              927 ml       Physical Exam   Constitutional: She is sedated and intubated.   Eyes: Pupils are equal, round, and reactive to light.   Neck: No JVD present.   Cardiovascular: Normal rate, regular rhythm and normal heart sounds.    Pulmonary/Chest: She is intubated. No respiratory distress. She has rhonchi in the right lower field.   Abdominal: Soft. Bowel sounds are normal. She exhibits no distension. There is no tenderness.   Musculoskeletal: She exhibits no edema.   Neurological:   All 4 extremities contracted, no movement.  Intubated         Vents:  Vent Mode: A/C (02/11/18 1517)  Ventilator Initiated: Yes (02/04/18 2344)  Set Rate: 18 bmp (02/11/18 1517)  Vt Set: 400 mL (02/11/18 1517)  Pressure Support: 0 cmH20 (02/11/18 1517)  PEEP/CPAP: 5 cmH20 (02/11/18 1517)  Oxygen Concentration (%): 30 (02/11/18 1630)  Peak Airway Pressure: 31 cmH2O (02/11/18 1517)  Plateau Pressure: 0 cmH20 (02/11/18 1517)  Total Ve: 7.79 mL (02/11/18 1517)  F/VT Ratio<105 (RSBI): (!) 48.72 (02/10/18 1323)  Lines/Drains/Airways     Central Venous Catheter Line                 Percutaneous Central Line Insertion/Assessment - triple lumen  02/04/18 2300 right internal jugular 6 days          Drain                  Gastrostomy/Enterostomy 10/25/16 1401 Percutaneous endoscopic gastrostomy (PEG) midline feeding 474 days         Urethral Catheter 02/05/18 1006 Latex 6 days          Airway                 Airway - Non-Surgical 02/04/18 2329 Endotracheal Tube 6 days          Pressure Ulcer                 Pressure Injury 02/05/18 1045 Left medial Foot Deep tissue injury 6 days         Pressure Injury 02/05/18 1045 Right lateral Foot Deep tissue injury 6 days              Significant Labs:    CBC/Anemia Profile:    Recent Labs  Lab 02/10/18  0334 02/11/18  0515   WBC 13.30* 12.49   HGB 8.0* 8.0*   HCT 27.5* 26.8*    265   MCV 80* 78*   RDW 17.5* 17.9*        Chemistries:    Recent Labs  Lab 02/10/18  0334 02/11/18  0515    137   K 4.1 4.3    106   CO2 24 24   BUN 8 9   CREATININE 0.5 0.5   CALCIUM 8.1* 8.0*   ALBUMIN 1.8* 1.9*   PROT 6.2 6.4   BILITOT 0.1 0.2   ALKPHOS 223* 203*   ALT 94* 150*   * 144*   MG 1.8 1.9   PHOS 2.9 3.1       Blood Culture: No results for input(s): LABBLOO in the last 48 hours.  CMP:   Recent Labs  Lab 02/10/18  0334 02/11/18  0515    137   K 4.1 4.3    106   CO2 24 24    100   BUN 8 9   CREATININE 0.5 0.5   CALCIUM 8.1* 8.0*   PROT 6.2 6.4   ALBUMIN 1.8* 1.9*   BILITOT 0.1 0.2   ALKPHOS 223* 203*   * 144*   ALT 94* 150*   ANIONGAP 7* 7*   EGFRNONAA >60.0 >60.0     Urine Culture: No results for input(s): LABURIN in the last 48 hours.  Urine Studies: No results for input(s): COLORU, APPEARANCEUA, PHUR, SPECGRAV, PROTEINUA, GLUCUA, KETONESU, BILIRUBINUA, OCCULTUA, NITRITE, UROBILINOGEN, LEUKOCYTESUR, RBCUA, WBCUA, BACTERIA, SQUAMEPITHEL, HYALINECASTS in the last 48 hours.    Invalid input(s): WRIGHTSUR  All pertinent labs within the past 24 hours have been reviewed.    Significant Imaging:  I have reviewed all pertinent imaging results/findings within the past 24 hours.     MRI: 2/10/18  Findings: The diffusion sequence demonstrates no evidence of acute  infarct. There is severe white matter small vessel ischemic change. There is a remote infarct of the right occipital lobe. There is a mild hemosiderin staining of the brainstem probably from a prior subarachnoid hemorrhage. There is a small focus of hemosiderin posterior right midline medulla. Pituitary and craniocervical junction show nothing unusual. There is left frontal sinusitis change. There is involutional change.    Assessment/Plan:     Neuro   Seizure-like activity    - continue keppra         Dysphagia as late effect of cerebrovascular disease    Last stoke in 2008        CVA, old, hemiparesis    -patient suffered multiple strokes in past, last one in 2008   -nonverbal at baseline, wheelchair/bedbound    MRI (2/10)  Findings: The diffusion sequence demonstrates no evidence of acute infarct. There is severe white matter small vessel ischemic change. There is a remote infarct of the right occipital lobe. There is a mild hemosiderin staining of the brainstem probably from a prior subarachnoid hemorrhage. There is a small focus of hemosiderin posterior right midline medulla. Pituitary and craniocervical junction show nothing unusual. There is left frontal sinusitis change. There is involutional change.    - Records requested from [facility] and [facility]. (check nursing station printer)         Pulmonary   * Acute respiratory failure with hypoxia    Intubated    - on rocephin tx 10 days   - sputum cultures rare gram positive & negative   - blood cultures NGTD          Aspiration pneumonia    Extensive consolidation of the right lung and debris in the right mainstem bronchus and its branches consistent with aspiration pneumonia.    - added rocephin   - stopped vanco & cefepime & flagyl   - CBC monitor down trending   - intubated with plan to extubate maria e           Renal/   Hypernatremia      - BMP continue to monitor   - resolving 142 on 2/7  - added 250 free water pushes     Resolved         UTI (urinary tract  infection)    UA (Bacteria, WBC, leukocyte positive)    - History of VRE   - Urine cultures proteus, Identification and susceptibility pending  - Rocephin completed         GI   PEG (percutaneous endoscopic gastrostomy) status    Tube feeds.         Other   Goals of care, counseling/discussion    -patient full code per ED; recommend Goals of care with primary team given patient's current functional status at baseline    - Goals of care discussion started 2/11/18  - Re-visit goals of care with all daughters; possibly 2/12/18            Critical Care Daily Checklist:    A: Awake: RASS Goal/Actual Goal: RASS Goal: 0-->alert and calm  Actual: Krishnamurthy Agitation Sedation Scale (RASS): Alert and calm   B: Spontaneous Breathing Trial Performed?     C: SAT & SBT Coordinated?  no                      D: Delirium: CAM-ICU Overall CAM-ICU: Negative   E: Early Mobility Performed? No   F: Feeding Goal: Goals: Meet % EEN, EPN  Status: Nutrition Goal Status: goal met   Current Diet Order   Procedures    Diet NPO      AS: Analgesia/Sedation no   T: Thromboembolic Prophylaxis yes   H: HOB > 300 Yes   U: Stress Ulcer Prophylaxis (if needed) Yes     G: Glucose Control    B: Bowel Function Stool Occurrence: 1   I: Indwelling Catheter (Lines & De Leon) Necessity yes   D: De-escalation of Antimicrobials/Pharmacotherapies     Plan for the day/ETD Family discussion    Code Status:  Family/Goals of Care: Full Code  today        Critical care was time spent personally by me on the following activities: development of treatment plan with patient or surrogate and bedside caregivers, discussions with consultants, evaluation of patient's response to treatment, examination of patient, ordering and performing treatments and interventions, ordering and review of laboratory studies, ordering and review of radiographic studies, pulse oximetry, re-evaluation of patient's condition. This critical care time did not overlap with that of any other  provider or involve time for any procedures.     Connor Cornelius MD  Critical Care Medicine  [facility]

## 2018-02-11 NOTE — ASSESSMENT & PLAN NOTE
-patient suffered multiple strokes in past, last one in 2008   -nonverbal at baseline, wheelchair/bedbound    MRI (2/10)  Findings: The diffusion sequence demonstrates no evidence of acute infarct. There is severe white matter small vessel ischemic change. There is a remote infarct of the right occipital lobe. There is a mild hemosiderin staining of the brainstem probably from a prior subarachnoid hemorrhage. There is a small focus of hemosiderin posterior right midline medulla. Pituitary and craniocervical junction show nothing unusual. There is left frontal sinusitis change. There is involutional change.    - Records requested from [facility] and [facility]. (check nursing station printer)

## 2018-02-11 NOTE — ASSESSMENT & PLAN NOTE
UA (Bacteria, WBC, leukocyte positive)    - History of VRE   - Urine cultures proteus, Identification and susceptibility pending  - Rocephin completed

## 2018-02-11 NOTE — SUBJECTIVE & OBJECTIVE
Interval History/Significant Events:   NAEON. MRI done; no acute processes identified.  Family called, had family meeting with one daughter. Need to re-visit with all daughters. Records requested from [facility] and [facility].    Review of Systems   Unable to perform ROS: Intubated     Objective:     Vital Signs (Most Recent):  Temp: 98.8 °F (37.1 °C) (02/11/18 0730)  Pulse: 73 (02/11/18 1630)  Resp: (!) 22 (02/11/18 0305)  BP: 98/65 (02/11/18 1630)  SpO2: 96 % (02/11/18 1630) Vital Signs (24h Range):  Temp:  [98.6 °F (37 °C)-98.8 °F (37.1 °C)] 98.8 °F (37.1 °C)  Pulse:  [72-86] 73  Resp:  [19-24] 22  SpO2:  [90 %-100 %] 96 %  BP: ()/(57-81) 98/65   Weight: 91.5 kg (201 lb 11.5 oz)  Body mass index is 30.67 kg/m².      Intake/Output Summary (Last 24 hours) at 02/11/18 1707  Last data filed at 02/11/18 1600   Gross per 24 hour   Intake             2082 ml   Output             1155 ml   Net              927 ml       Physical Exam   Constitutional: She is sedated and intubated.   Eyes: Pupils are equal, round, and reactive to light.   Neck: No JVD present.   Cardiovascular: Normal rate, regular rhythm and normal heart sounds.    Pulmonary/Chest: She is intubated. No respiratory distress. She has rhonchi in the right lower field.   Abdominal: Soft. Bowel sounds are normal. She exhibits no distension. There is no tenderness.   Musculoskeletal: She exhibits no edema.   Neurological:   All 4 extremities contracted, no movement.  Intubated         Vents:  Vent Mode: A/C (02/11/18 1517)  Ventilator Initiated: Yes (02/04/18 2344)  Set Rate: 18 bmp (02/11/18 1517)  Vt Set: 400 mL (02/11/18 1517)  Pressure Support: 0 cmH20 (02/11/18 1517)  PEEP/CPAP: 5 cmH20 (02/11/18 1517)  Oxygen Concentration (%): 30 (02/11/18 1630)  Peak Airway Pressure: 31 cmH2O (02/11/18 1517)  Plateau Pressure: 0 cmH20 (02/11/18 1517)  Total Ve: 7.79 mL (02/11/18 1517)  F/VT Ratio<105 (RSBI): (!) 48.72 (02/10/18 1323)  Lines/Drains/Airways     Central Venous  Catheter Line                 Percutaneous Central Line Insertion/Assessment - triple lumen  02/04/18 2300 right internal jugular 6 days          Drain                 Gastrostomy/Enterostomy 10/25/16 1401 Percutaneous endoscopic gastrostomy (PEG) midline feeding 474 days         Urethral Catheter 02/05/18 1006 Latex 6 days          Airway                 Airway - Non-Surgical 02/04/18 2329 Endotracheal Tube 6 days          Pressure Ulcer                 Pressure Injury 02/05/18 1045 Left medial Foot Deep tissue injury 6 days         Pressure Injury 02/05/18 1045 Right lateral Foot Deep tissue injury 6 days              Significant Labs:    CBC/Anemia Profile:    Recent Labs  Lab 02/10/18  0334 02/11/18  0515   WBC 13.30* 12.49   HGB 8.0* 8.0*   HCT 27.5* 26.8*    265   MCV 80* 78*   RDW 17.5* 17.9*        Chemistries:    Recent Labs  Lab 02/10/18  0334 02/11/18  0515    137   K 4.1 4.3    106   CO2 24 24   BUN 8 9   CREATININE 0.5 0.5   CALCIUM 8.1* 8.0*   ALBUMIN 1.8* 1.9*   PROT 6.2 6.4   BILITOT 0.1 0.2   ALKPHOS 223* 203*   ALT 94* 150*   * 144*   MG 1.8 1.9   PHOS 2.9 3.1       Blood Culture: No results for input(s): LABBLOO in the last 48 hours.  CMP:   Recent Labs  Lab 02/10/18  0334 02/11/18  0515    137   K 4.1 4.3    106   CO2 24 24    100   BUN 8 9   CREATININE 0.5 0.5   CALCIUM 8.1* 8.0*   PROT 6.2 6.4   ALBUMIN 1.8* 1.9*   BILITOT 0.1 0.2   ALKPHOS 223* 203*   * 144*   ALT 94* 150*   ANIONGAP 7* 7*   EGFRNONAA >60.0 >60.0     Urine Culture: No results for input(s): LABURIN in the last 48 hours.  Urine Studies: No results for input(s): COLORU, APPEARANCEUA, PHUR, SPECGRAV, PROTEINUA, GLUCUA, KETONESU, BILIRUBINUA, OCCULTUA, NITRITE, UROBILINOGEN, LEUKOCYTESUR, RBCUA, WBCUA, BACTERIA, SQUAMEPITHEL, HYALINECASTS in the last 48 hours.    Invalid input(s): MOLINA  All pertinent labs within the past 24 hours have been reviewed.    Significant Imaging:  I  have reviewed all pertinent imaging results/findings within the past 24 hours.     MRI: 2/10/18  Findings: The diffusion sequence demonstrates no evidence of acute infarct. There is severe white matter small vessel ischemic change. There is a remote infarct of the right occipital lobe. There is a mild hemosiderin staining of the brainstem probably from a prior subarachnoid hemorrhage. There is a small focus of hemosiderin posterior right midline medulla. Pituitary and craniocervical junction show nothing unusual. There is left frontal sinusitis change. There is involutional change.

## 2018-02-11 NOTE — NURSING
Pt's O2 88-90% despite suction and Albuterol tx. Breath sounds remain coarse and ETT remains at 25 cm. Dr. Dwyer informed and ordered that FiO2 be increased from 21% to 30% and ABG be obtained in 1 hour as well as STAT CXR. Will carry out orders. WCTM.

## 2018-02-11 NOTE — PLAN OF CARE
Problem: Patient Care Overview  Goal: Plan of Care Review  Outcome: Ongoing (interventions implemented as appropriate)  First time family have come to visit,reviewed plan of care with daughter.

## 2018-02-11 NOTE — PLAN OF CARE
Problem: Patient Care Overview  Goal: Plan of Care Review  Outcome: Ongoing (interventions implemented as appropriate)  Pt remains in CMICU 3088. No acute events overnight. VSS. RASS +2 to 0. Pt continues to not follow commands. Limited extremity movement due to contractions. SR 70-80s. BP stable. Afebrile. Intubated with ETT size 7.5 at 25 cm at lip, A/C R 18, Vt 400, FiO2 30%, PEEP 5, tolerating well. Pt continues to have copious amounts of secretions. PEG in place, TF at goal of 50cc/hr, tolerating well. No BM overnight. De Leon in place, UOA. Skin alterations treated per wound care recommendations. Pain controlled with PRN medication. R IJ TLC. KVO. POC discussed with Jonathan Nieves. Unable to assess understanding at this time. WCTM.

## 2018-02-12 PROBLEM — G82.50 SPASTIC QUADRIPLEGIA: Status: ACTIVE | Noted: 2018-01-01

## 2018-02-12 NOTE — PLAN OF CARE
Problem: Patient Care Overview  Goal: Plan of Care Review  Outcome: Ongoing (interventions implemented as appropriate)   Pt remains intubated with FiO2 30%, has moderate-large amount of secretions with oral and ET tube suctioning as needed. TF continued. Neurology consulted today. Critical care service Dr. Velez spoke with pt's daughter today about POC and goals of care. VS and assessment per flow sheet. Patient progressing towards goals as tolerated. Plan of care reviewed with pt's daughter. All questions and concerns addressed, will continue to monitor.

## 2018-02-12 NOTE — SUBJECTIVE & OBJECTIVE
Interval History/Significant Events:   NAEON.     Review of Systems   Unable to perform ROS: Intubated     Objective:     Vital Signs (Most Recent):  Temp: 99.7 °F (37.6 °C) (02/12/18 1100)  Pulse: 84 (02/12/18 1118)  Resp: 19 (02/12/18 1118)  BP: 102/69 (02/12/18 1100)  SpO2: 98 % (02/12/18 1118) Vital Signs (24h Range):  Temp:  [98.9 °F (37.2 °C)-99.7 °F (37.6 °C)] 99.7 °F (37.6 °C)  Pulse:  [72-90] 84  Resp:  [18-40] 19  SpO2:  [92 %-100 %] 98 %  BP: ()/(54-81) 102/69   Weight: 91.5 kg (201 lb 11.5 oz)  Body mass index is 30.67 kg/m².      Intake/Output Summary (Last 24 hours) at 02/12/18 1211  Last data filed at 02/12/18 1100   Gross per 24 hour   Intake             1920 ml   Output             1115 ml   Net              805 ml       Physical Exam   Constitutional: She is sedated and intubated.   Eyes: Pupils are equal, round, and reactive to light.   Neck: No JVD present.   Cardiovascular: Normal rate, regular rhythm and normal heart sounds.    Pulmonary/Chest: She is intubated. No respiratory distress. She has rhonchi in the right lower field.   Abdominal: Soft. Bowel sounds are normal. She exhibits no distension. There is no tenderness.   Musculoskeletal: She exhibits no edema.   Neurological:   All 4 extremities contracted, no movement.  Intubated         Vents:  Vent Mode: A/C (02/12/18 1118)  Ventilator Initiated: Yes (02/04/18 2344)  Set Rate: 18 bmp (02/12/18 1118)  Vt Set: 400 mL (02/12/18 1118)  Pressure Support: 0 cmH20 (02/12/18 1118)  PEEP/CPAP: 5 cmH20 (02/12/18 1118)  Oxygen Concentration (%): 30 (02/12/18 1118)  Peak Airway Pressure: 32 cmH2O (02/12/18 1118)  Plateau Pressure: 0 cmH20 (02/12/18 1118)  Total Ve: 8.13 mL (02/12/18 1118)  F/VT Ratio<105 (RSBI): (!) 44.81 (02/12/18 1118)  Lines/Drains/Airways     Central Venous Catheter Line                 Percutaneous Central Line Insertion/Assessment - triple lumen  02/04/18 2300 right internal jugular 7 days          Drain                  Gastrostomy/Enterostomy 10/25/16 1401 Percutaneous endoscopic gastrostomy (PEG) midline feeding 474 days         Urethral Catheter 02/05/18 1006 Latex 7 days          Airway                 Airway - Non-Surgical 02/04/18 2329 Endotracheal Tube 7 days          Pressure Ulcer                 Pressure Injury 02/05/18 1045 Left medial Foot Deep tissue injury 7 days         Pressure Injury 02/05/18 1045 Right lateral Foot Deep tissue injury 7 days              Significant Labs:    CBC/Anemia Profile:    Recent Labs  Lab 02/11/18 0515 02/12/18  0332   WBC 12.49 13.90*   HGB 8.0* 8.6*   HCT 26.8* 28.7*    301   MCV 78* 79*   RDW 17.9* 18.4*        Chemistries:    Recent Labs  Lab 02/11/18 0515 02/12/18  0332    136   K 4.3 4.3    104   CO2 24 26   BUN 9 9   CREATININE 0.5 0.6   CALCIUM 8.0* 8.4*   ALBUMIN 1.9* 2.1*   PROT 6.4 7.1   BILITOT 0.2 0.2   ALKPHOS 203* 190*   * 145*   * 105*   MG 1.9 2.0   PHOS 3.1 3.1       Blood Culture: No results for input(s): LABBLOO in the last 48 hours.  CMP:     Recent Labs  Lab 02/11/18 0515 02/12/18  0332    136   K 4.3 4.3    104   CO2 24 26    102   BUN 9 9   CREATININE 0.5 0.6   CALCIUM 8.0* 8.4*   PROT 6.4 7.1   ALBUMIN 1.9* 2.1*   BILITOT 0.2 0.2   ALKPHOS 203* 190*   * 105*   * 145*   ANIONGAP 7* 6*   EGFRNONAA >60.0 >60.0     Urine Culture: No results for input(s): LABURIN in the last 48 hours.  Urine Studies: No results for input(s): COLORU, APPEARANCEUA, PHUR, SPECGRAV, PROTEINUA, GLUCUA, KETONESU, BILIRUBINUA, OCCULTUA, NITRITE, UROBILINOGEN, LEUKOCYTESUR, RBCUA, WBCUA, BACTERIA, SQUAMEPITHEL, HYALINECASTS in the last 48 hours.    Invalid input(s): WRIGHTSUR  All pertinent labs within the past 24 hours have been reviewed.    Significant Imaging:  I have reviewed all pertinent imaging results/findings within the past 24 hours.     MRI: 2/10/18  Findings: The diffusion sequence demonstrates no evidence of acute  infarct. There is severe white matter small vessel ischemic change. There is a remote infarct of the right occipital lobe. There is a mild hemosiderin staining of the brainstem probably from a prior subarachnoid hemorrhage. There is a small focus of hemosiderin posterior right midline medulla. Pituitary and craniocervical junction show nothing unusual. There is left frontal sinusitis change. There is involutional change.

## 2018-02-12 NOTE — ASSESSMENT & PLAN NOTE
-patient suffered multiple strokes in past, last one in 2008   -nonverbal at baseline, wheelchair/bedbound    MRI (2/10)  Findings: The diffusion sequence demonstrates no evidence of acute infarct. There is severe white matter small vessel ischemic change. There is a remote infarct of the right occipital lobe. There is a mild hemosiderin staining of the brainstem probably from a prior subarachnoid hemorrhage. There is a small focus of hemosiderin posterior right midline medulla. Pituitary and craniocervical junction show nothing unusual. There is left frontal sinusitis change. There is involutional change.    - Records requested from [facility] and [facility].did not include brain images or neurology notes.  - neurology consulted.

## 2018-02-12 NOTE — PLAN OF CARE
Problem: Patient Care Overview  Goal: Plan of Care Review  Outcome: Ongoing (interventions implemented as appropriate)  Pt remains in CMICU 3088. No acute events overnight. VSS. RASS +2 to -1. SR 70-90s. BP stable. Afebrile. Intubated with ETT size 7.5 at 25 cm at lip, A/C R 18, Vt 400, FiO2 30%, PEEP 5, tolerating well. PEG in place, TF at goal of 50cc/hr, tolerating well with no residuals. x1 vomiting episode. x1 BM overnight. De Leon in place, UOA. Skin alterations treated per wound care recommendations. Pain controlled. R IJ TLC. KVO gtts. POC discussed with Jonathan Nieves. Unable to assess understanding at this time. WCTM.

## 2018-02-12 NOTE — PROGRESS NOTES
"[facility]  Critical Care Medicine  Progress Note    Patient Name: Jonathan Nieves  MRN: [id number]  Admission Date: 2/4/2018  Hospital Length of Stay: 7 days  Code Status: Full Code  Attending Provider: Jeronimo Velez MD  Primary Care Provider: Primary Doctor No   Principal Problem: Acute respiratory failure with hypoxia    Subjective:     HPI:  Mr. Jonathan Nieves is a 51yo female with history of CVA with residual aphasia and dysphagia, non-verbal, s/p PEG tube placement and bed riddenness, osteomyelitis s/p amputation of toe, who comes to the ED from [facility] via EMS in respiratory distress. Per ED/EMS the patient was noted to have "projectile vomiting" yesterday evening (24 hours prior to presentation), which was treated with zofran. During the event there is suspicion the patient possible aspirated. Today the patient was noted to be in resp distress with a fever.     Patient was satting 88% on 4L on EMS arrival, breathing 40 times per minute.  Improved to 96% on 15L NRB with RR high 20s.  Axillary temp 102.    CT chest concerning for aspiration pneumonia.  UA concern for a UTI.       Patient intubated in the ED & Critical Care consulted.         Hospital/ICU Course:  2/5: Critical Care Consulted. Patient intubated in the ED.   2/6: Patient doing well, on SBT; will extubate maria e. Continue treatment for aspiration PNA & UTI.   2/7: abx deescalated, continue to treat for PNA and UTI. Required pressors overnight. Off this morning.   2/8-9: still with secretions and questionable mentation? Baseline?. Will try to dry the secretions and extubated today or maria e.    2/10: MRI completed. Family called, had family meeting with one daughter. Need to re-visit with all daughters. Records requested from [facility] and [facility].   02/11-12: neurology consulted, no acute changes.    Interval History/Significant Events:   NAEON.     Review of Systems   Unable to perform ROS: Intubated "     Objective:     Vital Signs (Most Recent):  Temp: 99.7 °F (37.6 °C) (02/12/18 1100)  Pulse: 84 (02/12/18 1118)  Resp: 19 (02/12/18 1118)  BP: 102/69 (02/12/18 1100)  SpO2: 98 % (02/12/18 1118) Vital Signs (24h Range):  Temp:  [98.9 °F (37.2 °C)-99.7 °F (37.6 °C)] 99.7 °F (37.6 °C)  Pulse:  [72-90] 84  Resp:  [18-40] 19  SpO2:  [92 %-100 %] 98 %  BP: ()/(54-81) 102/69   Weight: 91.5 kg (201 lb 11.5 oz)  Body mass index is 30.67 kg/m².      Intake/Output Summary (Last 24 hours) at 02/12/18 1211  Last data filed at 02/12/18 1100   Gross per 24 hour   Intake             1920 ml   Output             1115 ml   Net              805 ml       Physical Exam   Constitutional: She is sedated and intubated.   Eyes: Pupils are equal, round, and reactive to light.   Neck: No JVD present.   Cardiovascular: Normal rate, regular rhythm and normal heart sounds.    Pulmonary/Chest: She is intubated. No respiratory distress. She has rhonchi in the right lower field.   Abdominal: Soft. Bowel sounds are normal. She exhibits no distension. There is no tenderness.   Musculoskeletal: She exhibits no edema.   Neurological:   All 4 extremities contracted, no movement.  Intubated         Vents:  Vent Mode: A/C (02/12/18 1118)  Ventilator Initiated: Yes (02/04/18 2344)  Set Rate: 18 bmp (02/12/18 1118)  Vt Set: 400 mL (02/12/18 1118)  Pressure Support: 0 cmH20 (02/12/18 1118)  PEEP/CPAP: 5 cmH20 (02/12/18 1118)  Oxygen Concentration (%): 30 (02/12/18 1118)  Peak Airway Pressure: 32 cmH2O (02/12/18 1118)  Plateau Pressure: 0 cmH20 (02/12/18 1118)  Total Ve: 8.13 mL (02/12/18 1118)  F/VT Ratio<105 (RSBI): (!) 44.81 (02/12/18 1118)  Lines/Drains/Airways     Central Venous Catheter Line                 Percutaneous Central Line Insertion/Assessment - triple lumen  02/04/18 2300 right internal jugular 7 days          Drain                 Gastrostomy/Enterostomy 10/25/16 1401 Percutaneous endoscopic gastrostomy (PEG) midline feeding 474 days          Urethral Catheter 02/05/18 1006 Latex 7 days          Airway                 Airway - Non-Surgical 02/04/18 2329 Endotracheal Tube 7 days          Pressure Ulcer                 Pressure Injury 02/05/18 1045 Left medial Foot Deep tissue injury 7 days         Pressure Injury 02/05/18 1045 Right lateral Foot Deep tissue injury 7 days              Significant Labs:    CBC/Anemia Profile:    Recent Labs  Lab 02/11/18  0515 02/12/18  0332   WBC 12.49 13.90*   HGB 8.0* 8.6*   HCT 26.8* 28.7*    301   MCV 78* 79*   RDW 17.9* 18.4*        Chemistries:    Recent Labs  Lab 02/11/18  0515 02/12/18  0332    136   K 4.3 4.3    104   CO2 24 26   BUN 9 9   CREATININE 0.5 0.6   CALCIUM 8.0* 8.4*   ALBUMIN 1.9* 2.1*   PROT 6.4 7.1   BILITOT 0.2 0.2   ALKPHOS 203* 190*   * 145*   * 105*   MG 1.9 2.0   PHOS 3.1 3.1       Blood Culture: No results for input(s): LABBLOO in the last 48 hours.  CMP:     Recent Labs  Lab 02/11/18  0515 02/12/18  0332    136   K 4.3 4.3    104   CO2 24 26    102   BUN 9 9   CREATININE 0.5 0.6   CALCIUM 8.0* 8.4*   PROT 6.4 7.1   ALBUMIN 1.9* 2.1*   BILITOT 0.2 0.2   ALKPHOS 203* 190*   * 105*   * 145*   ANIONGAP 7* 6*   EGFRNONAA >60.0 >60.0     Urine Culture: No results for input(s): LABURIN in the last 48 hours.  Urine Studies: No results for input(s): COLORU, APPEARANCEUA, PHUR, SPECGRAV, PROTEINUA, GLUCUA, KETONESU, BILIRUBINUA, OCCULTUA, NITRITE, UROBILINOGEN, LEUKOCYTESUR, RBCUA, WBCUA, BACTERIA, SQUAMEPITHEL, HYALINECASTS in the last 48 hours.    Invalid input(s): WRIGHTSUR  All pertinent labs within the past 24 hours have been reviewed.    Significant Imaging:  I have reviewed all pertinent imaging results/findings within the past 24 hours.     MRI: 2/10/18  Findings: The diffusion sequence demonstrates no evidence of acute infarct. There is severe white matter small vessel ischemic change. There is a remote infarct of the  right occipital lobe. There is a mild hemosiderin staining of the brainstem probably from a prior subarachnoid hemorrhage. There is a small focus of hemosiderin posterior right midline medulla. Pituitary and craniocervical junction show nothing unusual. There is left frontal sinusitis change. There is involutional change.    Assessment/Plan:     Neuro   Seizure-like activity    - continue keppra         Dysphagia as late effect of cerebrovascular disease    Last stoke in 2008        CVA, old, hemiparesis    -patient suffered multiple strokes in past, last one in 2008   -nonverbal at baseline, wheelchair/bedbound    MRI (2/10)  Findings: The diffusion sequence demonstrates no evidence of acute infarct. There is severe white matter small vessel ischemic change. There is a remote infarct of the right occipital lobe. There is a mild hemosiderin staining of the brainstem probably from a prior subarachnoid hemorrhage. There is a small focus of hemosiderin posterior right midline medulla. Pituitary and craniocervical junction show nothing unusual. There is left frontal sinusitis change. There is involutional change.    - [facility]did not include brain images or neurology notes.  - neurology consulted.          Pulmonary   * Acute respiratory failure with hypoxia    Intubated    - on rocephin tx 10 days   - sputum cultures rare gram positive & negative   - blood cultures NGTD          Aspiration pneumonia    Extensive consolidation of the right lung and debris in the right mainstem bronchus and its branches consistent with aspiration pneumonia.    - added rocephin   - stopped vanco & cefepime & flagyl   - CBC monitor down trending   - intubated with plan to extubate maria e           Renal/   Hypernatremia      - BMP continue to monitor   - resolving 142 on 2/7  - added 250 free water pushes     Resolved         UTI (urinary tract infection)    UA (Bacteria, WBC, leukocyte positive)    - History of  VRE   - Urine cultures proteus, Identification and susceptibility pending  - Rocephin completed         GI   PEG (percutaneous endoscopic gastrostomy) status    Tube feeds.         Other   Goals of care, counseling/discussion    -patient full code per ED; recommend Goals of care with primary team given patient's current functional status at baseline    - Goals of care discussion started 2/11/18  - Re-visit goals of care with all daughters; possibly 2/12/18         Critical care was time spent personally by me on the following activities: development of treatment plan with patient or surrogate and bedside caregivers, discussions with consultants, evaluation of patient's response to treatment, examination of patient, ordering and performing treatments and interventions, ordering and review of laboratory studies, ordering and review of radiographic studies, pulse oximetry, re-evaluation of patient's condition. This critical care time did not overlap with that of any other provider or involve time for any procedures.     Ana Chauhan MD  Critical Care Medicine  [facility]

## 2018-02-12 NOTE — NURSING
Pt had witnessed episode of projectile vomiting. Approximately 500cc of brown emesis noted. Pt suctioned, VSS. Zofran administered. WCTM.

## 2018-02-13 NOTE — ASSESSMENT & PLAN NOTE
History of 2 discrete strokes which are visualized on MRI. 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 Presence of white matter disease is not acute and reported on imaging from 2008. Given young age - differential includes chronic microvascular changes (less likely), vanishing white matter disease (less likely given predominance in children), Binswanger disease, CADASIL.   Given extensive white matter disease already present, severe neurologic deficits and irreversibility of above mentioned etiologies, the prognosis for meaningful neurologic recovery is poor.     Would like to obtain records from [facility] regarding workup that may have been completed for leukoencephalopathy.   If NOTCH3 gene has not been sent for - recommend sending for it given genetic predilection of the disease.

## 2018-02-13 NOTE — CONSULTS
"[facility]  Neurology  Consult Note    Patient Name: Jonathan Nieves  MRN: [id number]  Admission Date: 2/4/2018  Hospital Length of Stay: 7 days  Code Status: Full Code   Attending Provider: Jeronimo Velez MD   Consulting Provider:Areli Houston MD  Primary Care Physician: Primary Doctor No  Principal Problem:Acute respiratory failure with hypoxia    Consults   Subjective:     Chief Complaint:  White matter disease    HPI:   52 yr old female with PMHx of strokes with residual reported aphasia, dysphagia (s/p PEG tube placement) and quadriplegia who presented to the ED from [facility] via EMS in respiratory distress. Per chart review, the patient was noted to have "projectile vomiting" the day prior to presentation. Patient was admitted for respiratory failure secondary to aspiration pneumonia. There has been trouble weaning patient off the ventilator. Given weakness, and poor mentation, MRI brain was done to evaluate which was remarkable for strokes and extensive white matter disease. Primary team to have discussion about goals of care. Neurology is consulted to help determine etiology of patient's neurologic condition to help aid in prognosis for neurologic recovery given patient's current condition.   Per discussion with family -   2007 - first stroke without reported residual deficits  2008 - second stroke - had weakness but improved with rehab, required some assistance to walk  2009 - no reported stroke however patient continued to decline neurologically with onset of dysphagia, started becoming non-verbal and possibly aphasic. Since then has been bed ridden and being cared for at a SNF. No reported history migraines or seizures per family.      Past Medical History:   Diagnosis Date    Anemia     iron def    Depression     Dysarthria     HTN (hypertension)     Hyperlipemia     Osteomyelitis     Seizures     Stroke        Past Surgical History: "   Procedure Laterality Date    TOE AMPUTATION         Review of patient's allergies indicates:   Allergen Reactions    Amoxicillin Other (See Comments)     Per daughter always allergic, unknown rxn       Current Neurological Medications:     No current facility-administered medications on file prior to encounter.      Current Outpatient Prescriptions on File Prior to Encounter   Medication Sig    aspirin 81 MG Chew 1 tablet (81 mg total) by Per G Tube route once daily.    baclofen (LIORESAL) 20 MG tablet Take 1 tablet (20 mg total) by mouth every 4 (four) hours.    calcium-vitamin D3 500 mg(1,250mg) -200 unit per tablet 1 tablet by Per G Tube route 2 (two) times daily.    citalopram (CELEXA) 10 MG tablet 1 tablet (10 mg total) by Per G Tube route once daily.    levetiracetam (KEPPRA) 500 MG Tab Take 1 tablet (500 mg total) by mouth 2 (two) times daily.    metoprolol tartrate (LOPRESSOR) 25 MG tablet Take 12.5 mg by mouth 2 (two) times daily.    pantoprazole (PROTONIX) 40 mg GrPS Use twice daily for 8 weeks then start once daily indefinitely.    polyethylene glycol (GLYCOLAX) 17 gram/dose powder Take 17 g by mouth once daily.    propranolol (INDERAL) 20 mg/5 mL (4 mg/mL) Soln Take 2.5 mLs (10 mg total) by mouth every 4 (four) hours as needed (SBP >180 or HR >120 or RR >30 or diaphoresis. Hold for SBP <90).    simvastatin (ZOCOR) 40 MG tablet 1 tablet (40 mg total) by Per G Tube route every evening.     Family History     None        Social History Main Topics    Smoking status: Unknown If Ever Smoked    Smokeless tobacco: Never Used    Alcohol use No    Drug use: No    Sexual activity: Not on file     Review of Systems   Unable to perform ROS: Mental status change     Objective:     Vital Signs (Most Recent):  Temp: 99.2 °F (37.3 °C) (02/12/18 1500)  Pulse: 81 (02/12/18 1800)  Resp: 17 (02/12/18 1800)  BP: 116/77 (02/12/18 1800)  SpO2: 96 % (02/12/18 1800) Vital Signs (24h Range):  Temp:  [98.9 °F  (37.2 °C)-99.7 °F (37.6 °C)] 99.2 °F (37.3 °C)  Pulse:  [72-90] 81  Resp:  [17-40] 17  SpO2:  [92 %-100 %] 96 %  BP: ()/(54-77) 116/77     Weight: 91.5 kg (201 lb 11.5 oz)  Body mass index is 30.67 kg/m².    Physical Exam   HENT:   Head: Normocephalic and atraumatic.   Eyes: Pupils are equal, round, and reactive to light.   Pulmonary/Chest:   intubated   Neurological:   Reflex Scores:       Bicep reflexes are 3+ on the right side and 3+ on the left side.       Brachioradialis reflexes are 3+ on the right side and 3+ on the left side.      NEUROLOGICAL EXAMINATION:     MENTAL STATUS   Level of consciousness: alert       Seems to track intermittently however only to loud sounds. Not following any commands.      CRANIAL NERVES     CN III, IV, VI   Pupils are equal, round, and reactive to light.       Horizontal ocular motility to the right intact, did not seem to cross midline     MOTOR EXAM   Muscle bulk: decreased  Right arm tone: spastic  Left arm tone: spastic  Right leg tone: spastic  Left leg tone: spastic       Arms and legs contracted bilaterally     REFLEXES     Reflexes   Right brachioradialis: 3+  Left brachioradialis: 3+  Right biceps: 3+  Left biceps: 3+    SENSORY EXAM        +triple flexion to pain in bilateral lower extremities     GAIT AND COORDINATION        Unable to perform 2/2 decreased participation       Significant Labs:   Recent Lab Results       02/12/18  0332      Immature Granulocytes 2.1(H)     Immature Grans (Abs) 0.29  Comment:  Mild elevation in immature granulocytes is non specific and   can be seen in a variety of conditions including stress response,   acute inflammation, trauma and pregnancy. Correlation with other   laboratory and clinical findings is essential.  (H)     Albumin 2.1(L)     Alkaline Phosphatase 190(H)     (H)     Anion Gap 6(L)     (H)     Baso # 0.04     Basophil% 0.3     Total Bilirubin 0.2  Comment:  For infants and newborns, interpretation of  results should be based  on gestational age, weight and in agreement with clinical  observations.  Premature Infant recommended reference ranges:  Up to 24 hours.............<8.0 mg/dL  Up to 48 hours............<12.0 mg/dL  3-5 days..................<15.0 mg/dL  6-29 days.................<15.0 mg/dL       BUN, Bld 9     Calcium 8.4(L)     Chloride 104     CO2 26     Creatinine 0.6     Differential Method Automated     eGFR if African American >60.0     eGFR if non  >60.0  Comment:  Calculation used to obtain the estimated glomerular filtration  rate (eGFR) is the CKD-EPI equation.        Eos # 0.4     Eosinophil% 2.5     Glucose 102     Gran # (ANC) 9.7(H)     Gran% 69.4     Hematocrit 28.7(L)     Hemoglobin 8.6(L)     Lymph # 2.3     Lymph% 16.7(L)     Magnesium 2.0     MCH 23.8(L)     MCHC 30.0(L)     MCV 79(L)     Mono # 1.3(H)     Mono% 9.0     MPV 12.9     nRBC 0     Phosphorus 3.1     Platelets 301     Potassium 4.3     Total Protein 7.1     RBC 3.62(L)     RDW 18.4(H)     Sodium 136     WBC 13.90(H)           Significant Imaging:     MRI brain WO contrast (2/10/18)  The diffusion sequence demonstrates no evidence of acute infarct. There is severe white matter small vessel ischemic change. There is a remote infarct of the right occipital lobe. There is a mild hemosiderin staining of the brainstem probably from a prior subarachnoid hemorrhage. There is a small focus of hemosiderin posterior right midline medulla. Pituitary and craniocervical junction show nothing unusual. There is left frontal sinusitis change. There is involutional change.    Assessment and Plan:     Spastic quadriplegia    History of 2 discrete strokes which are visualized on MRI. 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 Presence of white matter disease is not acute and reported on imaging from 2008 and has a vast differential without any comparison imaging or having known any  prior w/u. Given young age and risk factors, differential includes but is not limited to CADASIL (given h/o strokes, extensive white matter involvement including the temporal lobes), adult onset adrenoleukodystrophy, chronic microvascular changes (less likely), vanishing white matter disease (less likely given predominance in children), or Binswanger disease (subcortical leukoencephalopathy or vascular dementia 2/2 chronic HTN).     Given extensive white matter disease already present, severe neurologic deficits and irreversibility of above mentioned etiologies, the prognosis for meaningful neurologic recovery is poor.     Would like to obtain records from [facility] regarding workup that may have been completed for leukoencephalopathy.     If NOTCH3 gene has not been sent for - recommend sending for it given genetic predilection of the disease.             VTE Risk Mitigation         Ordered     heparin (porcine) injection 5,000 Units  Every 8 hours     Route:  Subcutaneous        02/05/18 0002     Medium Risk of VTE  Once      02/05/18 0002          Thank you for your consult. I will sign off. Please contact us if you have any additional questions.    Areli Freire MD  Neurology  [facility]    I have reviewed and concur with the resident's history, physical, assessment, and plan.  I have personally interviewed and examined the patient at bedside.        Nisreen Cherry MD  General Neurology Staff  [facility]

## 2018-02-13 NOTE — PLAN OF CARE
Problem: Patient Care Overview  Goal: Plan of Care Review  Outcome: Ongoing (interventions implemented as appropriate)  No acute events throughout day. See vital signs and assessments in flowsheets. See below for updates on today's progress.     Pulmonary: Patient remains intubated ETT 7.5 25 @ lip AC 30%/400/18/5, + secretions, coarse LS bilaterally, sats maintained >96%.     Cardiovascular: NSR HR 80s, BP 110s/70s, no ectopies noted.     Neurological: Intubated, does not follow commands, does not tracts. Opens eyes spontaneously. Intact cough, gag and corneal. Patient contracted.     Gastrointestinal: No BM, TFs @ goal 50cc/hr, minimal residuals. Normoactive BS x4.    Genitourinary: [place] uop 25-50cc/hr.    Endocrine: Normoglycemic.    Integumentary/Other: Patient remains contracted, several DTIs and PUs, R IJ TLC.     Infusions: Mag replacement.     Patient progressing towards goals as tolerated, plan of care communicated and reviewed with Jonathan Nieves and family. All concerns addressed. Will continue to monitor.

## 2018-02-13 NOTE — SUBJECTIVE & OBJECTIVE
Interval History/Significant Events: NAEON.     Review of Systems   Unable to perform ROS: Intubated     Objective:     Vital Signs (Most Recent):  Temp: 99 °F (37.2 °C) (02/13/18 1500)  Pulse: 76 (02/13/18 1743)  Resp: (!) 24 (02/13/18 1343)  BP: 102/67 (02/13/18 1600)  SpO2: (!) 93 % (02/13/18 1743) Vital Signs (24h Range):  Temp:  [97.8 °F (36.6 °C)-99.6 °F (37.6 °C)] 99 °F (37.2 °C)  Pulse:  [76-87] 76  Resp:  [17-31] 24  SpO2:  [93 %-100 %] 93 %  BP: ()/(62-87) 102/67   Weight: 91.5 kg (201 lb 11.5 oz)  Body mass index is 30.67 kg/m².      Intake/Output Summary (Last 24 hours) at 02/13/18 1756  Last data filed at 02/13/18 1700   Gross per 24 hour   Intake             2105 ml   Output             1150 ml   Net              955 ml       Physical Exam   Constitutional: She is sedated and intubated.   Eyes: Pupils are equal, round, and reactive to light.   Neck: No JVD present.   Cardiovascular: Normal rate, regular rhythm and normal heart sounds.    Pulmonary/Chest: She is intubated. No respiratory distress. She has rhonchi in the right lower field.   Abdominal: Soft. Bowel sounds are normal. She exhibits no distension. There is no tenderness.   Musculoskeletal: She exhibits no edema.   Neurological:   All 4 extremities contracted, no movement.  Intubated         Vents:  Vent Mode: A/C (02/13/18 1743)  Ventilator Initiated: Yes (02/04/18 2344)  Set Rate: 18 bmp (02/13/18 1743)  Vt Set: 400 mL (02/13/18 1743)  Pressure Support: 0 cmH20 (02/13/18 1743)  PEEP/CPAP: 5 cmH20 (02/13/18 1743)  Oxygen Concentration (%): 30 (02/13/18 1743)  Peak Airway Pressure: 36 cmH2O (02/13/18 1743)  Plateau Pressure: 0 cmH20 (02/13/18 1743)  Total Ve: 8.73 mL (02/13/18 1743)  F/VT Ratio<105 (RSBI): (!) 54.3 (02/13/18 1343)  Lines/Drains/Airways     Central Venous Catheter Line                 Percutaneous Central Line Insertion/Assessment - triple lumen  02/04/18 2300 right internal jugular 8 days          Drain                  Gastrostomy/Enterostomy 10/25/16 1401 Percutaneous endoscopic gastrostomy (PEG) midline feeding 476 days         Urethral Catheter 02/05/18 1006 Latex 8 days          Airway                 Airway - Non-Surgical 02/04/18 2329 Endotracheal Tube 8 days          Pressure Ulcer                 Pressure Injury 02/05/18 1045 Left medial Foot Deep tissue injury 8 days         Pressure Injury 02/05/18 1045 Right lateral Foot Deep tissue injury 8 days              Significant Labs:    CBC/Anemia Profile:    Recent Labs  Lab 02/12/18 0332 02/13/18  0320   WBC 13.90* 14.15*   HGB 8.6* 8.1*   HCT 28.7* 27.8*    331   MCV 79* 80*   RDW 18.4* 18.6*        Chemistries:    Recent Labs  Lab 02/12/18 0332 02/13/18 0320 02/13/18  0658    137  --    K 4.3 4.3  --     104  --    CO2 26 25  --    BUN 9 11  --    CREATININE 0.6 0.6  --    CALCIUM 8.4* 7.9*  --    ALBUMIN 2.1* 2.0*  --    PROT 7.1 6.7  --    BILITOT 0.2 0.2  --    ALKPHOS 190* 163*  --    * 129*  --    * 71*  --    MG 2.0 1.6 2.2   PHOS 3.1 3.1  --        Blood Culture: No results for input(s): LABBLOO in the last 48 hours.  CMP:     Recent Labs  Lab 02/12/18 0332 02/13/18  0320    137   K 4.3 4.3    104   CO2 26 25    112*   BUN 9 11   CREATININE 0.6 0.6   CALCIUM 8.4* 7.9*   PROT 7.1 6.7   ALBUMIN 2.1* 2.0*   BILITOT 0.2 0.2   ALKPHOS 190* 163*   * 71*   * 129*   ANIONGAP 6* 8   EGFRNONAA >60.0 >60.0     Urine Culture: No results for input(s): LABURIN in the last 48 hours.  Urine Studies: No results for input(s): COLORU, APPEARANCEUA, PHUR, SPECGRAV, PROTEINUA, GLUCUA, KETONESU, BILIRUBINUA, OCCULTUA, NITRITE, UROBILINOGEN, LEUKOCYTESUR, RBCUA, WBCUA, BACTERIA, SQUAMEPITHEL, HYALINECASTS in the last 48 hours.    Invalid input(s): ARTHURR  All pertinent labs within the past 24 hours have been reviewed.    Significant Imaging:  I have reviewed all pertinent imaging results/findings within the past 24  hours.     MRI: 2/10/18  Findings: The diffusion sequence demonstrates no evidence of acute infarct. There is severe white matter small vessel ischemic change. There is a remote infarct of the right occipital lobe. There is a mild hemosiderin staining of the brainstem probably from a prior subarachnoid hemorrhage. There is a small focus of hemosiderin posterior right midline medulla. Pituitary and craniocervical junction show nothing unusual. There is left frontal sinusitis change. There is involutional change.

## 2018-02-13 NOTE — ASSESSMENT & PLAN NOTE
-patient suffered multiple strokes in past, last one in 2008   -nonverbal at baseline, wheelchair/bedbound    MRI (2/10)  Findings: The diffusion sequence demonstrates no evidence of acute infarct. There is severe white matter small vessel ischemic change. There is a remote infarct of the right occipital lobe. There is a mild hemosiderin staining of the brainstem probably from a prior subarachnoid hemorrhage. There is a small focus of hemosiderin posterior right midline medulla. Pituitary and craniocervical junction show nothing unusual. There is left frontal sinusitis change. There is involutional change.  - [facility]did not include brain images or neurology notes.  - neurology consulted: differential includes but is not limited to CADASIL (given h/o strokes, extensive white matter involvement including the temporal lobes), adult onset adrenoleukodystrophy, chronic microvascular changes (less likely), vanishing white matter disease (less likely given predominance in children), or [place] disease (subcortical leukoencephalopathy or vascular dementia 2/2 chronic HTN).

## 2018-02-13 NOTE — HPI
"52 yr old female with PMHx of strokes with residual reported aphasia, dysphagia (s/p PEG tube placement) and quadriplegia who presented to the ED from [facility] via EMS in respiratory distress. Per chart review, the patient was noted to have "projectile vomiting" the day prior to presentation. Patient was admitted for respiratory failure secondary to aspiration pneumonia. There has been trouble weaning patient off the ventilator. Given weakness, and poor mentation, MRI brain was done to evaluate which was remarkable for strokes and extensive white matter disease. Primary team to have discussion about goals of care. Neurology is consulted to help determine etiology of patient's neurologic condition to help aid in prognosis for neurologic recovery given patient's current condition.   Per discussion with family -   2007 - first stroke without reported residual deficits  2008 - second stroke - had weakness but improved with rehab, required some assistance to walk  2009 - no reported stroke however patient continued to decline neurologically with onset of dysphagia, started becoming non-verbal and possibly aphasic. Since then has been bed ridden and being cared for at a SNF. No reported history migraines or seizures per family.   "

## 2018-02-13 NOTE — SUBJECTIVE & OBJECTIVE
Past Medical History:   Diagnosis Date    Anemia     iron def    Depression     Dysarthria     HTN (hypertension)     Hyperlipemia     Osteomyelitis     Seizures     Stroke        Past Surgical History:   Procedure Laterality Date    TOE AMPUTATION         Review of patient's allergies indicates:   Allergen Reactions    Amoxicillin Other (See Comments)     Per daughter always allergic, unknown rxn       Current Neurological Medications:     No current facility-administered medications on file prior to encounter.      Current Outpatient Prescriptions on File Prior to Encounter   Medication Sig    aspirin 81 MG Chew 1 tablet (81 mg total) by Per G Tube route once daily.    baclofen (LIORESAL) 20 MG tablet Take 1 tablet (20 mg total) by mouth every 4 (four) hours.    calcium-vitamin D3 500 mg(1,250mg) -200 unit per tablet 1 tablet by Per G Tube route 2 (two) times daily.    citalopram (CELEXA) 10 MG tablet 1 tablet (10 mg total) by Per G Tube route once daily.    levetiracetam (KEPPRA) 500 MG Tab Take 1 tablet (500 mg total) by mouth 2 (two) times daily.    metoprolol tartrate (LOPRESSOR) 25 MG tablet Take 12.5 mg by mouth 2 (two) times daily.    pantoprazole (PROTONIX) 40 mg GrPS Use twice daily for 8 weeks then start once daily indefinitely.    polyethylene glycol (GLYCOLAX) 17 gram/dose powder Take 17 g by mouth once daily.    propranolol (INDERAL) 20 mg/5 mL (4 mg/mL) Soln Take 2.5 mLs (10 mg total) by mouth every 4 (four) hours as needed (SBP >180 or HR >120 or RR >30 or diaphoresis. Hold for SBP <90).    simvastatin (ZOCOR) 40 MG tablet 1 tablet (40 mg total) by Per G Tube route every evening.     Family History     None        Social History Main Topics    Smoking status: Unknown If Ever Smoked    Smokeless tobacco: Never Used    Alcohol use No    Drug use: No    Sexual activity: Not on file     Review of Systems   Unable to perform ROS: Mental status change     Objective:     Vital  Signs (Most Recent):  Temp: 99.2 °F (37.3 °C) (02/12/18 1500)  Pulse: 81 (02/12/18 1800)  Resp: 17 (02/12/18 1800)  BP: 116/77 (02/12/18 1800)  SpO2: 96 % (02/12/18 1800) Vital Signs (24h Range):  Temp:  [98.9 °F (37.2 °C)-99.7 °F (37.6 °C)] 99.2 °F (37.3 °C)  Pulse:  [72-90] 81  Resp:  [17-40] 17  SpO2:  [92 %-100 %] 96 %  BP: ()/(54-77) 116/77     Weight: 91.5 kg (201 lb 11.5 oz)  Body mass index is 30.67 kg/m².    Physical Exam   HENT:   Head: Normocephalic and atraumatic.   Eyes: Pupils are equal, round, and reactive to light.   Pulmonary/Chest:   intubated   Neurological:   Reflex Scores:       Bicep reflexes are 3+ on the right side and 3+ on the left side.       Brachioradialis reflexes are 3+ on the right side and 3+ on the left side.      NEUROLOGICAL EXAMINATION:     MENTAL STATUS   Level of consciousness: alert       Seems to track intermittently however only to loud sounds. Not following any commands.      CRANIAL NERVES     CN III, IV, VI   Pupils are equal, round, and reactive to light.       Horizontal ocular motility to the right intact, did not seem to cross midline     MOTOR EXAM   Muscle bulk: decreased  Right arm tone: spastic  Left arm tone: spastic  Right leg tone: spastic  Left leg tone: spastic       Arms and legs contracted bilaterally     REFLEXES     Reflexes   Right brachioradialis: 3+  Left brachioradialis: 3+  Right biceps: 3+  Left biceps: 3+    SENSORY EXAM        +triple flexion to pain in bilateral lower extremities     GAIT AND COORDINATION        Unable to perform 2/2 decreased participation       Significant Labs:   Recent Lab Results       02/12/18  0332      Immature Granulocytes 2.1(H)     Immature Grans (Abs) 0.29  Comment:  Mild elevation in immature granulocytes is non specific and   can be seen in a variety of conditions including stress response,   acute inflammation, trauma and pregnancy. Correlation with other   laboratory and clinical findings is essential.  (H)      Albumin 2.1(L)     Alkaline Phosphatase 190(H)     (H)     Anion Gap 6(L)     (H)     Baso # 0.04     Basophil% 0.3     Total Bilirubin 0.2  Comment:  For infants and newborns, interpretation of results should be based  on gestational age, weight and in agreement with clinical  observations.  Premature Infant recommended reference ranges:  Up to 24 hours.............<8.0 mg/dL  Up to 48 hours............<12.0 mg/dL  3-5 days..................<15.0 mg/dL  6-29 days.................<15.0 mg/dL       BUN, Bld 9     Calcium 8.4(L)     Chloride 104     CO2 26     Creatinine 0.6     Differential Method Automated     eGFR if African American >60.0     eGFR if non  >60.0  Comment:  Calculation used to obtain the estimated glomerular filtration  rate (eGFR) is the CKD-EPI equation.        Eos # 0.4     Eosinophil% 2.5     Glucose 102     Gran # (ANC) 9.7(H)     Gran% 69.4     Hematocrit 28.7(L)     Hemoglobin 8.6(L)     Lymph # 2.3     Lymph% 16.7(L)     Magnesium 2.0     MCH 23.8(L)     MCHC 30.0(L)     MCV 79(L)     Mono # 1.3(H)     Mono% 9.0     MPV 12.9     nRBC 0     Phosphorus 3.1     Platelets 301     Potassium 4.3     Total Protein 7.1     RBC 3.62(L)     RDW 18.4(H)     Sodium 136     WBC 13.90(H)           Significant Imaging:     MRI brain WO contrast (2/10/18)  The diffusion sequence demonstrates no evidence of acute infarct. There is severe white matter small vessel ischemic change. There is a remote infarct of the right occipital lobe. There is a mild hemosiderin staining of the brainstem probably from a prior subarachnoid hemorrhage. There is a small focus of hemosiderin posterior right midline medulla. Pituitary and craniocervical junction show nothing unusual. There is left frontal sinusitis change. There is involutional change.

## 2018-02-14 PROBLEM — Z51.5 PALLIATIVE CARE ENCOUNTER: Status: ACTIVE | Noted: 2018-01-01

## 2018-02-14 NOTE — CARE UPDATE
NOTCH 3 gene test was ordered with instruction (10ml in lavender top, refrigerated) under misc test. If test came back positive for gene family need to be notified.        Ana Chauhan   PGY-1

## 2018-02-14 NOTE — PROGRESS NOTES
Pt noted to have large tan/brown emesis occurrence around ET tube. Also, pink frothy endotracheal aspirate coming from ramos when suctioning. MD Chauhan notified with [facility].

## 2018-02-14 NOTE — PLAN OF CARE
Problem: Patient Care Overview  Goal: Plan of Care Review  Outcome: Ongoing (interventions implemented as appropriate)  No acute events throughout day. See vital signs and assessments in flowsheets. Pt remains intubated on minimal settings with copious secretions. NSR, no ectopies, normotensive. No BM. Patient had two projectile vomiting episodes following AM free water bolus (200cc). Contacted [facility]. Orders to hold TFs and FWBs for now and continue to monitor.   Patient progressing towards goals as tolerated, plan of care communicated and reviewed with Jonathan Nieves and family. All concerns addressed. Will continue to monitor.

## 2018-02-14 NOTE — ASSESSMENT & PLAN NOTE
Not tolerating tube feedings. Still with vomiting.  Holding feedings at this time. This is also supportive of multiorgan failure.

## 2018-02-14 NOTE — PROGRESS NOTES
Unable to place IV and obtain blood for labs after several tentatives. Spoke to OC Yanique. OC Yanique suggested RN ask respiratory for an ABG to obtain labs. RN called phlebotomy to request a lab drawn. Phlebotomy said they will not be able to come to the bedside due to too many draws. Spoke with [facility] MD Chauhan about this. MD ordered a midline and requested labs to be drawn s/p its placement.

## 2018-02-14 NOTE — CONSULTS
[facility]  Palliative Medicine  Consult Note    Patient Name: Jonathan Nieves  MRN: [id number]  Admission Date: 2/4/2018  Hospital Length of Stay: 9 days  Code Status: Full Code   Attending Provider: Erick Sales MD  Consulting Provider: TIM Andrade  Primary Care Physician: Primary Doctor No  Principal Problem:Acute respiratory failure with hypoxia      Inpatient consult to Palliative Care  Consult performed by: DEREJE GLEASON  Consult ordered by: JANET BRADSHAW  Reason for consult: goals of care, advanced care planning   Assessment/Recommendations: Consult received and acknowledged 2/14/18 10:57 AM  Palliative care provider will contact primary team prior to seeing patient.   Full consult to follow.  Thank you for opportunity to participate in Ms. Nieves's care.           TIM Andrade  Palliative Medicine - [id number]  [facility]

## 2018-02-14 NOTE — ASSESSMENT & PLAN NOTE
-patient full code per ED; recommend Goals of care with primary team given patient's current functional status at baseline.  - Goals of care discussion.

## 2018-02-14 NOTE — ASSESSMENT & PLAN NOTE
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   She is currently on ventilator with increased secretions. She already has a PEG and despite appropriate feeding has low albumin and multiple contractures/wounds.  Plan meeting tomorrow with patient's children. Fidepita will call the other siblings. Patients mother is still alive but has Alzheimers. Daughter Matias "has my hands full".  Patient is at the end of her life and discussions will occur tomorrow about focusing her care on comfort. At this point, she does not appear to be in pain except with procedures/turning and has prn meds ordered.  "

## 2018-02-14 NOTE — SUBJECTIVE & OBJECTIVE
Interval History:     Past Medical History:   Diagnosis Date    Anemia     iron def    Depression     Dysarthria     HTN (hypertension)     Hyperlipemia     Osteomyelitis     Seizures     Stroke        Past Surgical History:   Procedure Laterality Date    TOE AMPUTATION         Review of patient's allergies indicates:   Allergen Reactions    Amoxicillin Other (See Comments)     Per daughter always allergic, unknown rxn       Medications:  Continuous Infusions:  Scheduled Meds:   albuterol-ipratropium 2.5mg-0.5mg/3mL  3 mL Nebulization Q6H    baclofen  10 mg Per G Tube TID    chlorhexidine  15 mL Mouth/Throat BID    citalopram  10 mg Per G Tube Daily    famotidine  20 mg Per G Tube BID    [START ON 2/15/2018] glycopyrrolate  2 mg Per G Tube Daily    heparin (porcine)  5,000 Units Subcutaneous Q8H    levetiracetam oral soln  500 mg Per G Tube BID     PRN Meds:fentaNYL, magnesium sulfate IVPB, magnesium sulfate IVPB, midazolam, ondansetron, potassium chloride 10%, potassium chloride 10%, potassium chloride 10%, potassium, sodium phosphates, potassium, sodium phosphates, potassium, sodium phosphates, sodium chloride 0.9%    Family History     None        Social History Main Topics    Smoking status: Unknown If Ever Smoked    Smokeless tobacco: Never Used    Alcohol use No    Drug use: No    Sexual activity: Not on file       Review of Systems   Unable to perform ROS: Intubated     Objective:     Vital Signs (Most Recent):  Temp: 98 °F (36.7 °C) (02/14/18 0725)  Pulse: 80 (02/14/18 1309)  Resp: 18 (02/14/18 1309)  BP: 107/71 (02/14/18 0904)  SpO2: 96 % (02/14/18 1309) Vital Signs (24h Range):  Temp:  [98 °F (36.7 °C)-98.4 °F (36.9 °C)] 98 °F (36.7 °C)  Pulse:  [73-87] 80  Resp:  [16-32] 18  SpO2:  [93 %-100 %] 96 %  BP: (101-119)/(67-80) 107/71     Weight: 91.5 kg (201 lb 11.5 oz)  Body mass index is 30.67 kg/m².    Review of Symptoms  Symptom Assessment (ESAS 0-10 scale) Unable to assess due to  cognitive status.  ESAS 0 1 2 3 4 5 6 7 8 9 10   Pain              Dyspnea              Anxiety              Nausea              Depression               Anorexia              Fatigue              Insomnia              Restlessness               Agitation              CAM / Delirium __ --  ___+   Constipation     x__ --  ___+   Diarrhea           x__ --  ___+  Bowel Management Plan (BMP): No    Comments: on tube feeding, some fentanyl IV for procedures.    Pain Assessment: grimaces with movement or procedures    OME in 24 hours:     Performance Status: 10    ECOG Performance Status Grade: 4 - Completely disabled    Physical Exam   Eyes: Conjunctivae and EOM are normal. Pupils are equal, round, and reactive to light.   Neck: Normal range of motion. Neck supple.   Cardiovascular: Regular rhythm and normal heart sounds.    Pulmonary/Chest: Effort normal. She has rales.   Abdominal: Soft. Bowel sounds are normal.   Musculoskeletal: She exhibits deformity.   Contractures of hands/elbows, hips/knees.  Toes Right amputated.  Areas of pressure documented in wound care notes.   Neurological:   Opens eyes to stimulation. Unable to nod or show understanding. Increased tone to upper extremities. I am unable to move at elbow or knees.   Skin: Skin is warm and dry.   Wounds/deep tissue injury per wound notes.   Nursing note and vitals reviewed.      Significant Labs: All pertinent labs within the past 24 hours have been reviewed.  CBC:     Recent Labs  Lab 02/14/18  0945   WBC 13.18*   HGB 8.6*   HCT 29.4*   MCV 78*   *     BMP:    Recent Labs  Lab 02/14/18  0945   GLU 81      K 4.1      CO2 27   BUN 9   CREATININE 0.6   CALCIUM 8.6*   MG 1.9     LFT:  Lab Results   Component Value Date    AST 61 (H) 02/14/2018    ALKPHOS 155 (H) 02/14/2018    BILITOT 0.4 02/14/2018     Albumin:   Albumin   Date Value Ref Range Status   02/14/2018 2.4 (L) 3.5 - 5.2 g/dL Final     Protein:   Total Protein   Date Value Ref Range  Status   02/14/2018 7.7 6.0 - 8.4 g/dL Final     Lactic acid:   Lab Results   Component Value Date    LACTATE 0.9 02/07/2018    LACTATE 1.6 02/05/2018       Significant Imaging: I have reviewed all pertinent imaging results/findings within the past 24 hours.    Advanced Directives::  Living Will: No  LaPOST: No  Do Not Resuscitate Status: No  Medical Power of : No    Decision-Making Capacity: Patient unable to communicate due to disease severity/cognitive impairment    Living Arrangements: Lives in nursing home    Psychosocial/Cultural:  Patient's most important priorities:  Unable to assess.    Patient's biggest concerns/fears:  Unable to assess.    Previous death/end of life care history:  Unable to assess.    Patient's goals/hopes:  Unable to assess.    Spiritual:     F- Vielka and Belief: Unable to assess.  I - Importance:Unable to assess..  C - Community: Unable to assess.  A - Address in Care: Unable to assess.

## 2018-02-14 NOTE — ASSESSMENT & PLAN NOTE
Risk of aspiration is increased with PEG feedings. She is not tolerating.  Family meeting tomorrow to discuss.

## 2018-02-14 NOTE — CONSULTS
"[facility]  Palliative Medicine  Consult Note    Patient Name: Jonathan Nieves  MRN: [id number]  Admission Date: 2/4/2018  Hospital Length of Stay: 9 days  Code Status: Full Code   Attending Provider: Erick Sales MD  Consulting Provider: Mariella Rivas MD  Primary Care Physician: Primary Doctor No  Principal Problem:Acute respiratory failure with hypoxia    Patient information was obtained from past medical records and ER records.      Consults  Assessment/Plan:     Palliative care encounter    Patient has irreversible neurologic injury with CADASIL for which there is no treatment.   She is currently on ventilator with increased secretions. She already has a PEG and despite appropriate feeding has low albumin and multiple contractures/wounds.  Plan meeting tomorrow with patient's children. Catrine will call the other siblings. Patients mother is still alive but has Alzheimers. Daughter Catrine "has my hands full".  Patient is at the end of her life and discussions will occur tomorrow about focusing her care on comfort. At this point, she does not appear to be in pain except with procedures/turning and has prn meds ordered.        Unstageable pressure ulcer of left buttock    Patient has irreversible neurologic injury with CADASIL for which there is no treatment.   She is currently on ventilator with increased secretions. She already has a PEG and despite appropriate feeding has low albumin and multiple contractures/wounds. These wounds will not heal as she is dying. Plan meeting tomorrow with patient's children. Catrine will call the other siblings. Patients mother is still alive but has Alzheimers. Daughter Catrine "has my hands full".  Patient is at the end of her life and discussions will occur tomorrow about focusing her care on comfort. At this point, she does not appear to be in pain except with procedures/turning and has prn meds ordered.          Goals of care, " "counseling/discussion    Patient has irreversible neurologic injury with CADASIL for which there is no treatment.   She is currently on ventilator with increased secretions. She already has a PEG and despite appropriate feeding has low albumin and multiple contractures/wounds. These wounds will not heal as she is dying.  Plan meeting tomorrow with patient's children at 1 pm. Matias will call the other siblings. Patients mother is still alive but has Alzheimers. Daughter Matias "has my hands full".  Patient is at the end of her life and discussions will occur tomorrow about focusing her care on comfort. At this point, she does not appear to be in pain except with procedures/turning and has prn meds ordered.          PEG (percutaneous endoscopic gastrostomy) status    Risk of aspiration is increased with PEG feedings. She is not tolerating.  Family meeting tomorrow to discuss.        Aspiration pneumonia    Risk of aspiration is increased with PEG feedings. She is not tolerating.  Family meeting tomorrow to discuss.        Dysphagia as late effect of cerebrovascular disease    Not tolerating tube feedings. Still with vomiting.  Holding feedings at this time. This is also supportive of multiorgan failure.        CVA, old, hemiparesis    Patient has irreversible neurologic injury with CADASIL for which there is no treatment.               Thank you for your consult. I will follow-up with patient. Please contact us if you have any additional questions.    Subjective:     HPI:   Asked to see patient by Dr. Sales for assistance with goals of care and symptom management. Chart reviewed, patient examined.  Mr. Jonathan Nieves is a 51yo female with history of CVA with residual aphasia and dysphagia, non-verbal, s/p PEG tube placement and bed riddenness, osteomyelitis s/p amputation of toe, who came to the ED from [facility] via EMS in respiratory distress. Per ED/EMS the patient was noted to have "projectile " "vomiting" yesterday evening (24 hours prior to presentation), which was treated with zofran. During the event there is suspicion the patient possible aspirated. Currently, she opens eyes to stimulation, intubated, with contractures of knees/hips/elbows/hands and multiple areas of pressure injury.   I spoke with Daughter Matias about her mother. Family meeting to be tomorrow at 1 pm. She will call the rest of her siblings.    Hospital Course:  No notes on file    Interval History:     Past Medical History:   Diagnosis Date    Anemia     iron def    Depression     Dysarthria     HTN (hypertension)     Hyperlipemia     Osteomyelitis     Seizures     Stroke        Past Surgical History:   Procedure Laterality Date    TOE AMPUTATION         Review of patient's allergies indicates:   Allergen Reactions    Amoxicillin Other (See Comments)     Per daughter always allergic, unknown rxn       Medications:  Continuous Infusions:  Scheduled Meds:   albuterol-ipratropium 2.5mg-0.5mg/3mL  3 mL Nebulization Q6H    baclofen  10 mg Per G Tube TID    chlorhexidine  15 mL Mouth/Throat BID    citalopram  10 mg Per G Tube Daily    famotidine  20 mg Per G Tube BID    [START ON 2/15/2018] glycopyrrolate  2 mg Per G Tube Daily    heparin (porcine)  5,000 Units Subcutaneous Q8H    levetiracetam oral soln  500 mg Per G Tube BID     PRN Meds:fentaNYL, magnesium sulfate IVPB, magnesium sulfate IVPB, midazolam, ondansetron, potassium chloride 10%, potassium chloride 10%, potassium chloride 10%, potassium, sodium phosphates, potassium, sodium phosphates, potassium, sodium phosphates, sodium chloride 0.9%    Family History     None        Social History Main Topics    Smoking status: Unknown If Ever Smoked    Smokeless tobacco: Never Used    Alcohol use No    Drug use: No    Sexual activity: Not on file       Review of Systems   Unable to perform ROS: Intubated     Objective:     Vital Signs (Most Recent):  Temp: 98 °F " (36.7 °C) (02/14/18 0725)  Pulse: 80 (02/14/18 1309)  Resp: 18 (02/14/18 1309)  BP: 107/71 (02/14/18 0904)  SpO2: 96 % (02/14/18 1309) Vital Signs (24h Range):  Temp:  [98 °F (36.7 °C)-98.4 °F (36.9 °C)] 98 °F (36.7 °C)  Pulse:  [73-87] 80  Resp:  [16-32] 18  SpO2:  [93 %-100 %] 96 %  BP: (101-119)/(67-80) 107/71     Weight: 91.5 kg (201 lb 11.5 oz)  Body mass index is 30.67 kg/m².    Review of Symptoms  Symptom Assessment (ESAS 0-10 scale) Unable to assess due to cognitive status.  ESAS 0 1 2 3 4 5 6 7 8 9 10   Pain              Dyspnea              Anxiety              Nausea              Depression               Anorexia              Fatigue              Insomnia              Restlessness               Agitation              CAM / Delirium __ --  ___+   Constipation     x__ --  ___+   Diarrhea           x__ --  ___+  Bowel Management Plan (BMP): No    Comments: on tube feeding, some fentanyl IV for procedures.    Pain Assessment: grimaces with movement or procedures    OME in 24 hours:     Performance Status: 10    ECOG Performance Status Grade: 4 - Completely disabled    Physical Exam   Eyes: Conjunctivae and EOM are normal. Pupils are equal, round, and reactive to light.   Neck: Normal range of motion. Neck supple.   Cardiovascular: Regular rhythm and normal heart sounds.    Pulmonary/Chest: Effort normal. She has rales.   Abdominal: Soft. Bowel sounds are normal.   Musculoskeletal: She exhibits deformity.   Contractures of hands/elbows, hips/knees.  Toes Right amputated.  Areas of pressure documented in wound care notes.   Neurological:   Opens eyes to stimulation. Unable to nod or show understanding. Increased tone to upper extremities. I am unable to move at elbow or knees.   Skin: Skin is warm and dry.   Wounds/deep tissue injury per wound notes.   Nursing note and vitals reviewed.      Significant Labs: All pertinent labs within the past 24 hours have been reviewed.  CBC:     Recent Labs  Lab  02/14/18  0945   WBC 13.18*   HGB 8.6*   HCT 29.4*   MCV 78*   *     BMP:    Recent Labs  Lab 02/14/18  0945   GLU 81      K 4.1      CO2 27   BUN 9   CREATININE 0.6   CALCIUM 8.6*   MG 1.9     LFT:  Lab Results   Component Value Date    AST 61 (H) 02/14/2018    ALKPHOS 155 (H) 02/14/2018    BILITOT 0.4 02/14/2018     Albumin:   Albumin   Date Value Ref Range Status   02/14/2018 2.4 (L) 3.5 - 5.2 g/dL Final     Protein:   Total Protein   Date Value Ref Range Status   02/14/2018 7.7 6.0 - 8.4 g/dL Final     Lactic acid:   Lab Results   Component Value Date    LACTATE 0.9 02/07/2018    LACTATE 1.6 02/05/2018       Significant Imaging: I have reviewed all pertinent imaging results/findings within the past 24 hours.    Advanced Directives::  Living Will: No  LaPOST: No  Do Not Resuscitate Status: No  Medical Power of : No    Decision-Making Capacity: Patient unable to communicate due to disease severity/cognitive impairment    Living Arrangements: Lives in nursing home    Psychosocial/Cultural:  Patient's most important priorities:  Unable to assess.    Patient's biggest concerns/fears:  Unable to assess.    Previous death/end of life care history:  Unable to assess.    Patient's goals/hopes:  Unable to assess.    Spiritual:     F- Vielka and Belief: Unable to assess.  I - Importance:Unable to assess..  C - Community: Unable to assess.  A - Address in Care: Unable to assess.      > 50% of 50 min visit spent in chart review, face to face discussion of goals of care,  symptom assessment, coordination of care and emotional support.    Mariella Rivas MD  Palliative Medicine  [facility]

## 2018-02-14 NOTE — HPI
"Asked to see patient by Dr. Sales for assistance with goals of care and symptom management. Chart reviewed, patient examined.  Mr. Jonathan Nieves is a 53yo female with history of CVA with residual aphasia and dysphagia, non-verbal, s/p PEG tube placement and bed riddenness, osteomyelitis s/p amputation of toe, who came to the ED from [facility] via EMS in respiratory distress. Per ED/EMS the patient was noted to have "projectile vomiting" yesterday evening (24 hours prior to presentation), which was treated with zofran. During the event there is suspicion the patient possible aspirated. Currently, she opens eyes to stimulation, intubated, with contractures of knees/hips/elbows/hands and multiple areas of pressure injury.   I spoke with Daughter Matias about her mother. Family meeting for today was cancelled due to lack of participation.  "

## 2018-02-14 NOTE — CONSULTS
Single lumen 18g x 10cm midline placed left basilic vein. Max dwell date 3/15/18, Lot#[id number] .  Needle advanced into the vessel under real time ultrasound guidance.  Image recorded and saved.

## 2018-02-14 NOTE — PROGRESS NOTES
"[facility]  Critical Care Medicine  Progress Note    Patient Name: Jonathan Nieves  MRN: [id number]  Admission Date: 2/4/2018  Hospital Length of Stay: 8 days  Code Status: Full Code  Attending Provider: Erick Sales MD  Primary Care Provider: Primary Doctor No   Principal Problem: Acute respiratory failure with hypoxia    Subjective:     HPI:  Mr. Jonathan Nieves is a 51yo female with history of CVA with residual aphasia and dysphagia, non-verbal, s/p PEG tube placement and bed riddenness, osteomyelitis s/p amputation of toe, who comes to the ED from [facility] via EMS in respiratory distress. Per ED/EMS the patient was noted to have "projectile vomiting" yesterday evening (24 hours prior to presentation), which was treated with zofran. During the event there is suspicion the patient possible aspirated. Today the patient was noted to be in resp distress with a fever.     Patient was satting 88% on 4L on EMS arrival, breathing 40 times per minute.  Improved to 96% on 15L NRB with RR high 20s.  Axillary temp 102.    CT chest concerning for aspiration pneumonia.  UA concern for a UTI.       Patient intubated in the ED & Critical Care consulted.         Hospital/ICU Course:  2/5: Critical Care Consulted. Patient intubated in the ED.   2/6: Patient doing well, on SBT; will extubate maria e. Continue treatment for aspiration PNA & UTI.   2/7: abx deescalated, continue to treat for PNA and UTI. Required pressors overnight. Off this morning.   2/8-9: still with secretions and questionable mentation? Baseline?. Will try to dry the secretions and extubated today or maria e.    2/10: MRI completed. Family called, had family meeting with one daughter. Need to re-visit with all daughters. Records requested from [facility] and [facility].   02/11-13: neurology consulted, no acute changes, wbc and LFT trending up, keep monitoring.    Interval History/Significant Events: NAEON.     Review of Systems "   Unable to perform ROS: Intubated     Objective:     Vital Signs (Most Recent):  Temp: 99 °F (37.2 °C) (02/13/18 1500)  Pulse: 76 (02/13/18 1743)  Resp: (!) 24 (02/13/18 1343)  BP: 102/67 (02/13/18 1600)  SpO2: (!) 93 % (02/13/18 1743) Vital Signs (24h Range):  Temp:  [97.8 °F (36.6 °C)-99.6 °F (37.6 °C)] 99 °F (37.2 °C)  Pulse:  [76-87] 76  Resp:  [17-31] 24  SpO2:  [93 %-100 %] 93 %  BP: ()/(62-87) 102/67   Weight: 91.5 kg (201 lb 11.5 oz)  Body mass index is 30.67 kg/m².      Intake/Output Summary (Last 24 hours) at 02/13/18 1756  Last data filed at 02/13/18 1700   Gross per 24 hour   Intake             2105 ml   Output             1150 ml   Net              955 ml       Physical Exam   Constitutional: She is sedated and intubated.   Eyes: Pupils are equal, round, and reactive to light.   Neck: No JVD present.   Cardiovascular: Normal rate, regular rhythm and normal heart sounds.    Pulmonary/Chest: She is intubated. No respiratory distress. She has rhonchi in the right lower field.   Abdominal: Soft. Bowel sounds are normal. She exhibits no distension. There is no tenderness.   Musculoskeletal: She exhibits no edema.   Neurological:   All 4 extremities contracted, no movement.  Intubated         Vents:  Vent Mode: A/C (02/13/18 1743)  Ventilator Initiated: Yes (02/04/18 2344)  Set Rate: 18 bmp (02/13/18 1743)  Vt Set: 400 mL (02/13/18 1743)  Pressure Support: 0 cmH20 (02/13/18 1743)  PEEP/CPAP: 5 cmH20 (02/13/18 1743)  Oxygen Concentration (%): 30 (02/13/18 1743)  Peak Airway Pressure: 36 cmH2O (02/13/18 1743)  Plateau Pressure: 0 cmH20 (02/13/18 1743)  Total Ve: 8.73 mL (02/13/18 1743)  F/VT Ratio<105 (RSBI): (!) 54.3 (02/13/18 1343)  Lines/Drains/Airways     Central Venous Catheter Line                 Percutaneous Central Line Insertion/Assessment - triple lumen  02/04/18 2300 right internal jugular 8 days          Drain                 Gastrostomy/Enterostomy 10/25/16 1401 Percutaneous endoscopic  gastrostomy (PEG) midline feeding 476 days         Urethral Catheter 02/05/18 1006 Latex 8 days          Airway                 Airway - Non-Surgical 02/04/18 2329 Endotracheal Tube 8 days          Pressure Ulcer                 Pressure Injury 02/05/18 1045 Left medial Foot Deep tissue injury 8 days         Pressure Injury 02/05/18 1045 Right lateral Foot Deep tissue injury 8 days              Significant Labs:    CBC/Anemia Profile:    Recent Labs  Lab 02/12/18 0332 02/13/18  0320   WBC 13.90* 14.15*   HGB 8.6* 8.1*   HCT 28.7* 27.8*    331   MCV 79* 80*   RDW 18.4* 18.6*        Chemistries:    Recent Labs  Lab 02/12/18 0332 02/13/18  0320 02/13/18  0658    137  --    K 4.3 4.3  --     104  --    CO2 26 25  --    BUN 9 11  --    CREATININE 0.6 0.6  --    CALCIUM 8.4* 7.9*  --    ALBUMIN 2.1* 2.0*  --    PROT 7.1 6.7  --    BILITOT 0.2 0.2  --    ALKPHOS 190* 163*  --    * 129*  --    * 71*  --    MG 2.0 1.6 2.2   PHOS 3.1 3.1  --        Blood Culture: No results for input(s): LABBLOO in the last 48 hours.  CMP:     Recent Labs  Lab 02/12/18  0332 02/13/18  0320    137   K 4.3 4.3    104   CO2 26 25    112*   BUN 9 11   CREATININE 0.6 0.6   CALCIUM 8.4* 7.9*   PROT 7.1 6.7   ALBUMIN 2.1* 2.0*   BILITOT 0.2 0.2   ALKPHOS 190* 163*   * 71*   * 129*   ANIONGAP 6* 8   EGFRNONAA >60.0 >60.0     Urine Culture: No results for input(s): LABURIN in the last 48 hours.  Urine Studies: No results for input(s): COLORU, APPEARANCEUA, PHUR, SPECGRAV, PROTEINUA, GLUCUA, KETONESU, BILIRUBINUA, OCCULTUA, NITRITE, UROBILINOGEN, LEUKOCYTESUR, RBCUA, WBCUA, BACTERIA, SQUAMEPITHEL, HYALINECASTS in the last 48 hours.    Invalid input(s): WRIGHTSUR  All pertinent labs within the past 24 hours have been reviewed.    Significant Imaging:  I have reviewed all pertinent imaging results/findings within the past 24 hours.     MRI: 2/10/18  Findings: The diffusion sequence  demonstrates no evidence of acute infarct. There is severe white matter small vessel ischemic change. There is a remote infarct of the right occipital lobe. There is a mild hemosiderin staining of the brainstem probably from a prior subarachnoid hemorrhage. There is a small focus of hemosiderin posterior right midline medulla. Pituitary and craniocervical junction show nothing unusual. There is left frontal sinusitis change. There is involutional change.    Assessment/Plan:     Neuro   Seizure-like activity    - continue keppra         Dysphagia as late effect of cerebrovascular disease    Last stoke in 2008        CVA, old, hemiparesis    -patient suffered multiple strokes in past, last one in 2008   -nonverbal at baseline, wheelchair/bedbound    MRI (2/10)  Findings: The diffusion sequence demonstrates no evidence of acute infarct. There is severe white matter small vessel ischemic change. There is a remote infarct of the right occipital lobe. There is a mild hemosiderin staining of the brainstem probably from a prior subarachnoid hemorrhage. There is a small focus of hemosiderin posterior right midline medulla. Pituitary and craniocervical junction show nothing unusual. There is left frontal sinusitis change. There is involutional change.  - [facility] not [facility] brain images or neurology notes.  - neurology consulted: differential includes but is not limited to CADASIL (given h/o strokes, extensive white matter involvement including the temporal lobes), adult onset adrenoleukodystrophy, chronic microvascular changes (less likely), vanishing white matter disease (less likely given predominance in children), or [place] disease (subcortical leukoencephalopathy or vascular dementia 2/2 chronic HTN).           Pulmonary   * Acute respiratory failure with hypoxia    Intubated    - s/p rocephin tx 10 days   - sputum cultures rare gram positive & negative   - blood cultures NGTD           Aspiration pneumonia    Extensive consolidation of the right lung and debris in the right mainstem bronchus and its branches consistent with aspiration pneumonia.    - s/p rocephin   - stopped vanco & cefepime & flagyl   - CBC monitor down trending   - intubated very difficult to extubate.          Renal/   Hypernatremia      - BMP continue to monitor   - resolving 142 on 2/7  - added 150 free water pushes     Resolved         UTI (urinary tract infection)    UA (Bacteria, WBC, leukocyte positive)    - History of VRE   - Urine cultures proteus, Identification and susceptibility pending  - Rocephin completed         GI   PEG (percutaneous endoscopic gastrostomy) status    Tube feeds.         Other   Goals of care, counseling/discussion    -patient full code per ED; recommend Goals of care with primary team given patient's current functional status at baseline.  - Goals of care discussion.                 Critical care was time spent personally by me on the following activities: development of treatment plan with patient or surrogate and bedside caregivers, discussions with consultants, evaluation of patient's response to treatment, examination of patient, ordering and performing treatments and interventions, ordering and review of laboratory studies, ordering and review of radiographic studies, pulse oximetry, re-evaluation of patient's condition. This critical care time did not overlap with that of any other provider or involve time for any procedures.     Ana Chauhan MD  Critical Care Medicine  [facility]

## 2018-02-14 NOTE — ASSESSMENT & PLAN NOTE
Extensive consolidation of the right lung and debris in the right mainstem bronchus and its branches consistent with aspiration pneumonia.    - s/p rocephin   - stopped vanco & cefepime & flagyl   - CBC monitor down trending   - intubated very difficult to extubate.

## 2018-02-14 NOTE — ASSESSMENT & PLAN NOTE
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   She is currently on ventilator with increased secretions. She already has a PEG and despite appropriate feeding has low albumin and multiple contractures/wounds. These wounds will not heal as she is dying. Plan meeting tomorrow with patient's children. Matias will call the other siblings. Patients mother is still alive but has Alzheimers. Daughter Matias "has my hands full".  Patient is at the end of her life and discussions will occur tomorrow about focusing her care on comfort. At this point, she does not appear to be in pain except with procedures/turning and has prn meds ordered.    "

## 2018-02-14 NOTE — ASSESSMENT & PLAN NOTE
Intubated    - s/p rocephin tx 10 days   - sputum cultures rare gram positive & negative   - blood cultures NGTD

## 2018-02-14 NOTE — ASSESSMENT & PLAN NOTE
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   She is currently on ventilator with increased secretions. She already has a PEG and despite appropriate feeding has low albumin and multiple contractures/wounds. These wounds will not heal as she is dying.  Plan meeting tomorrow with patient's children at 1 pm. Fidepita will call the other siblings. Patients mother is still alive but has Alzheimers. Daughter Matias "has my hands full".  Patient is at the end of her life and discussions will occur tomorrow about focusing her care on comfort. At this point, she does not appear to be in pain except with procedures/turning and has prn meds ordered.    "

## 2018-02-15 PROBLEM — G82.50 SPASTIC QUADRIPLEGIA: Status: ACTIVE | Noted: 2018-01-01

## 2018-02-15 PROBLEM — K56.600 PARTIAL SMALL BOWEL OBSTRUCTION: Status: ACTIVE | Noted: 2018-01-01

## 2018-02-15 NOTE — PLAN OF CARE
Problem: Patient Care Overview  Goal: Plan of Care Review  Outcome: Ongoing (interventions implemented as appropriate)  No acute events throughout day. See vital signs and assessments in flowsheets. See below for updates on today's progress.     Pulmonary: ETT 24 @ lip, minimal settings, decreased secretions, coarse LS bilaterally.     Cardiovascular: NSR 70s, MAP maintained above 65. No ectopies.     Neurological: Opens eyes spontaneous, does not tract, withdraws x4. Pupils equal and reactive - 3mm.     Gastrointestinal: No BM, TFs @ 10, minimal residuals. No vomiting episodes with FWBs (200c x2). Normoactive BS x4.    Genitourinary: Increased UOP to 75-200cc/hr (clear yellow),     Endocrine: Normoglycemic.     Integumentary/Other: PUs,, left arm midline.    Infusions: None.    Patient progressing towards goals as tolerated, plan of care communicated and reviewed with Jonathan Nieves and family. All concerns addressed. Will continue to monitor.

## 2018-02-15 NOTE — ASSESSMENT & PLAN NOTE
- KUB in 02/14 showed distended stomach and partial SOB.  - hold tube feeds.  - Low intermittent NG suctioning.

## 2018-02-15 NOTE — ASSESSMENT & PLAN NOTE
- Intubated, Failing SBT >>> lots of secretion, started on glycopyrrolate  - s/p rocephin days   - sputum cultures rare gram positive & negative   - blood cultures NGTD

## 2018-02-15 NOTE — PROGRESS NOTES
"[facility]  Critical Care Medicine  Progress Note    Patient Name: Jonathan Nieves  MRN: [id number]  Admission Date: 2/4/2018  Hospital Length of Stay: 9 days  Code Status: Full Code  Attending Provider: Erick Sales MD  Primary Care Provider: Primary Doctor No   Principal Problem: Acute respiratory failure with hypoxia    Subjective:     HPI:  Mr. Jonathan Nieves is a 51yo female with history of CVA with residual aphasia and dysphagia, non-verbal, s/p PEG tube placement and bed riddenness, osteomyelitis s/p amputation of toe, who comes to the ED from [facility] via EMS in respiratory distress. Per ED/EMS the patient was noted to have "projectile vomiting" yesterday evening (24 hours prior to presentation), which was treated with zofran. During the event there is suspicion the patient possible aspirated. Today the patient was noted to be in resp distress with a fever.     Patient was satting 88% on 4L on EMS arrival, breathing 40 times per minute.  Improved to 96% on 15L NRB with RR high 20s.  Axillary temp 102.    CT chest concerning for aspiration pneumonia.  UA concern for a UTI.       Patient intubated in the ED & Critical Care consulted.         Hospital/ICU Course:  2/5: Critical Care Consulted. Patient intubated in the ED.   2/6: Patient doing well, on SBT; will extubate maria e. Continue treatment for aspiration PNA & UTI.   2/7: abx deescalated, continue to treat for PNA and UTI. Required pressors overnight. Off this morning.   2/8-9: still with secretions and questionable mentation? Baseline?. Will try to dry the secretions and extubated today or maria e.    2/10: MRI completed. Family called, had family meeting with one daughter. Need to re-visit with all daughters. Records requested from [facility] and [facility].   02/11-13: neurology consulted, no acute changes, wbc and LFT trending up, keep monitoring.  2/14: Concerns for CADASIL vs Binswanger syndrome per Neuro. NOTCH3 genetic " testing sent. Palliative consulted regarding goals of care.     Interval History/Significant Events: NAEON. Pt failed SBT.  NOTCH3 genetic testing sent out.      Review of Systems   Unable to perform ROS: Intubated     Objective:     Vital Signs (Most Recent):  Temp: 98.6 °F (37 °C) (02/14/18 1900)  Pulse: 79 (02/14/18 2044)  Resp: 16 (02/14/18 2044)  BP: 107/62 (02/14/18 1900)  SpO2: 96 % (02/14/18 2044) Vital Signs (24h Range):  Temp:  [98 °F (36.7 °C)-98.6 °F (37 °C)] 98.6 °F (37 °C)  Pulse:  [72-82] 79  Resp:  [15-32] 16  SpO2:  [93 %-100 %] 96 %  BP: ()/(59-77) 107/62   Weight: 91.5 kg (201 lb 11.5 oz)  Body mass index is 30.67 kg/m².      Intake/Output Summary (Last 24 hours) at 02/14/18 2053  Last data filed at 02/14/18 1900   Gross per 24 hour   Intake              480 ml   Output              681 ml   Net             -201 ml       Physical Exam   Constitutional: No distress.   HENT:   Head: Normocephalic and atraumatic.   Eyes: Right eye exhibits no discharge. Left eye exhibits no discharge.   Neck: Normal range of motion. Neck supple.   Cardiovascular: Normal rate and regular rhythm.    Pulmonary/Chest: Effort normal. She has rales (at bases).   intubated   Abdominal: Soft. She exhibits no distension.   Musculoskeletal: She exhibits deformity (contracted upper and lower extremities).   Neurological:   Responds to voice but does not following commands.   Skin: Skin is warm and dry.       Vents:  Vent Mode: A/C (02/14/18 2044)  Ventilator Initiated: Yes (02/04/18 2344)  Set Rate: 14 bmp (02/14/18 2044)  Vt Set: 400 mL (02/14/18 2044)  Pressure Support: 0 cmH20 (02/14/18 2044)  PEEP/CPAP: 5 cmH20 (02/14/18 2044)  Oxygen Concentration (%): 30 (02/14/18 2044)  Peak Airway Pressure: 34 cmH2O (02/14/18 2044)  Plateau Pressure: 0 cmH20 (02/14/18 2044)  Total Ve: 7.96 mL (02/14/18 2044)  F/VT Ratio<105 (RSBI): (!) 36.7 (02/14/18 2044)  Lines/Drains/Airways     Drain                 Gastrostomy/Enterostomy  10/25/16 1401 Percutaneous endoscopic gastrostomy (PEG) midline feeding 477 days         Urethral Catheter 02/05/18 1006 Latex 9 days          Airway                 Airway - Non-Surgical 02/04/18 2329 Endotracheal Tube 9 days          Pressure Ulcer                 Pressure Injury 02/05/18 1045 Left medial Foot Deep tissue injury 9 days         Pressure Injury 02/05/18 1045 Right lateral Foot Deep tissue injury 9 days          Peripheral Intravenous Line                 Peripheral IV - Single Lumen 02/13/18 1800 Right Upper Arm 1 day         Midline Catheter Insertion/Assessment  - Single Lumen 02/14/18 0954 Left basilic vein (medial side of arm) 18g x 10cm less than 1 day              Significant Labs:    CBC/Anemia Profile:    Recent Labs  Lab 02/13/18  0320 02/14/18  0945   WBC 14.15* 13.18*   HGB 8.1* 8.6*   HCT 27.8* 29.4*    358*   MCV 80* 78*   RDW 18.6* 18.9*        Chemistries:    Recent Labs  Lab 02/13/18  0320 02/13/18  0658 02/14/18  0945     --  137   K 4.3  --  4.1     --  101   CO2 25  --  27   BUN 11  --  9   CREATININE 0.6  --  0.6   CALCIUM 7.9*  --  8.6*   ALBUMIN 2.0*  --  2.4*   PROT 6.7  --  7.7   BILITOT 0.2  --  0.4   ALKPHOS 163*  --  155*   *  --  115*   AST 71*  --  61*   MG 1.6 2.2 1.9   PHOS 3.1  --  3.6       All pertinent labs within the past 24 hours have been reviewed.    Significant Imaging:  I have reviewed and interpreted all pertinent imaging results/findings within the past 24 hours.    Assessment/Plan:     Neuro   Seizure-like activity    - continue keppra         Dysphagia as late effect of cerebrovascular disease    - Neurology consulted: differential includes but is not limited to CADASIL (given h/o strokes, extensive white matter involvement including the temporal lobes), adult onset adrenoleukodystrophy, chronic microvascular changes (less likely), vanishing white matter disease (less likely given predominance in children), or Binswanger disease  (subcortical leukoencephalopathy or vascular dementia 2/2 chronic HTN).   - NOTCH3 genetic testing sent, results pending        CVA, old, hemiparesis    -patient suffered multiple strokes in past, last one in 2008   -nonverbal at baseline, wheelchair/bedbound    MRI (2/10)  Findings: The diffusion sequence demonstrates no evidence of acute infarct. There is severe white matter small vessel ischemic change. There is a remote infarct of the right occipital lobe. There is a mild hemosiderin staining of the brainstem probably from a prior subarachnoid hemorrhage. There is a small focus of hemosiderin posterior right midline medulla. Pituitary and craniocervical junction show nothing unusual. There is left frontal sinusitis change. There is involutional change.  - Records requested from [facility] and [facility]. Did not include brain images or neurology notes.  - Neurology consulted: differential includes but is not limited to CADASIL (given h/o strokes, extensive white matter involvement including the temporal lobes), adult onset adrenoleukodystrophy, chronic microvascular changes (less likely), vanishing white matter disease (less likely given predominance in children), or Binswanger disease (subcortical leukoencephalopathy or vascular dementia 2/2 chronic HTN).   - NOTCH3 genetic testing sent, results pending        Pulmonary   * Acute respiratory failure with hypoxia    - Intubated  - Failed SBT today  - s/p rocephin days   - sputum cultures rare gram positive & negative   - blood cultures NGTD          Aspiration pneumonia    Extensive consolidation of the right lung and debris in the right mainstem bronchus and its branches consistent with aspiration pneumonia.  - s/p course of rocephin   - stopped vanco & cefepime & flagyl   - CBC monitor down trending   - Intubated, failed SBT today 2/14          Renal/   Hypernatremia    - Resolved           UTI (urinary tract infection)    - UA (Bacteria, WBC, leukocyte positive)  -  History of VRE   - Urine cultures proteus, Identification and susceptibility pending  - Course of Rocephin completed         GI   PEG (percutaneous endoscopic gastrostomy) status    - Cont tube feeds        Other   Goals of care, counseling/discussion    - Palliative consulted. Family was not available to speak with team today. Will follow up with Goals of Care discussion.                Rebekah Saunders MD  Critical Care Medicine  [facility]

## 2018-02-15 NOTE — PROGRESS NOTES
"[facility]  Palliative Medicine  Progress Note    Patient Name: Jonathan Nieves  MRN: [id number]  Admission Date: 2/4/2018  Hospital Length of Stay: 10 days  Code Status: Full Code   Attending Provider: Erick Sales MD  Consulting Provider: Mariella Rivas MD  Primary Care Physician: Primary Doctor No  Principal Problem:Acute respiratory failure with hypoxia    Patient information was obtained from past medical records and ER records.      Assessment/Plan:     Palliative care encounter    Patient is not tolerating tube feedings and also failed weaning. Family meeting scheduled for 1 pm today. Family was unable to attend. Daughter Matias wants to meet in am and other sister in the pm (3) tomorrow. 2 of the sisters cannot stand to see MOM this way and so will not participate. Matias wanted to talk with Dr. Macias today for update on treatment.   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   She is currently on ventilator with increased secretions. She already has a PEG and despite appropriate feeding has low albumin and multiple contractures/wounds.  Plan meeting tomorrow 2/16 with patient's children. Matias will call the other siblings. Patients mother is still alive but has Alzheimers. Daughter Matias "has my hands full".  Patient is at the end of her life and discussions will occur tomorrow about focusing her care on comfort. At this point, she does not appear to be in pain except with procedures/turning and has prn meds ordered.        Unstageable pressure ulcer of left buttock    Patient has irreversible neurologic injury  for which there is no curative treatment.   She is currently on ventilator with increased secretions. She already has a PEG and despite appropriate feeding has low albumin and multiple contractures/wounds. These wounds will not heal as she is dying. Plan meeting tomorrow with patient's children. Some do not " "want to participate.Patients mother is still alive but has Alzheimers. Daughter Matias "has my hands full".  Patient is at the end of her life and discussions will occur tomorrow about focusing her care on comfort. At this point, she does not appear to be in pain except with procedures/turning and has prn meds ordered.          Goals of care, counseling/discussion    Patient has irreversible neurologic injury  for which there is no curative treatment.   She is currently on ventilator with increased secretions. She already has a PEG and despite previous appropriate feeding has low albumin and multiple contractures/wounds. These wounds will not heal as she is dying. She is not tolerating TF or water flushes today.  Plan meeting tomorrow with patient's children at 7 am 2/16. Matias has called the other siblings. One cannot attend until tomorrow at 3. 2 of the siblings find that looking at MOM in this condition if hard and will not participate. Patients mother is still alive but has Alzheimers. Daughter Matias "has my hands full".  Patient is at the end of her life and ongoing discussions will occur tomorrow about focusing her care on comfort. At this point, she does not appear to be in pain except with procedures/turning and has prn meds ordered.          Dysphagia as late effect of cerebrovascular disease    Not tolerating tube feedings. Still with vomiting.  Holding feedings at this time. This is also supportive of multiorgan failure.  Daughter wants to talk with primary ICU team about this.        CVA, old, hemiparesis    Patient has irreversible neurologic injury which may represent CADASIL. Regardless there is no curative treatment.               I will follow-up with patient. Please contact us if you have any additional questions.    Subjective:     Chief Complaint:   Chief Complaint   Patient presents with    Respiratory Distress     [facility] to ED with suspected aspiration following " "episode of reported projectile vomiting 3 Jan. Upon EMs arrival pt was 88% on 4L NC.        HPI:   Asked to see patient by Dr. Sales for assistance with goals of care and symptom management. Chart reviewed, patient examined.  Mr. Jonathan Nieves is a 51yo female with history of CVA with residual aphasia and dysphagia, non-verbal, s/p PEG tube placement and bed riddenness, osteomyelitis s/p amputation of toe, who came to the ED from [facility] via EMS in respiratory distress. Per ED/EMS the patient was noted to have "projectile vomiting" yesterday evening (24 hours prior to presentation), which was treated with zofran. During the event there is suspicion the patient possible aspirated. Currently, she opens eyes to stimulation, intubated, with contractures of knees/hips/elbows/hands and multiple areas of pressure injury.   I spoke with Daughter Matias about her mother. Family meeting for today was cancelled due to lack of participation.    Hospital Course:  No notes on file    Interval History: Family meeting today cancelled due to family unable to participate. Have discussed with nursing and Dr. Macias.    Past Medical History:   Diagnosis Date    Anemia     iron def    Depression     Dysarthria     HTN (hypertension)     Hyperlipemia     Osteomyelitis     Seizures     Stroke        Past Surgical History:   Procedure Laterality Date    TOE AMPUTATION         Review of patient's allergies indicates:   Allergen Reactions    Amoxicillin Other (See Comments)     Per daughter always allergic, unknown rxn       Medications:  Continuous Infusions:  Scheduled Meds:   albuterol-ipratropium 2.5mg-0.5mg/3mL  3 mL Nebulization Q6H    baclofen  10 mg Per G Tube TID    chlorhexidine  15 mL Mouth/Throat BID    citalopram  10 mg Per G Tube Daily    famotidine  20 mg Per G Tube BID    glycopyrrolate  2 mg Per G Tube Daily    heparin (porcine)  5,000 Units Subcutaneous Q8H    levetiracetam oral soln "  500 mg Per G Tube BID    sodium chloride 0.9%  500 mL Intravenous Once     PRN Meds:fentaNYL, magnesium sulfate IVPB, magnesium sulfate IVPB, midazolam, ondansetron, potassium chloride 10%, potassium chloride 10%, potassium chloride 10%, potassium, sodium phosphates, potassium, sodium phosphates, potassium, sodium phosphates, sodium chloride 0.9%    Family History     None        Social History Main Topics    Smoking status: Unknown If Ever Smoked    Smokeless tobacco: Never Used    Alcohol use No    Drug use: No    Sexual activity: Not on file       Review of Systems   Unable to perform ROS: Intubated     Objective:     Vital Signs (Most Recent):  Temp: 98.9 °F (37.2 °C) (02/15/18 1452)  Pulse: 85 (02/15/18 1333)  Resp: 17 (02/15/18 1333)  BP: 117/76 (02/15/18 1333)  SpO2: 100 % (02/15/18 1333) Vital Signs (24h Range):  Temp:  [98.4 °F (36.9 °C)-99.8 °F (37.7 °C)] 98.9 °F (37.2 °C)  Pulse:  [72-95] 85  Resp:  [14-42] 17  SpO2:  [93 %-100 %] 100 %  BP: ()/(59-82) 117/76     Weight: 91.5 kg (201 lb 11.5 oz)  Body mass index is 30.67 kg/m².    Review of Symptoms  Symptom Assessment (ESAS 0-10 scale) Unable to assess due to cognitive status.  ESAS 0 1 2 3 4 5 6 7 8 9 10   Pain              Dyspnea              Anxiety              Nausea              Depression               Anorexia              Fatigue              Insomnia              Restlessness               Agitation              CAM / Delirium __ --  ___+   Constipation     x__ --  ___+   Diarrhea           x__ --  ___+  Bowel Management Plan (BMP): No    Comments:   Pain Assessment: grimaces with movement or procedures    OME in 24 hours:     Performance Status: 10    ECOG Performance Status Grade: 4 - Completely disabled    Physical Exam   Eyes: Conjunctivae and EOM are normal. Pupils are equal, round, and reactive to light.   Neck: Normal range of motion. Neck supple.   Cardiovascular: Normal rate, regular rhythm and normal heart sounds.     Pulmonary/Chest: Effort normal. She has rales.   Abdominal: Soft. Bowel sounds are normal.   Musculoskeletal: She exhibits deformity.   Contractures of hands/elbows, hips/knees.  Toes Right amputated.  Areas of pressure documented in wound care notes.   Neurological:   Opens eyes to stimulation. Unable to nod or show understanding. Increased tone to upper extremities. I am unable to move at elbow or knees.   Skin: Skin is warm and dry.   Wounds/deep tissue injury per wound notes.   Nursing note and vitals reviewed.      Significant Labs: All pertinent labs within the past 24 hours have been reviewed.  CBC:     Recent Labs  Lab 02/14/18  2300   WBC 11.59   HGB 8.2*   HCT 27.4*   MCV 78*        BMP:    Recent Labs  Lab 02/14/18  2300   GLU 84      K 4.1      CO2 24   BUN 9   CREATININE 0.5   CALCIUM 8.3*   MG 1.8     LFT:  Lab Results   Component Value Date    AST 62 (H) 02/14/2018    ALKPHOS 136 (H) 02/14/2018    BILITOT 0.4 02/14/2018     Albumin:   Albumin   Date Value Ref Range Status   02/14/2018 2.1 (L) 3.5 - 5.2 g/dL Final     Protein:   Total Protein   Date Value Ref Range Status   02/14/2018 6.9 6.0 - 8.4 g/dL Final     Lactic acid:   Lab Results   Component Value Date    LACTATE 0.9 02/07/2018    LACTATE 1.6 02/05/2018       Significant Imaging: I have reviewed all pertinent imaging results/findings within the past 24 hours.    Advanced Directives::  Living Will: No  LaPOST: No  Do Not Resuscitate Status: No  Medical Power of : No    Decision-Making Capacity: Patient unable to communicate due to disease severity/cognitive impairment    Living Arrangements: Lives in nursing home    Psychosocial/Cultural:  Patient's most important priorities:  Unable to assess.    Patient's biggest concerns/fears:  Unable to assess.    Previous death/end of life care history:  Unable to assess.    Patient's goals/hopes:  Unable to assess.    Spiritual:     F- Vielka and Belief: Unable to assess.  I  - Importance:Unable to assess..  C - Community: Unable to assess.  A - Address in Care: Unable to assess.      > 50% of 25 min visit spent in chart review, face to face discussion of goals of care,  symptom assessment, coordination of care and emotional support.    Mariella Rivas MD  Palliative Medicine  [facility]

## 2018-02-15 NOTE — NURSING
Patient projectile vomited x3.  Notified critical care team. Ordered to hold tube feeding and water bolus.

## 2018-02-15 NOTE — ASSESSMENT & PLAN NOTE
Patient has irreversible neurologic injury which may represent CADASIL. Regardless there is no curative treatment.

## 2018-02-15 NOTE — CONSULTS
Midline placed  in left basilic, [id number] Lot# [id number] Removal date on or before 3/15/18. Needle advanced into vessel with real time ultrasound guidance. Image recorded and saved.

## 2018-02-15 NOTE — ASSESSMENT & PLAN NOTE
"Patient is not tolerating tube feedings and also failed weaning. Family meeting scheduled for 1 pm today. Family was unable to attend. Daughter Matias wants to meet in am and other sister in the pm (3) tomorrow. 2 of the sisters cannot stand to see MOM this way and so will not participate. Matias wanted to talk with Dr. Macias today for update on treatment.   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   She is currently on ventilator with increased secretions. She already has a PEG and despite appropriate feeding has low albumin and multiple contractures/wounds.  Plan meeting tomorrow 2/16 with patient's children. Fidepita will call the other siblings. Patients mother is still alive but has Alzheimers. Daughter Matias "has my hands full".  Patient is at the end of her life and discussions will occur tomorrow about focusing her care on comfort. At this point, she does not appear to be in pain except with procedures/turning and has prn meds ordered.  "

## 2018-02-15 NOTE — SUBJECTIVE & OBJECTIVE
Interval History/Significant Events: NAEON. Pt failed SBT.  NOTCH3 genetic testing sent out.      Review of Systems   Unable to perform ROS: Intubated     Objective:     Vital Signs (Most Recent):  Temp: 98.6 °F (37 °C) (02/14/18 1900)  Pulse: 79 (02/14/18 2044)  Resp: 16 (02/14/18 2044)  BP: 107/62 (02/14/18 1900)  SpO2: 96 % (02/14/18 2044) Vital Signs (24h Range):  Temp:  [98 °F (36.7 °C)-98.6 °F (37 °C)] 98.6 °F (37 °C)  Pulse:  [72-82] 79  Resp:  [15-32] 16  SpO2:  [93 %-100 %] 96 %  BP: ()/(59-77) 107/62   Weight: 91.5 kg (201 lb 11.5 oz)  Body mass index is 30.67 kg/m².      Intake/Output Summary (Last 24 hours) at 02/14/18 2053  Last data filed at 02/14/18 1900   Gross per 24 hour   Intake              480 ml   Output              681 ml   Net             -201 ml       Physical Exam   Constitutional: No distress.   HENT:   Head: Normocephalic and atraumatic.   Eyes: Right eye exhibits no discharge. Left eye exhibits no discharge.   Neck: Normal range of motion. Neck supple.   Cardiovascular: Normal rate and regular rhythm.    Pulmonary/Chest: Effort normal. She has rales (at bases).   intubated   Abdominal: Soft. She exhibits no distension.   Musculoskeletal: She exhibits deformity (contracted upper and lower extremities).   Neurological:   Responds to voice but does not following commands.   Skin: Skin is warm and dry.       Vents:  Vent Mode: A/C (02/14/18 2044)  Ventilator Initiated: Yes (02/04/18 2344)  Set Rate: 14 bmp (02/14/18 2044)  Vt Set: 400 mL (02/14/18 2044)  Pressure Support: 0 cmH20 (02/14/18 2044)  PEEP/CPAP: 5 cmH20 (02/14/18 2044)  Oxygen Concentration (%): 30 (02/14/18 2044)  Peak Airway Pressure: 34 cmH2O (02/14/18 2044)  Plateau Pressure: 0 cmH20 (02/14/18 2044)  Total Ve: 7.96 mL (02/14/18 2044)  F/VT Ratio<105 (RSBI): (!) 36.7 (02/14/18 2044)  Lines/Drains/Airways     Drain                 Gastrostomy/Enterostomy 10/25/16 1401 Percutaneous endoscopic gastrostomy (PEG) midline  feeding 477 days         Urethral Catheter 02/05/18 1006 Latex 9 days          Airway                 Airway - Non-Surgical 02/04/18 2329 Endotracheal Tube 9 days          Pressure Ulcer                 Pressure Injury 02/05/18 1045 Left medial Foot Deep tissue injury 9 days         Pressure Injury 02/05/18 1045 Right lateral Foot Deep tissue injury 9 days          Peripheral Intravenous Line                 Peripheral IV - Single Lumen 02/13/18 1800 Right Upper Arm 1 day         Midline Catheter Insertion/Assessment  - Single Lumen 02/14/18 0954 Left basilic vein (medial side of arm) 18g x 10cm less than 1 day              Significant Labs:    CBC/Anemia Profile:    Recent Labs  Lab 02/13/18  0320 02/14/18  0945   WBC 14.15* 13.18*   HGB 8.1* 8.6*   HCT 27.8* 29.4*    358*   MCV 80* 78*   RDW 18.6* 18.9*        Chemistries:    Recent Labs  Lab 02/13/18  0320 02/13/18  0658 02/14/18  0945     --  137   K 4.3  --  4.1     --  101   CO2 25  --  27   BUN 11  --  9   CREATININE 0.6  --  0.6   CALCIUM 7.9*  --  8.6*   ALBUMIN 2.0*  --  2.4*   PROT 6.7  --  7.7   BILITOT 0.2  --  0.4   ALKPHOS 163*  --  155*   *  --  115*   AST 71*  --  61*   MG 1.6 2.2 1.9   PHOS 3.1  --  3.6       All pertinent labs within the past 24 hours have been reviewed.    Significant Imaging:  I have reviewed and interpreted all pertinent imaging results/findings within the past 24 hours.

## 2018-02-15 NOTE — ASSESSMENT & PLAN NOTE
-patient suffered multiple strokes in past, last one in 2008   -nonverbal at baseline, wheelchair/bedbound    MRI (2/10)  Findings: The diffusion sequence demonstrates no evidence of acute infarct. There is severe white matter small vessel ischemic change. There is a remote infarct of the right occipital lobe. There is a mild hemosiderin staining of the brainstem probably from a prior subarachnoid hemorrhage. There is a small focus of hemosiderin posterior right midline medulla. Pituitary and craniocervical junction show nothing unusual. There is left frontal sinusitis change. There is involutional change.  - [facility] and [facility]. Did not include brain images or neurology notes.  - Neurology consulted: differential includes but is not limited to CADASIL (given h/o strokes, extensive white matter involvement including the temporal lobes), adult onset adrenoleukodystrophy, chronic microvascular changes (less likely), vanishing white matter disease (less likely given predominance in children), or Binswanger disease (subcortical leukoencephalopathy or vascular dementia 2/2 chronic HTN).   - NOTCH3 genetic testing sent, results pending

## 2018-02-15 NOTE — ASSESSMENT & PLAN NOTE
Not tolerating tube feedings. Still with vomiting.  Holding feedings at this time. This is also supportive of multiorgan failure.  Daughter wants to talk with primary ICU team about this.

## 2018-02-15 NOTE — PLAN OF CARE
Patient is not medically stable for discharge. Palliative Care consulted for GOC meeting w/family. Currently TF & free water flushes are on hold due to patient vomited;remains on VENT.        02/15/18 1702   Discharge Reassessment   Assessment Type Discharge Planning Reassessment   Provided patient/caregiver education on the expected discharge date and the discharge plan No  (Per MD)   Discharge Plan A Long-term acute care facility (LTAC)   Discharge Plan B Return to Nursing Home  ([facility])   Number of comorbid conditions (as recorded on the chart) Five or more

## 2018-02-15 NOTE — ASSESSMENT & PLAN NOTE
Extensive consolidation of the right lung and debris in the right mainstem bronchus and its branches consistent with aspiration pneumonia.  - s/p course of rocephin   - stopped vanco & cefepime & flagyl   - CBC monitor down trending   - Intubated, failed SBT today 2/14

## 2018-02-15 NOTE — ASSESSMENT & PLAN NOTE
-patient suffered multiple strokes in past, last one in 2008   -nonverbal at baseline, wheelchair/bedbound    MRI (2/10)  Findings: The diffusion sequence demonstrates no evidence of acute infarct. There is severe white matter small vessel ischemic change. There is a remote infarct of the right occipital lobe. There is a mild hemosiderin staining of the brainstem probably from a prior subarachnoid hemorrhage. There is a small focus of hemosiderin posterior right midline medulla. Pituitary and craniocervical junction show nothing unusual. There is left frontal sinusitis change. There is involutional change.  - [facility] Did not include brain images or neurology notes.  - Neurology consulted: differential includes but is not limited to CADASIL (given h/o strokes, extensive white matter involvement including the temporal lobes), adult onset adrenoleukodystrophy, chronic microvascular changes (less likely), vanishing white matter disease (less likely given predominance in children), or Binswanger disease (subcortical leukoencephalopathy or vascular dementia 2/2 chronic HTN).   - NOTCH3 genetic testing sent, results pending

## 2018-02-15 NOTE — ASSESSMENT & PLAN NOTE
- Neurology consulted: differential includes but is not limited to CADASIL (given h/o strokes, extensive white matter involvement including the temporal lobes), adult onset adrenoleukodystrophy, chronic microvascular changes (less likely), vanishing white matter disease (less likely given predominance in children), or Binswanger disease (subcortical leukoencephalopathy or vascular dementia 2/2 chronic HTN).   - NOTCH3 genetic testing sent, results pending

## 2018-02-15 NOTE — ASSESSMENT & PLAN NOTE
- Intubated  - Failed SBT today  - s/p rocephin days   - sputum cultures rare gram positive & negative   - blood cultures NGTD

## 2018-02-15 NOTE — PROGRESS NOTES
"[facility]  Critical Care Medicine  Progress Note    Patient Name: Jonathan Nieves  MRN: [id number]  Admission Date: 2/4/2018  Hospital Length of Stay: 10 days  Code Status: Full Code  Attending Provider: Erick Sales MD  Primary Care Provider: Primary Doctor No   Principal Problem: Acute respiratory failure with hypoxia    Subjective:     HPI:  Mr. Jonathan Nieves is a 51yo female with history of CVA with residual aphasia and dysphagia, non-verbal, s/p PEG tube placement and bed riddenness, osteomyelitis s/p amputation of toe, who comes to the ED from [facility] via EMS in respiratory distress. Per ED/EMS the patient was noted to have "projectile vomiting" yesterday evening (24 hours prior to presentation), which was treated with zofran. During the event there is suspicion the patient possible aspirated. Today the patient was noted to be in resp distress with a fever.     Patient was satting 88% on 4L on EMS arrival, breathing 40 times per minute.  Improved to 96% on 15L NRB with RR high 20s.  Axillary temp 102.    CT chest concerning for aspiration pneumonia.  UA concern for a UTI.       Patient intubated in the ED & Critical Care consulted.         Hospital/ICU Course:  2/5: Critical Care Consulted. Patient intubated in the ED.   2/6: Patient doing well, on SBT; will extubate maria e. Continue treatment for aspiration PNA & UTI.   2/7: abx deescalated, continue to treat for PNA and UTI. Required pressors overnight. Off this morning.   2/8-9: still with secretions and questionable mentation? Baseline?. Will try to dry the secretions and extubated today or maria e.    2/10: MRI completed. Family called, had family meeting with one daughter. Need to re-visit with all daughters. [facility] requested from [facility] and [facility].   02/11-13: neurology consulted, no acute changes, wbc and LFT trending up, keep monitoring.  2/14: Concerns for CADASIL vs Binswanger syndrome per Neuro. NOTCH3 " genetic testing sent. Palliative consulted regarding goals of care.   02/15 pt is not tolerating tube feed, had two episodes of projectile vomiting, KUB 02/14 shows partial SOB and distended stomach.    Interval History/Significant Events: vomiting, not tolerating tube feeds      Review of Systems   Unable to perform ROS: Intubated     Objective:     Vital Signs (Most Recent):  Temp: 98.9 °F (37.2 °C) (02/15/18 1452)  Pulse: 85 (02/15/18 1333)  Resp: 17 (02/15/18 1333)  BP: 117/76 (02/15/18 1333)  SpO2: 100 % (02/15/18 1333) Vital Signs (24h Range):  Temp:  [98.4 °F (36.9 °C)-99.8 °F (37.7 °C)] 98.9 °F (37.2 °C)  Pulse:  [72-95] 85  Resp:  [14-42] 17  SpO2:  [93 %-100 %] 100 %  BP: ()/(59-82) 117/76   Weight: 91.5 kg (201 lb 11.5 oz)  Body mass index is 30.67 kg/m².      Intake/Output Summary (Last 24 hours) at 02/15/18 1534  Last data filed at 02/15/18 1439   Gross per 24 hour   Intake             1315 ml   Output             1775 ml   Net             -460 ml       Physical Exam   Constitutional: No distress.   HENT:   Head: Normocephalic and atraumatic.   Eyes: Right eye exhibits no discharge. Left eye exhibits no discharge.   Neck: Normal range of motion. Neck supple.   Cardiovascular: Normal rate and regular rhythm.    Pulmonary/Chest: Effort normal. She has rales (at bases).   intubated   Abdominal: Soft. She exhibits no distension.   Musculoskeletal: She exhibits deformity (contracted upper and lower extremities).   Neurological:   Responds to voice but does not following commands.   Skin: Skin is warm and dry.       Vents:  Vent Mode: A/C (02/15/18 1333)  Ventilator Initiated: Yes (02/04/18 5974)  Set Rate: 14 bmp (02/15/18 1333)  Vt Set: 400 mL (02/15/18 1333)  Pressure Support: 0 cmH20 (02/15/18 1333)  PEEP/CPAP: 5 cmH20 (02/15/18 1333)  Oxygen Concentration (%): 30 (02/15/18 1333)  Peak Airway Pressure: 36 cmH2O (02/15/18 1333)  Plateau Pressure: 0 cmH20 (02/15/18 1333)  Total Ve: 6.44 mL (02/15/18  1333)  F/VT Ratio<105 (RSBI): (!) 38.37 (02/15/18 1333)  Lines/Drains/Airways     Drain                 Gastrostomy/Enterostomy 10/25/16 1401 Percutaneous endoscopic gastrostomy (PEG) midline feeding 478 days         Urethral Catheter 02/05/18 1006 Latex 10 days          Airway                 Airway - Non-Surgical 02/04/18 2329 Endotracheal Tube 10 days          Pressure Ulcer                 Pressure Injury 02/05/18 1045 Left medial Foot Deep tissue injury 10 days         Pressure Injury 02/05/18 1045 Right lateral Foot Deep tissue injury 10 days          Peripheral Intravenous Line                 Peripheral IV - Single Lumen 02/13/18 1800 Right Upper Arm 1 day         Midline Catheter Insertion/Assessment  - Single Lumen 02/15/18 1125 Left basilic vein (medial side of arm) 18g x 10cm less than 1 day              Significant Labs:    CBC/Anemia Profile:    Recent Labs  Lab 02/14/18  0945 02/14/18  2300   WBC 13.18* 11.59   HGB 8.6* 8.2*   HCT 29.4* 27.4*   * 340   MCV 78* 78*   RDW 18.9* 18.8*        Chemistries:    Recent Labs  Lab 02/14/18  0945 02/14/18  2300    136   K 4.1 4.1    104   CO2 27 24   BUN 9 9   CREATININE 0.6 0.5   CALCIUM 8.6* 8.3*   ALBUMIN 2.4* 2.1*   PROT 7.7 6.9   BILITOT 0.4 0.4   ALKPHOS 155* 136*   * 112*   AST 61* 62*   MG 1.9 1.8   PHOS 3.6  --        All pertinent labs within the past 24 hours have been reviewed.    Significant Imaging:  I have reviewed and interpreted all pertinent imaging results/findings within the past 24 hours.    Assessment/Plan:     Neuro   Seizure-like activity    - continue keppra         Dysphagia as late effect of cerebrovascular disease    - Neurology consulted: differential includes but is not limited to CADASIL (given h/o strokes, extensive white matter involvement including the temporal lobes), adult onset adrenoleukodystrophy, chronic microvascular changes (less likely), vanishing white matter disease (less likely given  predominance in children), or Binswanger disease (subcortical leukoencephalopathy or vascular dementia 2/2 chronic HTN).   - NOTCH3 genetic testing sent, results pending        CVA, old, hemiparesis    -patient suffered multiple strokes in past, last one in 2008   -nonverbal at baseline, wheelchair/bedbound    MRI (2/10)  Findings: The diffusion sequence demonstrates no evidence of acute infarct. There is severe white matter small vessel ischemic change. There is a remote infarct of the right occipital lobe. There is a mild hemosiderin staining of the brainstem probably from a prior subarachnoid hemorrhage. There is a small focus of hemosiderin posterior right midline medulla. Pituitary and craniocervical junction show nothing unusual. There is left frontal sinusitis change. There is involutional change.  - Records requested from [facility] and [facility]. Did not include brain images or neurology notes.  - Neurology consulted: differential includes but is not limited to CADASIL (given h/o strokes, extensive white matter involvement including the temporal lobes), adult onset adrenoleukodystrophy, chronic microvascular changes (less likely), vanishing white matter disease (less likely given predominance in children), or Binswanger disease (subcortical leukoencephalopathy or vascular dementia 2/2 chronic HTN).   - NOTCH3 genetic testing sent, results pending        Derm   Unstageable pressure ulcer of left buttock    - multiple area, all looks clean, not infected.        Pulmonary   * Acute respiratory failure with hypoxia    - Intubated, Failing SBT >>> lots of secretion, started on glycopyrrolate  - s/p rocephin days   - sputum cultures rare gram positive & negative   - blood cultures NGTD          Aspiration pneumonia    Extensive consolidation of the right lung and debris in the right mainstem bronchus and its branches consistent with aspiration pneumonia.  - s/p course of rocephin   - s/p vanco & cefepime & flagyl   - CBC  monitor down trending   - Intubated, failed SBT today 2/14          Cardiac/Vascular   Paroxysmal atrial fibrillation    -NSR  - no ECG done at [facility] shows Afib.  - Pt home BB held due to hypotension           Renal/   Hypernatremia    - Resolved           UTI (urinary tract infection)    - UA (Bacteria, WBC, leukocyte positive)  - History of VRE   - Urine cultures proteus, Identification and susceptibility pending  - Course of Rocephin completed         GI   Partial small bowel obstruction    - KUB in 02/14 showed distended stomach and partial SOB.  - hold tube feeds.  - Low intermittent NG suctioning.        PEG (percutaneous endoscopic gastrostomy) status    - held tube feeds due to vomiting.        Other   Goals of care, counseling/discussion    - Palliative consulted. Family was not available to speak with team today. Will follow up with Goals of Care discussion.              Critical care was time spent personally by me on the following activities: development of treatment plan with patient or surrogate and bedside caregivers, discussions with consultants, evaluation of patient's response to treatment, examination of patient, ordering and performing treatments and interventions, ordering and review of laboratory studies, ordering and review of radiographic studies, pulse oximetry, re-evaluation of patient's condition. This critical care time did not overlap with that of any other provider or involve time for any procedures.     Ana Chauhan MD  Critical Care Medicine  [facility]

## 2018-02-15 NOTE — ASSESSMENT & PLAN NOTE
"Patient has irreversible neurologic injury  for which there is no curative treatment.   She is currently on ventilator with increased secretions. She already has a PEG and despite appropriate feeding has low albumin and multiple contractures/wounds. These wounds will not heal as she is dying. Plan meeting tomorrow with patient's children. Some do not want to participate.Patients mother is still alive but has Alzheimers. Daughter Catrine "has my hands full".  Patient is at the end of her life and discussions will occur tomorrow about focusing her care on comfort. At this point, she does not appear to be in pain except with procedures/turning and has prn meds ordered.    "

## 2018-02-15 NOTE — ASSESSMENT & PLAN NOTE
"Patient has irreversible neurologic injury  for which there is no curative treatment.   She is currently on ventilator with increased secretions. She already has a PEG and despite previous appropriate feeding has low albumin and multiple contractures/wounds. These wounds will not heal as she is dying. She is not tolerating TF or water flushes today.  Plan meeting tomorrow with patient's children at 7 am 2/16. Matias has called the other siblings. One cannot attend until tomorrow at 3. 2 of the siblings find that looking at MOM in this condition if hard and will not participate. Patients mother is still alive but has Alzheimers. Daughter Matias "has my hands full".  Patient is at the end of her life and ongoing discussions will occur tomorrow about focusing her care on comfort. At this point, she does not appear to be in pain except with procedures/turning and has prn meds ordered.    "

## 2018-02-15 NOTE — ASSESSMENT & PLAN NOTE
Extensive consolidation of the right lung and debris in the right mainstem bronchus and its branches consistent with aspiration pneumonia.  - s/p course of rocephin   - s/p vanco & cefepime & flagyl   - CBC monitor down trending   - Intubated, failed SBT today 2/14

## 2018-02-15 NOTE — PLAN OF CARE
Problem: Patient Care Overview  Goal: Plan of Care Review  Outcome: Ongoing (interventions implemented as appropriate)  Patient failed SBT today. She continues to have copious secretions but has not vomited. Tube feeding was resumed at 5 pm and free water bolus to start again.Patients urine output decreased to 10-15 ml per hour. BP dropped and map dropped below 65.  500 ml ns bolus was given and tube feeding resumed. BP increased and map aleksandr above 65. Patient does not track. She does respond to pain. Midline was placed in left interior forearm but does not draw unless pulled back a little.  Patient's muscles are contracted and hard to move. It took 3 people to turn and clean her. Palliative care came by but family has not been here so nothing was confirmed. See flowsheet for vital signs. Will continue to monitor.

## 2018-02-15 NOTE — ASSESSMENT & PLAN NOTE
- Palliative consulted. Family was not available to speak with team today. Will follow up with Goals of Care discussion.

## 2018-02-15 NOTE — ASSESSMENT & PLAN NOTE
- UA (Bacteria, WBC, leukocyte positive)  - History of VRE   - Urine cultures proteus, Identification and susceptibility pending  - Course of Rocephin completed

## 2018-02-15 NOTE — ASSESSMENT & PLAN NOTE
-NSR  - no ECG done at [facility] shows Afib.  - Pt home BB held due to hypotension

## 2018-02-15 NOTE — SUBJECTIVE & OBJECTIVE
Interval History/Significant Events: vomiting, not tolerating tube feeds      Review of Systems   Unable to perform ROS: Intubated     Objective:     Vital Signs (Most Recent):  Temp: 98.9 °F (37.2 °C) (02/15/18 1452)  Pulse: 85 (02/15/18 1333)  Resp: 17 (02/15/18 1333)  BP: 117/76 (02/15/18 1333)  SpO2: 100 % (02/15/18 1333) Vital Signs (24h Range):  Temp:  [98.4 °F (36.9 °C)-99.8 °F (37.7 °C)] 98.9 °F (37.2 °C)  Pulse:  [72-95] 85  Resp:  [14-42] 17  SpO2:  [93 %-100 %] 100 %  BP: ()/(59-82) 117/76   Weight: 91.5 kg (201 lb 11.5 oz)  Body mass index is 30.67 kg/m².      Intake/Output Summary (Last 24 hours) at 02/15/18 1534  Last data filed at 02/15/18 1439   Gross per 24 hour   Intake             1315 ml   Output             1775 ml   Net             -460 ml       Physical Exam   Constitutional: No distress.   HENT:   Head: Normocephalic and atraumatic.   Eyes: Right eye exhibits no discharge. Left eye exhibits no discharge.   Neck: Normal range of motion. Neck supple.   Cardiovascular: Normal rate and regular rhythm.    Pulmonary/Chest: Effort normal. She has rales (at bases).   intubated   Abdominal: Soft. She exhibits no distension.   Musculoskeletal: She exhibits deformity (contracted upper and lower extremities).   Neurological:   Responds to voice but does not following commands.   Skin: Skin is warm and dry.       Vents:  Vent Mode: A/C (02/15/18 1333)  Ventilator Initiated: Yes (02/04/18 2344)  Set Rate: 14 bmp (02/15/18 1333)  Vt Set: 400 mL (02/15/18 1333)  Pressure Support: 0 cmH20 (02/15/18 1333)  PEEP/CPAP: 5 cmH20 (02/15/18 1333)  Oxygen Concentration (%): 30 (02/15/18 1333)  Peak Airway Pressure: 36 cmH2O (02/15/18 1333)  Plateau Pressure: 0 cmH20 (02/15/18 1333)  Total Ve: 6.44 mL (02/15/18 1333)  F/VT Ratio<105 (RSBI): (!) 38.37 (02/15/18 1333)  Lines/Drains/Airways     Drain                 Gastrostomy/Enterostomy 10/25/16 1401 Percutaneous endoscopic gastrostomy (PEG) midline feeding 478  days         Urethral Catheter 02/05/18 1006 Latex 10 days          Airway                 Airway - Non-Surgical 02/04/18 2329 Endotracheal Tube 10 days          Pressure Ulcer                 Pressure Injury 02/05/18 1045 Left medial Foot Deep tissue injury 10 days         Pressure Injury 02/05/18 1045 Right lateral Foot Deep tissue injury 10 days          Peripheral Intravenous Line                 Peripheral IV - Single Lumen 02/13/18 1800 Right Upper Arm 1 day         Midline Catheter Insertion/Assessment  - Single Lumen 02/15/18 1125 Left basilic vein (medial side of arm) 18g x 10cm less than 1 day              Significant Labs:    CBC/Anemia Profile:    Recent Labs  Lab 02/14/18  0945 02/14/18  2300   WBC 13.18* 11.59   HGB 8.6* 8.2*   HCT 29.4* 27.4*   * 340   MCV 78* 78*   RDW 18.9* 18.8*        Chemistries:    Recent Labs  Lab 02/14/18  0945 02/14/18  2300    136   K 4.1 4.1    104   CO2 27 24   BUN 9 9   CREATININE 0.6 0.5   CALCIUM 8.6* 8.3*   ALBUMIN 2.4* 2.1*   PROT 7.7 6.9   BILITOT 0.4 0.4   ALKPHOS 155* 136*   * 112*   AST 61* 62*   MG 1.9 1.8   PHOS 3.6  --        All pertinent labs within the past 24 hours have been reviewed.    Significant Imaging:  I have reviewed and interpreted all pertinent imaging results/findings within the past 24 hours.

## 2018-02-15 NOTE — SUBJECTIVE & OBJECTIVE
Interval History: Family meeting today cancelled due to family unable to participate. Have discussed with nursing and Dr. Macias.    Past Medical History:   Diagnosis Date    Anemia     iron def    Depression     Dysarthria     HTN (hypertension)     Hyperlipemia     Osteomyelitis     Seizures     Stroke        Past Surgical History:   Procedure Laterality Date    TOE AMPUTATION         Review of patient's allergies indicates:   Allergen Reactions    Amoxicillin Other (See Comments)     Per daughter always allergic, unknown rxn       Medications:  Continuous Infusions:  Scheduled Meds:   albuterol-ipratropium 2.5mg-0.5mg/3mL  3 mL Nebulization Q6H    baclofen  10 mg Per G Tube TID    chlorhexidine  15 mL Mouth/Throat BID    citalopram  10 mg Per G Tube Daily    famotidine  20 mg Per G Tube BID    glycopyrrolate  2 mg Per G Tube Daily    heparin (porcine)  5,000 Units Subcutaneous Q8H    levetiracetam oral soln  500 mg Per G Tube BID    sodium chloride 0.9%  500 mL Intravenous Once     PRN Meds:fentaNYL, magnesium sulfate IVPB, magnesium sulfate IVPB, midazolam, ondansetron, potassium chloride 10%, potassium chloride 10%, potassium chloride 10%, potassium, sodium phosphates, potassium, sodium phosphates, potassium, sodium phosphates, sodium chloride 0.9%    Family History     None        Social History Main Topics    Smoking status: Unknown If Ever Smoked    Smokeless tobacco: Never Used    Alcohol use No    Drug use: No    Sexual activity: Not on file       Review of Systems   Unable to perform ROS: Intubated     Objective:     Vital Signs (Most Recent):  Temp: 98.9 °F (37.2 °C) (02/15/18 1452)  Pulse: 85 (02/15/18 1333)  Resp: 17 (02/15/18 1333)  BP: 117/76 (02/15/18 1333)  SpO2: 100 % (02/15/18 1333) Vital Signs (24h Range):  Temp:  [98.4 °F (36.9 °C)-99.8 °F (37.7 °C)] 98.9 °F (37.2 °C)  Pulse:  [72-95] 85  Resp:  [14-42] 17  SpO2:  [93 %-100 %] 100 %  BP: ()/(59-82) 117/76      Weight: 91.5 kg (201 lb 11.5 oz)  Body mass index is 30.67 kg/m².    Review of Symptoms  Symptom Assessment (ESAS 0-10 scale) Unable to assess due to cognitive status.  ESAS 0 1 2 3 4 5 6 7 8 9 10   Pain              Dyspnea              Anxiety              Nausea              Depression               Anorexia              Fatigue              Insomnia              Restlessness               Agitation              CAM / Delirium __ --  ___+   Constipation     x__ --  ___+   Diarrhea           x__ --  ___+  Bowel Management Plan (BMP): No    Comments:   Pain Assessment: grimaces with movement or procedures    OME in 24 hours:     Performance Status: 10    ECOG Performance Status Grade: 4 - Completely disabled    Physical Exam   Eyes: Conjunctivae and EOM are normal. Pupils are equal, round, and reactive to light.   Neck: Normal range of motion. Neck supple.   Cardiovascular: Normal rate, regular rhythm and normal heart sounds.    Pulmonary/Chest: Effort normal. She has rales.   Abdominal: Soft. Bowel sounds are normal.   Musculoskeletal: She exhibits deformity.   Contractures of hands/elbows, hips/knees.  Toes Right amputated.  Areas of pressure documented in wound care notes.   Neurological:   Opens eyes to stimulation. Unable to nod or show understanding. Increased tone to upper extremities. I am unable to move at elbow or knees.   Skin: Skin is warm and dry.   Wounds/deep tissue injury per wound notes.   Nursing note and vitals reviewed.      Significant Labs: All pertinent labs within the past 24 hours have been reviewed.  CBC:     Recent Labs  Lab 02/14/18  2300   WBC 11.59   HGB 8.2*   HCT 27.4*   MCV 78*        BMP:    Recent Labs  Lab 02/14/18  2300   GLU 84      K 4.1      CO2 24   BUN 9   CREATININE 0.5   CALCIUM 8.3*   MG 1.8     LFT:  Lab Results   Component Value Date    AST 62 (H) 02/14/2018    ALKPHOS 136 (H) 02/14/2018    BILITOT 0.4 02/14/2018     Albumin:   Albumin   Date  Value Ref Range Status   02/14/2018 2.1 (L) 3.5 - 5.2 g/dL Final     Protein:   Total Protein   Date Value Ref Range Status   02/14/2018 6.9 6.0 - 8.4 g/dL Final     Lactic acid:   Lab Results   Component Value Date    LACTATE 0.9 02/07/2018    LACTATE 1.6 02/05/2018       Significant Imaging: I have reviewed all pertinent imaging results/findings within the past 24 hours.    Advanced Directives::  Living Will: No  LaPOST: No  Do Not Resuscitate Status: No  Medical Power of : No    Decision-Making Capacity: Patient unable to communicate due to disease severity/cognitive impairment    Living Arrangements: Lives in nursing home    Psychosocial/Cultural:  Patient's most important priorities:  Unable to assess.    Patient's biggest concerns/fears:  Unable to assess.    Previous death/end of life care history:  Unable to assess.    Patient's goals/hopes:  Unable to assess.    Spiritual:     F- Vielka and Belief: Unable to assess.  I - Importance:Unable to assess..  C - Community: Unable to assess.  A - Address in Care: Unable to assess.

## 2018-02-16 PROBLEM — G93.40 ACUTE ENCEPHALOPATHY: Status: ACTIVE | Noted: 2018-01-01

## 2018-02-16 PROBLEM — K56.600 PARTIAL SMALL BOWEL OBSTRUCTION: Status: ACTIVE | Noted: 2018-01-01

## 2018-02-16 NOTE — PLAN OF CARE
Problem: Patient Care Overview  Goal: Plan of Care Review  Pt remains afebrile and VSS. See flow sheet for full assessment. Pt remains on continuous tele, pulse ox monitor, ETT to vent.  No falls. No N/V this shift, PEG clamped. CT performed this shift, results available. Patient contracted with skin breakdown. POC reviewed w/Pt who is unable verbalize or demonstrate understanding, no family present.

## 2018-02-16 NOTE — PROGRESS NOTES
Phone call to erika Salcedo yesterday afternoon when she did not attend family meeting. Scheduled family meeting at 7 am with erika Salcedo today. She is not here or in the family waiting area. She did not answer the phone. Will continue attempt family meeting.

## 2018-02-16 NOTE — ASSESSMENT & PLAN NOTE
"Family contacted several times yesterday. Did not come in for meeting yesterday at 1 pm or this am at 7 as planned. Now they say that they will be here at 3 pm. Dr. Macias did talk with jose Salcedo last night. No decisions made.  Patient is not tolerating tube feedings and also failed weaning.  2 of the daughters cannot stand to see MOM this way and so will not participate. 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   She is currently on ventilator with increased secretions. She already has a PEG and despite appropriate feeding has low albumin and multiple contractures/wounds.  Plan meeting tomorrow 2/16 with patient's children. Fidepita will call the other siblings. Patients mother is still alive but has Alzheimers. Jose Salcedo "has my hands full".  Patient is at the end of her life and discussions will occur tomorrow about focusing her care on comfort. At this point, she does not appear to be in pain except with procedures/turning and has prn meds ordered.  "

## 2018-02-16 NOTE — ASSESSMENT & PLAN NOTE
Risk of aspiration is increased with PEG feedings. She is not tolerating. Had CT of abdomen with no acute findings. Family meeting tomorrow to discuss.

## 2018-02-16 NOTE — PROGRESS NOTES
"[facility]  Palliative Medicine  Progress Note    Patient Name: Jonathan Nieves  MRN: [id number]  Admission Date: 2/4/2018  Hospital Length of Stay: 11 days  Code Status: Full Code   Attending Provider: Erick Sales MD  Consulting Provider: Mariella Rivas MD  Primary Care Physician: Primary Doctor No  Principal Problem:Acute respiratory failure with hypoxia    Patient information was obtained from past medical records and ER records.      Assessment/Plan:     Palliative care encounter    Family contacted several times yesterday. Did not come in for meeting yesterday at 1 pm or this am at 7 as planned. Now they say that they will be here at 3 pm. Dr. Macias did talk with erika Salcedo last night. No decisions made.  Patient is not tolerating tube feedings and also failed weaning.  2 of the daughters cannot stand to see MOM this way and so will not participate. 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   She is currently on ventilator with increased secretions. She already has a PEG and despite appropriate feeding has low albumin and multiple contractures/wounds.  Plan meeting tomorrow 2/16 with patient's children. Matias will call the other siblings. Patients mother is still alive but has Alzheimers. Daughter Matias "has my hands full".  Patient is at the end of her life and discussions will occur tomorrow about focusing her care on comfort. At this point, she does not appear to be in pain except with procedures/turning and has prn meds ordered.        PEG (percutaneous endoscopic gastrostomy) status    Risk of aspiration is increased with PEG feedings. She is not tolerating. Had CT of abdomen with no acute findings. Family meeting tomorrow to discuss.        Aspiration pneumonia    Not able to be weaned. No curative treatment available. Risk of aspiration is increased with PEG feedings. She is not tolerating.  Family meeting " "today to discuss.            I will follow-up with patient. Please contact us if you have any additional questions.    Subjective:     Chief Complaint:   Chief Complaint   Patient presents with    Respiratory Distress     [facility] to ED with suspected aspiration following episode of reported projectile vomiting 3 Jan. Upon EMs arrival pt was 88% on 4L NC.        HPI:   Asked to see patient by Dr. Sales for assistance with goals of care and symptom management. Chart reviewed, patient examined.  Mr. Jonathan Nieves is a 53yo female with history of CVA with residual aphasia and dysphagia, non-verbal, s/p PEG tube placement and bed riddenness, osteomyelitis s/p amputation of toe, who came to the ED from [facility] via EMS in respiratory distress. Per ED/EMS the patient was noted to have "projectile vomiting" yesterday evening (24 hours prior to presentation), which was treated with zofran. During the event there is suspicion the patient possible aspirated. Currently, she opens eyes to stimulation, intubated, with contractures of knees/hips/elbows/hands and multiple areas of pressure injury.   I spoke with Daughter Matias about her mother. Family meeting for today was cancelled due to lack of participation.    Hospital Course:  No notes on file    Interval History:     Past Medical History:   Diagnosis Date    Anemia     iron def    Depression     Dysarthria     HTN (hypertension)     Hyperlipemia     Osteomyelitis     Seizures     Stroke        Past Surgical History:   Procedure Laterality Date    TOE AMPUTATION         Review of patient's allergies indicates:   Allergen Reactions    Amoxicillin Other (See Comments)     Per daughter always allergic, unknown rxn       Medications:  Continuous Infusions:  Scheduled Meds:   albuterol-ipratropium 2.5mg-0.5mg/3mL  3 mL Nebulization Q6H    baclofen  10 mg Per G Tube TID    chlorhexidine  15 mL Mouth/Throat BID    " citalopram  10 mg Per G Tube Daily    famotidine  20 mg Per G Tube BID    glycopyrrolate  2 mg Per G Tube Daily    heparin (porcine)  5,000 Units Subcutaneous Q8H    levetiracetam oral soln  500 mg Per G Tube BID     PRN Meds:[] fentaNYL **FOLLOWED BY** fentaNYL, magnesium sulfate IVPB, magnesium sulfate IVPB, midazolam, ondansetron, potassium chloride 10%, potassium chloride 10%, potassium chloride 10%, potassium, sodium phosphates, potassium, sodium phosphates, potassium, sodium phosphates, sodium chloride 0.9%    Family History     None        Social History Main Topics    Smoking status: Unknown If Ever Smoked    Smokeless tobacco: Never Used    Alcohol use No    Drug use: No    Sexual activity: Not on file       Review of Systems   Unable to perform ROS: Intubated     Objective:     Vital Signs (Most Recent):  Temp: 98.7 °F (37.1 °C) (02/15/18 1901)  Pulse: 90 (18 09)  Resp: (!) 23 (18)  BP: (!) 176/96 (18 0900)  SpO2: 98 % (18) Vital Signs (24h Range):  Temp:  [98.4 °F (36.9 °C)-98.9 °F (37.2 °C)] 98.7 °F (37.1 °C)  Pulse:  [] 90  Resp:  [14-51] 23  SpO2:  [96 %-100 %] 98 %  BP: ()/() 176/96     Weight: 91.5 kg (201 lb 11.5 oz)  Body mass index is 30.67 kg/m².    Review of Symptoms  Symptom Assessment (ESAS 0-10 scale) Unable to assess due to cognitive status.  ESAS 0 1 2 3 4 5 6 7 8 9 10   Pain              Dyspnea              Anxiety              Nausea              Depression               Anorexia              Fatigue              Insomnia              Restlessness               Agitation              CAM / Delirium __ --  ___+   Constipation     x__ --  ___+   Diarrhea           x__ --  ___+  Bowel Management Plan (BMP): No    Comments: on tube feeding, some fentanyl IV for procedures.    Pain Assessment: grimaces with movement or procedures    OME in 24 hours:     Performance Status: 10    ECOG Performance Status Grade: 4 - Completely  disabled    Physical Exam   Constitutional: She appears ill.   Opens eyes. No tracking or purposeful movement.   Eyes: Conjunctivae and EOM are normal. Pupils are equal, round, and reactive to light.   Neck: Normal range of motion. Neck supple.   Cardiovascular: Regular rhythm and normal heart sounds.    Pulmonary/Chest: Effort normal. She has rales.   On vent. Poor inspiratory effort.   Abdominal: Soft. Bowel sounds are normal.   Musculoskeletal: She exhibits deformity.   Contractures of hands/elbows, hips/knees.  Toes Right amputated.  Areas of pressure documented in wound care notes.   Neurological: She is unresponsive.   Opens eyes to stimulation. Unable to nod or show understanding. Increased tone to upper extremities. I am unable to move at elbow or knees.   Skin: Skin is warm and dry.   Wounds covered.   Nursing note and vitals reviewed.      Significant Labs: All pertinent labs within the past 24 hours have been reviewed.  CBC:     Recent Labs  Lab 02/16/18  0809   WBC 11.68   HGB 9.4*   HCT 32.5*   MCV 80*   *     BMP:    Recent Labs  Lab 02/16/18  0809   GLU 76      K 4.2      CO2 24   BUN 6   CREATININE 0.6   CALCIUM 8.6*   MG 1.6     LFT:  Lab Results   Component Value Date    AST 44 (H) 02/16/2018    ALKPHOS 144 (H) 02/16/2018    BILITOT 0.6 02/16/2018     Albumin:   Albumin   Date Value Ref Range Status   02/16/2018 2.4 (L) 3.5 - 5.2 g/dL Final     Protein:   Total Protein   Date Value Ref Range Status   02/16/2018 7.9 6.0 - 8.4 g/dL Final     Lactic acid:   Lab Results   Component Value Date    LACTATE 0.9 02/07/2018    LACTATE 1.6 02/05/2018       Significant Imaging: I have reviewed all pertinent imaging results/findings within the past 24 hours.    Advanced Directives::  Living Will: No  LaPOST: No  Do Not Resuscitate Status: No  Medical Power of : No    Decision-Making Capacity: Patient unable to communicate due to disease severity/cognitive impairment    Living  Arrangements: Lives in nursing home    Psychosocial/Cultural:  Patient's most important priorities:  Unable to assess.    Patient's biggest concerns/fears:  Unable to assess.    Previous death/end of life care history:  Unable to assess.    Patient's goals/hopes:  Unable to assess.    Spiritual:     F- Vielka and Belief: Unable to assess.  I - Importance:Unable to assess..  C - Community: Unable to assess.  A - Address in Care: Unable to assess.      > 50% of 35 min visit spent in chart review, face to face discussion of goals of care,  symptom assessment, coordination of care and emotional support.    Mariella Rivas MD  Palliative Medicine  [facility]

## 2018-02-16 NOTE — SUBJECTIVE & OBJECTIVE
Interval History:     Past Medical History:   Diagnosis Date    Anemia     iron def    Depression     Dysarthria     HTN (hypertension)     Hyperlipemia     Osteomyelitis     Seizures     Stroke        Past Surgical History:   Procedure Laterality Date    TOE AMPUTATION         Review of patient's allergies indicates:   Allergen Reactions    Amoxicillin Other (See Comments)     Per daughter always allergic, unknown rxn       Medications:  Continuous Infusions:  Scheduled Meds:   albuterol-ipratropium 2.5mg-0.5mg/3mL  3 mL Nebulization Q6H    baclofen  10 mg Per G Tube TID    chlorhexidine  15 mL Mouth/Throat BID    citalopram  10 mg Per G Tube Daily    famotidine  20 mg Per G Tube BID    glycopyrrolate  2 mg Per G Tube Daily    heparin (porcine)  5,000 Units Subcutaneous Q8H    levetiracetam oral soln  500 mg Per G Tube BID     PRN Meds:[] fentaNYL **FOLLOWED BY** fentaNYL, magnesium sulfate IVPB, magnesium sulfate IVPB, midazolam, ondansetron, potassium chloride 10%, potassium chloride 10%, potassium chloride 10%, potassium, sodium phosphates, potassium, sodium phosphates, potassium, sodium phosphates, sodium chloride 0.9%    Family History     None        Social History Main Topics    Smoking status: Unknown If Ever Smoked    Smokeless tobacco: Never Used    Alcohol use No    Drug use: No    Sexual activity: Not on file       Review of Systems   Unable to perform ROS: Intubated     Objective:     Vital Signs (Most Recent):  Temp: 98.7 °F (37.1 °C) (02/15/18 1901)  Pulse: 90 (18 0921)  Resp: (!) 23 (18 09)  BP: (!) 176/96 (18 0900)  SpO2: 98 % (18 0921) Vital Signs (24h Range):  Temp:  [98.4 °F (36.9 °C)-98.9 °F (37.2 °C)] 98.7 °F (37.1 °C)  Pulse:  [] 90  Resp:  [14-51] 23  SpO2:  [96 %-100 %] 98 %  BP: ()/() 176/96     Weight: 91.5 kg (201 lb 11.5 oz)  Body mass index is 30.67 kg/m².    Review of Symptoms  Symptom Assessment (ESAS 0-10  scale) Unable to assess due to cognitive status.  ESAS 0 1 2 3 4 5 6 7 8 9 10   Pain              Dyspnea              Anxiety              Nausea              Depression               Anorexia              Fatigue              Insomnia              Restlessness               Agitation              CAM / Delirium __ --  ___+   Constipation     x__ --  ___+   Diarrhea           x__ --  ___+  Bowel Management Plan (BMP): No    Comments: on tube feeding, some fentanyl IV for procedures.    Pain Assessment: grimaces with movement or procedures    OME in 24 hours:     Performance Status: 10    ECOG Performance Status Grade: 4 - Completely disabled    Physical Exam   Constitutional: She appears ill.   Opens eyes. No tracking or purposeful movement.   Eyes: Conjunctivae and EOM are normal. Pupils are equal, round, and reactive to light.   Neck: Normal range of motion. Neck supple.   Cardiovascular: Regular rhythm and normal heart sounds.    Pulmonary/Chest: Effort normal. She has rales.   On vent. Poor inspiratory effort.   Abdominal: Soft. Bowel sounds are normal.   Musculoskeletal: She exhibits deformity.   Contractures of hands/elbows, hips/knees.  Toes Right amputated.  Areas of pressure documented in wound care notes.   Neurological: She is unresponsive.   Opens eyes to stimulation. Unable to nod or show understanding. Increased tone to upper extremities. I am unable to move at elbow or knees.   Skin: Skin is warm and dry.   Wounds covered.   Nursing note and vitals reviewed.      Significant Labs: All pertinent labs within the past 24 hours have been reviewed.  CBC:     Recent Labs  Lab 02/16/18  0809   WBC 11.68   HGB 9.4*   HCT 32.5*   MCV 80*   *     BMP:    Recent Labs  Lab 02/16/18  0809   GLU 76      K 4.2      CO2 24   BUN 6   CREATININE 0.6   CALCIUM 8.6*   MG 1.6     LFT:  Lab Results   Component Value Date    AST 44 (H) 02/16/2018    ALKPHOS 144 (H) 02/16/2018    BILITOT 0.6 02/16/2018      Albumin:   Albumin   Date Value Ref Range Status   02/16/2018 2.4 (L) 3.5 - 5.2 g/dL Final     Protein:   Total Protein   Date Value Ref Range Status   02/16/2018 7.9 6.0 - 8.4 g/dL Final     Lactic acid:   Lab Results   Component Value Date    LACTATE 0.9 02/07/2018    LACTATE 1.6 02/05/2018       Significant Imaging: I have reviewed all pertinent imaging results/findings within the past 24 hours.    Advanced Directives::  Living Will: No  LaPOST: No  Do Not Resuscitate Status: No  Medical Power of : No    Decision-Making Capacity: Patient unable to communicate due to disease severity/cognitive impairment    Living Arrangements: Lives in nursing home    Psychosocial/Cultural:  Patient's most important priorities:  Unable to assess.    Patient's biggest concerns/fears:  Unable to assess.    Previous death/end of life care history:  Unable to assess.    Patient's goals/hopes:  Unable to assess.    Spiritual:     F- Vielka and Belief: Unable to assess.  I - Importance:Unable to assess..  C - Community: Unable to assess.  A - Address in Care: Unable to assess.

## 2018-02-16 NOTE — PROGRESS NOTES
"  [facility]  Adult Nutrition  Consult Note    SUMMARY     Recommendations    1. If medically feasible, resume enteral nutrition. Recommend Isosource 1.5 @ 50 mL/hr to provide 1800 calories, 82 g of protein, 917 mL fluid.    - Hold for residuals >500 mL.  2. RD to monitor & follow-up.    Goals: Meet % EEN, EPN  Nutrition Goal Status: goal not met  Communication of RD Recs: reviewed with RN    Reason for Assessment    Reason for Assessment: RD follow-up  Diagnosis: other (see comments) (Resp fx.)  Relevent Medical History: HTN, HLD   Interdisciplinary Rounds: attended     General Information Comments: TF remains off 2/2 nausea & vomiting. Family meeting scheduled for today.  Nutrition Discharge Planning: Unable to determine    Nutrition Prescription Ordered    Current Diet Order: NPO  Nutrition Order Comments: TF held 2/2 nausea/vomiting    Nutrition/Diet History    Patient Reported Diet/Restrictions/Preferences: other (see comments) (JONH; On TFs via PEG)     Factors Affecting Nutritional Intake: NPO, on mechanical ventilation    Labs/Tests/Procedures/Meds    Pertinent Labs Reviewed: reviewed, pertinent  Pertinent Labs Comments: Stable  Pertinent Medications Reviewed: reviewed, pertinent  Pertinent Medications Comments: -    Physical Findings    Overall Physical Appearance: nourished, on ventilator support  Tubes: gastrostomy tube  Oral/Mouth Cavity: WDL  Skin: other (see comments) (Wounds)    Anthropometrics    Temp: 98.6 °F (37 °C)     Height: 5' 8" (172.7 cm)  Weight Method: Bed Scale  Weight: 91.5 kg (201 lb 11.5 oz)    Ideal Body Weight (IBW), Female: 140 lb   % Ideal Body Weight, Female (lb): 144.09 lb  BMI (Calculated): 30.7  BMI Grade: 30 - 34.9- obesity - grade I     Usual Body Weight (UBW), kg:  (JONH)    Estimated/Assessed Needs    Weight Used For Calorie Calculations: 91.5 kg (201 lb 11.5 oz)      Energy Calorie Requirements (kcal): 1731 kcal/d  Energy Need Method: [employer] [place]   "   Weight Used For Protein Calculations: 91.5 kg (201 lb 11.5 oz)  Protein Requirements:  g/d (1.1-1.3 g/kg)     Fluid Need Method: RDA Method, other (see comments) (1 mL/kcal or per MD)    Assessment and Plan    Aspiration pneumonia      Nutrition Problem  Inadequate energy intake    Related to (etiology):   Inability to consume sufficient energy    Signs and Symptoms (as evidenced by):   NPO with no alternate means of nutrition     Nutrition Diagnosis Status:   Continues        Monitor and Evaluation    Food and Nutrient Intake: enteral nutrition intake  Food and Nutrient Adminstration: enteral and parenteral nutrition administration     Physical Activity and Function: nutrition-related ADLs and IADLs  Anthropometric Measurements: weight, weight change  Biochemical Data, Medical Tests and Procedures: lipid profile, inflammatory profile, glucose/endocrine profile, gastrointestinal profile, electrolyte and renal panel  Nutrition-Focused Physical Findings: overall appearance    Nutrition Risk    Level of Risk: other (see comments) (2x/week)    Nutrition Follow-Up    RD Follow-up?: Yes

## 2018-02-16 NOTE — ASSESSMENT & PLAN NOTE
Not able to be weaned. No curative treatment available. Risk of aspiration is increased with PEG feedings. She is not tolerating.  Family meeting today to discuss.

## 2018-02-16 NOTE — SUBJECTIVE & OBJECTIVE
Interval History: Patient seen earlier today. I have spent 90 minutes with 4 daughters discussing goals of care.     Medications:  Continuous Infusions:  Scheduled Meds:   albuterol-ipratropium 2.5mg-0.5mg/3mL  3 mL Nebulization Q6H    baclofen  10 mg Per G Tube TID    chlorhexidine  15 mL Mouth/Throat BID    citalopram  10 mg Per G Tube Daily    famotidine  20 mg Per G Tube BID    heparin (porcine)  5,000 Units Subcutaneous Q8H    levetiracetam oral soln  500 mg Per G Tube BID     PRN Meds:[] fentaNYL **FOLLOWED BY** fentaNYL, magnesium sulfate IVPB, magnesium sulfate IVPB, midazolam, ondansetron, potassium chloride 10%, potassium chloride 10%, potassium chloride 10%, potassium, sodium phosphates, potassium, sodium phosphates, potassium, sodium phosphates, sodium chloride 0.9%    Objective:     Vital Signs (Most Recent):  Temp: 98.8 °F (37.1 °C) (18 1600)  Pulse: 82 (18 1725)  Resp: 16 (18 1725)  BP: 98/71 (18 1700)  SpO2: (!) 94 % (18 1725) Vital Signs (24h Range):  Temp:  [98.6 °F (37 °C)-98.8 °F (37.1 °C)] 98.8 °F (37.1 °C)  Pulse:  [] 82  Resp:  [11-51] 16  SpO2:  [94 %-100 %] 94 %  BP: ()/() 98/71     Weight: 91.5 kg (201 lb 11.5 oz)  Body mass index is 30.67 kg/m².    Review of Symptoms  Symptom Assessment (ESAS 0-10 scale)  ESAS 0 1 2 3 4 5 6 7 8 9 10   Pain              Dyspnea              Anxiety              Nausea              Depression               Anorexia              Fatigue              Insomnia              Restlessness               Agitation              CAM / Delirium __ --  ___+   Constipation     __ --  ___+   Diarrhea           __ --  ___+  Bowel Management Plan (BMP): No    Comments:     Pain Assessment:     OME in 24 hours:     Performance Status: 10    ECOG Performance Status Grade: 4 - Completely disabled    Physical Exam    Significant Labs: All pertinent labs within the past 24 hours have been reviewed.  CBC:     Recent  Labs  Lab 02/16/18  0809   WBC 11.68   HGB 9.4*   HCT 32.5*   MCV 80*   *     BMP:    Recent Labs  Lab 02/16/18  0809   GLU 76      K 4.2      CO2 24   BUN 6   CREATININE 0.6   CALCIUM 8.6*   MG 1.6     LFT:  Lab Results   Component Value Date    AST 44 (H) 02/16/2018    ALKPHOS 144 (H) 02/16/2018    BILITOT 0.6 02/16/2018     Albumin:   Albumin   Date Value Ref Range Status   02/16/2018 2.4 (L) 3.5 - 5.2 g/dL Final     Protein:   Total Protein   Date Value Ref Range Status   02/16/2018 7.9 6.0 - 8.4 g/dL Final     Lactic acid:   Lab Results   Component Value Date    LACTATE 0.9 02/07/2018    LACTATE 1.6 02/05/2018       Significant Imaging: None    Advanced Directives::  Living Will: No  LaPOST: No  Do Not Resuscitate Status: Yes changed to DNR with current conversation  Medical Power of : No 4 of her six children were present. Catpita, Safia, Radha and Dolores.     Decision-Making Capacity: Family answered questions, Patient unable to communicate due to disease severity/cognitive impairment    Living Arrangements: Lives in nursing home    Psychosocial/Cultural:  Patient's most important priorities:  Unable to assess.    Patient's biggest concerns/fears:  Unable to assess.    Previous death/end of life care history:  Unable to assess.    Patient's goals/hopes:  Unable to assess.    Spiritual:     F- Vielka and Belief: Unable to assess  I - Importance: Unable to assess  C - Community:Unable to assess  A - Address in Care: Unable to assess

## 2018-02-17 NOTE — SUBJECTIVE & OBJECTIVE
Interval History/Significant Events: Tube feeds held due to emesis.        Review of Systems   Unable to perform ROS: Intubated     Objective:     Vital Signs (Most Recent):  Temp: 98.5 °F (36.9 °C) (02/16/18 1901)  Pulse: 82 (02/16/18 2128)  Resp: 17 (02/16/18 2041)  BP: 98/60 (02/16/18 2000)  SpO2: 96 % (02/16/18 2128) Vital Signs (24h Range):  Temp:  [98.5 °F (36.9 °C)-98.8 °F (37.1 °C)] 98.5 °F (36.9 °C)  Pulse:  [] 82  Resp:  [11-51] 17  SpO2:  [94 %-100 %] 96 %  BP: ()/() 98/60   Weight: 91.5 kg (201 lb 11.5 oz)  Body mass index is 30.67 kg/m².      Intake/Output Summary (Last 24 hours) at 02/16/18 2228  Last data filed at 02/16/18 2000   Gross per 24 hour   Intake               60 ml   Output              970 ml   Net             -910 ml       Physical Exam   Constitutional: No distress.   HENT:   Head: Normocephalic and atraumatic.   Eyes: Right eye exhibits no discharge. Left eye exhibits no discharge.   Neck: Normal range of motion. Neck supple.   Cardiovascular: Normal rate and regular rhythm.    Pulmonary/Chest: Effort normal. She has rales (at bases).   intubated   Abdominal: Soft. She exhibits no distension.   Musculoskeletal: She exhibits deformity (contracted upper and lower extremities).   Neurological:   Does not following commands. No purposeful movement. Opens eyes, but does not track.    Skin: Skin is warm and dry.       Vents:  Vent Mode: A/C (02/16/18 2128)  Ventilator Initiated: Yes (02/04/18 2344)  Set Rate: 14 bmp (02/16/18 2128)  Vt Set: 400 mL (02/16/18 2128)  Pressure Support: 0 cmH20 (02/16/18 2128)  PEEP/CPAP: 5 cmH20 (02/16/18 2128)  Oxygen Concentration (%): 30 (02/16/18 2128)  Peak Airway Pressure: 25 cmH2O (02/16/18 2128)  Plateau Pressure: 21 cmH20 (02/16/18 2128)  Total Ve: 7.12 mL (02/16/18 2128)  F/VT Ratio<105 (RSBI): (!) 40.92 (02/16/18 1725)  Lines/Drains/Airways     Drain                 Gastrostomy/Enterostomy 10/25/16 1401 Percutaneous endoscopic  gastrostomy (PEG) midline feeding 479 days         Urethral Catheter 02/05/18 1006 Latex 11 days          Airway                 Airway - Non-Surgical 02/04/18 2329 Endotracheal Tube 11 days          Pressure Ulcer                 Pressure Injury 02/05/18 1045 Left medial Foot Deep tissue injury 11 days         Pressure Injury 02/05/18 1045 Right lateral Foot Deep tissue injury 11 days          Peripheral Intravenous Line                 Peripheral IV - Single Lumen 02/13/18 1800 Right Upper Arm 3 days         Midline Catheter Insertion/Assessment  - Single Lumen 02/15/18 1125 Left basilic vein (medial side of arm) 18g x 10cm 1 day              Significant Labs:    CBC/Anemia Profile:    Recent Labs  Lab 02/14/18  2300 02/16/18  0809   WBC 11.59 11.68   HGB 8.2* 9.4*   HCT 27.4* 32.5*    384*   MCV 78* 80*   RDW 18.8* 18.6*        Chemistries:    Recent Labs  Lab 02/14/18  2300 02/16/18  0809    136   K 4.1 4.2    103   CO2 24 24   BUN 9 6   CREATININE 0.5 0.6   CALCIUM 8.3* 8.6*   ALBUMIN 2.1* 2.4*   PROT 6.9 7.9   BILITOT 0.4 0.6   ALKPHOS 136* 144*   * 99*   AST 62* 44*   MG 1.8 1.6   PHOS  --  3.3       All pertinent labs within the past 24 hours have been reviewed.    Significant Imaging:  I have reviewed and interpreted all pertinent imaging results/findings within the past 24 hours.     CT Abd/ Pelvis W Contrast  Impression         1. No acute intra-abdominal abnormality identified.    2.  Bilateral atelectatic change, progressed on the left since CT 2/4/2018, but with interval improvement in right basilar consolidation.    3.  Small hiatal hernia.  Distal esophageal wall thickening which appears similar to the May 19, 2017 examination.  Further evaluation can be performed with upper endoscopy if clinically indicated.    4.  Additional stable findings include:  -Prominence of the common bile duct, not unexpected in this patient status post cholecystectomy  -Absence of the right  kidney.  The right adrenal gland is not seen.    -Gastrostomy tube in appropriate position  -Left renal staghorn calculus with resultant dilatation of lower pole calyces and associated cortical thinning  -Urinary bladder is collapsed around a De Leon catheter  -Degenerative change of the spine with multiple lumbar compression deformities  -Small fat containing umbilical hernia  -Generalized muscle atrophy.  ______________________________________

## 2018-02-17 NOTE — ASSESSMENT & PLAN NOTE
- NSR  - no ECG done at [facility] shows Afib.  - Pt home BB held due to hypotension

## 2018-02-17 NOTE — PLAN OF CARE
Problem: Patient Care Overview  Goal: Plan of Care Review  Outcome: Ongoing (interventions implemented as appropriate)  No acute events throughout shift, VS and assessment per flow sheet, patient progressing towards goals as tolerated. Neuro status remains the same, opens eyes spontaneously and appears to track. Tube feeds infusing at 10 cc/hr with some residual. Remained in NSR and BP WNL. Minimal UO, CCS aware, no new orders placed. No BM. Plan of care reviewed with Jonathan Nieves and family, all concerns addressed, will continue to monitor.

## 2018-02-17 NOTE — ASSESSMENT & PLAN NOTE
-patient suffered multiple strokes in past, last one in 2008   -nonverbal at baseline, wheelchair/bedbound    MRI (2/10)  Findings: The diffusion sequence demonstrates no evidence of acute infarct. There is severe white matter small vessel ischemic change. There is a remote infarct of the right occipital lobe. There is a mild hemosiderin staining of the brainstem probably from a prior subarachnoid hemorrhage. There is a small focus of hemosiderin posterior right midline medulla. Pituitary and craniocervical junction show nothing unusual. There is left frontal sinusitis change. There is involutional change.  - [facility]. Did not include brain images or neurology notes.  - Neurology consulted: differential includes but is not limited to CADASIL (given h/o strokes, extensive white matter involvement including the temporal lobes), adult onset adrenoleukodystrophy, chronic microvascular changes (less likely), vanishing white matter disease (less likely given predominance in children), or Binswanger disease (subcortical leukoencephalopathy or vascular dementia 2/2 chronic HTN).   - NOTCH3 genetic testing sent, results pending

## 2018-02-17 NOTE — ASSESSMENT & PLAN NOTE
"Two daughters arrived at 3 and the other 1 came at 4 and then additional daughter came later. Discussed goals of care with the four children for additional 90 minutes today.  They understand that she is dying but they are waiting for her to die in God's time. They do not want to remove the ventilator but agree to DNR. They do want attempts to restart tube feeding but if not tolerating would stop. They continue to think about no further escalation in care such as changing antibiotics or further lab/xray evaluation. Discussed with Dr. Sales who is rounding later today. They wish to discuss further with him.  Family contacted several times yesterday. Did not come in for meeting yesterday at 1 pm or this am at 7 as planned. Now they say that they will be here at 3 pm. Dr. Macias did talk with erika Salcedo last night. No decisions made.  Patient is not tolerating tube feedings and also failed weaning.  2 of the daughters cannot stand to see MOM this way and so will not participate. 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   She is currently on ventilator with increased secretions. She already has a PEG and despite appropriate feeding has low albumin and multiple contractures/wounds.  Plan meeting tomorrow 2/16 with patient's children. Matias will call the other siblings. Patients mother is still alive but has Alzheimers. Erika Salcedo "has my hands full".  Patient is at the end of her life and discussions will occur tomorrow about focusing her care on comfort. At this point, she does not appear to be in pain except with procedures/turning and has prn meds ordered.  "

## 2018-02-17 NOTE — PROGRESS NOTES
"[facility]  Critical Care Medicine  Progress Note    Patient Name: Jonathan Nieves  MRN: [id number]  Admission Date: 2/4/2018  Hospital Length of Stay: 11 days  Code Status: Full Code  Attending Provider: Erick Sales MD  Primary Care Provider: Primary Doctor No   Principal Problem: Acute respiratory failure with hypoxia    Subjective:     HPI:  Mr. Jonathan Nieves is a 51yo female with history of CVA with residual aphasia and dysphagia, non-verbal, s/p PEG tube placement and bed riddenness, osteomyelitis s/p amputation of toe, who comes to the ED from [facility] via EMS in respiratory distress. Per ED/EMS the patient was noted to have "projectile vomiting" yesterday evening (24 hours prior to presentation), which was treated with zofran. During the event there is suspicion the patient possible aspirated. Today the patient was noted to be in resp distress with a fever.     Patient was satting 88% on 4L on EMS arrival, breathing 40 times per minute.  Improved to 96% on 15L NRB with RR high 20s.  Axillary temp 102.    CT chest concerning for aspiration pneumonia.  UA concern for a UTI.       Patient intubated in the ED & Critical Care consulted.         Hospital/ICU Course:  2/5: Critical Care Consulted. Patient intubated in the ED.   2/6: Patient doing well, on SBT; will extubate maria e. Continue treatment for aspiration PNA & UTI.   2/7: abx deescalated, continue to treat for PNA and UTI. Required pressors overnight. Off this morning.   2/8-9: still with secretions and questionable mentation? Baseline?. Will try to dry the secretions and extubated today or maria e.    2/10: MRI completed. Family called, had family meeting with one daughter. Need to re-visit with all daughters. [facility] requested [facility] and [facility].   02/11-13: neurology consulted, no acute changes, wbc and LFT trending up, keep monitoring.  2/14: Concerns for CADASIL vs Binswanger syndrome per Neuro. NOTCH3 " genetic testing sent. Palliative consulted regarding goals of care.   02/15 pt is not tolerating tube feed, had two episodes of projectile vomiting, KUB 02/14 shows partial SOB and distended stomach.    Interval History/Significant Events: Tube feeds held due to emesis.        Review of Systems   Unable to perform ROS: Intubated     Objective:     Vital Signs (Most Recent):  Temp: 98.5 °F (36.9 °C) (02/16/18 1901)  Pulse: 82 (02/16/18 2128)  Resp: 17 (02/16/18 2041)  BP: 98/60 (02/16/18 2000)  SpO2: 96 % (02/16/18 2128) Vital Signs (24h Range):  Temp:  [98.5 °F (36.9 °C)-98.8 °F (37.1 °C)] 98.5 °F (36.9 °C)  Pulse:  [] 82  Resp:  [11-51] 17  SpO2:  [94 %-100 %] 96 %  BP: ()/() 98/60   Weight: 91.5 kg (201 lb 11.5 oz)  Body mass index is 30.67 kg/m².      Intake/Output Summary (Last 24 hours) at 02/16/18 2228  Last data filed at 02/16/18 2000   Gross per 24 hour   Intake               60 ml   Output              970 ml   Net             -910 ml       Physical Exam   Constitutional: No distress.   HENT:   Head: Normocephalic and atraumatic.   Eyes: Right eye exhibits no discharge. Left eye exhibits no discharge.   Neck: Normal range of motion. Neck supple.   Cardiovascular: Normal rate and regular rhythm.    Pulmonary/Chest: Effort normal. She has rales (at bases).   intubated   Abdominal: Soft. She exhibits no distension.   Musculoskeletal: She exhibits deformity (contracted upper and lower extremities).   Neurological:   Does not following commands. No purposeful movement. Opens eyes, but does not track.    Skin: Skin is warm and dry.       Vents:  Vent Mode: A/C (02/16/18 2128)  Ventilator Initiated: Yes (02/04/18 2344)  Set Rate: 14 bmp (02/16/18 2128)  Vt Set: 400 mL (02/16/18 2128)  Pressure Support: 0 cmH20 (02/16/18 2128)  PEEP/CPAP: 5 cmH20 (02/16/18 2128)  Oxygen Concentration (%): 30 (02/16/18 2128)  Peak Airway Pressure: 25 cmH2O (02/16/18 2128)  Plateau Pressure: 21 cmH20 (02/16/18  2128)  Total Ve: 7.12 mL (02/16/18 2128)  F/VT Ratio<105 (RSBI): (!) 40.92 (02/16/18 1725)  Lines/Drains/Airways     Drain                 Gastrostomy/Enterostomy 10/25/16 1401 Percutaneous endoscopic gastrostomy (PEG) midline feeding 479 days         Urethral Catheter 02/05/18 1006 Latex 11 days          Airway                 Airway - Non-Surgical 02/04/18 2329 Endotracheal Tube 11 days          Pressure Ulcer                 Pressure Injury 02/05/18 1045 Left medial Foot Deep tissue injury 11 days         Pressure Injury 02/05/18 1045 Right lateral Foot Deep tissue injury 11 days          Peripheral Intravenous Line                 Peripheral IV - Single Lumen 02/13/18 1800 Right Upper Arm 3 days         Midline Catheter Insertion/Assessment  - Single Lumen 02/15/18 1125 Left basilic vein (medial side of arm) 18g x 10cm 1 day              Significant Labs:    CBC/Anemia Profile:    Recent Labs  Lab 02/14/18  2300 02/16/18  0809   WBC 11.59 11.68   HGB 8.2* 9.4*   HCT 27.4* 32.5*    384*   MCV 78* 80*   RDW 18.8* 18.6*        Chemistries:    Recent Labs  Lab 02/14/18  2300 02/16/18  0809    136   K 4.1 4.2    103   CO2 24 24   BUN 9 6   CREATININE 0.5 0.6   CALCIUM 8.3* 8.6*   ALBUMIN 2.1* 2.4*   PROT 6.9 7.9   BILITOT 0.4 0.6   ALKPHOS 136* 144*   * 99*   AST 62* 44*   MG 1.8 1.6   PHOS  --  3.3       All pertinent labs within the past 24 hours have been reviewed.    Significant Imaging:  I have reviewed and interpreted all pertinent imaging results/findings within the past 24 hours.     CT Abd/ Pelvis W Contrast  Impression         1. No acute intra-abdominal abnormality identified.    2.  Bilateral atelectatic change, progressed on the left since CT 2/4/2018, but with interval improvement in right basilar consolidation.    3.  Small hiatal hernia.  Distal esophageal wall thickening which appears similar to the May 19, 2017 examination.  Further evaluation can be performed with upper  endoscopy if clinically indicated.    4.  Additional stable findings include:  -Prominence of the common bile duct, not unexpected in this patient status post cholecystectomy  -Absence of the right kidney.  The right adrenal gland is not seen.    -Gastrostomy tube in appropriate position  -Left renal staghorn calculus with resultant dilatation of lower pole calyces and associated cortical thinning  -Urinary bladder is collapsed around a De Leon catheter  -Degenerative change of the spine with multiple lumbar compression deformities  -Small fat containing umbilical hernia  -Generalized muscle atrophy.  ______________________________________          Assessment/Plan:     Neuro   Seizure-like activity    - continue keppra         Dysphagia as late effect of cerebrovascular disease    - Neurology consulted: differential includes but is not limited to CADASIL (given h/o strokes, extensive white matter involvement including the temporal lobes), adult onset adrenoleukodystrophy, chronic microvascular changes (less likely), vanishing white matter disease (less likely given predominance in children), or Binswanger disease (subcortical leukoencephalopathy or vascular dementia 2/2 chronic HTN).   - NOTCH3 genetic testing sent, results pending        CVA, old, hemiparesis    -patient suffered multiple strokes in past, last one in 2008   -nonverbal at baseline, wheelchair/bedbound    MRI (2/10)  Findings: The diffusion sequence demonstrates no evidence of acute infarct. There is severe white matter small vessel ischemic change. There is a remote infarct of the right occipital lobe. There is a mild hemosiderin staining of the brainstem probably from a prior subarachnoid hemorrhage. There is a small focus of hemosiderin posterior right midline medulla. Pituitary and craniocervical junction show nothing unusual. There is left frontal sinusitis change. There is involutional change.  - Records requested from [facility] and [facility]. Did not  include brain images or neurology notes.  - Neurology consulted: differential includes but is not limited to CADASIL (given h/o strokes, extensive white matter involvement including the temporal lobes), adult onset adrenoleukodystrophy, chronic microvascular changes (less likely), vanishing white matter disease (less likely given predominance in children), or Binswanger disease (subcortical leukoencephalopathy or vascular dementia 2/2 chronic HTN).   - NOTCH3 genetic testing sent, results pending        Derm   Unstageable pressure ulcer of left buttock    - multiple area, all looks clean, not infected.        Pulmonary   * Acute respiratory failure with hypoxia    - Intubated, Failing SBT  - AC mode, FiO2 30%, PEEP 5         Aspiration pneumonia    Extensive consolidation of the right lung and debris in the right mainstem bronchus and its branches consistent with aspiration pneumonia.  - s/p course of rocephin   - s/p vanco & cefepime & flagyl   - Not on any antibiotics currently  - CBC monitor down trending   - Intubated          Cardiac/Vascular   Paroxysmal atrial fibrillation    - NSR  - no ECG done at [facility] shows Afib.  - Pt home BB held due to hypotension           Renal/   Hypernatremia    - Resolved           UTI (urinary tract infection)    - previous UA (Bacteria, WBC, leukocyte positive)  - Urine cultures proteus  - Course of Rocephin completed   - Resolved        GI   Partial small bowel obstruction    - KUB in 02/14 showed distended stomach and partial SOB.  - CT abd pelvis w contrast showed no acute intra-abdominal abnormality.  - hold tube feeds for emesis.          PEG (percutaneous endoscopic gastrostomy) status    - held tube feeds due to vomiting.        Other   Goals of care, counseling/discussion    - Palliative consulted. Family was not available attend this morning's [facility] meeting. Will follow up with Goals of Care discussion.                  Rebekah Saundesr MD  Critical Care  Medicine  [facility]

## 2018-02-17 NOTE — PROGRESS NOTES
"[facility]  Critical Care Medicine  Progress Note    Patient Name: Jonathan Nieves  MRN: [id number]  Admission Date: 2/4/2018  Hospital Length of Stay: 12 days  Code Status: Full Code  Attending Provider: Erick Sales MD  Primary Care Provider: Primary Doctor No   Principal Problem: Acute respiratory failure with hypoxia    Subjective:     HPI:  Mr. Jonathan Nieves is a 53yo female with history of CVA with residual aphasia and dysphagia, non-verbal, s/p PEG tube placement and bed riddenness, osteomyelitis s/p amputation of toe, who comes to the ED from [facility] via EMS in respiratory distress. Per ED/EMS the patient was noted to have "projectile vomiting" yesterday evening (24 hours prior to presentation), which was treated with zofran. During the event there is suspicion the patient possible aspirated. Today the patient was noted to be in resp distress with a fever.     Patient was satting 88% on 4L on EMS arrival, breathing 40 times per minute.  Improved to 96% on 15L NRB with RR high 20s.  Axillary temp 102.    CT chest concerning for aspiration pneumonia.  UA concern for a UTI.       Patient intubated in the ED & Critical Care consulted.         Hospital/ICU Course:  2/5: Critical Care Consulted. Patient intubated in the ED.   2/6: Patient doing well, on SBT; will extubate maria e. Continue treatment for aspiration PNA & UTI.   2/7: abx deescalated, continue to treat for PNA and UTI. Required pressors overnight. Off this morning.   2/8-9: still with secretions and questionable mentation? Baseline?. Will try to dry the secretions and extubated today or maria e.    2/10: MRI completed. Family called, had family meeting with one daughter. Need to re-visit with all daughters. [facility] requested from [facility] and [facility].   02/11-13: neurology consulted, no acute changes, wbc and LFT trending up, keep monitoring.  2/14: Concerns for CADASIL vs Binswanger syndrome per Neuro. NOTCH3 " genetic testing sent. Palliative consulted regarding goals of care.   02/15 pt is not tolerating tube feed, had two episodes of projectile vomiting, KUB 02/14 shows partial SOB and distended stomach.  02/16 CT abdomen was unremarkable, family meeting was done.  02/17 no more vomiting, tolerating 10 cc/hr tube feeds.    Interval History/Significant Events: continue tube feeds as tolerated.      Review of Systems   Unable to perform ROS: Intubated     Objective:     Vital Signs (Most Recent):  Temp: 98.4 °F (36.9 °C) (02/17/18 0300)  Pulse: 79 (02/17/18 0727)  Resp: 14 (02/17/18 0727)  BP: 94/66 (02/17/18 0727)  SpO2: 96 % (02/17/18 0727) Vital Signs (24h Range):  Temp:  [98.4 °F (36.9 °C)-98.8 °F (37.1 °C)] 98.4 °F (36.9 °C)  Pulse:  [75-90] 79  Resp:  [11-25] 14  SpO2:  [93 %-100 %] 96 %  BP: ()/(50-97) 94/66   Weight: 91.5 kg (201 lb 11.5 oz)  Body mass index is 30.67 kg/m².      Intake/Output Summary (Last 24 hours) at 02/17/18 0841  Last data filed at 02/17/18 0700   Gross per 24 hour   Intake              460 ml   Output              512 ml   Net              -52 ml       Physical Exam   Constitutional: No distress.   HENT:   Head: Normocephalic and atraumatic.   Eyes: Right eye exhibits no discharge. Left eye exhibits no discharge.   Neck: Normal range of motion. Neck supple.   Cardiovascular: Normal rate and regular rhythm.    Pulmonary/Chest: Effort normal. She has rales (at bases).   intubated   Abdominal: Soft. She exhibits no distension.   Musculoskeletal: She exhibits deformity (contracted upper and lower extremities).   Neurological:   Does not following commands. No purposeful movement. Opens eyes, but does not track.    Skin: Skin is warm and dry.       Vents:  Vent Mode: A/C (02/17/18 0727)  Ventilator Initiated: Yes (02/04/18 2344)  Set Rate: 14 bmp (02/17/18 0727)  Vt Set: 400 mL (02/17/18 0727)  Pressure Support: 0 cmH20 (02/17/18 0727)  PEEP/CPAP: 5 cmH20 (02/17/18 0727)  Oxygen  Concentration (%): 30 (02/17/18 0727)  Peak Airway Pressure: 38 cmH2O (02/17/18 0727)  Plateau Pressure: 21 cmH20 (02/17/18 0727)  Total Ve: 7.4 mL (02/17/18 0727)  F/VT Ratio<105 (RSBI): (!) 35.62 (02/17/18 0727)  Lines/Drains/Airways     Drain                 Gastrostomy/Enterostomy 10/25/16 1401 Percutaneous endoscopic gastrostomy (PEG) midline feeding 479 days         Urethral Catheter 02/05/18 1006 Latex 11 days          Airway                 Airway - Non-Surgical 02/04/18 2329 Endotracheal Tube 12 days          Pressure Ulcer                 Pressure Injury 02/05/18 1045 Left medial Foot Deep tissue injury 11 days         Pressure Injury 02/05/18 1045 Right lateral Foot Deep tissue injury 11 days          Peripheral Intravenous Line                 Midline Catheter Insertion/Assessment  - Single Lumen 02/15/18 1125 Left basilic vein (medial side of arm) 18g x 10cm 1 day              Significant Labs:    CBC/Anemia Profile:    Recent Labs  Lab 02/16/18  0809 02/17/18  0016   WBC 11.68 10.61   HGB 9.4* 9.6*   HCT 32.5* 32.7*   * 398*   MCV 80* 79*   RDW 18.6* 18.7*        Chemistries:    Recent Labs  Lab 02/16/18  0809 02/17/18  0016    136   K 4.2 3.9    102   CO2 24 24   BUN 6 6   CREATININE 0.6 0.6   CALCIUM 8.6* 8.7   ALBUMIN 2.4* 2.5*   PROT 7.9 8.0   BILITOT 0.6 0.7   ALKPHOS 144* 141*   ALT 99* 87*   AST 44* 40   MG 1.6 1.6   PHOS 3.3  --        All pertinent labs within the past 24 hours have been reviewed.    Significant Imaging:  I have reviewed and interpreted all pertinent imaging results/findings within the past 24 hours.     CT Abd/ Pelvis W Contrast  Impression         1. No acute intra-abdominal abnormality identified.    2.  Bilateral atelectatic change, progressed on the left since CT 2/4/2018, but with interval improvement in right basilar consolidation.    3.  Small hiatal hernia.  Distal esophageal wall thickening which appears similar to the May 19, 2017 examination.   Further evaluation can be performed with upper endoscopy if clinically indicated.    4.  Additional stable findings include:  -Prominence of the common bile duct, not unexpected in this patient status post cholecystectomy  -Absence of the right kidney.  The right adrenal gland is not seen.    -Gastrostomy tube in appropriate position  -Left renal staghorn calculus with resultant dilatation of lower pole calyces and associated cortical thinning  -Urinary bladder is collapsed around a De Leon catheter  -Degenerative change of the spine with multiple lumbar compression deformities  -Small fat containing umbilical hernia  -Generalized muscle atrophy.  ______________________________________          Assessment/Plan:     Neuro   Seizure-like activity    - continue keppra         Dysphagia as late effect of cerebrovascular disease    - Neurology consulted: differential includes but is not limited to CADASIL (given h/o strokes, extensive white matter involvement including the temporal lobes), adult onset adrenoleukodystrophy, chronic microvascular changes (less likely), vanishing white matter disease (less likely given predominance in children), or Binswanger disease (subcortical leukoencephalopathy or vascular dementia 2/2 chronic HTN).   - NOTCH3 genetic testing sent, results pending        CVA, old, hemiparesis    -patient suffered multiple strokes in past, last one in 2008   -nonverbal at baseline, wheelchair/bedbound    MRI (2/10)  Findings: The diffusion sequence demonstrates no evidence of acute infarct. There is severe white matter small vessel ischemic change. There is a remote infarct of the right occipital lobe. There is a mild hemosiderin staining of the brainstem probably from a prior subarachnoid hemorrhage. There is a small focus of hemosiderin posterior right midline medulla. Pituitary and craniocervical junction show nothing unusual. There is left frontal sinusitis change. There is involutional change.  -  Records requested from [facility] and [facility]. Did not include brain images or neurology notes.  - Neurology consulted: differential includes but is not limited to CADASIL (given h/o strokes, extensive white matter involvement including the temporal lobes), adult onset adrenoleukodystrophy, chronic microvascular changes (less likely), vanishing white matter disease (less likely given predominance in children), or Binswanger disease (subcortical leukoencephalopathy or vascular dementia 2/2 chronic HTN).   - NOTCH3 genetic testing sent, results pending        Derm   Unstageable pressure ulcer of left buttock    - multiple area, all looks clean, not infected.        Pulmonary   * Acute respiratory failure with hypoxia    - Intubated, Failing SBT  - AC mode, FiO2 30%, PEEP 5         Aspiration pneumonia    Extensive consolidation of the right lung and debris in the right mainstem bronchus and its branches consistent with aspiration pneumonia.  - s/p course of rocephin   - s/p vanco & cefepime & flagyl   - Not on any antibiotics currently  - CBC monitor down trending   - Intubated          Cardiac/Vascular   Paroxysmal atrial fibrillation    - NSR  - no ECG done at [facility] shows Afib.  - Pt home BB held due to hypotension           Renal/   Hypernatremia    - Resolved           UTI (urinary tract infection)    - previous UA (Bacteria, WBC, leukocyte positive)  - Urine cultures proteus  - Course of Rocephin completed   - Resolved        GI   Partial small bowel obstruction    - KUB in 02/14 showed distended stomach and partial SOB.  - CT abd pelvis w contrast showed no acute intra-abdominal abnormality.  - no more emesis, tube feeds resumed tolerating 10 cc/hr, will advance as tolerated.          PEG (percutaneous endoscopic gastrostomy) status    - held tube feeds due to vomiting.        Other   Goals of care, counseling/discussion    - Palliative consulted. Family was not available attend this morning's [facility] meeting. Will follow up  with Goals of Care discussion.             Critical care was time spent personally by me on the following activities: development of treatment plan with patient or surrogate and bedside caregivers, discussions with consultants, evaluation of patient's response to treatment, examination of patient, ordering and performing treatments and interventions, ordering and review of laboratory studies, ordering and review of radiographic studies, pulse oximetry, re-evaluation of patient's condition. This critical care time did not overlap with that of any other provider or involve time for any procedures.     Ana Chauhan MD  Critical Care Medicine  [facility]

## 2018-02-17 NOTE — PLAN OF CARE
Problem: Patient Care Overview  Goal: Plan of Care Review  Outcome: Ongoing (interventions implemented as appropriate)  No significant events today. Daughters at bedside for goals of care meeting.  Palliative at bedside to discuss w/ family. Pt cont w/ mechanical ventilation, tf's being held d/t emesis. See epic flow sheets for complete assessment.  Will cont to monitor.

## 2018-02-17 NOTE — ASSESSMENT & PLAN NOTE
- KUB in 02/14 showed distended stomach and partial SOB.  - CT abd pelvis w contrast showed no acute intra-abdominal abnormality.  - no more emesis, tube feeds resumed tolerating 10 cc/hr, will advance as tolerated.

## 2018-02-17 NOTE — ASSESSMENT & PLAN NOTE
Extensive consolidation of the right lung and debris in the right mainstem bronchus and its branches consistent with aspiration pneumonia.  - s/p course of rocephin   - s/p vanco & cefepime & flagyl   - Not on any antibiotics currently  - CBC monitor down trending   - Intubated

## 2018-02-17 NOTE — ASSESSMENT & PLAN NOTE
"Risk of aspiration is increased with PEG feedings. She is not tolerating. Had CT of abdomen with no acute findings. Family meeting discussed inability to tolerate TF and no assimilation of "food" with low albumin and poor wound healing.  "

## 2018-02-17 NOTE — SUBJECTIVE & OBJECTIVE
Interval History/Significant Events: continue tube feeds as tolerated.      Review of Systems   Unable to perform ROS: Intubated     Objective:     Vital Signs (Most Recent):  Temp: 98.4 °F (36.9 °C) (02/17/18 0300)  Pulse: 79 (02/17/18 0727)  Resp: 14 (02/17/18 0727)  BP: 94/66 (02/17/18 0727)  SpO2: 96 % (02/17/18 0727) Vital Signs (24h Range):  Temp:  [98.4 °F (36.9 °C)-98.8 °F (37.1 °C)] 98.4 °F (36.9 °C)  Pulse:  [75-90] 79  Resp:  [11-25] 14  SpO2:  [93 %-100 %] 96 %  BP: ()/(50-97) 94/66   Weight: 91.5 kg (201 lb 11.5 oz)  Body mass index is 30.67 kg/m².      Intake/Output Summary (Last 24 hours) at 02/17/18 0841  Last data filed at 02/17/18 0700   Gross per 24 hour   Intake              460 ml   Output              512 ml   Net              -52 ml       Physical Exam   Constitutional: No distress.   HENT:   Head: Normocephalic and atraumatic.   Eyes: Right eye exhibits no discharge. Left eye exhibits no discharge.   Neck: Normal range of motion. Neck supple.   Cardiovascular: Normal rate and regular rhythm.    Pulmonary/Chest: Effort normal. She has rales (at bases).   intubated   Abdominal: Soft. She exhibits no distension.   Musculoskeletal: She exhibits deformity (contracted upper and lower extremities).   Neurological:   Does not following commands. No purposeful movement. Opens eyes, but does not track.    Skin: Skin is warm and dry.       Vents:  Vent Mode: A/C (02/17/18 0727)  Ventilator Initiated: Yes (02/04/18 2344)  Set Rate: 14 bmp (02/17/18 0727)  Vt Set: 400 mL (02/17/18 0727)  Pressure Support: 0 cmH20 (02/17/18 0727)  PEEP/CPAP: 5 cmH20 (02/17/18 0727)  Oxygen Concentration (%): 30 (02/17/18 0727)  Peak Airway Pressure: 38 cmH2O (02/17/18 0727)  Plateau Pressure: 21 cmH20 (02/17/18 0727)  Total Ve: 7.4 mL (02/17/18 0727)  F/VT Ratio<105 (RSBI): (!) 35.62 (02/17/18 0727)  Lines/Drains/Airways     Drain                 Gastrostomy/Enterostomy 10/25/16 1401 Percutaneous endoscopic  gastrostomy (PEG) midline feeding 479 days         Urethral Catheter 02/05/18 1006 Latex 11 days          Airway                 Airway - Non-Surgical 02/04/18 2329 Endotracheal Tube 12 days          Pressure Ulcer                 Pressure Injury 02/05/18 1045 Left medial Foot Deep tissue injury 11 days         Pressure Injury 02/05/18 1045 Right lateral Foot Deep tissue injury 11 days          Peripheral Intravenous Line                 Midline Catheter Insertion/Assessment  - Single Lumen 02/15/18 1125 Left basilic vein (medial side of arm) 18g x 10cm 1 day              Significant Labs:    CBC/Anemia Profile:    Recent Labs  Lab 02/16/18  0809 02/17/18  0016   WBC 11.68 10.61   HGB 9.4* 9.6*   HCT 32.5* 32.7*   * 398*   MCV 80* 79*   RDW 18.6* 18.7*        Chemistries:    Recent Labs  Lab 02/16/18  0809 02/17/18  0016    136   K 4.2 3.9    102   CO2 24 24   BUN 6 6   CREATININE 0.6 0.6   CALCIUM 8.6* 8.7   ALBUMIN 2.4* 2.5*   PROT 7.9 8.0   BILITOT 0.6 0.7   ALKPHOS 144* 141*   ALT 99* 87*   AST 44* 40   MG 1.6 1.6   PHOS 3.3  --        All pertinent labs within the past 24 hours have been reviewed.    Significant Imaging:  I have reviewed and interpreted all pertinent imaging results/findings within the past 24 hours.     CT Abd/ Pelvis W Contrast  Impression         1. No acute intra-abdominal abnormality identified.    2.  Bilateral atelectatic change, progressed on the left since CT 2/4/2018, but with interval improvement in right basilar consolidation.    3.  Small hiatal hernia.  Distal esophageal wall thickening which appears similar to the May 19, 2017 examination.  Further evaluation can be performed with upper endoscopy if clinically indicated.    4.  Additional stable findings include:  -Prominence of the common bile duct, not unexpected in this patient status post cholecystectomy  -Absence of the right kidney.  The right adrenal gland is not seen.    -Gastrostomy tube in appropriate  position  -Left renal staghorn calculus with resultant dilatation of lower pole calyces and associated cortical thinning  -Urinary bladder is collapsed around a De Leon catheter  -Degenerative change of the spine with multiple lumbar compression deformities  -Small fat containing umbilical hernia  -Generalized muscle atrophy.  ______________________________________

## 2018-02-17 NOTE — PLAN OF CARE
Problem: Patient Care Overview  Goal: Plan of Care Review  Outcome: Ongoing (interventions implemented as appropriate)  No significant events today. Received 1L NS for hypovolemia, increased uop and b/p. Tolerating low rates of tube feeding. No family at bedside today. See epic flowsheets for complete assessment.  Plan of care reviewed w/ pt. Will cont to monitor.

## 2018-02-17 NOTE — ASSESSMENT & PLAN NOTE
- previous UA (Bacteria, WBC, leukocyte positive)  - Urine cultures proteus  - Course of Rocephin completed   - Resolved

## 2018-02-17 NOTE — ASSESSMENT & PLAN NOTE
- Palliative consulted. Family was not available attend this morning's GOC meeting. Will follow up with Goals of Care discussion.

## 2018-02-17 NOTE — ASSESSMENT & PLAN NOTE
- KUB in 02/14 showed distended stomach and partial SOB.  - CT abd pelvis w contrast showed no acute intra-abdominal abnormality.  - hold tube feeds for emesis.

## 2018-02-17 NOTE — PROGRESS NOTES
"[facility]  Palliative Medicine  Progress Note    Patient Name: Jonathan Nieves  MRN: [id number]  Admission Date: 2/4/2018  Hospital Length of Stay: 11 days  Code Status: Full Code   Attending Provider: Erick Sales MD  Consulting Provider: Mariella Rivas MD  Primary Care Physician: Primary Doctor No  Principal Problem:Acute respiratory failure with hypoxia    Patient information was obtained from relative(s) and ER records.      Assessment/Plan:     Palliative care encounter    Two daughters arrived at 3 and the other 1 came at 4 and then additional daughter came later. Discussed goals of care with the four children for additional 90 minutes today.  They understand that she is dying but they are waiting for her to die in God's time. They do not want to remove the ventilator but agree to DNR. They do want attempts to restart tube feeding but if not tolerating would stop. They continue to think about no further escalation in care such as changing antibiotics or further lab/xray evaluation. Discussed with Dr. Sales who is rounding later today. They wish to discuss further with him.  Family contacted several times yesterday. Did not come in for meeting yesterday at 1 pm or this am at 7 as planned. Now they say that they will be here at 3 pm. Dr. Macias did talk with erika Salcedo last night. No decisions made.  Patient is not tolerating tube feedings and also failed weaning.  2 of the daughters cannot stand to see MOM this way and so will not participate. 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   She is currently on ventilator with increased secretions. She already has a PEG and despite appropriate feeding has low albumin and multiple contractures/wounds.  Plan meeting tomorrow 2/16 with patient's children. Matias will call the other siblings. Patients mother is still alive but has Alzheimers. Daughter Matias "has my hands " "full".  Patient is at the end of her life and discussions will occur tomorrow about focusing her care on comfort. At this point, she does not appear to be in pain except with procedures/turning and has prn meds ordered.        PEG (percutaneous endoscopic gastrostomy) status    Risk of aspiration is increased with PEG feedings. She is not tolerating. Had CT of abdomen with no acute findings. Family meeting discussed inability to tolerate TF and no assimilation of "food" with low albumin and poor wound healing.            I will follow-up with patient. Please contact us if you have any additional questions.    Subjective:     Chief Complaint:   Chief Complaint   Patient presents with    Respiratory Distress     [facility] to ED with suspected aspiration following episode of reported projectile vomiting 3 Jan. Upon EMs arrival pt was 88% on 4L NC.        HPI:   Asked to see patient by Dr. Sales for assistance with goals of care and symptom management. Chart reviewed, patient examined.  Mr. Jonathan Nieves is a 51yo female with history of CVA with residual aphasia and dysphagia, non-verbal, s/p PEG tube placement and bed riddenness, osteomyelitis s/p amputation of toe, who came to the ED from [facility] via EMS in respiratory distress. Per ED/EMS the patient was noted to have "projectile vomiting" yesterday evening (24 hours prior to presentation), which was treated with zofran. During the event there is suspicion the patient possible aspirated. Currently, she opens eyes to stimulation, intubated, with contractures of knees/hips/elbows/hands and multiple areas of pressure injury.   I spoke with Daughter Matias about her mother. Family meeting for today was cancelled due to lack of participation.    Hospital Course:  No notes on file    Interval History: Patient seen earlier today. I have spent 90 minutes with 4 daughters discussing goals of care.     Medications:  Continuous " Infusions:  Scheduled Meds:   albuterol-ipratropium 2.5mg-0.5mg/3mL  3 mL Nebulization Q6H    baclofen  10 mg Per G Tube TID    chlorhexidine  15 mL Mouth/Throat BID    citalopram  10 mg Per G Tube Daily    famotidine  20 mg Per G Tube BID    heparin (porcine)  5,000 Units Subcutaneous Q8H    levetiracetam oral soln  500 mg Per G Tube BID     PRN Meds:[] fentaNYL **FOLLOWED BY** fentaNYL, magnesium sulfate IVPB, magnesium sulfate IVPB, midazolam, ondansetron, potassium chloride 10%, potassium chloride 10%, potassium chloride 10%, potassium, sodium phosphates, potassium, sodium phosphates, potassium, sodium phosphates, sodium chloride 0.9%    Objective:     Vital Signs (Most Recent):  Temp: 98.8 °F (37.1 °C) (18 1600)  Pulse: 82 (18 1725)  Resp: 16 (18 1725)  BP: 98/71 (18 1700)  SpO2: (!) 94 % (18 1725) Vital Signs (24h Range):  Temp:  [98.6 °F (37 °C)-98.8 °F (37.1 °C)] 98.8 °F (37.1 °C)  Pulse:  [] 82  Resp:  [11-51] 16  SpO2:  [94 %-100 %] 94 %  BP: ()/() 98/71     Weight: 91.5 kg (201 lb 11.5 oz)  Body mass index is 30.67 kg/m².    Review of Symptoms  Symptom Assessment (ESAS 0-10 scale)  ESAS 0 1 2 3 4 5 6 7 8 9 10   Pain              Dyspnea              Anxiety              Nausea              Depression               Anorexia              Fatigue              Insomnia              Restlessness               Agitation              CAM / Delirium __ --  ___+   Constipation     __ --  ___+   Diarrhea           __ --  ___+  Bowel Management Plan (BMP): No    Comments:     Pain Assessment:     OME in 24 hours:     Performance Status: 10    ECOG Performance Status Grade: 4 - Completely disabled    Physical Exam    Significant Labs: All pertinent labs within the past 24 hours have been reviewed.  CBC:     Recent Labs  Lab 18  0809   WBC 11.68   HGB 9.4*   HCT 32.5*   MCV 80*   *     BMP:    Recent Labs  Lab 18  0809   GLU 76       K 4.2      CO2 24   BUN 6   CREATININE 0.6   CALCIUM 8.6*   MG 1.6     LFT:  Lab Results   Component Value Date    AST 44 (H) 02/16/2018    ALKPHOS 144 (H) 02/16/2018    BILITOT 0.6 02/16/2018     Albumin:   Albumin   Date Value Ref Range Status   02/16/2018 2.4 (L) 3.5 - 5.2 g/dL Final     Protein:   Total Protein   Date Value Ref Range Status   02/16/2018 7.9 6.0 - 8.4 g/dL Final     Lactic acid:   Lab Results   Component Value Date    LACTATE 0.9 02/07/2018    LACTATE 1.6 02/05/2018       Significant Imaging: None    Advanced Directives::  Living Will: No  LaPOST: No  Do Not Resuscitate Status: Yes changed to DNR with current conversation  Medical Power of : No 4 of her six children were present. Catpita, Safia, Radha and Dolores.     Decision-Making Capacity: Family answered questions, Patient unable to communicate due to disease severity/cognitive impairment    Living Arrangements: Lives in nursing home    Psychosocial/Cultural:  Patient's most important priorities:  Unable to assess.    Patient's biggest concerns/fears:  Unable to assess.    Previous death/end of life care history:  Unable to assess.    Patient's goals/hopes:  Unable to assess.    Spiritual:     F- Vielka and Belief: Unable to assess  I - Importance: Unable to assess  C - Community:Unable to assess  A - Address in Care: Unable to assess      90 additional min visit spent in chart review, face to face discussion of goals of care,  symptom assessment, coordination of care and emotional support.    Mariella Rivas MD  Palliative Medicine  [facility]

## 2018-02-17 NOTE — ASSESSMENT & PLAN NOTE
- NSR  - no ECG done at [facility] shows Afib.  - Pt home BB held due to hypotension

## 2018-02-18 NOTE — PLAN OF CARE
Problem: Patient Care Overview  Goal: Plan of Care Review  Outcome: Ongoing (interventions implemented as appropriate)  No acute events throughout shift, VS and assessment per flow sheet, patient progressing towards goals as tolerated. Remained in NSR and BP WNL. UO minimal. Tube feeds held r/t high residual and started back at 10cc/hr. 1 BM throughout shift. Remains on ventilator without complications. Plan of care reviewed with Jonathan Nieves and family, all concerns addressed, will continue to monitor.

## 2018-02-18 NOTE — PLAN OF CARE
Problem: Patient Care Overview  Goal: Plan of Care Review  Outcome: Ongoing (interventions implemented as appropriate)  No acute events throughout day. See vital signs and assessments in flowsheets. See below for updates on today's progress.     Pulmonary: remains ventilated on same settings. O2 sats >92%    Cardiovascular: SR with HR in 70-80s, BP WNL    Neurological: pupils equal and reactive, opens eyes spontaneously, withdraws from touch/pain    Gastrointestinal: PEG tube with tube feeds now at 20ml/hr per CCS, water boluses TID, minimal residuals.     Genitourinary: whitmore with 20-60ml/hr concentrated, yellow urine    Endocrine: accuchecks Q6H WNL     Integumentary/Other: dressings to wounds remain intact, turned Q2H    Infusions: KVO    Patient progressing towards goals as tolerated, plan of care communicated and reviewed with Jonathan Nieves and family. All concerns addressed. Will continue to monitor.

## 2018-02-18 NOTE — PROGRESS NOTES
"[facility]  Critical Care Medicine  Progress Note    Patient Name: Jonathan Nieves  MRN: [id number]  Admission Date: 2/4/2018  Hospital Length of Stay: 13 days  Code Status: Full Code  Attending Provider: Erick Sales MD  Primary Care Provider: Primary Doctor No   Principal Problem: Acute respiratory failure with hypoxia    Subjective:     HPI:  Mr. Jonathan Nieves is a 53yo female with history of CVA with residual aphasia and dysphagia, non-verbal, s/p PEG tube placement and bed riddenness, osteomyelitis s/p amputation of toe, who comes to the ED from [facility] via EMS in respiratory distress. Per ED/EMS the patient was noted to have "projectile vomiting" yesterday evening (24 hours prior to presentation), which was treated with zofran. During the event there is suspicion the patient possible aspirated. Today the patient was noted to be in resp distress with a fever.     Patient was satting 88% on 4L on EMS arrival, breathing 40 times per minute.  Improved to 96% on 15L NRB with RR high 20s.  Axillary temp 102.    CT chest concerning for aspiration pneumonia.  UA concern for a UTI.       Patient intubated in the ED & Critical Care consulted.         Hospital/ICU Course:  2/5: Critical Care Consulted. Patient intubated in the ED.   2/6: Patient doing well, on SBT; will extubate maria e. Continue treatment for aspiration PNA & UTI.   2/7: abx deescalated, continue to treat for PNA and UTI. Required pressors overnight. Off this morning.   2/8-9: still with secretions and questionable mentation? Baseline?. Will try to dry the secretions and extubated today or maria e.    2/10: MRI completed. Family called, had family meeting with one daughter. Need to re-visit with all daughters. Records requested from [facility] and [facility].   02/11-13: neurology consulted, no acute changes, wbc and LFT trending up, keep monitoring.  2/14: Concerns for CADASIL vs Binswanger syndrome per Neuro. NOTCH3 " genetic testing sent. Palliative consulted regarding goals of care.   02/15 pt is not tolerating tube feed, had two episodes of projectile vomiting, KUB 02/14 shows partial SOB and distended stomach.  02/16 CT abdomen was unremarkable, family meeting was done.  02/17 no more vomiting, tolerating 10 cc/hr tube feeds.  02/17 trial to advance tube feeding, had residual back to trickle feed will try to advance slowly.    Interval History/Significant Events: no episodes of hypoglycemia, no vomiting.      Review of Systems   Unable to perform ROS: Intubated     Objective:     Vital Signs (Most Recent):  Temp: 98.7 °F (37.1 °C) (02/18/18 0715)  Pulse: 80 (02/18/18 0930)  Resp: 14 (02/18/18 0930)  BP: 109/70 (02/18/18 0930)  SpO2: 97 % (02/18/18 0930) Vital Signs (24h Range):  Temp:  [98.3 °F (36.8 °C)-99.5 °F (37.5 °C)] 98.7 °F (37.1 °C)  Pulse:  [72-86] 80  Resp:  [13-29] 14  SpO2:  [92 %-100 %] 97 %  BP: ()/(48-73) 109/70   Weight: 91.5 kg (201 lb 11.5 oz)  Body mass index is 30.67 kg/m².      Intake/Output Summary (Last 24 hours) at 02/18/18 1000  Last data filed at 02/18/18 0900   Gross per 24 hour   Intake             2060 ml   Output              710 ml   Net             1350 ml       Physical Exam   Constitutional: No distress.   HENT:   Head: Normocephalic and atraumatic.   Eyes: Right eye exhibits no discharge. Left eye exhibits no discharge.   Neck: Normal range of motion. Neck supple.   Cardiovascular: Normal rate and regular rhythm.    Pulmonary/Chest: Effort normal. She has rales (at bases).   intubated   Abdominal: Soft. She exhibits no distension.   Musculoskeletal: She exhibits deformity (contracted upper and lower extremities).   Neurological:   Does not following commands. No purposeful movement. Opens eyes, but does not track.    Skin: Skin is warm and dry.       Vents:  Vent Mode: A/C (02/18/18 0719)  Ventilator Initiated: Yes (02/04/18 3144)  Set Rate: 14 bmp (02/18/18 0719)  Vt Set: 400 mL  (02/18/18 0719)  Pressure Support: 0 cmH20 (02/18/18 0719)  PEEP/CPAP: 5 cmH20 (02/18/18 0719)  Oxygen Concentration (%): 30 (02/18/18 0930)  Peak Airway Pressure: 26 cmH2O (02/18/18 0719)  Plateau Pressure: 21 cmH20 (02/18/18 0719)  Total Ve: 7.32 mL (02/18/18 0719)  F/VT Ratio<105 (RSBI): (!) 50.13 (02/18/18 0719)  Lines/Drains/Airways     Drain                 Gastrostomy/Enterostomy 10/25/16 1401 Percutaneous endoscopic gastrostomy (PEG) midline feeding 480 days         Urethral Catheter 02/05/18 1006 Latex 12 days          Airway                 Airway - Non-Surgical 02/04/18 2329 Endotracheal Tube 13 days          Pressure Ulcer                 Pressure Injury 02/05/18 1045 Left medial Foot Deep tissue injury 12 days         Pressure Injury 02/05/18 1045 Right lateral Foot Deep tissue injury 12 days          Peripheral Intravenous Line                 Midline Catheter Insertion/Assessment  - Single Lumen 02/15/18 1125 Left basilic vein (medial side of arm) 18g x 10cm 2 days              Significant Labs:    CBC/Anemia Profile:    Recent Labs  Lab 02/17/18  0016   WBC 10.61   HGB 9.6*   HCT 32.7*   *   MCV 79*   RDW 18.7*        Chemistries:    Recent Labs  Lab 02/17/18  0016      K 3.9      CO2 24   BUN 6   CREATININE 0.6   CALCIUM 8.7   ALBUMIN 2.5*   PROT 8.0   BILITOT 0.7   ALKPHOS 141*   ALT 87*   AST 40   MG 1.6       All pertinent labs within the past 24 hours have been reviewed.    Significant Imaging:  I have reviewed and interpreted all pertinent imaging results/findings within the past 24 hours.     CT Abd/ Pelvis W Contrast  Impression         1. No acute intra-abdominal abnormality identified.    2.  Bilateral atelectatic change, progressed on the left since CT 2/4/2018, but with interval improvement in right basilar consolidation.    3.  Small hiatal hernia.  Distal esophageal wall thickening which appears similar to the May 19, 2017 examination.  Further evaluation can be  performed with upper endoscopy if clinically indicated.    4.  Additional stable findings include:  -Prominence of the common bile duct, not unexpected in this patient status post cholecystectomy  -Absence of the right kidney.  The right adrenal gland is not seen.    -Gastrostomy tube in appropriate position  -Left renal staghorn calculus with resultant dilatation of lower pole calyces and associated cortical thinning  -Urinary bladder is collapsed around a De Leon catheter  -Degenerative change of the spine with multiple lumbar compression deformities  -Small fat containing umbilical hernia  -Generalized muscle atrophy.  ______________________________________          Assessment/Plan:     Neuro   Seizure-like activity    - continue keppra         Dysphagia as late effect of cerebrovascular disease    - Neurology consulted: differential includes but is not limited to CADASIL (given h/o strokes, extensive white matter involvement including the temporal lobes), adult onset adrenoleukodystrophy, chronic microvascular changes (less likely), vanishing white matter disease (less likely given predominance in children), or Binswanger disease (subcortical leukoencephalopathy or vascular dementia 2/2 chronic HTN).   - NOTCH3 genetic testing sent, results pending        CVA, old, hemiparesis    -patient suffered multiple strokes in past, last one in 2008   -nonverbal at baseline, wheelchair/bedbound    MRI (2/10)  Findings: The diffusion sequence demonstrates no evidence of acute infarct. There is severe white matter small vessel ischemic change. There is a remote infarct of the right occipital lobe. There is a mild hemosiderin staining of the brainstem probably from a prior subarachnoid hemorrhage. There is a small focus of hemosiderin posterior right midline medulla. Pituitary and craniocervical junction show nothing unusual. There is left frontal sinusitis change. There is involutional change.  - Records requested from [facility]  and [facility]. Did not include brain images or neurology notes.  - Neurology consulted: differential includes but is not limited to CADASIL (given h/o strokes, extensive white matter involvement including the temporal lobes), adult onset adrenoleukodystrophy, chronic microvascular changes (less likely), vanishing white matter disease (less likely given predominance in children), or Binswanger disease (subcortical leukoencephalopathy or vascular dementia 2/2 chronic HTN).   - NOTCH3 genetic testing sent, results pending        Derm   Unstageable pressure ulcer of left buttock    - multiple area, all looks clean, not infected.        Pulmonary   * Acute respiratory failure with hypoxia    - Intubated, Failing SBT  - AC mode, FiO2 30%, PEEP 5         Aspiration pneumonia    Extensive consolidation of the right lung and debris in the right mainstem bronchus and its branches consistent with aspiration pneumonia.  - s/p course of rocephin   - s/p vanco & cefepime & flagyl   - Not on any antibiotics currently  - CBC monitor down trending   - Intubated          Cardiac/Vascular   Paroxysmal atrial fibrillation    - NSR  - no ECG done at [facility] shows Afib.  - Pt home BB held due to hypotension           Renal/   Hypernatremia    - Resolved           UTI (urinary tract infection)    - previous UA (Bacteria, WBC, leukocyte positive)  - Urine cultures proteus  - Course of Rocephin completed   - Resolved        GI   Partial small bowel obstruction    - KUB in 02/14 showed distended stomach and partial SOB.  - CT abd pelvis w contrast showed no acute intra-abdominal abnormality.  - no more emesis, tube feeds resumed tolerating 10 cc/hr, will advance as tolerated.          PEG (percutaneous endoscopic gastrostomy) status    - trickle feeds will advance as tolerated.        Other   Goals of care, counseling/discussion    - Palliative consulted. Family was not available attend this morning's GO meeting. Will follow up with Goals of Care  discussion.           Critical care was time spent personally by me on the following activities: development of treatment plan with patient or surrogate and bedside caregivers, discussions with consultants, evaluation of patient's response to treatment, examination of patient, ordering and performing treatments and interventions, ordering and review of laboratory studies, ordering and review of radiographic studies, pulse oximetry, re-evaluation of patient's condition. This critical care time did not overlap with that of any other provider or involve time for any procedures.     Ana Chauhan MD  Critical Care Medicine  [facility]

## 2018-02-18 NOTE — ASSESSMENT & PLAN NOTE
- NSR  - no ECG done at [facility] shows Afib.  - Pt home BB held due to hypotension

## 2018-02-18 NOTE — SUBJECTIVE & OBJECTIVE
Interval History/Significant Events: no episodes of hypoglycemia, no vomiting.      Review of Systems   Unable to perform ROS: Intubated     Objective:     Vital Signs (Most Recent):  Temp: 98.7 °F (37.1 °C) (02/18/18 0715)  Pulse: 80 (02/18/18 0930)  Resp: 14 (02/18/18 0930)  BP: 109/70 (02/18/18 0930)  SpO2: 97 % (02/18/18 0930) Vital Signs (24h Range):  Temp:  [98.3 °F (36.8 °C)-99.5 °F (37.5 °C)] 98.7 °F (37.1 °C)  Pulse:  [72-86] 80  Resp:  [13-29] 14  SpO2:  [92 %-100 %] 97 %  BP: ()/(48-73) 109/70   Weight: 91.5 kg (201 lb 11.5 oz)  Body mass index is 30.67 kg/m².      Intake/Output Summary (Last 24 hours) at 02/18/18 1000  Last data filed at 02/18/18 0900   Gross per 24 hour   Intake             2060 ml   Output              710 ml   Net             1350 ml       Physical Exam   Constitutional: No distress.   HENT:   Head: Normocephalic and atraumatic.   Eyes: Right eye exhibits no discharge. Left eye exhibits no discharge.   Neck: Normal range of motion. Neck supple.   Cardiovascular: Normal rate and regular rhythm.    Pulmonary/Chest: Effort normal. She has rales (at bases).   intubated   Abdominal: Soft. She exhibits no distension.   Musculoskeletal: She exhibits deformity (contracted upper and lower extremities).   Neurological:   Does not following commands. No purposeful movement. Opens eyes, but does not track.    Skin: Skin is warm and dry.       Vents:  Vent Mode: A/C (02/18/18 0719)  Ventilator Initiated: Yes (02/04/18 2344)  Set Rate: 14 bmp (02/18/18 0719)  Vt Set: 400 mL (02/18/18 0719)  Pressure Support: 0 cmH20 (02/18/18 0719)  PEEP/CPAP: 5 cmH20 (02/18/18 0719)  Oxygen Concentration (%): 30 (02/18/18 0930)  Peak Airway Pressure: 26 cmH2O (02/18/18 0719)  Plateau Pressure: 21 cmH20 (02/18/18 0719)  Total Ve: 7.32 mL (02/18/18 0719)  F/VT Ratio<105 (RSBI): (!) 50.13 (02/18/18 0719)  Lines/Drains/Airways     Drain                 Gastrostomy/Enterostomy 10/25/16 1401 Percutaneous  endoscopic gastrostomy (PEG) midline feeding 480 days         Urethral Catheter 02/05/18 1006 Latex 12 days          Airway                 Airway - Non-Surgical 02/04/18 2329 Endotracheal Tube 13 days          Pressure Ulcer                 Pressure Injury 02/05/18 1045 Left medial Foot Deep tissue injury 12 days         Pressure Injury 02/05/18 1045 Right lateral Foot Deep tissue injury 12 days          Peripheral Intravenous Line                 Midline Catheter Insertion/Assessment  - Single Lumen 02/15/18 1125 Left basilic vein (medial side of arm) 18g x 10cm 2 days              Significant Labs:    CBC/Anemia Profile:    Recent Labs  Lab 02/17/18  0016   WBC 10.61   HGB 9.6*   HCT 32.7*   *   MCV 79*   RDW 18.7*        Chemistries:    Recent Labs  Lab 02/17/18  0016      K 3.9      CO2 24   BUN 6   CREATININE 0.6   CALCIUM 8.7   ALBUMIN 2.5*   PROT 8.0   BILITOT 0.7   ALKPHOS 141*   ALT 87*   AST 40   MG 1.6       All pertinent labs within the past 24 hours have been reviewed.    Significant Imaging:  I have reviewed and interpreted all pertinent imaging results/findings within the past 24 hours.     CT Abd/ Pelvis W Contrast  Impression         1. No acute intra-abdominal abnormality identified.    2.  Bilateral atelectatic change, progressed on the left since CT 2/4/2018, but with interval improvement in right basilar consolidation.    3.  Small hiatal hernia.  Distal esophageal wall thickening which appears similar to the May 19, 2017 examination.  Further evaluation can be performed with upper endoscopy if clinically indicated.    4.  Additional stable findings include:  -Prominence of the common bile duct, not unexpected in this patient status post cholecystectomy  -Absence of the right kidney.  The right adrenal gland is not seen.    -Gastrostomy tube in appropriate position  -Left renal staghorn calculus with resultant dilatation of lower pole calyces and associated cortical  thinning  -Urinary bladder is collapsed around a De Leon catheter  -Degenerative change of the spine with multiple lumbar compression deformities  -Small fat containing umbilical hernia  -Generalized muscle atrophy.  ______________________________________

## 2018-02-18 NOTE — NURSING
CCS called regarding patient's mental status, patient confused and lethargic. No recent ammonia levels have been checked, CCS aware. 1 BM charted for today. Ability to swallow r/t mental status questionable. CCS to come to bedside.     2020: CCS at bedside. Pending orders.

## 2018-02-19 NOTE — ASSESSMENT & PLAN NOTE
-patient suffered multiple strokes in past, last one in 2008   -nonverbal at baseline, wheelchair/bedbound    MRI (2/10)  Findings: The diffusion sequence demonstrates no evidence of acute infarct. There is severe white matter small vessel ischemic change. There is a remote infarct of the right occipital lobe. There is a mild hemosiderin staining of the brainstem probably from a prior subarachnoid hemorrhage. There is a small focus of hemosiderin posterior right midline medulla. Pituitary and craniocervical junction show nothing unusual. There is left frontal sinusitis change. There is involutional change.  - [facility]. Did not include brain images or neurology notes.  - Neurology consulted: differential includes but is not limited to CADASIL (given h/o strokes, extensive white matter involvement including the temporal lobes), adult onset adrenoleukodystrophy, chronic microvascular changes (less likely), vanishing white matter disease (less likely given predominance in children), or Binswanger disease (subcortical leukoencephalopathy or vascular dementia 2/2 chronic HTN).   - NOTCH3 genetic testing sent, results pending

## 2018-02-19 NOTE — NURSING
CCS notified patient hypotensive with MAP's in the 50's, systolic's 60-70. Patient's mental status appears to be the same. Dose of fentanyl given at 0615. Orders to recheck pressure in 20 minutes. Report given to KATIE Morales.

## 2018-02-19 NOTE — PROGRESS NOTES
[facility]  Palliative Medicine  Consult Note    Patient Name: Jonathan Nieves  MRN: [id number]  Admission Date: 2/4/2018  Hospital Length of Stay: 14 days  Code Status: Partial Code   Attending Provider: Jo Ann Ocasio MD  Consulting Provider: Vladimir De La Torre MD  Primary Care Physician: Primary Doctor No  Principal Problem:Acute respiratory failure with hypoxia    Consults  Assessment/Plan:     Active Diagnoses:    Diagnosis Date Noted POA    PRINCIPAL PROBLEM:  Acute respiratory failure with hypoxia [J96.01] 02/05/2018 Yes    Partial small bowel obstruction [K56.600] 02/16/2018 Yes    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] 02/15/2018 Yes    Palliative care encounter [Z51.5] 02/14/2018 Not Applicable    Hypernatremia [E87.0] 02/07/2018 Yes    Intubation of airway performed without difficulty [Z78.9] 02/05/2018 Yes    Unstageable pressure ulcer of left buttock [L89.320] 02/05/2018 Yes    Pressure injury of deep tissue [L89.90] 02/05/2018 Yes    Alteration in skin integrity due to moisture [R23.9] 02/05/2018 Yes    Seizure-like activity [R56.9] 05/22/2017 Yes    Goals of care, counseling/discussion [Z71.89] 05/20/2017 Not Applicable     Chronic    PEG (percutaneous endoscopic gastrostomy) status [Z93.1] 12/16/2014 Not Applicable    Aspiration pneumonia [J69.0] 10/13/2014 Yes    UTI (urinary tract infection) [N39.0] 10/13/2014 Yes    Paroxysmal atrial fibrillation [I48.0] 05/15/2014 Yes     Chronic    CVA, old, hemiparesis [I69.359] 05/15/2014 Not Applicable     Chronic    Dysphagia as late effect of cerebrovascular disease [I69.991] 05/15/2014 Not Applicable     Chronic      Problems Resolved During this Admission:    Diagnosis Date Noted Date Resolved POA     Goals of care:  - 53 yo lady with several CVA and extensive white matter disease with wide differential (CADASIL).   - Reviewed Dr. Cherry note 2/12. Appears to have severe neurologic deficits that likely are irreversible. Poor  "prognosis for meaningful neurologic recovery.   - 2/19 made partial code per primary 2/2 to hypotensive and bradycardia episode  - now on pressors, recovery of hypotension currently  - spoke with daughter Ruth who herself has a challenging situation (caring for demented grandmother and several children).   - she understands the recent changes (hypotension) and confirms wishes for partial code  - she understands the extensive neurologic disease and poor prognosis for meaningful recovery  - she is awaiting arrival of her brother (from [place]) this evening.   - she agrees to a family meeting tomorrow AM at 9:30 to further discuss goals of care and poss. Transitioning to comfort care as previously discussed.   - have stressed importance of family presence for decisions as it appears they have missed previous appointments with providers.      I will follow along with you. Please call [phone] with questions.     Subjective:     HPI: Mr. Jonathan Nieves is a 53yo female with history of CVA with residual aphasia and dysphagia, non-verbal, s/p PEG tube placement and bed riddenness, osteomyelitis s/p amputation of toe, who came to the ED from [facility] via EMS in respiratory distress. Per ED/EMS the patient was noted to have "projectile vomiting" yesterday evening (24 hours prior to presentation), which was treated with zofran. During the event there is suspicion the patient possible aspirated. Currently, she opens eyes to stimulation, intubated, with contractures of knees/hips/elbows/hands and multiple areas of pressure injury.     Interval History: pt. Had an episode of hypotension and bradycardia this AM. Was given fluid boluses and started on Levophed. Primary team called daughter, she agreed to partial code (no CPR).  Hypotension and bradycardia improved by this AM.     Currently pt. Has eyes wide open, appears anxious. Being suctioned and cleaned by RN.       Medications:  Continuous " Infusions:   norepinephrine bitartrate-D5W 0.08 mg (18 1100)     Scheduled Meds:   baclofen  10 mg Per G Tube TID    chlorhexidine  15 mL Mouth/Throat BID    citalopram  10 mg Per G Tube Daily    famotidine  20 mg Per G Tube BID    heparin (porcine)  5,000 Units Subcutaneous Q8H    levetiracetam oral soln  500 mg Per G Tube BID    piperacillin-tazobactam (ZOSYN) IVPB  4.5 g Intravenous Q8H    vancomycin (VANCOCIN) IVPB  1,250 mg Intravenous Q12H    vancomycin (VANCOCIN) IVPB  2,000 mg Intravenous Once     PRN Meds:dextrose 50%, [] fentaNYL **FOLLOWED BY** fentaNYL, glucagon (human recombinant), magnesium sulfate IVPB, magnesium sulfate IVPB, midazolam, ondansetron, potassium chloride 10%, potassium chloride 10%, potassium chloride 10%, potassium, sodium phosphates, potassium, sodium phosphates, potassium, sodium phosphates, sodium chloride 0.9%        Review of Systems   Unable to perform ROS: Intubated     Objective:     Vital Signs (Most Recent):  Temp: 98.3 °F (36.8 °C) (18 1115)  Pulse: 88 (18 1131)  Resp: (!) 28 (18 1131)  BP: 108/72 (18 1131)  SpO2: 98 % (18 1131) Vital Signs (24h Range):  Temp:  [98 °F (36.7 °C)-99.4 °F (37.4 °C)] 98.3 °F (36.8 °C)  Pulse:  [45-88] 88  Resp:  [11-28] 28  SpO2:  [9 %-100 %] 98 %  BP: ()/(49-76) 108/72     Physical Exam   Constitutional: She appears well-developed.   HENT:   Head: Normocephalic.   Neck: No JVD present.   Cardiovascular:   No murmur heard.  Pulmonary/Chest:   Ventilated. Coarse breath sounds on anterior exam   Abdominal: Soft.   PEG in place   Musculoskeletal:   Upper and lower extremity contractures, no edema. Multiple pressure injuries   Neurological:   Eyes open. Does not follow commands, does not track. Appears frightened.    Skin: Skin is warm.       Laboratory:   CBC:   Recent Labs  Lab 18  0155   WBC 7.84   RBC 3.82*   HGB 8.9*   HCT 30.9*      MCV 81*   MCH 23.3*   MCHC 28.8*        CMP:   Recent Labs  Lab 02/19/18  0155   GLU 83   CALCIUM 8.6*   ALBUMIN 2.2*   PROT 7.2      K 4.1   CO2 22*      BUN 4*   CREATININE 0.5   ALKPHOS 111   ALT 49*   AST 20   BILITOT 0.4       POCT Glucose   Date Value Ref Range Status   02/19/2018 95 70 - 110 mg/dL Final   02/19/2018 92 70 - 110 mg/dL Final   02/18/2018 96 70 - 110 mg/dL Final   02/18/2018 92 70 - 110 mg/dL Final   02/18/2018 86 70 - 110 mg/dL Final   02/18/2018 94 70 - 110 mg/dL Final   02/17/2018 94 70 - 110 mg/dL Final   02/17/2018 63 (L) 70 - 110 mg/dL Final         Significant Imaging: CT Abdomen and Pelvis:   1. No acute intra-abdominal abnormality identified.    2.  Bilateral atelectatic change, progressed on the left since CT 2/4/2018, but with interval improvement in right basilar consolidation.    3.  Small hiatal hernia.  Distal esophageal wall thickening which appears similar to the May 19, 2017 examination.  Further evaluation can be performed with upper endoscopy if clinically indicated.    4.  Additional stable findings include:  -Prominence of the common bile duct, not unexpected in this patient status post cholecystectomy  -Absence of the right kidney.  The right adrenal gland is not seen.    -Gastrostomy tube in appropriate position  -Left renal staghorn calculus with resultant dilatation of lower pole calyces and associated cortical thinning  -Urinary bladder is collapsed around a De Leon catheter  -Degenerative change of the spine with multiple lumbar compression deformities  -Small fat containing umbilical hernia  -Generalized muscle atrophy.    > 50% of 40 min visit spent in chart review, face to face discussion of goals of care,  symptom assessment, coordination of care and emotional support.    Vladimir De La Torre MD  Palliative Medicine  [facility]

## 2018-02-19 NOTE — PLAN OF CARE
Problem: Patient Care Overview  Goal: Plan of Care Review  Outcome: Ongoing (interventions implemented as appropriate)  No acute events throughout shift, VS and assessment per flow sheet, patient progressing towards goals as tolerated. Neuro status the same, remained in NSR, BP stable. Better UO tonight per [place], no BM. Tube feeds remain at 20/hr with minimal residual. Plan of care reviewed with Jonathan Nieves and family, all concerns addressed, will continue to monitor.

## 2018-02-19 NOTE — PROGRESS NOTES
"[facility]  Critical Care Medicine  Progress Note    Patient Name: Jonathan Nieves  MRN: [id number]  Admission Date: 2/4/2018  Hospital Length of Stay: 14 days  Code Status: Partial Code  Attending Provider: Jo Ann Ocasio MD  Primary Care Provider: Primary Doctor No   Principal Problem: Acute respiratory failure with hypoxia    Subjective:     HPI:  Mr. Jonathan Nieves is a 51yo female with history of CVA with residual aphasia and dysphagia, non-verbal, s/p PEG tube placement and bed riddenness, osteomyelitis s/p amputation of toe, who comes to the ED from [facility] via EMS in respiratory distress. Per ED/EMS the patient was noted to have "projectile vomiting" yesterday evening (24 hours prior to presentation), which was treated with zofran. During the event there is suspicion the patient possible aspirated. Today the patient was noted to be in resp distress with a fever.     Patient was satting 88% on 4L on EMS arrival, breathing 40 times per minute.  Improved to 96% on 15L NRB with RR high 20s.  Axillary temp 102.    CT chest concerning for aspiration pneumonia.  UA concern for a UTI.       Patient intubated in the ED & Critical Care consulted.         Hospital/ICU Course:  2/5: Critical Care Consulted. Patient intubated in the ED.   2/6: Patient doing well, on SBT; will extubate maria e. Continue treatment for aspiration PNA & UTI.   2/7: abx deescalated, continue to treat for PNA and UTI. Required pressors overnight. Off this morning.   2/8-9: still with secretions and questionable mentation? Baseline?. Will try to dry the secretions and extubated today or maria e.    2/10: MRI completed. Family called, had family meeting with one daughter. Need to re-visit with all daughters. Records requested from [facility] and [facility].   02/11-13: neurology consulted, no acute changes, wbc and LFT trending up, keep monitoring.  2/14: Concerns for CADASIL vs Binswanger syndrome per Neuro. NOTCH3 genetic " testing sent. Palliative consulted regarding goals of care.   02/15 pt is not tolerating tube feed, had two episodes of projectile vomiting, KUB 02/14 shows partial SOB and distended stomach.  02/16 CT abdomen was unremarkable, family meeting was done.  02/17 no more vomiting, tolerating 10 cc/hr tube feeds.  02/18 trial to advance tube feeding, had residual back to trickle feed will try to advance slowly. 02/19 PT became hypotensive this morning with MAPs of 50's started on levo, daughter was contacted regarding goals of care, pt status was changed to partial code upon family request.    Interval History/Significant Events: tolerating tube feeds.      Review of Systems   Unable to perform ROS: Intubated     Objective:     Vital Signs (Most Recent):  Temp: 98 °F (36.7 °C) (02/19/18 0726)  Pulse: (!) 54 (02/19/18 0747)  Resp: 17 (02/19/18 0747)  BP: (!) 77/49 (02/19/18 0747)  SpO2: 99 % (02/19/18 0747) Vital Signs (24h Range):  Temp:  [98 °F (36.7 °C)-99.4 °F (37.4 °C)] 98 °F (36.7 °C)  Pulse:  [54-86] 54  Resp:  [11-25] 17  SpO2:  [9 %-100 %] 99 %  BP: ()/(49-82) 77/49   Weight: 91.5 kg (201 lb 11.5 oz)  Body mass index is 30.67 kg/m².      Intake/Output Summary (Last 24 hours) at 02/19/18 0845  Last data filed at 02/19/18 0700   Gross per 24 hour   Intake             1370 ml   Output             1150 ml   Net              220 ml       Physical Exam   Constitutional: No distress.   HENT:   Head: Normocephalic and atraumatic.   Eyes: Right eye exhibits no discharge. Left eye exhibits no discharge.   Neck: Normal range of motion. Neck supple.   Cardiovascular: Normal rate and regular rhythm.    Pulmonary/Chest: Effort normal. She has rales (at bases).   intubated   Abdominal: Soft. She exhibits no distension.   Musculoskeletal: She exhibits deformity (contracted upper and lower extremities).   Neurological:   Does not following commands. No purposeful movement. Opens eyes, but does not track.    Skin: Skin is  warm and dry.       Vents:  Vent Mode: A/C (02/19/18 0747)  Ventilator Initiated: Yes (02/04/18 2344)  Set Rate: 14 bmp (02/19/18 0747)  Vt Set: 400 mL (02/19/18 0747)  Pressure Support: 0 cmH20 (02/19/18 0747)  PEEP/CPAP: 5 cmH20 (02/19/18 0747)  Oxygen Concentration (%): 30 (02/19/18 0747)  Peak Airway Pressure: 42 cmH2O (02/19/18 0747)  Plateau Pressure: 21 cmH20 (02/19/18 0747)  Total Ve: 6.53 mL (02/19/18 0747)  F/VT Ratio<105 (RSBI): (!) 38.55 (02/19/18 0747)  Lines/Drains/Airways     Drain                 Gastrostomy/Enterostomy 10/25/16 1401 Percutaneous endoscopic gastrostomy (PEG) midline feeding 481 days         Urethral Catheter 02/05/18 1006 Latex 13 days          Airway                 Airway - Non-Surgical 02/04/18 2329 Endotracheal Tube 14 days          Pressure Ulcer                 Pressure Injury 02/05/18 1045 Left medial Foot Deep tissue injury 13 days         Pressure Injury 02/05/18 1045 Right lateral Foot Deep tissue injury 13 days          Peripheral Intravenous Line                 Midline Catheter Insertion/Assessment  - Single Lumen 02/15/18 1125 Left basilic vein (medial side of arm) 18g x 10cm 3 days              Significant Labs:    CBC/Anemia Profile:    Recent Labs  Lab 02/19/18  0155   WBC 7.84   HGB 8.9*   HCT 30.9*      MCV 81*   RDW 18.9*        Chemistries:    Recent Labs  Lab 02/19/18  0155      K 4.1      CO2 22*   BUN 4*   CREATININE 0.5   CALCIUM 8.6*   ALBUMIN 2.2*   PROT 7.2   BILITOT 0.4   ALKPHOS 111   ALT 49*   AST 20   MG 1.5*   PHOS 3.0       All pertinent labs within the past 24 hours have been reviewed.    Significant Imaging:  I have reviewed and interpreted all pertinent imaging results/findings within the past 24 hours.     CT Abd/ Pelvis W Contrast  Impression         1. No acute intra-abdominal abnormality identified.    2.  Bilateral atelectatic change, progressed on the left since CT 2/4/2018, but with interval improvement in right basilar  consolidation.    3.  Small hiatal hernia.  Distal esophageal wall thickening which appears similar to the May 19, 2017 examination.  Further evaluation can be performed with upper endoscopy if clinically indicated.    4.  Additional stable findings include:  -Prominence of the common bile duct, not unexpected in this patient status post cholecystectomy  -Absence of the right kidney.  The right adrenal gland is not seen.    -Gastrostomy tube in appropriate position  -Left renal staghorn calculus with resultant dilatation of lower pole calyces and associated cortical thinning  -Urinary bladder is collapsed around a De Leon catheter  -Degenerative change of the spine with multiple lumbar compression deformities  -Small fat containing umbilical hernia  -Generalized muscle atrophy.  ______________________________________          Assessment/Plan:     Neuro   Seizure-like activity    - continue keppra         Dysphagia as late effect of cerebrovascular disease    - Neurology consulted: differential includes but is not limited to CADASIL (given h/o strokes, extensive white matter involvement including the temporal lobes), adult onset adrenoleukodystrophy, chronic microvascular changes (less likely), vanishing white matter disease (less likely given predominance in children), or Binswanger disease (subcortical leukoencephalopathy or vascular dementia 2/2 chronic HTN).   - NOTCH3 genetic testing sent, results pending        CVA, old, hemiparesis    -patient suffered multiple strokes in past, last one in 2008   -nonverbal at baseline, wheelchair/bedbound    MRI (2/10)  Findings: The diffusion sequence demonstrates no evidence of acute infarct. There is severe white matter small vessel ischemic change. There is a remote infarct of the right occipital lobe. There is a mild hemosiderin staining of the brainstem probably from a prior subarachnoid hemorrhage. There is a small focus of hemosiderin posterior right midline medulla.  Pituitary and craniocervical junction show nothing unusual. There is left frontal sinusitis change. There is involutional change.  - [facility]. Did not include brain images or neurology notes.  - Neurology consulted: differential includes but is not limited to CADASIL (given h/o strokes, extensive white matter involvement including the temporal lobes), adult onset adrenoleukodystrophy, chronic microvascular changes (less likely), vanishing white matter disease (less likely given predominance in children), or Binswanger disease (subcortical leukoencephalopathy or vascular dementia 2/2 chronic HTN).   - NOTCH3 genetic testing sent, results pending        Derm   Unstageable pressure ulcer of left buttock    - multiple area, all looks clean, not infected.        Pulmonary   * Acute respiratory failure with hypoxia    - Intubated, Failing SBT  - AC mode, FiO2 30%, PEEP 5         Aspiration pneumonia    Extensive consolidation of the right lung and debris in the right mainstem bronchus and its branches consistent with aspiration pneumonia.  - s/p course of rocephin   - s/p vanco & cefepime & flagyl   - Not on any antibiotics currently  - Intubated          Cardiac/Vascular   Paroxysmal atrial fibrillation    - NSR  - no ECG done at [facility] shows Afib.  - Pt home BB held due to hypotension           Renal/   Hypernatremia    - Resolved           UTI (urinary tract infection)    - previous UA (Bacteria, WBC, leukocyte positive)  - Urine cultures proteus  - Course of Rocephin completed   - Resolved        GI   Partial small bowel obstruction    - KUB in 02/14 showed distended stomach and partial SOB.  - CT abd pelvis w contrast showed no acute intra-abdominal abnormality.  - no more emesis, tube feeds resumed tolerating.          PEG (percutaneous endoscopic gastrostomy) status    - tolerating tube feeds.        Other   Palliative care encounter    - partial code        Goals of care,  counseling/discussion    - Palliative consulted. Family was not available attend this morning's [facility] meeting.   - Pt placed partial code.           Critical care was time spent personally by me on the following activities: development of treatment plan with patient or surrogate and bedside caregivers, discussions with consultants, evaluation of patient's response to treatment, examination of patient, ordering and performing treatments and interventions, ordering and review of laboratory studies, ordering and review of radiographic studies, pulse oximetry, re-evaluation of patient's condition. This critical care time did not overlap with that of any other provider or involve time for any procedures.     Ana Chauhan MD  Critical Care Medicine  [facility]

## 2018-02-19 NOTE — SUBJECTIVE & OBJECTIVE
Interval History/Significant Events: tolerating tube feeds.      Review of Systems   Unable to perform ROS: Intubated     Objective:     Vital Signs (Most Recent):  Temp: 98 °F (36.7 °C) (02/19/18 0726)  Pulse: (!) 54 (02/19/18 0747)  Resp: 17 (02/19/18 0747)  BP: (!) 77/49 (02/19/18 0747)  SpO2: 99 % (02/19/18 0747) Vital Signs (24h Range):  Temp:  [98 °F (36.7 °C)-99.4 °F (37.4 °C)] 98 °F (36.7 °C)  Pulse:  [54-86] 54  Resp:  [11-25] 17  SpO2:  [9 %-100 %] 99 %  BP: ()/(49-82) 77/49   Weight: 91.5 kg (201 lb 11.5 oz)  Body mass index is 30.67 kg/m².      Intake/Output Summary (Last 24 hours) at 02/19/18 0845  Last data filed at 02/19/18 0700   Gross per 24 hour   Intake             1370 ml   Output             1150 ml   Net              220 ml       Physical Exam   Constitutional: No distress.   HENT:   Head: Normocephalic and atraumatic.   Eyes: Right eye exhibits no discharge. Left eye exhibits no discharge.   Neck: Normal range of motion. Neck supple.   Cardiovascular: Normal rate and regular rhythm.    Pulmonary/Chest: Effort normal. She has rales (at bases).   intubated   Abdominal: Soft. She exhibits no distension.   Musculoskeletal: She exhibits deformity (contracted upper and lower extremities).   Neurological:   Does not following commands. No purposeful movement. Opens eyes, but does not track.    Skin: Skin is warm and dry.       Vents:  Vent Mode: A/C (02/19/18 0747)  Ventilator Initiated: Yes (02/04/18 2344)  Set Rate: 14 bmp (02/19/18 0747)  Vt Set: 400 mL (02/19/18 0747)  Pressure Support: 0 cmH20 (02/19/18 0747)  PEEP/CPAP: 5 cmH20 (02/19/18 0747)  Oxygen Concentration (%): 30 (02/19/18 0747)  Peak Airway Pressure: 42 cmH2O (02/19/18 0747)  Plateau Pressure: 21 cmH20 (02/19/18 0747)  Total Ve: 6.53 mL (02/19/18 0747)  F/VT Ratio<105 (RSBI): (!) 38.55 (02/19/18 0747)  Lines/Drains/Airways     Drain                 Gastrostomy/Enterostomy 10/25/16 1401 Percutaneous endoscopic gastrostomy (PEG)  midline feeding 481 days         Urethral Catheter 02/05/18 1006 Latex 13 days          Airway                 Airway - Non-Surgical 02/04/18 2329 Endotracheal Tube 14 days          Pressure Ulcer                 Pressure Injury 02/05/18 1045 Left medial Foot Deep tissue injury 13 days         Pressure Injury 02/05/18 1045 Right lateral Foot Deep tissue injury 13 days          Peripheral Intravenous Line                 Midline Catheter Insertion/Assessment  - Single Lumen 02/15/18 1125 Left basilic vein (medial side of arm) 18g x 10cm 3 days              Significant Labs:    CBC/Anemia Profile:    Recent Labs  Lab 02/19/18  0155   WBC 7.84   HGB 8.9*   HCT 30.9*      MCV 81*   RDW 18.9*        Chemistries:    Recent Labs  Lab 02/19/18  0155      K 4.1      CO2 22*   BUN 4*   CREATININE 0.5   CALCIUM 8.6*   ALBUMIN 2.2*   PROT 7.2   BILITOT 0.4   ALKPHOS 111   ALT 49*   AST 20   MG 1.5*   PHOS 3.0       All pertinent labs within the past 24 hours have been reviewed.    Significant Imaging:  I have reviewed and interpreted all pertinent imaging results/findings within the past 24 hours.     CT Abd/ Pelvis W Contrast  Impression         1. No acute intra-abdominal abnormality identified.    2.  Bilateral atelectatic change, progressed on the left since CT 2/4/2018, but with interval improvement in right basilar consolidation.    3.  Small hiatal hernia.  Distal esophageal wall thickening which appears similar to the May 19, 2017 examination.  Further evaluation can be performed with upper endoscopy if clinically indicated.    4.  Additional stable findings include:  -Prominence of the common bile duct, not unexpected in this patient status post cholecystectomy  -Absence of the right kidney.  The right adrenal gland is not seen.    -Gastrostomy tube in appropriate position  -Left renal staghorn calculus with resultant dilatation of lower pole calyces and associated cortical thinning  -Urinary bladder  is collapsed around a De Leon catheter  -Degenerative change of the spine with multiple lumbar compression deformities  -Small fat containing umbilical hernia  -Generalized muscle atrophy.  ______________________________________

## 2018-02-19 NOTE — NURSING
Patient was hypotensive. Notified cardiac team. Team came bedside and ordered levo and ns bolus. Team notified family and code status was changed from full to partial. See status in chart. Levo and bolus started.

## 2018-02-19 NOTE — ASSESSMENT & PLAN NOTE
- NSR  - no ECG done at [facility] shows Afib.  - Pt home BB held due to hypotension

## 2018-02-19 NOTE — ASSESSMENT & PLAN NOTE
- KUB in 02/14 showed distended stomach and partial SOB.  - CT abd pelvis w contrast showed no acute intra-abdominal abnormality.  - no more emesis, tube feeds resumed tolerating.

## 2018-02-19 NOTE — ASSESSMENT & PLAN NOTE
- Palliative consulted. Family was not available attend this morning's GOC meeting.   - Pt placed partial code.

## 2018-02-19 NOTE — PLAN OF CARE
Patient remains intubated.  Pressor support restarted d/t to hypotension.  Palliative care consult placed, plan for family meeting to further discuss GOC for 2/20/18 @ 0930.  CM will continue to follow.       02/19/18 1237   Right Care Assessment   Can the patient answer the patient profile reliably? No, cognitively impaired   How often would a person be available to care for the patient? Often   Describe the patient's ability to walk at the present time. Does not walk or unable to take any steps at all   How does the patient rate their overall health at the present time? Poor   Number of comorbid conditions (as recorded on the chart) Five or more   During the past month, has the patient often been bothered by feeling down, depressed or hopeless? No   During the past month, has the patient often been bothered by little interest or pleasure in doing things? No   Lisa Landers RN, BSN  Case Management  [facility]  Ext. [phone]

## 2018-02-19 NOTE — ASSESSMENT & PLAN NOTE
Extensive consolidation of the right lung and debris in the right mainstem bronchus and its branches consistent with aspiration pneumonia.  - s/p course of rocephin   - s/p vanco & cefepime & flagyl   - Not on any antibiotics currently  - Intubated

## 2018-02-20 PROBLEM — R57.9 CIRCULATORY FAILURE: Status: ACTIVE | Noted: 2018-01-01

## 2018-02-20 NOTE — PROGRESS NOTES
[facility]  Adult Nutrition  Consult Note    SUMMARY     Recommendations    1. As/if medically appropriate, increase current TF rate (of Isosource 1.5) to 50 mL/hr to meet 105% EEN, 90% EPN.    - Hold for residuals >500 mL.   2. RD to monitor & follow-up.    Goals: Meet % EEN, EPN  Nutrition Goal Status: goal not met  Communication of RD Recs: reviewed with RN    Reason for Assessment    Reason for Assessment: RD follow-up  Diagnosis: other (see comments) (Resp fx.)  Relevent Medical History: HTN, HLD   Interdisciplinary Rounds: attended     General Information Comments: Pt remains intubated; family discussion to be held today. Tolerating Isosource 1.5 @ 20 mL/hr.  Nutrition Discharge Planning: Unable to determine    Nutrition Prescription Ordered    Current Diet Order: NPO  Current Nutrition Support Formula Ordered: Isosource 1.5  Current Nutrition Support Rate Ordered: 20 (ml)  Current Nutrition Support Frequency Ordered: mL/hr    Evaluation of Received Nutrients/Fluid Intake    Enteral Calories (kcal): 720  Enteral Protein (gm): 33  Enteral (Free Water) Fluid (mL): 367  Free Water Flush Fluid (mL): 1000     Energy Calories Required: not meeting needs  % Kcal Needs: 42%     Protein Required: not meeting needs  % Protein Needs: 36%     Fluid Required: meeting needs     Tolerance: tolerating    Nutrition/Diet History    Patient Reported Diet/Restrictions/Preferences: other (see comments) (JONH; On TFs via PEG)     Factors Affecting Nutritional Intake: NPO, on mechanical ventilation    Labs/Tests/Procedures/Meds    Pertinent Labs Reviewed: reviewed, pertinent  Pertinent Labs Comments: Stable  Pertinent Medications Reviewed: reviewed, pertinent  Pertinent Medications Comments: Levophed    Physical Findings    Overall Physical Appearance: nourished, on ventilator support  Tubes: gastrostomy tube  Oral/Mouth Cavity: WDL  Skin: other (see comments) (Wounds)    Anthropometrics    Temp: 98.9 °F  "(37.2 °C)     Height: 5' 8" (172.7 cm)  Weight Method: Bed Scale  Weight: 91.5 kg (201 lb 11.5 oz)    Ideal Body Weight (IBW), Female: 140 lb  % Ideal Body Weight, Female (lb): 144.09 lb     BMI (Calculated): 30.7  BMI Grade: 30 - 34.9- obesity - grade I     Usual Body Weight (UBW), kg:  (JONH)    Estimated/Assessed Needs    Weight Used For Calorie Calculations: 91.5 kg (201 lb 11.5 oz)      Energy Calorie Requirements (kcal): 1705 kcal/d  Energy Need Method: [facility]     Weight Used For Protein Calculations: 91.5 kg (201 lb 11.5 oz)  Protein Requirements:  g/d (1-1.2 g/kg)     Fluid Need Method: RDA Method, other (see comments) (1 mL/kcal or per MD)    Assessment and Plan    Aspiration pneumonia      Nutrition Problem  Inadequate energy intake    Related to (etiology):   Inability to consume sufficient energy    Signs and Symptoms (as evidenced by):   NPO with no alternate means of nutrition     Nutrition Diagnosis Status:   New        Monitor and Evaluation    Food and Nutrient Intake: enteral nutrition intake  Food and Nutrient Adminstration: enteral and parenteral nutrition administration     Physical Activity and Function: nutrition-related ADLs and IADLs  Anthropometric Measurements: weight, weight change  Biochemical Data, Medical Tests and Procedures: lipid profile, inflammatory profile, glucose/endocrine profile, gastrointestinal profile, electrolyte and renal panel  Nutrition-Focused Physical Findings: overall appearance    Nutrition Risk    Level of Risk: other (see comments) (1x/week)    Nutrition Follow-Up    RD Follow-up?: Yes    "

## 2018-02-20 NOTE — ASSESSMENT & PLAN NOTE
- NSR  - no ECG done at [facility] shows Afib.  - Pt home BB held due to hypotension

## 2018-02-20 NOTE — PROGRESS NOTES
[facility]  Palliative Medicine  Consult Note    Patient Name: Jonathan Nieves  MRN: [id number]  Admission Date: 2/4/2018  Hospital Length of Stay: 15 days  Code Status: Partial Code   Attending Provider: Jo Ann Ocasio MD  Consulting Provider: Vladimir De La Torre MD  Primary Care Physician: Primary Doctor No  Principal Problem:Acute respiratory failure with hypoxia    Consults  Assessment/Plan:     Active Diagnoses:    Diagnosis Date Noted POA    PRINCIPAL PROBLEM:  Acute respiratory failure with hypoxia [J96.01] 02/05/2018 Yes    Partial small bowel obstruction [K56.600] 02/16/2018 Yes    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] 02/15/2018 Yes    Palliative care encounter [Z51.5] 02/14/2018 Not Applicable    Hypernatremia [E87.0] 02/07/2018 Yes    Intubation of airway performed without difficulty [Z78.9] 02/05/2018 Yes    Unstageable pressure ulcer of left buttock [L89.320] 02/05/2018 Yes    Pressure injury of deep tissue [L89.90] 02/05/2018 Yes    Alteration in skin integrity due to moisture [R23.9] 02/05/2018 Yes    Seizure-like activity [R56.9] 05/22/2017 Yes    Goals of care, counseling/discussion [Z71.89] 05/20/2017 Not Applicable     Chronic    PEG (percutaneous endoscopic gastrostomy) status [Z93.1] 12/16/2014 Not Applicable    Aspiration pneumonia [J69.0] 10/13/2014 Yes    UTI (urinary tract infection) [N39.0] 10/13/2014 Yes    Paroxysmal atrial fibrillation [I48.0] 05/15/2014 Yes     Chronic    CVA, old, hemiparesis [I69.359] 05/15/2014 Not Applicable     Chronic    Dysphagia as late effect of cerebrovascular disease [I69.991] 05/15/2014 Not Applicable     Chronic      Problems Resolved During this Admission:    Diagnosis Date Noted Date Resolved POA     Goals of care:  - 53 yo lady with several CVA and extensive white matter disease with wide differential (CADASIL).   - Reviewed Dr. Cehrry note 2/12. Appears to have severe neurologic deficits that likely are irreversible. Poor  "prognosis for meaningful neurologic recovery.   - 2/19 made partial code per primary 2/2 to hypotensive and bradycardia episode  - pressors weaned over night but resumed due to hypotension early this AM.   - pt. Awake but not following commands or tracking  - no family available this AM despite scheduled family meeting at 9:30AM.   - discussed with ICU Team, ultimate plans for withdrawal of life sustaining treatments as discussed with family earlier.   - hoping to confirm family wishes once they arrive today  - asked RN to call when family arrives for a bedside meeting.      I will follow along with you. Please call [phone] with questions.     Subjective:     HPI: Mr. Jonathan Nieves is a 53yo female with history of CVA with residual aphasia and dysphagia, non-verbal, s/p PEG tube placement and bed riddenness, osteomyelitis s/p amputation of toe, who came to the ED from [facility] via EMS in respiratory distress. Per ED/EMS the patient was noted to have "projectile vomiting" yesterday evening (24 hours prior to presentation), which was treated with zofran. During the event there is suspicion the patient possible aspirated. Currently, she opens eyes to stimulation, intubated, with contractures of knees/hips/elbows/hands and multiple areas of pressure injury.     Interval History: small doses of pressors today. Unable to completely wean pressors over night. Remains on AC mechanical ventilation.       Medications:  Continuous Infusions:   norepinephrine bitartrate-D5W 0.02 mcg/kg/min (02/20/18 1000)     Scheduled Meds:   baclofen  10 mg Per G Tube TID    chlorhexidine  15 mL Mouth/Throat BID    citalopram  10 mg Per G Tube Daily    famotidine  20 mg Per G Tube BID    heparin (porcine)  5,000 Units Subcutaneous Q8H    levetiracetam oral soln  500 mg Per G Tube BID    piperacillin-tazobactam (ZOSYN) IVPB  4.5 g Intravenous Q8H    vancomycin (VANCOCIN) IVPB  1,250 mg Intravenous Q12H     PRN " Meds:dextrose 50%, [] fentaNYL **FOLLOWED BY** fentaNYL, glucagon (human recombinant), magnesium sulfate IVPB, magnesium sulfate IVPB, midazolam, ondansetron, potassium chloride 10%, potassium chloride 10%, potassium chloride 10%, potassium, sodium phosphates, potassium, sodium phosphates, potassium, sodium phosphates, sodium chloride 0.9%        Review of Systems   Unable to perform ROS: Intubated     Objective:     Vital Signs (Most Recent):  Temp: 98.9 °F (37.2 °C) (18 0714)  Pulse: 62 (18 1000)  Resp: 14 (18 1000)  BP: 106/70 (18 1000)  SpO2: 100 % (18 1000) Vital Signs (24h Range):  Temp:  [98.3 °F (36.8 °C)-99 °F (37.2 °C)] 98.9 °F (37.2 °C)  Pulse:  [60-88] 62  Resp:  [14-37] 14  SpO2:  [90 %-100 %] 100 %  BP: ()/(52-82) 106/70     Physical Exam   Constitutional: She appears well-developed.   HENT:   Head: Normocephalic.   Neck: No JVD present.   Cardiovascular:   No murmur heard.  Pulmonary/Chest:   Ventilated. Coarse breath sounds on anterior exam   Abdominal: Soft.   PEG in place   Musculoskeletal:   Upper and lower extremity contractures, no edema. Multiple pressure injuries   Neurological:   Eyes open. Does not follow commands, does not track.   Skin: Skin is warm.       Laboratory:   CBC:     Recent Labs  Lab 18  0155   WBC 7.84   RBC 3.82*   HGB 8.9*   HCT 30.9*      MCV 81*   MCH 23.3*   MCHC 28.8*       CMP:     Recent Labs  Lab 18  0155   GLU 83   CALCIUM 8.6*   ALBUMIN 2.2*   PROT 7.2      K 4.1   CO2 22*      BUN 4*   CREATININE 0.5   ALKPHOS 111   ALT 49*   AST 20   BILITOT 0.4       POCT Glucose   Date Value Ref Range Status   2018 103 70 - 110 mg/dL Final   2018 104 70 - 110 mg/dL Final   2018 110 70 - 110 mg/dL Final   2018 95 70 - 110 mg/dL Final   2018 92 70 - 110 mg/dL Final   2018 96 70 - 110 mg/dL Final   2018 92 70 - 110 mg/dL Final   2018 86 70 - 110 mg/dL Final    02/18/2018 94 70 - 110 mg/dL Final         Significant Imaging: CT Abdomen and Pelvis:   1. No acute intra-abdominal abnormality identified.    2.  Bilateral atelectatic change, progressed on the left since CT 2/4/2018, but with interval improvement in right basilar consolidation.    3.  Small hiatal hernia.  Distal esophageal wall thickening which appears similar to the May 19, 2017 examination.  Further evaluation can be performed with upper endoscopy if clinically indicated.    4.  Additional stable findings include:  -Prominence of the common bile duct, not unexpected in this patient status post cholecystectomy  -Absence of the right kidney.  The right adrenal gland is not seen.    -Gastrostomy tube in appropriate position  -Left renal staghorn calculus with resultant dilatation of lower pole calyces and associated cortical thinning  -Urinary bladder is collapsed around a De Leon catheter  -Degenerative change of the spine with multiple lumbar compression deformities  -Small fat containing umbilical hernia  -Generalized muscle atrophy.    > 50% of 30 min visit spent in chart review, face to face discussion of goals of care,  symptom assessment, coordination of care and emotional support.    Vladimir De La Torre MD  Palliative Medicine  [facility]

## 2018-02-20 NOTE — PLAN OF CARE
Spoke to the patient's daughter, Sonia, today regarding patient's SBP dropping into the 80's and 90's which was acutely new for the patient. Sonia and family agreed that they did not want to place a central line - peripheral Levophed was started. Patient's family is agreeable and understands risks that are involved with peripheral Levo. They would like to keep patient comfortable. They plan on coming in on 2/20 to initiate comfort measures.    Teri Macias MD  [employer] Fellow

## 2018-02-20 NOTE — NURSING
Notified , Berna, and [facility] concerning WOC before 9:30 this am. Family did not show at 9:30. [facility] Care team and Critical Care team are following up with family for future direction for plan of care. Will continue to monitor.

## 2018-02-20 NOTE — ASSESSMENT & PLAN NOTE
Extensive consolidation of the right lung and debris in the right mainstem bronchus and its branches consistent with aspiration pneumonia.  - s/p course of rocephin   - s/p vanco & cefepime & flagyl   - Not on any antibiotics currently  - Intubated  - restarted vanc/ cefepime 022/19

## 2018-02-20 NOTE — SUBJECTIVE & OBJECTIVE
Interval History/Significant Events: tolerating tube feeds.      Review of Systems   Unable to perform ROS: Intubated     Objective:     Vital Signs (Most Recent):  Temp: 99 °F (37.2 °C) (02/20/18 1100)  Pulse: 87 (02/20/18 1200)  Resp: (!) 33 (02/20/18 1200)  BP: 112/67 (02/20/18 1200)  SpO2: 99 % (02/20/18 1200) Vital Signs (24h Range):  Temp:  [98.5 °F (36.9 °C)-99 °F (37.2 °C)] 99 °F (37.2 °C)  Pulse:  [60-87] 87  Resp:  [14-43] 33  SpO2:  [95 %-100 %] 99 %  BP: ()/(52-85) 112/67   Weight: 91.5 kg (201 lb 11.5 oz)  Body mass index is 30.67 kg/m².      Intake/Output Summary (Last 24 hours) at 02/20/18 1231  Last data filed at 02/20/18 1200   Gross per 24 hour   Intake          1868.46 ml   Output              660 ml   Net          1208.46 ml       Physical Exam   Constitutional: No distress.   HENT:   Head: Normocephalic and atraumatic.   Eyes: Right eye exhibits no discharge. Left eye exhibits no discharge.   Neck: Normal range of motion. Neck supple.   Cardiovascular: Normal rate and regular rhythm.    Pulmonary/Chest: Effort normal. She has rales (at bases).   intubated   Abdominal: Soft. She exhibits no distension.   Musculoskeletal: She exhibits deformity (contracted upper and lower extremities).   Neurological:   Does not following commands. No purposeful movement. Opens eyes, but does not track.    Skin: Skin is warm and dry.       Vents:  Vent Mode: A/C (02/20/18 1153)  Ventilator Initiated: Yes (02/04/18 9794)  Set Rate: 14 bmp (02/20/18 1153)  Vt Set: 400 mL (02/20/18 1153)  Pressure Support: 0 cmH20 (02/20/18 1153)  PEEP/CPAP: 5 cmH20 (02/20/18 1153)  Oxygen Concentration (%): 30 (02/20/18 1200)  Peak Airway Pressure: 37 cmH2O (02/20/18 1153)  Plateau Pressure: 19 cmH20 (02/20/18 1153)  Total Ve: 6.52 mL (02/20/18 1153)  F/VT Ratio<105 (RSBI): 105.65 (02/20/18 1153)  Lines/Drains/Airways     Drain                 Gastrostomy/Enterostomy 10/25/16 1401 Percutaneous endoscopic gastrostomy (PEG)  midline feeding 482 days         Urethral Catheter 02/05/18 1006 Latex 15 days          Airway                 Airway - Non-Surgical 02/04/18 2329 Endotracheal Tube 15 days          Pressure Ulcer                 Pressure Injury 02/05/18 1045 Left medial Foot Deep tissue injury 15 days         Pressure Injury 02/05/18 1045 Right lateral Foot Deep tissue injury 15 days          Peripheral Intravenous Line                 Midline Catheter Insertion/Assessment  - Single Lumen 02/15/18 1125 Left basilic vein (medial side of arm) 18g x 10cm 5 days              Significant Labs:    CBC/Anemia Profile:    Recent Labs  Lab 02/19/18  0155   WBC 7.84   HGB 8.9*   HCT 30.9*      MCV 81*   RDW 18.9*        Chemistries:    Recent Labs  Lab 02/19/18  0155      K 4.1      CO2 22*   BUN 4*   CREATININE 0.5   CALCIUM 8.6*   ALBUMIN 2.2*   PROT 7.2   BILITOT 0.4   ALKPHOS 111   ALT 49*   AST 20   MG 1.5*   PHOS 3.0       All pertinent labs within the past 24 hours have been reviewed.    Significant Imaging:  I have reviewed and interpreted all pertinent imaging results/findings within the past 24 hours.     CT Abd/ Pelvis W Contrast  Impression         1. No acute intra-abdominal abnormality identified.    2.  Bilateral atelectatic change, progressed on the left since CT 2/4/2018, but with interval improvement in right basilar consolidation.    3.  Small hiatal hernia.  Distal esophageal wall thickening which appears similar to the May 19, 2017 examination.  Further evaluation can be performed with upper endoscopy if clinically indicated.    4.  Additional stable findings include:  -Prominence of the common bile duct, not unexpected in this patient status post cholecystectomy  -Absence of the right kidney.  The right adrenal gland is not seen.    -Gastrostomy tube in appropriate position  -Left renal staghorn calculus with resultant dilatation of lower pole calyces and associated cortical thinning  -Urinary bladder  is collapsed around a De Leon catheter  -Degenerative change of the spine with multiple lumbar compression deformities  -Small fat containing umbilical hernia  -Generalized muscle atrophy.  ______________________________________

## 2018-02-20 NOTE — PLAN OF CARE
Problem: Patient Care Overview  Goal: Plan of Care Review  Outcome: Ongoing (interventions implemented as appropriate)  No acute events throughout shift, VS and assessment per flow sheet, patient progressing towards goals as tolerated. Off levo most of night, turned back on at 0430. Oliguric throughout night, no BM. Plan of care reviewed with Jonathan Nieves and family, all concerns addressed, will continue to monitor.

## 2018-02-20 NOTE — PLAN OF CARE
Problem: Patient Care Overview  Goal: Plan of Care Review  Outcome: Ongoing (interventions implemented as appropriate)  Patient's code status was changed from full code to partial code. Patient was hypotensive this morning and levo was started. 2 liter normal saline bolus was given.BP stabilized. Patient has excessive secretions and was suctioned on an hourly basis. Vital signs have remained stabled after levo stabilized. See flowsheet. Will continue to monitor.

## 2018-02-20 NOTE — ASSESSMENT & PLAN NOTE
-patient suffered multiple strokes in past, last one in 2008   -nonverbal at baseline, wheelchair/bedbound    MRI (2/10)  Findings: The diffusion sequence demonstrates no evidence of acute infarct. There is severe white matter small vessel ischemic change. There is a remote infarct of the right occipital lobe. There is a mild hemosiderin staining of the brainstem probably from a prior subarachnoid hemorrhage. There is a small focus of hemosiderin posterior right midline medulla. Pituitary and craniocervical junction show nothing unusual. There is left frontal sinusitis change. There is involutional change.  - [facility]. Did not include brain images or neurology notes.  - Neurology consulted: differential includes but is not limited to CADASIL (given h/o strokes, extensive white matter involvement including the temporal lobes), adult onset adrenoleukodystrophy, chronic microvascular changes (less likely), vanishing white matter disease (less likely given predominance in children), or Binswanger disease (subcortical leukoencephalopathy or vascular dementia 2/2 chronic HTN).   - NOTCH3 genetic testing sent, results pending

## 2018-02-20 NOTE — ASSESSMENT & PLAN NOTE
- hypotensive requiring pressors.  - etiology septic (VAP) vs adrenal insuf   - pt was started on vanc and cefepime 02/19.

## 2018-02-20 NOTE — PROGRESS NOTES
"[facility]  Critical Care Medicine  Progress Note    Patient Name: Jonathan Nieves  MRN: [id number]  Admission Date: 2/4/2018  Hospital Length of Stay: 15 days  Code Status: Partial Code  Attending Provider: Jo Ann Ocasio MD  Primary Care Provider: Primary Doctor No   Principal Problem: Acute respiratory failure with hypoxia    Subjective:     HPI:  Mr. Jonathan Nieves is a 51yo female with history of CVA with residual aphasia and dysphagia, non-verbal, s/p PEG tube placement and bed riddenness, osteomyelitis s/p amputation of toe, who comes to the ED from [facility] via EMS in respiratory distress. Per ED/EMS the patient was noted to have "projectile vomiting" yesterday evening (24 hours prior to presentation), which was treated with zofran. During the event there is suspicion the patient possible aspirated. Today the patient was noted to be in resp distress with a fever.     Patient was satting 88% on 4L on EMS arrival, breathing 40 times per minute.  Improved to 96% on 15L NRB with RR high 20s.  Axillary temp 102.    CT chest concerning for aspiration pneumonia.  UA concern for a UTI.       Patient intubated in the ED & Critical Care consulted.         Hospital/ICU Course:  2/5: Critical Care Consulted. Patient intubated in the ED.   2/6: Patient doing well, on SBT; will extubate maria e. Continue treatment for aspiration PNA & UTI.   2/7: abx deescalated, continue to treat for PNA and UTI. Required pressors overnight. Off this morning.   2/8-9: still with secretions and questionable mentation? Baseline?. Will try to dry the secretions and extubated today or maria e.    2/10: MRI completed. Family called, had family meeting with one daughter. Need to re-visit with all daughters. Records requested from [facility] and [facility].   02/11-13: neurology consulted, no acute changes, wbc and LFT trending up, keep monitoring.  2/14: Concerns for CADASIL vs Binswanger syndrome per Neuro. NOTCH3 genetic " testing sent. Palliative consulted regarding goals of care.   02/15 pt is not tolerating tube feed, had two episodes of projectile vomiting, KUB 02/14 shows partial SOB and distended stomach.  02/16 CT abdomen was unremarkable, family meeting was done.  02/17 no more vomiting, tolerating 10 cc/hr tube feeds.  02/19 trial to advance tube feeding, had residual back to trickle feed will try to advance slowly. 02/19 PT became hypotensive this morning with MAPs of 50's started on levo, daughter was contacted regarding goals of care, pt status was changed to partial code upon family request.  02/20 Pt still requiring levo, try to wean off slowly, family did not show for the meeting.    Interval History/Significant Events: tolerating tube feeds.      Review of Systems   Unable to perform ROS: Intubated     Objective:     Vital Signs (Most Recent):  Temp: 99 °F (37.2 °C) (02/20/18 1100)  Pulse: 87 (02/20/18 1200)  Resp: (!) 33 (02/20/18 1200)  BP: 112/67 (02/20/18 1200)  SpO2: 99 % (02/20/18 1200) Vital Signs (24h Range):  Temp:  [98.5 °F (36.9 °C)-99 °F (37.2 °C)] 99 °F (37.2 °C)  Pulse:  [60-87] 87  Resp:  [14-43] 33  SpO2:  [95 %-100 %] 99 %  BP: ()/(52-85) 112/67   Weight: 91.5 kg (201 lb 11.5 oz)  Body mass index is 30.67 kg/m².      Intake/Output Summary (Last 24 hours) at 02/20/18 1231  Last data filed at 02/20/18 1200   Gross per 24 hour   Intake          1868.46 ml   Output              660 ml   Net          1208.46 ml       Physical Exam   Constitutional: No distress.   HENT:   Head: Normocephalic and atraumatic.   Eyes: Right eye exhibits no discharge. Left eye exhibits no discharge.   Neck: Normal range of motion. Neck supple.   Cardiovascular: Normal rate and regular rhythm.    Pulmonary/Chest: Effort normal. She has rales (at bases).   intubated   Abdominal: Soft. She exhibits no distension.   Musculoskeletal: She exhibits deformity (contracted upper and lower extremities).   Neurological:   Does not  following commands. No purposeful movement. Opens eyes, but does not track.    Skin: Skin is warm and dry.       Vents:  Vent Mode: A/C (02/20/18 1153)  Ventilator Initiated: Yes (02/04/18 2344)  Set Rate: 14 bmp (02/20/18 1153)  Vt Set: 400 mL (02/20/18 1153)  Pressure Support: 0 cmH20 (02/20/18 1153)  PEEP/CPAP: 5 cmH20 (02/20/18 1153)  Oxygen Concentration (%): 30 (02/20/18 1200)  Peak Airway Pressure: 37 cmH2O (02/20/18 1153)  Plateau Pressure: 19 cmH20 (02/20/18 1153)  Total Ve: 6.52 mL (02/20/18 1153)  F/VT Ratio<105 (RSBI): 105.65 (02/20/18 1153)  Lines/Drains/Airways     Drain                 Gastrostomy/Enterostomy 10/25/16 1401 Percutaneous endoscopic gastrostomy (PEG) midline feeding 482 days         Urethral Catheter 02/05/18 1006 Latex 15 days          Airway                 Airway - Non-Surgical 02/04/18 2329 Endotracheal Tube 15 days          Pressure Ulcer                 Pressure Injury 02/05/18 1045 Left medial Foot Deep tissue injury 15 days         Pressure Injury 02/05/18 1045 Right lateral Foot Deep tissue injury 15 days          Peripheral Intravenous Line                 Midline Catheter Insertion/Assessment  - Single Lumen 02/15/18 1125 Left basilic vein (medial side of arm) 18g x 10cm 5 days              Significant Labs:    CBC/Anemia Profile:    Recent Labs  Lab 02/19/18  0155   WBC 7.84   HGB 8.9*   HCT 30.9*      MCV 81*   RDW 18.9*        Chemistries:    Recent Labs  Lab 02/19/18  0155      K 4.1      CO2 22*   BUN 4*   CREATININE 0.5   CALCIUM 8.6*   ALBUMIN 2.2*   PROT 7.2   BILITOT 0.4   ALKPHOS 111   ALT 49*   AST 20   MG 1.5*   PHOS 3.0       All pertinent labs within the past 24 hours have been reviewed.    Significant Imaging:  I have reviewed and interpreted all pertinent imaging results/findings within the past 24 hours.     CT Abd/ Pelvis W Contrast  Impression         1. No acute intra-abdominal abnormality identified.    2.  Bilateral atelectatic change,  progressed on the left since CT 2/4/2018, but with interval improvement in right basilar consolidation.    3.  Small hiatal hernia.  Distal esophageal wall thickening which appears similar to the May 19, 2017 examination.  Further evaluation can be performed with upper endoscopy if clinically indicated.    4.  Additional stable findings include:  -Prominence of the common bile duct, not unexpected in this patient status post cholecystectomy  -Absence of the right kidney.  The right adrenal gland is not seen.    -Gastrostomy tube in appropriate position  -Left renal staghorn calculus with resultant dilatation of lower pole calyces and associated cortical thinning  -Urinary bladder is collapsed around a De Leon catheter  -Degenerative change of the spine with multiple lumbar compression deformities  -Small fat containing umbilical hernia  -Generalized muscle atrophy.  ______________________________________          Assessment/Plan:     Neuro   Seizure-like activity    - continue keppra         Dysphagia as late effect of cerebrovascular disease    - Neurology consulted: differential includes but is not limited to CADASIL (given h/o strokes, extensive white matter involvement including the temporal lobes), adult onset adrenoleukodystrophy, chronic microvascular changes (less likely), vanishing white matter disease (less likely given predominance in children), or Binswanger disease (subcortical leukoencephalopathy or vascular dementia 2/2 chronic HTN).   - NOTCH3 genetic testing sent, results pending        CVA, old, hemiparesis    -patient suffered multiple strokes in past, last one in 2008   -nonverbal at baseline, wheelchair/bedbound    MRI (2/10)  Findings: The diffusion sequence demonstrates no evidence of acute infarct. There is severe white matter small vessel ischemic change. There is a remote infarct of the right occipital lobe. There is a mild hemosiderin staining of the brainstem probably from a prior  subarachnoid hemorrhage. There is a small focus of hemosiderin posterior right midline medulla. Pituitary and craniocervical junction show nothing unusual. There is left frontal sinusitis change. There is involutional change.  - Records requested from [facility] and [facility]. Did not include brain images or neurology notes.  - Neurology consulted: differential includes but is not limited to CADASIL (given h/o strokes, extensive white matter involvement including the temporal lobes), adult onset adrenoleukodystrophy, chronic microvascular changes (less likely), vanishing white matter disease (less likely given predominance in children), or Binswanger disease (subcortical leukoencephalopathy or vascular dementia 2/2 chronic HTN).   - NOTCH3 genetic testing sent, results pending        Derm   Unstageable pressure ulcer of left buttock    - multiple area, all looks clean, not infected.        Pulmonary   * Acute respiratory failure with hypoxia    - Intubated, Failing SBT  - AC mode, FiO2 30%, PEEP 5         Aspiration pneumonia    Extensive consolidation of the right lung and debris in the right mainstem bronchus and its branches consistent with aspiration pneumonia.  - s/p course of rocephin   - s/p vanco & cefepime & flagyl   - Not on any antibiotics currently  - Intubated  - restarted vanc/ cefepime 022/19        Cardiac/Vascular   Paroxysmal atrial fibrillation    - NSR  - no ECG done at [facility] shows Afib.  - Pt home BB held due to hypotension           Renal/   Hypernatremia    - Resolved           UTI (urinary tract infection)    - previous UA (Bacteria, WBC, leukocyte positive)  - Urine cultures proteus  - Course of Rocephin completed   - Resolved        GI   Partial small bowel obstruction    - KUB in 02/14 showed distended stomach and partial SOB.  - CT abd pelvis w contrast showed no acute intra-abdominal abnormality.  - no more emesis, tube feeds resumed tolerating.          PEG (percutaneous endoscopic gastrostomy)  status    - tolerating tube feeds.        Orthopedic   Pressure injury of deep tissue    - multiple area, all looks clean, not infected.        Other   Circulatory failure    - hypotensive requiring pressors.  - etiology septic (VAP) vs adrenal insuf   - pt was started on vanc and cefepime 02/19.        Palliative care encounter    - partial code        Goals of care, counseling/discussion    - Palliative consulted. Family was not available attend this morning's [facility] meeting.   - Pt placed partial code.           Critical care was time spent personally by me on the following activities: development of treatment plan with patient or surrogate and bedside caregivers, discussions with consultants, evaluation of patient's response to treatment, examination of patient, ordering and performing treatments and interventions, ordering and review of laboratory studies, ordering and review of radiographic studies, pulse oximetry, re-evaluation of patient's condition. This critical care time did not overlap with that of any other provider or involve time for any procedures.     Ana Chauhan MD  Critical Care Medicine  [facility]

## 2018-02-21 NOTE — PLAN OF CARE
Goals of Care Discussion:    At 7:45 pm, Ms. Nieves's three daughters and I met to discuss goals of care. Ms. Nieves has 5 daughters and 1 son. All children have discussed patient's diagnosis and prognosis. Family has spoken to Dr. Sales and Dr. Velez previously from the ICU team. Two of the daughters were not present today and will not be present for discussions due to their grief. Patient's son is on his way into [facility] from [place] (currently 30 min away).    Family is very aware of patient's diagnosis and prognosis. I re-iterated her medical conditions and irreversible extensive white matter disease/neurological deficits. Daughters re-iterated their desire to keep Ms. Nieves comfortable - they do not want chest compressions, resuscitative measures, pressors, central lines, or dialysis. They, however, do want treatment with antibiotics if patient has an active true infection noted on microbiology. They would like us to keep her comfortable.    Regarding extubation - family is extremely tearful. They do not feel ready to wean patient off of the ventilator. They feel that the patient is able to track them with her eyes, and feel that the patient can understand what is happening. They do not feel ready to let go. They stated that they are waiting to extubate so that their brother can see his mom tonight, as well as to give them time to make all of their  arrangements.    Patient has been intubated for 15 days - question of tracheostomy was brought up. Family understands that patient is too sick to undergo tracheostomy. They do not want her to undergo surgery. I spoke to them regarding the risks of prolonged intubation. Family took time alone after this to discuss plans for extubation. They have decided to terminally wean on Monday, . One of the sister's daughters has a birthday the previous weekend - family is trying to prevent terminal wean on that weekend.    At this time, patient's  positive cultures have already been appropriately treated. Will discontinue Vancomycin and Zosyn. Scopolamine patch and glycopyrrolate IV have been started for secretion management. (Glycopyrrolate PO had given patient a lot of nausea and abdominal discomfort previously).    Teri Macias MD  [facility] PCCM Fellow

## 2018-02-21 NOTE — PLAN OF CARE
Problem: Patient Care Overview  Goal: Plan of Care Review  Outcome: Ongoing (interventions implemented as appropriate)  Patient vitals remained stable. See flowsheet. Neuro: Disoriented x4. Patient did seem to track me better today. NSR in the 60's. Vent settings: 30% O2, A/C mode, Rate 14, and PEEP 6.  Suctioned secretions hourly. Tube feedings at 20 ml/hr through Peg tube with a goal of 50ml. 150 water bolus every 6 hours. Levo was stopped and MAP goal was dropped to 60. Urine output averaged 30-50 ml/hr. Family was supposed to come in at 9:30 to withdraw care. They did not show.They were suppose to show a couple more times before the end of my shift at 6:45 but no show. I notified Berna this morning of patients potential withdrawal of care. If family shows up tonight please call critical care fellow and palliative on call staff to discuss plan of care with them. Plan of care was reviewed with patient at bedside. Will continue to monitor.

## 2018-02-21 NOTE — ASSESSMENT & PLAN NOTE
- hypotensive requiring pressors. >>> OFF pressors 02/20  - etiology septic (VAP) vs adrenal insuf   - pt was started on vanc and cefepime 02/19.d/c'ed 92/20

## 2018-02-21 NOTE — SUBJECTIVE & OBJECTIVE
Interval History/Significant Events: tolerating tube feeds.      Review of Systems   Unable to perform ROS: Intubated     Objective:     Vital Signs (Most Recent):  Temp: 98.6 °F (37 °C) (02/21/18 0730)  Pulse: 69 (02/21/18 0732)  Resp: 16 (02/21/18 0732)  BP: 115/74 (02/21/18 0732)  SpO2: 97 % (02/21/18 0732) Vital Signs (24h Range):  Temp:  [98.6 °F (37 °C)-100 °F (37.8 °C)] 98.6 °F (37 °C)  Pulse:  [59-92] 69  Resp:  [13-43] 16  SpO2:  [93 %-100 %] 97 %  BP: ()/(59-85) 115/74   Weight: 89.5 kg (197 lb 5 oz)  Body mass index is 30 kg/m².      Intake/Output Summary (Last 24 hours) at 02/21/18 0837  Last data filed at 02/21/18 0702   Gross per 24 hour   Intake          1741.24 ml   Output              925 ml   Net           816.24 ml       Physical Exam   Constitutional: No distress.   HENT:   Head: Normocephalic and atraumatic.   Eyes: Right eye exhibits no discharge. Left eye exhibits no discharge.   Neck: Normal range of motion. Neck supple.   Cardiovascular: Normal rate and regular rhythm.    Pulmonary/Chest: Effort normal. She has rales (at bases).   intubated   Abdominal: Soft. She exhibits no distension.   Musculoskeletal: She exhibits deformity (contracted upper and lower extremities).   Neurological:   Does not following commands. No purposeful movement. Opens eyes, but does not track.    Skin: Skin is warm and dry.       Vents:  Vent Mode: A/C (02/21/18 0700)  Ventilator Initiated: Yes (02/04/18 2344)  Set Rate: 14 bmp (02/21/18 0700)  Vt Set: 400 mL (02/21/18 0700)  Pressure Support: 0 cmH20 (02/21/18 0700)  PEEP/CPAP: 5 cmH20 (02/21/18 0700)  Oxygen Concentration (%): 30 (02/21/18 0732)  Peak Airway Pressure: 37 cmH2O (02/21/18 0700)  Plateau Pressure: 19 cmH20 (02/21/18 0700)  Total Ve: 7.07 mL (02/21/18 0700)  F/VT Ratio<105 (RSBI): (!) 38.66 (02/21/18 0700)  Lines/Drains/Airways     Drain                 Gastrostomy/Enterostomy 10/25/16 1401 Percutaneous endoscopic gastrostomy (PEG) midline  feeding 483 days         Urethral Catheter 02/05/18 1006 Latex 15 days          Airway                 Airway - Non-Surgical 02/04/18 2329 Endotracheal Tube 16 days          Pressure Ulcer                 Pressure Injury 02/05/18 1045 Left medial Foot Deep tissue injury 15 days         Pressure Injury 02/05/18 1045 Right lateral Foot Deep tissue injury 15 days          Peripheral Intravenous Line                 Midline Catheter Insertion/Assessment  - Single Lumen 02/15/18 1125 Left basilic vein (medial side of arm) 18g x 10cm 5 days              Significant Labs:    CBC/Anemia Profile:    Recent Labs  Lab 02/21/18  0513   WBC 6.06   HGB 8.7*   HCT 29.9*      MCV 80*   RDW 18.7*        Chemistries:    Recent Labs  Lab 02/21/18  0513      K 3.8      CO2 23   BUN 4*   CREATININE 0.6   CALCIUM 8.4*   ALBUMIN 2.2*   PROT 7.4   BILITOT 0.3   ALKPHOS 99   ALT 31   AST 22   MG 1.6   PHOS 3.3       All pertinent labs within the past 24 hours have been reviewed.    Significant Imaging:  I have reviewed and interpreted all pertinent imaging results/findings within the past 24 hours.     CT Abd/ Pelvis W Contrast  Impression         1. No acute intra-abdominal abnormality identified.    2.  Bilateral atelectatic change, progressed on the left since CT 2/4/2018, but with interval improvement in right basilar consolidation.    3.  Small hiatal hernia.  Distal esophageal wall thickening which appears similar to the May 19, 2017 examination.  Further evaluation can be performed with upper endoscopy if clinically indicated.    4.  Additional stable findings include:  -Prominence of the common bile duct, not unexpected in this patient status post cholecystectomy  -Absence of the right kidney.  The right adrenal gland is not seen.    -Gastrostomy tube in appropriate position  -Left renal staghorn calculus with resultant dilatation of lower pole calyces and associated cortical thinning  -Urinary bladder is  collapsed around a De Leon catheter  -Degenerative change of the spine with multiple lumbar compression deformities  -Small fat containing umbilical hernia  -Generalized muscle atrophy.  ______________________________________

## 2018-02-21 NOTE — PLAN OF CARE
"Problem: Patient Care Overview  Goal: Plan of Care Review  Outcome: Ongoing (interventions implemented as appropriate)  Goals of care meeting last night with 3 of patient daughters and son came later in night. Daughters made decision not to escalate care and if not to perform code patient if cardiac/resp event occurs. Son stated" his sisters did not tell him that his mother was on life support". CCS called and Dr. Francisco came to bedside and talked with son and answered all his questions. Son remained at bedside during night. Will continue to with care and keep patient comfortable and given family support during this time.       "

## 2018-02-21 NOTE — PHYSICIAN QUERY
PT Name: Jonathan Nieves  MR #: [id number]     Physician Query Form - Documentation Clarification      CDS/: Kaykay Nance RN  CDI            Contact information: [email or web address]    This form is a permanent document in the medical record.     Query Date: February 21, 2018    By submitting this query, we are merely seeking further clarification of documentation. Please utilize your independent clinical judgment when addressing the question(s) below.    The Medical record reflects the following:    Supporting Clinical Findings Location in Medical Record   Derm  Unstageable pressure ulcer of left buttock    - multiple area, all looks clean, not infected.     Critical care note 2/21 839 am       Wound 02/05/18 0300 upper Buttocks -   Pre-existing: Yes    Date First Assessed/Time First Assessed: 02/05/18 0300   Pre-existing: Yes    Side: (c)   Orientation: upper  Location: Buttocks     Wound Image    Drainage Amount Scant   Drainage Characteristics/Odor Serosanguineous   Appearance Red;Eschar   Black (%), Wound Tissue Color (100 on left)   Red (%), Wound Tissue Color (100 on right)   Periwound Area Intact   Wound Edges Open   Wound Length (cm) 3   Wound Width (cm) 1     wound care consult                                                                            Doctor, Please specify diagnosis or diagnoses associated with above clinical findings.         Doctor, please further specify Pressure ulcer(s) of buttocks, left and right with POA (present on admit) status.    Provider Use Only         [    ] Unstageable pressure ulcer of left buttock                 POA -    [  ] Yes,   [  ] No,  [  ] Other,   [  ] Undetermined       [    ] Other, please specify_____________.             [    ] Pressure ulcer/injury of right buttocks,                 specify stage; ____________.                 POA -    [  ] Yes,   [  ] No,  [  ] Other,   [  ] Undetermined       [    ]  Right buttocks no pressure ulcer, specify  wound________.       [    ]  Other, please specify______________.                                                                                                             [  ] Clinically undetermined

## 2018-02-21 NOTE — PROGRESS NOTES
[facility]  Palliative Medicine  Consult Note    Patient Name: Jonathan Nieves  MRN: [id number]  Admission Date: 2/4/2018  Hospital Length of Stay: 16 days  Code Status: Partial Code   Attending Provider: Jo Ann Ocasio MD  Consulting Provider: Vladimir De La Torre MD  Primary Care Physician: Primary Doctor No  Principal Problem:Acute respiratory failure with hypoxia    Consults  Assessment/Plan:     Active Diagnoses:    Diagnosis Date Noted POA    PRINCIPAL PROBLEM:  Acute respiratory failure with hypoxia [J96.01] 02/05/2018 Yes    Circulatory failure [R57.9] 02/20/2018 Unknown    Partial small bowel obstruction [K56.600] 02/16/2018 Yes    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] 02/15/2018 Yes    Palliative care encounter [Z51.5] 02/14/2018 Not Applicable    Hypernatremia [E87.0] 02/07/2018 Yes    Intubation of airway performed without difficulty [Z78.9] 02/05/2018 Yes    Unstageable pressure ulcer of left buttock [L89.320] 02/05/2018 Yes    Pressure injury of deep tissue [L89.90] 02/05/2018 Yes    Alteration in skin integrity due to moisture [R23.9] 02/05/2018 Yes    Seizure-like activity [R56.9] 05/22/2017 Yes    Goals of care, counseling/discussion [Z71.89] 05/20/2017 Not Applicable     Chronic    PEG (percutaneous endoscopic gastrostomy) status [Z93.1] 12/16/2014 Not Applicable    Aspiration pneumonia [J69.0] 10/13/2014 Yes    UTI (urinary tract infection) [N39.0] 10/13/2014 Yes    Paroxysmal atrial fibrillation [I48.0] 05/15/2014 Yes     Chronic    CVA, old, hemiparesis [I69.359] 05/15/2014 Not Applicable     Chronic    Dysphagia as late effect of cerebrovascular disease [I69.991] 05/15/2014 Not Applicable     Chronic      Problems Resolved During this Admission:    Diagnosis Date Noted Date Resolved POA     Goals of care:  - 51 yo lady with several CVA and extensive white matter disease with wide differential (CADASIL).   - Reviewed Dr. Cherry note 2/12. Appears to have severe  "neurologic deficits that likely are irreversible. Poor prognosis for meaningful neurologic recovery.   - 2/19 made partial code per primary 2/2 to hypotensive and bradycardia episode  - Family did not show for meeting arranged yesterday morning  - they apparently arrived late afternoon and had a discussion with Dr. Macias, critical care.   - I was unable to attend that meeting. Decisions for terminal extubation agreed upon on Monday, Feb 26th.   - no escalation of care and partial code confirmed.   - Modes of terminal extubation discussed with Critical Care Team yesterday    - Pt. Currently awake, not following commands or tracking. Remains on the vent intubated. Appears to be resting comfortably.   - will follow up    Please call [phone] with questions.     Subjective:     HPI: Mr. Jonathan Nieves is a 51yo female with history of CVA with residual aphasia and dysphagia, non-verbal, s/p PEG tube placement and bed riddenness, osteomyelitis s/p amputation of toe, who came to the ED from [facility] via EMS in respiratory distress. Per ED/EMS the patient was noted to have "projectile vomiting" yesterday evening (24 hours prior to presentation), which was treated with zofran. During the event there is suspicion the patient possible aspirated. Currently, she opens eyes to stimulation, intubated, with contractures of knees/hips/elbows/hands and multiple areas of pressure injury.     Interval History: small doses of pressors today. Unable to completely wean pressors over night. Remains on AC mechanical ventilation.       Medications:  Continuous Infusions:    Scheduled Meds:   baclofen  10 mg Per G Tube TID    chlorhexidine  15 mL Mouth/Throat BID    citalopram  10 mg Per G Tube Daily    famotidine  20 mg Per G Tube BID    glycopyrrolate  0.1 mg Intravenous Daily    heparin (porcine)  5,000 Units Subcutaneous Q8H    levetiracetam oral soln  500 mg Per G Tube BID    scopolamine  1 patch " Transdermal Q3 Days     PRN Meds:dextrose 50%, [] fentaNYL **FOLLOWED BY** fentaNYL, glucagon (human recombinant), magnesium sulfate IVPB, magnesium sulfate IVPB, midazolam, ondansetron, potassium chloride 10%, potassium chloride 10%, potassium chloride 10%, potassium, sodium phosphates, potassium, sodium phosphates, potassium, sodium phosphates, sodium chloride 0.9%        Review of Systems   Unable to perform ROS: Intubated     Objective:     Vital Signs (Most Recent):  Temp: 98.6 °F (37 °C) (18 0730)  Pulse: 74 (18 1126)  Resp: 16 (18 1100)  BP: 128/71 (18 1030)  SpO2: 96 % (18 1126) Vital Signs (24h Range):  Temp:  [98.6 °F (37 °C)-100 °F (37.8 °C)] 98.6 °F (37 °C)  Pulse:  [60-92] 74  Resp:  [13-43] 16  SpO2:  [93 %-99 %] 96 %  BP: ()/(61-81) 128/71     Physical Exam   Constitutional: She appears well-developed.   HENT:   Head: Normocephalic.   Neck: No JVD present.   Cardiovascular:   No murmur heard.  Pulmonary/Chest:   Ventilated. Coarse breath sounds on anterior exam   Abdominal: Soft.   PEG in place   Musculoskeletal:   Upper and lower extremity contractures, no edema. Multiple pressure injuries   Neurological:   Eyes open. Does not follow commands, does not track.   Skin: Skin is warm.       Laboratory:   CBC:     Recent Labs  Lab 18  0513   WBC 6.06   RBC 3.73*   HGB 8.7*   HCT 29.9*      MCV 80*   MCH 23.3*   MCHC 29.1*       CMP:     Recent Labs  Lab 18  0513   GLU 86   CALCIUM 8.4*   ALBUMIN 2.2*   PROT 7.4      K 3.8   CO2 23      BUN 4*   CREATININE 0.6   ALKPHOS 99   ALT 31   AST 22   BILITOT 0.3       POCT Glucose   Date Value Ref Range Status   2018 109 70 - 110 mg/dL Final   2018 103 70 - 110 mg/dL Final   2018 104 70 - 110 mg/dL Final   2018 110 70 - 110 mg/dL Final   2018 95 70 - 110 mg/dL Final   2018 92 70 - 110 mg/dL Final   2018 96 70 - 110 mg/dL Final         Significant  Imaging: CT Abdomen and Pelvis:   1. No acute intra-abdominal abnormality identified.    2.  Bilateral atelectatic change, progressed on the left since CT 2/4/2018, but with interval improvement in right basilar consolidation.    3.  Small hiatal hernia.  Distal esophageal wall thickening which appears similar to the May 19, 2017 examination.  Further evaluation can be performed with upper endoscopy if clinically indicated.    4.  Additional stable findings include:  -Prominence of the common bile duct, not unexpected in this patient status post cholecystectomy  -Absence of the right kidney.  The right adrenal gland is not seen.    -Gastrostomy tube in appropriate position  -Left renal staghorn calculus with resultant dilatation of lower pole calyces and associated cortical thinning  -Urinary bladder is collapsed around a De Leon catheter  -Degenerative change of the spine with multiple lumbar compression deformities  -Small fat containing umbilical hernia  -Generalized muscle atrophy.    > 50% of 25 min visit spent in chart review, face to face discussion of goals of care,  symptom assessment, coordination of care and emotional support.    Vladimir De La Torre MD  Palliative Medicine  [facility]

## 2018-02-21 NOTE — PLAN OF CARE
Problem: Grieving (Adult)  Intervention: Support the Grieving Process  Goals of care family meeting tonight with 3 of patients daughters. Son arrived later and was informed and comfort given.Will continue to give family support and answer any and all questions thay they may have. At end of meeting plan was to keep patient comfortable and not excellerrate care,and if patient passes  on her own not perform CPR/ACLS/

## 2018-02-21 NOTE — PROGRESS NOTES
"[facility]  Critical Care Medicine  Progress Note    Patient Name: Jonathan Nieves  MRN: [id number]  Admission Date: 2/4/2018  Hospital Length of Stay: 16 days  Code Status: Partial Code  Attending Provider: Jo Ann Ocasio MD  Primary Care Provider: Primary Doctor No   Principal Problem: Acute respiratory failure with hypoxia    Subjective:     HPI:  Mr. Jonathan Nieves is a 51yo female with history of CVA with residual aphasia and dysphagia, non-verbal, s/p PEG tube placement and bed riddenness, osteomyelitis s/p amputation of toe, who comes to the ED from [facility] via EMS in respiratory distress. Per ED/EMS the patient was noted to have "projectile vomiting" yesterday evening (24 hours prior to presentation), which was treated with zofran. During the event there is suspicion the patient possible aspirated. Today the patient was noted to be in resp distress with a fever.     Patient was satting 88% on 4L on EMS arrival, breathing 40 times per minute.  Improved to 96% on 15L NRB with RR high 20s.  Axillary temp 102.    CT chest concerning for aspiration pneumonia.  UA concern for a UTI.       Patient intubated in the ED & Critical Care consulted.         Hospital/ICU Course:  2/5: Critical Care Consulted. Patient intubated in the ED.   2/6: Patient doing well, on SBT; will extubate maria e. Continue treatment for aspiration PNA & UTI.   2/7: abx deescalated, continue to treat for PNA and UTI. Required pressors overnight. Off this morning.   2/8-9: still with secretions and questionable mentation? Baseline?. Will try to dry the secretions and extubated today or maria e.    2/10: MRI completed. Family called, had family meeting with one daughter. Need to re-visit with all daughters. Records requested from [facility] and [facility].   02/11-13: neurology consulted, no acute changes, wbc and LFT trending up, keep monitoring.  2/14: Concerns for CADASIL vs Binswanger syndrome per Neuro. NOTCH3 genetic " testing sent. Palliative consulted regarding goals of care.   02/15 pt is not tolerating tube feed, had two episodes of projectile vomiting, KUB 02/14 shows partial SOB and distended stomach.  02/16 CT abdomen was unremarkable, family meeting was done.  02/17 no more vomiting, tolerating 10 cc/hr tube feeds.  02/19 trial to advance tube feeding, had residual back to trickle feed will try to advance slowly. 02/19 PT became hypotensive this morning with MAPs of 50's started on levo, daughter was contacted regarding goals of care, pt status was changed to partial code upon family request.  02/20 Pt still requiring levo, try to wean off slowly, family did not show for the meeting.  02/21 family meeting was done yesterday, no acclation of care per daughter wishes.    Interval History/Significant Events: tolerating tube feeds.      Review of Systems   Unable to perform ROS: Intubated     Objective:     Vital Signs (Most Recent):  Temp: 98.6 °F (37 °C) (02/21/18 0730)  Pulse: 69 (02/21/18 0732)  Resp: 16 (02/21/18 0732)  BP: 115/74 (02/21/18 0732)  SpO2: 97 % (02/21/18 0732) Vital Signs (24h Range):  Temp:  [98.6 °F (37 °C)-100 °F (37.8 °C)] 98.6 °F (37 °C)  Pulse:  [59-92] 69  Resp:  [13-43] 16  SpO2:  [93 %-100 %] 97 %  BP: ()/(59-85) 115/74   Weight: 89.5 kg (197 lb 5 oz)  Body mass index is 30 kg/m².      Intake/Output Summary (Last 24 hours) at 02/21/18 0837  Last data filed at 02/21/18 0702   Gross per 24 hour   Intake          1741.24 ml   Output              925 ml   Net           816.24 ml       Physical Exam   Constitutional: No distress.   HENT:   Head: Normocephalic and atraumatic.   Eyes: Right eye exhibits no discharge. Left eye exhibits no discharge.   Neck: Normal range of motion. Neck supple.   Cardiovascular: Normal rate and regular rhythm.    Pulmonary/Chest: Effort normal. She has rales (at bases).   intubated   Abdominal: Soft. She exhibits no distension.   Musculoskeletal: She exhibits deformity  (contracted upper and lower extremities).   Neurological:   Does not following commands. No purposeful movement. Opens eyes, but does not track.    Skin: Skin is warm and dry.       Vents:  Vent Mode: A/C (02/21/18 0700)  Ventilator Initiated: Yes (02/04/18 2344)  Set Rate: 14 bmp (02/21/18 0700)  Vt Set: 400 mL (02/21/18 0700)  Pressure Support: 0 cmH20 (02/21/18 0700)  PEEP/CPAP: 5 cmH20 (02/21/18 0700)  Oxygen Concentration (%): 30 (02/21/18 0732)  Peak Airway Pressure: 37 cmH2O (02/21/18 0700)  Plateau Pressure: 19 cmH20 (02/21/18 0700)  Total Ve: 7.07 mL (02/21/18 0700)  F/VT Ratio<105 (RSBI): (!) 38.66 (02/21/18 0700)  Lines/Drains/Airways     Drain                 Gastrostomy/Enterostomy 10/25/16 1401 Percutaneous endoscopic gastrostomy (PEG) midline feeding 483 days         Urethral Catheter 02/05/18 1006 Latex 15 days          Airway                 Airway - Non-Surgical 02/04/18 2329 Endotracheal Tube 16 days          Pressure Ulcer                 Pressure Injury 02/05/18 1045 Left medial Foot Deep tissue injury 15 days         Pressure Injury 02/05/18 1045 Right lateral Foot Deep tissue injury 15 days          Peripheral Intravenous Line                 Midline Catheter Insertion/Assessment  - Single Lumen 02/15/18 1125 Left basilic vein (medial side of arm) 18g x 10cm 5 days              Significant Labs:    CBC/Anemia Profile:    Recent Labs  Lab 02/21/18  0513   WBC 6.06   HGB 8.7*   HCT 29.9*      MCV 80*   RDW 18.7*        Chemistries:    Recent Labs  Lab 02/21/18  0513      K 3.8      CO2 23   BUN 4*   CREATININE 0.6   CALCIUM 8.4*   ALBUMIN 2.2*   PROT 7.4   BILITOT 0.3   ALKPHOS 99   ALT 31   AST 22   MG 1.6   PHOS 3.3       All pertinent labs within the past 24 hours have been reviewed.    Significant Imaging:  I have reviewed and interpreted all pertinent imaging results/findings within the past 24 hours.     CT Abd/ Pelvis W Contrast  Impression         1. No acute  intra-abdominal abnormality identified.    2.  Bilateral atelectatic change, progressed on the left since CT 2/4/2018, but with interval improvement in right basilar consolidation.    3.  Small hiatal hernia.  Distal esophageal wall thickening which appears similar to the May 19, 2017 examination.  Further evaluation can be performed with upper endoscopy if clinically indicated.    4.  Additional stable findings include:  -Prominence of the common bile duct, not unexpected in this patient status post cholecystectomy  -Absence of the right kidney.  The right adrenal gland is not seen.    -Gastrostomy tube in appropriate position  -Left renal staghorn calculus with resultant dilatation of lower pole calyces and associated cortical thinning  -Urinary bladder is collapsed around a De Leon catheter  -Degenerative change of the spine with multiple lumbar compression deformities  -Small fat containing umbilical hernia  -Generalized muscle atrophy.  ______________________________________          Assessment/Plan:     Neuro   Seizure-like activity    - continue keppra         Dysphagia as late effect of cerebrovascular disease    - Neurology consulted: differential includes but is not limited to CADASIL (given h/o strokes, extensive white matter involvement including the temporal lobes), adult onset adrenoleukodystrophy, chronic microvascular changes (less likely), vanishing white matter disease (less likely given predominance in children), or Binswanger disease (subcortical leukoencephalopathy or vascular dementia 2/2 chronic HTN).   - NOTCH3 genetic testing sent, results pending        CVA, old, hemiparesis    -patient suffered multiple strokes in past, last one in 2008   -nonverbal at baseline, wheelchair/bedbound    MRI (2/10)  Findings: The diffusion sequence demonstrates no evidence of acute infarct. There is severe white matter small vessel ischemic change. There is a remote infarct of the right occipital lobe. There is  a mild hemosiderin staining of the brainstem probably from a prior subarachnoid hemorrhage. There is a small focus of hemosiderin posterior right midline medulla. Pituitary and craniocervical junction show nothing unusual. There is left frontal sinusitis change. There is involutional change.  - Records requested from [facility] and [facility]. Did not include brain images or neurology notes.  - Neurology consulted: differential includes but is not limited to CADASIL (given h/o strokes, extensive white matter involvement including the temporal lobes), adult onset adrenoleukodystrophy, chronic microvascular changes (less likely), vanishing white matter disease (less likely given predominance in children), or Binswanger disease (subcortical leukoencephalopathy or vascular dementia 2/2 chronic HTN).   - NOTCH3 genetic testing sent, results pending        Derm   Unstageable pressure ulcer of left buttock    - multiple area, all looks clean, not infected.        Pulmonary   * Acute respiratory failure with hypoxia    - Intubated, Failing SBT  - AC mode, FiO2 30%, PEEP 5         Aspiration pneumonia    Extensive consolidation of the right lung and debris in the right mainstem bronchus and its branches consistent with aspiration pneumonia.  - s/p course of rocephin   - s/p vanco & cefepime & flagyl   - Not on any antibiotics currently  - Intubated  - restarted vanc/ cefepime 02/19 d/c'ed 02/20        Cardiac/Vascular   Paroxysmal atrial fibrillation    - NSR  - no ECG done at [facility] shows Afib.  - Pt home BB held due to hypotension           Renal/   Hypernatremia    - Resolved           UTI (urinary tract infection)    - previous UA (Bacteria, WBC, leukocyte positive)  - Urine cultures proteus  - Course of Rocephin completed   - Resolved        GI   Partial small bowel obstruction    - KUB in 02/14 showed distended stomach and partial SOB.  - CT abd pelvis w contrast showed no acute intra-abdominal abnormality.  - no more emesis, tube  feeds resumed tolerating.          PEG (percutaneous endoscopic gastrostomy) status    - tolerating tube feeds.        Orthopedic   Pressure injury of deep tissue    - multiple area, all looks clean, not infected.        Other   Circulatory failure    - hypotensive requiring pressors. >>> OFF pressors 02/20  - etiology septic (VAP) vs adrenal insuf   - pt was started on vanc and cefepime 02/19.d/c'ed 92/20          Palliative care encounter    - partial code        Goals of care, counseling/discussion    - Palliative consulted. Family was not available attend this morning's [facility] meeting.   - Pt placed partial code.          Critical care was time spent personally by me on the following activities: development of treatment plan with patient or surrogate and bedside caregivers, discussions with consultants, evaluation of patient's response to treatment, examination of patient, ordering and performing treatments and interventions, ordering and review of laboratory studies, ordering and review of radiographic studies, pulse oximetry, re-evaluation of patient's condition. This critical care time did not overlap with that of any other provider or involve time for any procedures.     Ana Chauhan MD  Critical Care Medicine  [facility]

## 2018-02-21 NOTE — PLAN OF CARE
Problem: Patient Care Overview  Goal: Plan of Care Review  Outcome: Ongoing (interventions implemented as appropriate)  Patient unable to participate in education at this time, no family available.

## 2018-02-21 NOTE — ASSESSMENT & PLAN NOTE
Extensive consolidation of the right lung and debris in the right mainstem bronchus and its branches consistent with aspiration pneumonia.  - s/p course of rocephin   - s/p vanco & cefepime & flagyl   - Not on any antibiotics currently  - Intubated  - restarted vanc/ cefepime 02/19 d/c'ed 02/20

## 2018-02-22 NOTE — PLAN OF CARE
Problem: Patient Care Overview  Goal: Plan of Care Review  Outcome: Ongoing (interventions implemented as appropriate)  POC reviewed with patient. Pt unable to verbalized understanding.   No acute events overnight. Pt progressing toward goals.   Will continue to monitor. See flow sheets for full assessment and VS info

## 2018-02-22 NOTE — ASSESSMENT & PLAN NOTE
- Pt receives tube feeds  - Neurology consulted: differential includes but is not limited to CADASIL (given h/o strokes, extensive white matter involvement including the temporal lobes), adult onset adrenoleukodystrophy, chronic microvascular changes (less likely), vanishing white matter disease (less likely given predominance in children), or Binswanger disease (subcortical leukoencephalopathy or vascular dementia 2/2 chronic HTN).   - NOTCH3 genetic testing sent, results pending

## 2018-02-22 NOTE — SUBJECTIVE & OBJECTIVE
Interval History/Significant Events: NAEON.    Review of Systems   Unable to perform ROS: Intubated     Objective:     Vital Signs (Most Recent):  Temp: 98.4 °F (36.9 °C) (02/22/18 0702)  Pulse: 61 (02/22/18 1000)  Resp: 14 (02/22/18 1000)  BP: 105/64 (02/22/18 1000)  SpO2: 100 % (02/22/18 1000) Vital Signs (24h Range):  Temp:  [98.4 °F (36.9 °C)-99.4 °F (37.4 °C)] 98.4 °F (36.9 °C)  Pulse:  [57-79] 61  Resp:  [13-21] 14  SpO2:  [96 %-100 %] 100 %  BP: ()/(59-79) 105/64   Weight: 89.5 kg (197 lb 5 oz)  Body mass index is 30 kg/m².      Intake/Output Summary (Last 24 hours) at 02/22/18 1031  Last data filed at 02/22/18 0702   Gross per 24 hour   Intake             1105 ml   Output              690 ml   Net              415 ml       Physical Exam   Constitutional: No distress.   HENT:   Head: Normocephalic and atraumatic.   Eyes: Right eye exhibits no discharge. Left eye exhibits no discharge.   Neck: Normal range of motion. Neck supple.   Cardiovascular: Normal rate and regular rhythm.    Pulmonary/Chest: Effort normal. No respiratory distress. She has no wheezes. She has rales (b/l bases).   intubated   Abdominal: Soft. She exhibits no distension.   Musculoskeletal: She exhibits deformity (contracted upper and lower extremities).   Neurological: She is alert.   Does not following commands. No purposeful movement. Opens eyes, but does not track.     Skin: Skin is warm and dry.       Vents:  Vent Mode: A/C (02/22/18 0914)  Ventilator Initiated: Yes (02/04/18 2344)  Set Rate: 14 bmp (02/22/18 0914)  Vt Set: 400 mL (02/22/18 0914)  Pressure Support: 0 cmH20 (02/22/18 0914)  PEEP/CPAP: 5 cmH20 (02/22/18 0914)  Oxygen Concentration (%): 30 (02/22/18 1000)  Peak Airway Pressure: 29 cmH2O (02/22/18 0914)  Plateau Pressure: 19 cmH20 (02/22/18 0914)  Total Ve: 5.9 mL (02/22/18 0914)  F/VT Ratio<105 (RSBI): (!) 35.44 (02/22/18 0914)  Lines/Drains/Airways     Drain                 Gastrostomy/Enterostomy 10/25/16 1401  Percutaneous endoscopic gastrostomy (PEG) midline feeding 484 days         Urethral Catheter 02/05/18 1006 Latex 17 days          Airway                 Airway - Non-Surgical 02/04/18 2329 Endotracheal Tube 17 days          Pressure Ulcer                 Pressure Injury 02/05/18 1045 Left medial Foot Deep tissue injury 16 days         Pressure Injury 02/05/18 1045 Right lateral Foot Deep tissue injury 16 days          Peripheral Intravenous Line                 Midline Catheter Insertion/Assessment  - Single Lumen 02/15/18 1125 Left basilic vein (medial side of arm) 18g x 10cm 6 days              Significant Labs:    CBC/Anemia Profile:    Recent Labs  Lab 02/21/18  0513   WBC 6.06   HGB 8.7*   HCT 29.9*      MCV 80*   RDW 18.7*        Chemistries:    Recent Labs  Lab 02/21/18  0513      K 3.8      CO2 23   BUN 4*   CREATININE 0.6   CALCIUM 8.4*   ALBUMIN 2.2*   PROT 7.4   BILITOT 0.3   ALKPHOS 99   ALT 31   AST 22   MG 1.6   PHOS 3.3       Recent Lab Results       02/22/18  0609 02/22/18  0300      POCT Glucose 96 97           Significant Imaging:  No new imaging

## 2018-02-22 NOTE — ASSESSMENT & PLAN NOTE
-patient suffered multiple strokes in past, last one in 2008   -nonverbal at baseline, wheelchair/bedbound    MRI (2/10)  Findings: The diffusion sequence demonstrates no evidence of acute infarct. There is severe white matter small vessel ischemic change. There is a remote infarct of the right occipital lobe. There is a mild hemosiderin staining of the brainstem probably from a prior subarachnoid hemorrhage. There is a small focus of hemosiderin posterior right midline medulla. Pituitary and craniocervical junction show nothing unusual. There is left frontal sinusitis change. There is involutional change.  - [facility]. Did not include brain images or neurology notes.  - Neurology consulted: differential includes but is not limited to CADASIL (given h/o strokes, extensive white matter involvement including the temporal lobes), adult onset adrenoleukodystrophy, chronic microvascular changes (less likely), vanishing white matter disease (less likely given predominance in children), or Binswanger disease (subcortical leukoencephalopathy or vascular dementia 2/2 chronic HTN).   - NOTCH3 genetic testing sent, results pending

## 2018-02-22 NOTE — ASSESSMENT & PLAN NOTE
- NSR  - no ECG done at [facility] shows Afib.  - Pt home BB held due to hypotension

## 2018-02-22 NOTE — PROGRESS NOTES
"[facility]  Critical Care Medicine  Progress Note    Patient Name: Jonathan Nieves  MRN: [id number]  Admission Date: 2/4/2018  Hospital Length of Stay: 17 days  Code Status: Partial Code  Attending Provider: Jo Ann Ocasio MD  Primary Care Provider: Primary Doctor No   Principal Problem: Acute respiratory failure with hypoxia    Subjective:     HPI:  Mr. Jonathan Nieves is a 53yo female with history of CVA with residual aphasia and dysphagia, non-verbal, s/p PEG tube placement and bed riddenness, osteomyelitis s/p amputation of toe, who comes to the ED from [facility] via EMS in respiratory distress. Per ED/EMS the patient was noted to have "projectile vomiting" yesterday evening (24 hours prior to presentation), which was treated with zofran. During the event there is suspicion the patient possible aspirated. Today the patient was noted to be in resp distress with a fever.     Patient was satting 88% on 4L on EMS arrival, breathing 40 times per minute.  Improved to 96% on 15L NRB with RR high 20s.  Axillary temp 102.    CT chest concerning for aspiration pneumonia.  UA concern for a UTI.       Patient intubated in the ED & Critical Care consulted.         Hospital/ICU Course:  2/5: Critical Care Consulted. Patient intubated in the ED.   2/6: Patient doing well, on SBT; will extubate maria e. Continue treatment for aspiration PNA & UTI.   2/7: abx deescalated, continue to treat for PNA and UTI. Required pressors overnight. Off this morning.   2/8-9: still with secretions and questionable mentation? Baseline?. Will try to dry the secretions and extubated today or maria e.    2/10: MRI completed. Family called, had family meeting with one daughter. Need to re-visit with all daughters. Records requested from [facility] and [facility].   02/11-13: neurology consulted, no acute changes, wbc and LFT trending up, keep monitoring.  2/14: Concerns for CADASIL vs Binswanger syndrome per Neuro. NOTCH3 genetic " testing sent. Palliative consulted regarding goals of care.   02/15 pt is not tolerating tube feed, had two episodes of projectile vomiting, KUB 02/14 shows partial SOB and distended stomach.  02/16 CT abdomen was unremarkable, family meeting was done.  02/17 no more vomiting, tolerating 10 cc/hr tube feeds.  02/19 trial to advance tube feeding, had residual back to trickle feed will try to advance slowly. 02/19 PT became hypotensive this morning with MAPs of 50's started on levo, daughter was contacted regarding goals of care, pt status was changed to partial code upon family request.  02/20 Pt still requiring levo, try to wean off slowly, family did not show for the meeting.  02/21 family meeting was done yesterday, no acclation of care per daughter wishes.    Interval History/Significant Events: NAEON.    Review of Systems   Unable to perform ROS: Intubated     Objective:     Vital Signs (Most Recent):  Temp: 98.4 °F (36.9 °C) (02/22/18 0702)  Pulse: 61 (02/22/18 1000)  Resp: 14 (02/22/18 1000)  BP: 105/64 (02/22/18 1000)  SpO2: 100 % (02/22/18 1000) Vital Signs (24h Range):  Temp:  [98.4 °F (36.9 °C)-99.4 °F (37.4 °C)] 98.4 °F (36.9 °C)  Pulse:  [57-79] 61  Resp:  [13-21] 14  SpO2:  [96 %-100 %] 100 %  BP: ()/(59-79) 105/64   Weight: 89.5 kg (197 lb 5 oz)  Body mass index is 30 kg/m².      Intake/Output Summary (Last 24 hours) at 02/22/18 1031  Last data filed at 02/22/18 0702   Gross per 24 hour   Intake             1105 ml   Output              690 ml   Net              415 ml       Physical Exam   Constitutional: No distress.   HENT:   Head: Normocephalic and atraumatic.   Eyes: Right eye exhibits no discharge. Left eye exhibits no discharge.   Neck: Normal range of motion. Neck supple.   Cardiovascular: Normal rate and regular rhythm.    Pulmonary/Chest: Effort normal. No respiratory distress. She has no wheezes. She has rales (b/l bases).   intubated   Abdominal: Soft. She exhibits no distension.    Musculoskeletal: She exhibits deformity (contracted upper and lower extremities).   Neurological: She is alert.   Does not following commands. No purposeful movement. Opens eyes, but does not track.     Skin: Skin is warm and dry.       Vents:  Vent Mode: A/C (02/22/18 0914)  Ventilator Initiated: Yes (02/04/18 2344)  Set Rate: 14 bmp (02/22/18 0914)  Vt Set: 400 mL (02/22/18 0914)  Pressure Support: 0 cmH20 (02/22/18 0914)  PEEP/CPAP: 5 cmH20 (02/22/18 0914)  Oxygen Concentration (%): 30 (02/22/18 1000)  Peak Airway Pressure: 29 cmH2O (02/22/18 0914)  Plateau Pressure: 19 cmH20 (02/22/18 0914)  Total Ve: 5.9 mL (02/22/18 0914)  F/VT Ratio<105 (RSBI): (!) 35.44 (02/22/18 0914)  Lines/Drains/Airways     Drain                 Gastrostomy/Enterostomy 10/25/16 1401 Percutaneous endoscopic gastrostomy (PEG) midline feeding 484 days         Urethral Catheter 02/05/18 1006 Latex 17 days          Airway                 Airway - Non-Surgical 02/04/18 2329 Endotracheal Tube 17 days          Pressure Ulcer                 Pressure Injury 02/05/18 1045 Left medial Foot Deep tissue injury 16 days         Pressure Injury 02/05/18 1045 Right lateral Foot Deep tissue injury 16 days          Peripheral Intravenous Line                 Midline Catheter Insertion/Assessment  - Single Lumen 02/15/18 1125 Left basilic vein (medial side of arm) 18g x 10cm 6 days              Significant Labs:    CBC/Anemia Profile:    Recent Labs  Lab 02/21/18  0513   WBC 6.06   HGB 8.7*   HCT 29.9*      MCV 80*   RDW 18.7*        Chemistries:    Recent Labs  Lab 02/21/18 0513      K 3.8      CO2 23   BUN 4*   CREATININE 0.6   CALCIUM 8.4*   ALBUMIN 2.2*   PROT 7.4   BILITOT 0.3   ALKPHOS 99   ALT 31   AST 22   MG 1.6   PHOS 3.3       Recent Lab Results       02/22/18  0609 02/22/18  0300      POCT Glucose 96 97           Significant Imaging:  No new imaging    Assessment/Plan:     Neuro   Seizure-like activity    - continue keppra          Dysphagia as late effect of cerebrovascular disease    - Pt receives tube feeds  - Neurology consulted: differential includes but is not limited to CADASIL (given h/o strokes, extensive white matter involvement including the temporal lobes), adult onset adrenoleukodystrophy, chronic microvascular changes (less likely), vanishing white matter disease (less likely given predominance in children), or Binswanger disease (subcortical leukoencephalopathy or vascular dementia 2/2 chronic HTN).   - NOTCH3 genetic testing sent, results pending        CVA, old, hemiparesis    -patient suffered multiple strokes in past, last one in 2008   -nonverbal at baseline, wheelchair/bedbound    MRI (2/10)  Findings: The diffusion sequence demonstrates no evidence of acute infarct. There is severe white matter small vessel ischemic change. There is a remote infarct of the right occipital lobe. There is a mild hemosiderin staining of the brainstem probably from a prior subarachnoid hemorrhage. There is a small focus of hemosiderin posterior right midline medulla. Pituitary and craniocervical junction show nothing unusual. There is left frontal sinusitis change. There is involutional change.  - [facility] Did not include brain images or neurology notes.  - Neurology consulted: differential includes but is not limited to CADASIL (given h/o strokes, extensive white matter involvement including the temporal lobes), adult onset adrenoleukodystrophy, chronic microvascular changes (less likely), vanishing white matter disease (less likely given predominance in children), or Binswanger disease (subcortical leukoencephalopathy or vascular dementia 2/2 chronic HTN).   - NOTCH3 genetic testing sent, results pending        Derm   Unstageable pressure ulcer of left buttock    - multiple area, all looks clean, not infected.        Pulmonary   * Acute respiratory failure with hypoxia    - Intubated, Failing SBT  Vent  Mode: A/C  Oxygen Concentration (%):  [30] 30  Resp Rate Total:  [14 br/min-32 br/min] 14 br/min  Vt Set:  [400 mL] 400 mL  PEEP/CPAP:  [5 cmH20] 5 cmH20  Pressure Support:  [0 cmH20] 0 cmH20  Mean Airway Pressure:  [9.1 cmH20-15 cmH20] 9.9 cmH20          Aspiration pneumonia    Extensive consolidation of the right lung and debris in the right mainstem bronchus and its branches consistent with aspiration pneumonia.  - S/p course of rocephin; s/p vanco & cefepime & flagyl   - Not on any antibiotics currently  - Intubated        Cardiac/Vascular   Paroxysmal atrial fibrillation    - NSR  - no ECG done at [facility] shows Afib.  - Pt home BB held due to hypotension           Renal/   Hypernatremia    - Resolved           UTI (urinary tract infection)    - previous UA (Bacteria, WBC, leukocyte positive)  - Urine cultures proteus  - Course of Rocephin completed   - Resolved        GI   Partial small bowel obstruction    - KUB in 02/14 showed distended stomach and partial SOB.  - CT abd pelvis w contrast showed no acute intra-abdominal abnormality.  - no more emesis, tube feeds resumed tolerating.          PEG (percutaneous endoscopic gastrostomy) status    - tolerating tube feeds.        Orthopedic   Pressure injury of deep tissue    - multiple area, all looks clean, not infected.        Other   Circulatory failure    - hypotensive requiring pressors. >>> OFF pressors 02/20  - etiology septic (VAP) vs adrenal insuf   - Pt was started on vanc and cefepime 02/19.d/c'ed 02/20          Palliative care encounter    - partial code  - Palliative care following.  - Plan for terminal extubation 2/26/18        Goals of care, counseling/discussion    - Palliative consulted.   - Plan for terminal extubation on 2/26/2018            Rebekah Saunders MD  Critical Care Medicine  [facility]

## 2018-02-22 NOTE — ASSESSMENT & PLAN NOTE
Extensive consolidation of the right lung and debris in the right mainstem bronchus and its branches consistent with aspiration pneumonia.  - S/p course of rocephin; s/p vanco & cefepime & flagyl   - Not on any antibiotics currently  - Intubated

## 2018-02-22 NOTE — ASSESSMENT & PLAN NOTE
- hypotensive requiring pressors. >>> OFF pressors 02/20  - etiology septic (VAP) vs adrenal insuf   - Pt was started on vanc and cefepime 02/19.d/c'ed 02/20

## 2018-02-22 NOTE — ASSESSMENT & PLAN NOTE
- Intubated, Failing SBT  Vent Mode: A/C  Oxygen Concentration (%):  [30] 30  Resp Rate Total:  [14 br/min-32 br/min] 14 br/min  Vt Set:  [400 mL] 400 mL  PEEP/CPAP:  [5 cmH20] 5 cmH20  Pressure Support:  [0 cmH20] 0 cmH20  Mean Airway Pressure:  [9.1 cmH20-15 cmH20] 9.9 cmH20

## 2018-02-22 NOTE — PLAN OF CARE
Problem: Patient Care Overview  Goal: Plan of Care Review  Outcome: Ongoing (interventions implemented as appropriate)  Patient unable to participate in care planning at this time, and no family available.

## 2018-02-23 NOTE — PLAN OF CARE
Patient remains in ICU.  Plan for palliative extubation 2/26/18.  CM will continue to follow.     02/23/18 1223   Right Care Assessment   Can the patient answer the patient profile reliably? No, cognitively impaired   How often would a person be available to care for the patient? Often   Describe the patient's ability to walk at the present time. Does not walk or unable to take any steps at all   How does the patient rate their overall health at the present time? Poor  (JONH)   Number of comorbid conditions (as recorded on the chart) Five or more   During the past month, has the patient often been bothered by feeling down, depressed or hopeless? (JONH)   During the past month, has the patient often been bothered by little interest or pleasure in doing things? (JONH)   Lisa Landers RN, BSN  Case Management  [facility]  Ext. [phone]

## 2018-02-23 NOTE — SUBJECTIVE & OBJECTIVE
Interval History/Significant Events: NAEON.    Review of Systems   Unable to perform ROS: Intubated     Objective:     Vital Signs (Most Recent):  Temp: 98.9 °F (37.2 °C) (02/23/18 0700)  Pulse: 91 (02/23/18 1130)  Resp: 15 (02/23/18 1100)  BP: 105/68 (02/23/18 1100)  SpO2: 99 % (02/23/18 1130) Vital Signs (24h Range):  Temp:  [97.9 °F (36.6 °C)-98.9 °F (37.2 °C)] 98.9 °F (37.2 °C)  Pulse:  [62-92] 91  Resp:  [12-22] 15  SpO2:  [94 %-100 %] 99 %  BP: ()/(60-83) 105/68   Weight: 89.5 kg (197 lb 5 oz)  Body mass index is 30 kg/m².      Intake/Output Summary (Last 24 hours) at 02/23/18 1228  Last data filed at 02/23/18 0600   Gross per 24 hour   Intake              545 ml   Output              520 ml   Net               25 ml       Physical Exam   Constitutional: No distress.   HENT:   Head: Normocephalic and atraumatic.   Eyes: Right eye exhibits no discharge. Left eye exhibits no discharge.   Neck: Normal range of motion. Neck supple.   Cardiovascular: Normal rate and regular rhythm.    Pulmonary/Chest: Effort normal. No respiratory distress. She has no wheezes. She has rales (b/l bases).   intubated   Abdominal: Soft. She exhibits no distension.   Musculoskeletal: She exhibits deformity (contracted upper and lower extremities).   Neurological: She is alert.   Does not following commands. No purposeful movement. Opens eyes, but does not track.     Skin: Skin is warm and dry.       Vents:  Vent Mode: A/C (02/23/18 1130)  Ventilator Initiated: Yes (02/04/18 2344)  Set Rate: 14 bmp (02/23/18 1130)  Vt Set: 400 mL (02/23/18 1130)  Pressure Support: 0 cmH20 (02/23/18 1130)  PEEP/CPAP: 5 cmH20 (02/23/18 1130)  Oxygen Concentration (%): 30 (02/23/18 1130)  Peak Airway Pressure: 40 cmH2O (02/23/18 1130)  Plateau Pressure: 19 cmH20 (02/23/18 1130)  Total Ve: 7.19 mL (02/23/18 1130)  F/VT Ratio<105 (RSBI): (!) 40.65 (02/22/18 2200)  Lines/Drains/Airways     Drain                 Gastrostomy/Enterostomy 10/25/16 1401  Percutaneous endoscopic gastrostomy (PEG) midline feeding 485 days         Urethral Catheter 02/05/18 1006 Latex 18 days          Airway                 Airway - Non-Surgical 02/04/18 2329 Endotracheal Tube 18 days          Pressure Ulcer                 Pressure Injury 02/05/18 1045 Left medial Foot Deep tissue injury 18 days         Pressure Injury 02/05/18 1045 Right lateral Foot Deep tissue injury 18 days          Peripheral Intravenous Line                 Midline Catheter Insertion/Assessment  - Single Lumen 02/15/18 1125 Left basilic vein (medial side of arm) 18g x 10cm 8 days              Significant Labs:    CBC/Anemia Profile:  No results for input(s): WBC, HGB, HCT, PLT, MCV, RDW, IRON, FERRITIN, RETIC, FOLATE, FLVVITAX31, OCCULTBLOOD in the last 48 hours.     Chemistries:  No results for input(s): NA, K, CL, CO2, BUN, CREATININE, CALCIUM, ALBUMIN, PROT, BILITOT, ALKPHOS, ALT, AST, GLUCOSE, MG, PHOS in the last 48 hours.    Recent Lab Results     None          Significant Imaging:  No new imaging

## 2018-02-23 NOTE — PLAN OF CARE
Problem: Patient Care Overview  Goal: Individualization & Mutuality  Critical Care team notified of pt with copious amount of sputum noted with tube feeding from mouth.  Tube feed held at this time.  Pt with no s/s of aspiration at this time.  No s/s of SOB, no residual noted in feeding tube, no s/s decrease sats at this time. Midline catheter remove to due being out of pt's left upper arm.  Catheter tip in place.  Critical care team is aware of midline as well.  Will continue to monitor

## 2018-02-23 NOTE — PROGRESS NOTES
"[facility]  Critical Care Medicine  Progress Note    Patient Name: Jonathan Nieves  MRN: [id number]  Admission Date: 2/4/2018  Hospital Length of Stay: 18 days  Code Status: Partial Code  Attending Provider: Jo Ann Ocasio MD  Primary Care Provider: Primary Doctor No   Principal Problem: Acute respiratory failure with hypoxia    Subjective:     HPI:  Mr. Jonathan Nieves is a 53yo female with history of CVA with residual aphasia and dysphagia, non-verbal, s/p PEG tube placement and bed riddenness, osteomyelitis s/p amputation of toe, who comes to the ED from [facility] via EMS in respiratory distress. Per ED/EMS the patient was noted to have "projectile vomiting" yesterday evening (24 hours prior to presentation), which was treated with zofran. During the event there is suspicion the patient possible aspirated. Today the patient was noted to be in resp distress with a fever.     Patient was satting 88% on 4L on EMS arrival, breathing 40 times per minute.  Improved to 96% on 15L NRB with RR high 20s.  Axillary temp 102.    CT chest concerning for aspiration pneumonia.  UA concern for a UTI.       Patient intubated in the ED & Critical Care consulted.         Hospital/ICU Course:  2/5: Critical Care Consulted. Patient intubated in the ED.   2/6: Patient doing well, on SBT; will extubate maria e. Continue treatment for aspiration PNA & UTI.   2/7: abx deescalated, continue to treat for PNA and UTI. Required pressors overnight. Off this morning.   2/8-9: still with secretions and questionable mentation? Baseline?. Will try to dry the secretions and extubated today or maria e.    2/10: MRI completed. Family called, had family meeting with one daughter. Need to re-visit with all daughters. Records requested from [facility] and [facility].   02/11-13: neurology consulted, no acute changes, wbc and LFT trending up, keep monitoring.  2/14: Concerns for CADASIL vs Binswanger syndrome per Neuro. NOTCH3 genetic " testing sent. Palliative consulted regarding goals of care.   02/15 pt is not tolerating tube feed, had two episodes of projectile vomiting, KUB 02/14 shows partial SOB and distended stomach.  02/16 CT abdomen was unremarkable, family meeting was done.  02/17 no more vomiting, tolerating 10 cc/hr tube feeds.  02/19 trial to advance tube feeding, had residual back to trickle feed will try to advance slowly. 02/19 PT became hypotensive this morning with MAPs of 50's started on levo, daughter was contacted regarding goals of care, pt status was changed to partial code upon family request.  02/20 Pt still requiring levo, try to wean off slowly, family did not show for the meeting.  02/21 family meeting was done yesterday, no acclation of care per daughter wishes.    Interval History/Significant Events: NAEON.    Review of Systems   Unable to perform ROS: Intubated     Objective:     Vital Signs (Most Recent):  Temp: 98.9 °F (37.2 °C) (02/23/18 0700)  Pulse: 91 (02/23/18 1130)  Resp: 15 (02/23/18 1100)  BP: 105/68 (02/23/18 1100)  SpO2: 99 % (02/23/18 1130) Vital Signs (24h Range):  Temp:  [97.9 °F (36.6 °C)-98.9 °F (37.2 °C)] 98.9 °F (37.2 °C)  Pulse:  [62-92] 91  Resp:  [12-22] 15  SpO2:  [94 %-100 %] 99 %  BP: ()/(60-83) 105/68   Weight: 89.5 kg (197 lb 5 oz)  Body mass index is 30 kg/m².      Intake/Output Summary (Last 24 hours) at 02/23/18 1228  Last data filed at 02/23/18 0600   Gross per 24 hour   Intake              545 ml   Output              520 ml   Net               25 ml       Physical Exam   Constitutional: No distress.   HENT:   Head: Normocephalic and atraumatic.   Eyes: Right eye exhibits no discharge. Left eye exhibits no discharge.   Neck: Normal range of motion. Neck supple.   Cardiovascular: Normal rate and regular rhythm.    Pulmonary/Chest: Effort normal. No respiratory distress. She has no wheezes. She has rales (b/l bases).   intubated   Abdominal: Soft. She exhibits no distension.    Musculoskeletal: She exhibits deformity (contracted upper and lower extremities).   Neurological: She is alert.   Does not following commands. No purposeful movement. Opens eyes, but does not track.     Skin: Skin is warm and dry.       Vents:  Vent Mode: A/C (02/23/18 1130)  Ventilator Initiated: Yes (02/04/18 2344)  Set Rate: 14 bmp (02/23/18 1130)  Vt Set: 400 mL (02/23/18 1130)  Pressure Support: 0 cmH20 (02/23/18 1130)  PEEP/CPAP: 5 cmH20 (02/23/18 1130)  Oxygen Concentration (%): 30 (02/23/18 1130)  Peak Airway Pressure: 40 cmH2O (02/23/18 1130)  Plateau Pressure: 19 cmH20 (02/23/18 1130)  Total Ve: 7.19 mL (02/23/18 1130)  F/VT Ratio<105 (RSBI): (!) 40.65 (02/22/18 2200)  Lines/Drains/Airways     Drain                 Gastrostomy/Enterostomy 10/25/16 1401 Percutaneous endoscopic gastrostomy (PEG) midline feeding 485 days         Urethral Catheter 02/05/18 1006 Latex 18 days          Airway                 Airway - Non-Surgical 02/04/18 2329 Endotracheal Tube 18 days          Pressure Ulcer                 Pressure Injury 02/05/18 1045 Left medial Foot Deep tissue injury 18 days         Pressure Injury 02/05/18 1045 Right lateral Foot Deep tissue injury 18 days          Peripheral Intravenous Line                 Midline Catheter Insertion/Assessment  - Single Lumen 02/15/18 1125 Left basilic vein (medial side of arm) 18g x 10cm 8 days              Significant Labs:    CBC/Anemia Profile:  No results for input(s): WBC, HGB, HCT, PLT, MCV, RDW, IRON, FERRITIN, RETIC, FOLATE, [id number], OCCULTBLOOD in the last 48 hours.     Chemistries:  No results for input(s): NA, K, CL, CO2, BUN, CREATININE, CALCIUM, ALBUMIN, PROT, BILITOT, ALKPHOS, ALT, AST, GLUCOSE, MG, PHOS in the last 48 hours.    Recent Lab Results     None          Significant Imaging:  No new imaging    Assessment/Plan:     Neuro   Seizure-like activity    - continue keppra         Dysphagia as late effect of cerebrovascular disease    - Pt receives  tube feeds  - Neurology consulted: differential includes but is not limited to CADASIL (given h/o strokes, extensive white matter involvement including the temporal lobes), adult onset adrenoleukodystrophy, chronic microvascular changes (less likely), vanishing white matter disease (less likely given predominance in children), or Binswanger disease (subcortical leukoencephalopathy or vascular dementia 2/2 chronic HTN).   - NOTCH3 genetic testing sent, results pending        CVA, old, hemiparesis    -patient suffered multiple strokes in past, last one in 2008   -nonverbal at baseline, wheelchair/bedbound    MRI (2/10)  Findings: The diffusion sequence demonstrates no evidence of acute infarct. There is severe white matter small vessel ischemic change. There is a remote infarct of the right occipital lobe. There is a mild hemosiderin staining of the brainstem probably from a prior subarachnoid hemorrhage. There is a small focus of hemosiderin posterior right midline medulla. Pituitary and craniocervical junction show nothing unusual. There is left frontal sinusitis change. There is involutional change.  - Records requested from [facility] and [facility]. Did not include brain images or neurology notes.  - Neurology consulted: differential includes but is not limited to CADASIL (given h/o strokes, extensive white matter involvement including the temporal lobes), adult onset adrenoleukodystrophy, chronic microvascular changes (less likely), vanishing white matter disease (less likely given predominance in children), or Binswanger disease (subcortical leukoencephalopathy or vascular dementia 2/2 chronic HTN).   - NOTCH3 genetic testing sent, results pending        Derm   Unstageable pressure ulcer of left buttock    - multiple area, all looks clean, not infected.        Pulmonary   * Acute respiratory failure with hypoxia    - Intubated, Failing SBT  Vent Mode: A/C  Oxygen Concentration (%):  [30] 30  Resp Rate Total:  [14 br/min-32  br/min] 14 br/min  Vt Set:  [400 mL] 400 mL  PEEP/CPAP:  [5 cmH20] 5 cmH20  Pressure Support:  [0 cmH20] 0 cmH20  Mean Airway Pressure:  [9.1 cmH20-15 cmH20] 9.9 cmH20          Aspiration pneumonia    Extensive consolidation of the right lung and debris in the right mainstem bronchus and its branches consistent with aspiration pneumonia.  - S/p course of rocephin; s/p vanco & cefepime & flagyl   - Not on any antibiotics currently  - Intubated        Cardiac/Vascular   Paroxysmal atrial fibrillation    - NSR  - no ECG done at [facility] shows Afib.  - Pt home BB held due to hypotension           Renal/   Hypernatremia    - Resolved           UTI (urinary tract infection)    - previous UA (Bacteria, WBC, leukocyte positive)  - Urine cultures proteus  - Course of Rocephin completed   - Resolved        GI   Partial small bowel obstruction    - KUB in 02/14 showed distended stomach and partial SOB.  - CT abd pelvis w contrast showed no acute intra-abdominal abnormality.  - no more emesis, tube feeds resumed tolerating.          PEG (percutaneous endoscopic gastrostomy) status    - tolerating tube feeds.        Orthopedic   Pressure injury of deep tissue    - multiple area, all looks clean, not infected.        Other   Circulatory failure    - hypotensive requiring pressors. >>> OFF pressors 02/20  - etiology septic (VAP) vs adrenal insuf   - Pt was started on vanc and cefepime 02/19.d/c'ed 02/20          Palliative care encounter    - partial code  - Palliative care following.  - Plan for terminal extubation 2/26/18        Goals of care, counseling/discussion    - Palliative consulted.   - Plan for terminal extubation on 2/26/2018               Critical care was time spent personally by me on the following activities: development of treatment plan with patient or surrogate and bedside caregivers, discussions with consultants, evaluation of patient's response to treatment, examination of patient, ordering and performing  treatments and interventions, ordering and review of laboratory studies, ordering and review of radiographic studies, pulse oximetry, re-evaluation of patient's condition. This critical care time did not overlap with that of any other provider or involve time for any procedures.     Ana Chauhan MD  Critical Care Medicine  [facility]

## 2018-02-23 NOTE — PROGRESS NOTES
[facility]  Palliative Medicine  Consult Note    Patient Name: Jonathan Nieves  MRN: [id number]  Admission Date: 2/4/2018  Hospital Length of Stay: 18 days  Code Status: Partial Code   Attending Provider: Jo Ann Ocasio MD  Consulting Provider: Vladimir De La Torre MD  Primary Care Physician: Primary Doctor No  Principal Problem:Acute respiratory failure with hypoxia    Consults  Assessment/Plan:     Active Diagnoses:    Diagnosis Date Noted POA    PRINCIPAL PROBLEM:  Acute respiratory failure with hypoxia [J96.01] 02/05/2018 Yes    Circulatory failure [R57.9] 02/20/2018 Unknown    Partial small bowel obstruction [K56.600] 02/16/2018 Yes    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] 02/15/2018 Yes    Palliative care encounter [Z51.5] 02/14/2018 Not Applicable    Hypernatremia [E87.0] 02/07/2018 Yes    Intubation of airway performed without difficulty [Z78.9] 02/05/2018 Yes    Unstageable pressure ulcer of left buttock [L89.320] 02/05/2018 Yes    Pressure injury of deep tissue [L89.90] 02/05/2018 Yes    Alteration in skin integrity due to moisture [R23.9] 02/05/2018 Yes    Seizure-like activity [R56.9] 05/22/2017 Yes    Goals of care, counseling/discussion [Z71.89] 05/20/2017 Not Applicable     Chronic    PEG (percutaneous endoscopic gastrostomy) status [Z93.1] 12/16/2014 Not Applicable    Aspiration pneumonia [J69.0] 10/13/2014 Yes    UTI (urinary tract infection) [N39.0] 10/13/2014 Yes    Paroxysmal atrial fibrillation [I48.0] 05/15/2014 Yes     Chronic    CVA, old, hemiparesis [I69.359] 05/15/2014 Not Applicable     Chronic    Dysphagia as late effect of cerebrovascular disease [I69.991] 05/15/2014 Not Applicable     Chronic      Problems Resolved During this Admission:    Diagnosis Date Noted Date Resolved POA     Goals of care:  - 53 yo lady with several CVA and extensive white matter disease with wide differential (CADASIL).   - Reviewed Dr. Cherry note 2/12. Appears to have severe  "neurologic deficits that likely are irreversible. Poor prognosis for meaningful neurologic recovery.   -  made partial code per primary  to hypotensive and bradycardia episode  - Family did not show for meeting with palliative care but arrived late afternoon and had a discussion with Dr. Macias, critical care.   - no escalation of care and partial code confirmed. Plans for terminal extubation 18.     - pt. Awake today, not tracking. Low grade temp noted.  - Apnea on SBT yesterday  - Will follow up on Monday and assist with terminal extubation.     Please call [phone] with questions.     Subjective:     HPI: Mr. Jonathan Nieves is a 53yo female with history of CVA with residual aphasia and dysphagia, non-verbal, s/p PEG tube placement and bed riddenness, osteomyelitis s/p amputation of toe, who came to the ED from [facility] via EMS in respiratory distress. Per ED/EMS the patient was noted to have "projectile vomiting" yesterday evening (24 hours prior to presentation), which was treated with zofran. During the event there is suspicion the patient possible aspirated. Currently, she opens eyes to stimulation, intubated, with contractures of knees/hips/elbows/hands and multiple areas of pressure injury.     Interval History: small doses of pressors today. Unable to completely wean pressors over night. Remains on AC mechanical ventilation.       Medications:  Continuous Infusions:    Scheduled Meds:   baclofen  10 mg Per G Tube TID    chlorhexidine  15 mL Mouth/Throat BID    citalopram  10 mg Per G Tube Daily    famotidine  20 mg Per G Tube BID    glycopyrrolate  0.1 mg Intravenous Daily    levetiracetam oral soln  500 mg Per G Tube BID    scopolamine  1 patch Transdermal Q3 Days     PRN Meds:dextrose 50%, [] fentaNYL **FOLLOWED BY** fentaNYL, glucagon (human recombinant), magnesium sulfate IVPB, magnesium sulfate IVPB, midazolam, ondansetron, potassium chloride 10%, " potassium chloride 10%, potassium chloride 10%, potassium, sodium phosphates, potassium, sodium phosphates, potassium, sodium phosphates, sodium chloride 0.9%        Review of Systems   Unable to perform ROS: Intubated     Objective:     Vital Signs (Most Recent):  Temp: (!) 100.8 °F (38.2 °C) (02/23/18 1200)  Pulse: 92 (02/23/18 1313)  Resp: 14 (02/23/18 1200)  BP: 105/72 (02/23/18 1200)  SpO2: 100 % (02/23/18 1313) Vital Signs (24h Range):  Temp:  [97.9 °F (36.6 °C)-100.8 °F (38.2 °C)] 100.8 °F (38.2 °C)  Pulse:  [64-92] 92  Resp:  [14-22] 14  SpO2:  [94 %-100 %] 100 %  BP: ()/(60-83) 105/72     Physical Exam   Constitutional: She appears well-developed.   HENT:   Head: Normocephalic.   Neck: No JVD present.   Cardiovascular:   No murmur heard.  Pulmonary/Chest:   Ventilated. Coarse breath sounds on anterior exam   Abdominal: Soft.   PEG in place   Musculoskeletal:   Upper and lower extremity contractures, no edema. Multiple pressure injuries   Neurological:   Eyes open. Does not follow commands, does not track.   Skin: Skin is warm.       Laboratory:   CBC:     Recent Labs  Lab 02/21/18  0513   WBC 6.06   RBC 3.73*   HGB 8.7*   HCT 29.9*      MCV 80*   MCH 23.3*   MCHC 29.1*       CMP:     Recent Labs  Lab 02/21/18  0513   GLU 86   CALCIUM 8.4*   ALBUMIN 2.2*   PROT 7.4      K 3.8   CO2 23      BUN 4*   CREATININE 0.6   ALKPHOS 99   ALT 31   AST 22   BILITOT 0.3       POCT Glucose   Date Value Ref Range Status   02/22/2018 90 70 - 110 mg/dL Final   02/22/2018 96 70 - 110 mg/dL Final   02/22/2018 97 70 - 110 mg/dL Final         Significant Imaging: CT Abdomen and Pelvis:   1. No acute intra-abdominal abnormality identified.    2.  Bilateral atelectatic change, progressed on the left since CT 2/4/2018, but with interval improvement in right basilar consolidation.    3.  Small hiatal hernia.  Distal esophageal wall thickening which appears similar to the May 19, 2017 examination.  Further  evaluation can be performed with upper endoscopy if clinically indicated.    4.  Additional stable findings include:  -Prominence of the common bile duct, not unexpected in this patient status post cholecystectomy  -Absence of the right kidney.  The right adrenal gland is not seen.    -Gastrostomy tube in appropriate position  -Left renal staghorn calculus with resultant dilatation of lower pole calyces and associated cortical thinning  -Urinary bladder is collapsed around a De Leon catheter  -Degenerative change of the spine with multiple lumbar compression deformities  -Small fat containing umbilical hernia  -Generalized muscle atrophy.    > 50% of 25 min visit spent in chart review, face to face discussion of goals of care,  symptom assessment, coordination of care and emotional support.    Vladimir De La Torre MD  Palliative Medicine  [facility]

## 2018-02-24 NOTE — ASSESSMENT & PLAN NOTE
- KUB in 02/14 showed distended stomach and partial SOB.  - CT abd pelvis w contrast showed no acute intra-abdominal abnormality.  - Emesis happens on/off, no residual. respond to antemetic and feed holding.

## 2018-02-24 NOTE — ASSESSMENT & PLAN NOTE
- NSR  - no ECG done at [facility] shows Afib.  - Pt home BB held due to hypotension

## 2018-02-24 NOTE — ASSESSMENT & PLAN NOTE
-patient suffered multiple strokes in past, last one in 2008   -nonverbal at baseline, wheelchair/bedbound    MRI (2/10)  Findings: The diffusion sequence demonstrates no evidence of acute infarct. There is severe white matter small vessel ischemic change. There is a remote infarct of the right occipital lobe. There is a mild hemosiderin staining of the brainstem probably from a prior subarachnoid hemorrhage. There is a small focus of hemosiderin posterior right midline medulla. Pituitary and craniocervical junction show nothing unusual. There is left frontal sinusitis change. There is involutional change.  - [facility]. Did not include brain images or neurology notes.  - Neurology consulted: differential includes but is not limited to CADASIL (given h/o strokes, extensive white matter involvement including the temporal lobes), adult onset adrenoleukodystrophy, chronic microvascular changes (less likely), vanishing white matter disease (less likely given predominance in children), or Binswanger disease (subcortical leukoencephalopathy or vascular dementia 2/2 chronic HTN).   - NOTCH3 genetic testing sent, results pending

## 2018-02-24 NOTE — SUBJECTIVE & OBJECTIVE
Interval History/Significant Events: Pt had episode of emesis yesterday, tube feeds were held.    Review of Systems   Unable to perform ROS: Intubated     Objective:     Vital Signs (Most Recent):  Temp: 99.3 °F (37.4 °C) (02/24/18 0700)  Pulse: 64 (02/24/18 0912)  Resp: 14 (02/24/18 0912)  BP: 118/73 (02/24/18 0700)  SpO2: 99 % (02/24/18 0912) Vital Signs (24h Range):  Temp:  [99.3 °F (37.4 °C)-100.8 °F (38.2 °C)] 99.3 °F (37.4 °C)  Pulse:  [] 64  Resp:  [14-30] 14  SpO2:  [96 %-100 %] 99 %  BP: (105-127)/(68-85) 118/73   Weight: 89.5 kg (197 lb 5 oz)  Body mass index is 30 kg/m².      Intake/Output Summary (Last 24 hours) at 02/24/18 0942  Last data filed at 02/24/18 0700   Gross per 24 hour   Intake                0 ml   Output              310 ml   Net             -310 ml       Physical Exam   Constitutional: No distress.   HENT:   Head: Normocephalic and atraumatic.   Eyes: Right eye exhibits no discharge. Left eye exhibits no discharge.   Neck: Normal range of motion. Neck supple.   Cardiovascular: Normal rate and regular rhythm.    Pulmonary/Chest: Effort normal. No respiratory distress. She has no wheezes. She has rales (b/l bases).   intubated   Abdominal: Soft. She exhibits no distension.   Musculoskeletal: She exhibits deformity (contracted upper and lower extremities).   Neurological: She is alert.   Does not following commands. No purposeful movement. Opens eyes, but does not track.     Skin: Skin is warm and dry.       Vents:  Vent Mode: A/C (02/24/18 0912)  Ventilator Initiated: Yes (02/04/18 2344)  Set Rate: 14 bmp (02/24/18 0912)  Vt Set: 400 mL (02/24/18 0912)  Pressure Support: 0 cmH20 (02/24/18 0912)  PEEP/CPAP: 5 cmH20 (02/24/18 0912)  Oxygen Concentration (%): 30 (02/24/18 0912)  Peak Airway Pressure: 34 cmH2O (02/24/18 0912)  Plateau Pressure: 18 cmH20 (02/24/18 0912)  Total Ve: 5.82 mL (02/24/18 0912)  F/VT Ratio<105 (RSBI): (!) 33.57 (02/24/18 0912)  Lines/Drains/Airways     Drain                  Gastrostomy/Enterostomy 10/25/16 1401 Percutaneous endoscopic gastrostomy (PEG) midline feeding 486 days         Urethral Catheter 02/05/18 1006 Latex 18 days          Airway                 Airway - Non-Surgical 02/04/18 2329 Endotracheal Tube 19 days          Pressure Ulcer                 Pressure Injury 02/05/18 1045 Left medial Foot Deep tissue injury 18 days         Pressure Injury 02/05/18 1045 Right lateral Foot Deep tissue injury 18 days              Significant Labs:    CBC/Anemia Profile:  No results for input(s): WBC, HGB, HCT, PLT, MCV, RDW, IRON, FERRITIN, RETIC, FOLATE, [id number], OCCULTBLOOD in the last 48 hours.     Chemistries:  No results for input(s): NA, K, CL, CO2, BUN, CREATININE, CALCIUM, ALBUMIN, PROT, BILITOT, ALKPHOS, ALT, AST, GLUCOSE, MG, PHOS in the last 48 hours.    Recent Lab Results     None          Significant Imaging:  No new imaging

## 2018-02-24 NOTE — ASSESSMENT & PLAN NOTE
- Intubated, Failing SBT  Vent Mode: A/C  Oxygen Concentration (%):  [30] 30  Resp Rate Total:  [14 br/min-37 br/min] 14 br/min  Vt Set:  [400 mL] 400 mL  PEEP/CPAP:  [5 cmH20] 5 cmH20  Pressure Support:  [0 cmH20] 0 cmH20  Mean Airway Pressure:  [9.1 [id number] cmH20] 9.1 cmH20

## 2018-02-24 NOTE — ASSESSMENT & PLAN NOTE
- hypotensive requiring pressors. >>> OFF pressors 02/20  - etiology septic (VAP) vs adrenal insuf   - Pt was started on vanc and cefepime 02/19.d/c'ed 02/20    RESOLVED

## 2018-02-24 NOTE — ASSESSMENT & PLAN NOTE
- S/P PEG tube.  - Neurology consulted: differential includes but is not limited to CADASIL (given h/o strokes, extensive white matter involvement including the temporal lobes), adult onset adrenoleukodystrophy, chronic microvascular changes (less likely), vanishing white matter disease (less likely given predominance in children), or Binswanger disease (subcortical leukoencephalopathy or vascular dementia 2/2 chronic HTN).   - NOTCH3 genetic testing sent, results pending

## 2018-02-24 NOTE — PROGRESS NOTES
"[facility]  Critical Care Medicine  Progress Note    Patient Name: Jonathan Nieves  MRN: [id number]  Admission Date: 2/4/2018  Hospital Length of Stay: 19 days  Code Status: Partial Code  Attending Provider: Jo Ann Ocasio MD  Primary Care Provider: Primary Doctor No   Principal Problem: Acute respiratory failure with hypoxia    Subjective:     HPI:  Mr. Jonathan Nieves is a 51yo female with history of CVA with residual aphasia and dysphagia, non-verbal, s/p PEG tube placement and bed riddenness, osteomyelitis s/p amputation of toe, who comes to the ED from [facility] via EMS in respiratory distress. Per ED/EMS the patient was noted to have "projectile vomiting" yesterday evening (24 hours prior to presentation), which was treated with zofran. During the event there is suspicion the patient possible aspirated. Today the patient was noted to be in resp distress with a fever.     Patient was satting 88% on 4L on EMS arrival, breathing 40 times per minute.  Improved to 96% on 15L NRB with RR high 20s.  Axillary temp 102.    CT chest concerning for aspiration pneumonia.  UA concern for a UTI.       Patient intubated in the ED & Critical Care consulted.         Hospital/ICU Course:  2/5: Critical Care Consulted. Patient intubated in the ED.   2/6: Patient doing well, on SBT; will extubate maria e. Continue treatment for aspiration PNA & UTI.   2/7: abx deescalated, continue to treat for PNA and UTI. Required pressors overnight. Off this morning.   2/8-9: still with secretions and questionable mentation? Baseline?. Will try to dry the secretions and extubated today or maria e.    2/10: MRI completed. Family called, had family meeting with one daughter. Need to re-visit with all daughters. Records requested from [facility] and [facility].   02/11-13: neurology consulted, no acute changes, wbc and LFT trending up, keep monitoring.  2/14: Concerns for CADASIL vs Binswanger syndrome per Neuro. NOTCH3 genetic " testing sent. Palliative consulted regarding goals of care.   02/15 pt is not tolerating tube feed, had two episodes of projectile vomiting, KUB 02/14 shows partial SOB and distended stomach.  02/16 CT abdomen was unremarkable, family meeting was done.  02/17 no more vomiting, tolerating 10 cc/hr tube feeds.  02/19 trial to advance tube feeding, had residual back to trickle feed will try to advance slowly. 02/19 PT became hypotensive this morning with MAPs of 50's started on levo, daughter was contacted regarding goals of care, pt status was changed to partial code upon family request.  02/20 Pt still requiring levo, try to wean off slowly, family did not show for the meeting.  02/21 family meeting was done yesterday, no acclation of care per daughter wishes.    Interval History/Significant Events: Pt had episode of emesis yesterday, tube feeds were held.    Review of Systems   Unable to perform ROS: Intubated     Objective:     Vital Signs (Most Recent):  Temp: 99.3 °F (37.4 °C) (02/24/18 0700)  Pulse: 64 (02/24/18 0912)  Resp: 14 (02/24/18 0912)  BP: 118/73 (02/24/18 0700)  SpO2: 99 % (02/24/18 0912) Vital Signs (24h Range):  Temp:  [99.3 °F (37.4 °C)-100.8 °F (38.2 °C)] 99.3 °F (37.4 °C)  Pulse:  [] 64  Resp:  [14-30] 14  SpO2:  [96 %-100 %] 99 %  BP: (105-127)/(68-85) 118/73   Weight: 89.5 kg (197 lb 5 oz)  Body mass index is 30 kg/m².      Intake/Output Summary (Last 24 hours) at 02/24/18 0942  Last data filed at 02/24/18 0700   Gross per 24 hour   Intake                0 ml   Output              310 ml   Net             -310 ml       Physical Exam   Constitutional: No distress.   HENT:   Head: Normocephalic and atraumatic.   Eyes: Right eye exhibits no discharge. Left eye exhibits no discharge.   Neck: Normal range of motion. Neck supple.   Cardiovascular: Normal rate and regular rhythm.    Pulmonary/Chest: Effort normal. No respiratory distress. She has no wheezes. She has rales (b/l bases).   intubated    Abdominal: Soft. She exhibits no distension.   Musculoskeletal: She exhibits deformity (contracted upper and lower extremities).   Neurological: She is alert.   Does not following commands. No purposeful movement. Opens eyes, but does not track.     Skin: Skin is warm and dry.       Vents:  Vent Mode: A/C (02/24/18 0912)  Ventilator Initiated: Yes (02/04/18 2344)  Set Rate: 14 bmp (02/24/18 0912)  Vt Set: 400 mL (02/24/18 0912)  Pressure Support: 0 cmH20 (02/24/18 0912)  PEEP/CPAP: 5 cmH20 (02/24/18 0912)  Oxygen Concentration (%): 30 (02/24/18 0912)  Peak Airway Pressure: 34 cmH2O (02/24/18 0912)  Plateau Pressure: 18 cmH20 (02/24/18 0912)  Total Ve: 5.82 mL (02/24/18 0912)  F/VT Ratio<105 (RSBI): (!) 33.57 (02/24/18 0912)  Lines/Drains/Airways     Drain                 Gastrostomy/Enterostomy 10/25/16 1401 Percutaneous endoscopic gastrostomy (PEG) midline feeding 486 days         Urethral Catheter 02/05/18 1006 Latex 18 days          Airway                 Airway - Non-Surgical 02/04/18 2329 Endotracheal Tube 19 days          Pressure Ulcer                 Pressure Injury 02/05/18 1045 Left medial Foot Deep tissue injury 18 days         Pressure Injury 02/05/18 1045 Right lateral Foot Deep tissue injury 18 days              Significant Labs:    CBC/Anemia Profile:  No results for input(s): WBC, HGB, HCT, PLT, MCV, RDW, IRON, FERRITIN, RETIC, FOLATE, [id number], OCCULTBLOOD in the last 48 hours.     Chemistries:  No results for input(s): NA, K, CL, CO2, BUN, CREATININE, CALCIUM, ALBUMIN, PROT, BILITOT, ALKPHOS, ALT, AST, GLUCOSE, MG, PHOS in the last 48 hours.    Recent Lab Results     None          Significant Imaging:  No new imaging    Assessment/Plan:     Neuro   Seizure-like activity    - continue keppra         Dysphagia as late effect of cerebrovascular disease    - S/P PEG tube.  - Neurology consulted: differential includes but is not limited to CADASIL (given h/o strokes, extensive white matter  involvement including the temporal lobes), adult onset adrenoleukodystrophy, chronic microvascular changes (less likely), vanishing white matter disease (less likely given predominance in children), or Binswanger disease (subcortical leukoencephalopathy or vascular dementia 2/2 chronic HTN).   - NOTCH3 genetic testing sent, results pending        CVA, old, hemiparesis    -patient suffered multiple strokes in past, last one in 2008   -nonverbal at baseline, wheelchair/bedbound    MRI (2/10)  Findings: The diffusion sequence demonstrates no evidence of acute infarct. There is severe white matter small vessel ischemic change. There is a remote infarct of the right occipital lobe. There is a mild hemosiderin staining of the brainstem probably from a prior subarachnoid hemorrhage. There is a small focus of hemosiderin posterior right midline medulla. Pituitary and craniocervical junction show nothing unusual. There is left frontal sinusitis change. There is involutional change.  - Records requested from [facility] and [facility]. Did not include brain images or neurology notes.  - Neurology consulted: differential includes but is not limited to CADASIL (given h/o strokes, extensive white matter involvement including the temporal lobes), adult onset adrenoleukodystrophy, chronic microvascular changes (less likely), vanishing white matter disease (less likely given predominance in children), or Binswanger disease (subcortical leukoencephalopathy or vascular dementia 2/2 chronic HTN).   - NOTCH3 genetic testing sent, results pending        Derm   Unstageable pressure ulcer of left buttock    - multiple area.  - wound care per nursing.        Pulmonary   * Acute respiratory failure with hypoxia    - Intubated, Failing SBT  Vent Mode: A/C  Oxygen Concentration (%):  [30] 30  Resp Rate Total:  [14 br/min-37 br/min] 14 br/min  Vt Set:  [400 mL] 400 mL  PEEP/CPAP:  [5 cmH20] 5 cmH20  Pressure Support:  [0 cmH20] 0 cmH20  Mean Airway  Pressure:  [9.1 hdI45-51 cmH20] 9.1 cmH20          Aspiration pneumonia    Extensive consolidation of the right lung and debris in the right mainstem bronchus and its branches consistent with aspiration pneumonia.  - S/p course of rocephin; s/p vanco & cefepime & flagyl   - Not on any antibiotics currently  - Intubated        Cardiac/Vascular   Paroxysmal atrial fibrillation    - NSR  - no ECG done at [facility] shows Afib.  - Pt home BB held due to hypotension           Renal/   Hypernatremia    - Resolved           UTI (urinary tract infection)    - previous UA (Bacteria, WBC, leukocyte positive)  - Urine cultures proteus  - Course of Rocephin completed   - Resolved        GI   Partial small bowel obstruction    - KUB in 02/14 showed distended stomach and partial SOB.  - CT abd pelvis w contrast showed no acute intra-abdominal abnormality.  - Emesis happens on/off, no residual. respond to antemetic and feed holding.          PEG (percutaneous endoscopic gastrostomy) status    - on tube feed on/off due to emesis         Orthopedic   Pressure injury of deep tissue    - multiple areas.  - wound care per nursing.        Other   Circulatory failure    - hypotensive requiring pressors. >>> OFF pressors 02/20  - etiology septic (VAP) vs adrenal insuf   - Pt was started on vanc and cefepime 02/19.d/c'ed 02/20    RESOLVED          Palliative care encounter    - partial code  - Palliative care following.  - Plan for terminal extubation 2/26/18        Goals of care, counseling/discussion    - Palliative consulted.   - Plan for terminal extubation on 2/26/2018           Critical care was time spent personally by me on the following activities: development of treatment plan with patient or surrogate and bedside caregivers, discussions with consultants, evaluation of patient's response to treatment, examination of patient, ordering and performing treatments and interventions, ordering and review of laboratory studies, ordering and  review of radiographic studies, pulse oximetry, re-evaluation of patient's condition. This critical care time did not overlap with that of any other provider or involve time for any procedures.     Ana Chauhan MD  Critical Care Medicine  [facility]

## 2018-02-25 NOTE — ASSESSMENT & PLAN NOTE
-patient suffered multiple strokes in past, last one in 2008   -nonverbal at baseline, alert, makes eye contact but doesn't follow commands.    MRI (2/10)  Findings: The diffusion sequence demonstrates no evidence of acute infarct. There is severe white matter small vessel ischemic change. There is a remote infarct of the right occipital lobe. There is a mild hemosiderin staining of the brainstem probably from a prior subarachnoid hemorrhage. There is a small focus of hemosiderin posterior right midline medulla. Pituitary and craniocervical junction show nothing unusual. There is left frontal sinusitis change. There is involutional change.  - Records requested from [facility] and [facility]. Did not include brain images or neurology notes.  - Neurology consulted: differential includes but is not limited to CADASIL (given h/o strokes, extensive white matter involvement including the temporal lobes), adult onset adrenoleukodystrophy, chronic microvascular changes (less likely), vanishing white matter disease (less likely given predominance in children), or Binswanger disease (subcortical leukoencephalopathy or vascular dementia 2/2 chronic HTN).   - NOTCH3 genetic testing sent, results pending

## 2018-02-25 NOTE — PLAN OF CARE
Problem: Patient Care Overview  Goal: Plan of Care Review  Outcome: Ongoing (interventions implemented as appropriate)     Pulmonary: Remains on mechanical ventilation with no changes to vent settings.     Cardiovascular: NSR, BP WNL.     Neurological: Following commands intermittently, contracted x4 extremities. Withdraws to pain.     Gastrointestinal: Trickle feeds maintained with low residuals.     Genitourinary: De Leon intact, minimal UO.      Integumentary/Other: Multiple wounds, see flowsheets for charting.        Patient progressing towards goals as tolerated, plan of care communicated and reviewed with Jonathan Nieves and family. All concerns addressed. Will continue to monitor.

## 2018-02-25 NOTE — ASSESSMENT & PLAN NOTE
- NSR  - no ECG done at [facility] shows Afib.  - Pt home BB held due to hypotension

## 2018-02-25 NOTE — PROGRESS NOTES
"[facility]  Critical Care Medicine  Progress Note    Patient Name: Jonathan Nieves  MRN: [id number]  Admission Date: 2/4/2018  Hospital Length of Stay: 20 days  Code Status: Partial Code  Attending Provider: Jo Ann Ocasio MD  Primary Care Provider: Primary Doctor No   Principal Problem: Acute respiratory failure with hypoxia    Subjective:     HPI:  Mr. Jonathan Nieves is a 51yo female with history of CVA with residual aphasia and dysphagia, non-verbal, s/p PEG tube placement and bed riddenness, osteomyelitis s/p amputation of toe, who comes to the ED from [facility] via EMS in respiratory distress. Per ED/EMS the patient was noted to have "projectile vomiting" yesterday evening (24 hours prior to presentation), which was treated with zofran. During the event there is suspicion the patient possible aspirated. Today the patient was noted to be in resp distress with a fever.     Patient was satting 88% on 4L on EMS arrival, breathing 40 times per minute.  Improved to 96% on 15L NRB with RR high 20s.  Axillary temp 102.    CT chest concerning for aspiration pneumonia.  UA concern for a UTI.       Patient intubated in the ED & Critical Care consulted.         Hospital/ICU Course:  2/5: Critical Care Consulted. Patient intubated in the ED.   2/6: Patient doing well, on SBT; will extubate maria e. Continue treatment for aspiration PNA & UTI.   2/7: abx deescalated, continue to treat for PNA and UTI. Required pressors overnight. Off this morning.   2/8-9: still with secretions and questionable mentation? Baseline?. Will try to dry the secretions and extubated today or maria e.    2/10: MRI completed. Family called, had family meeting with one daughter. Need to re-visit with all daughters. Records requested from [facility] and [facility].   02/11-13: neurology consulted, no acute changes, wbc and LFT trending up, keep monitoring.  2/14: Concerns for CADASIL vs Binswanger syndrome per Neuro. NOTCH3 genetic " testing sent. Palliative consulted regarding goals of care.   02/15 pt is not tolerating tube feed, had two episodes of projectile vomiting, KUB 02/14 shows partial SOB and distended stomach.  02/16 CT abdomen was unremarkable, family meeting was done.  02/17 no more vomiting, tolerating 10 cc/hr tube feeds.  02/19 trial to advance tube feeding, had residual back to trickle feed will try to advance slowly. 02/19 PT became hypotensive this morning with MAPs of 50's started on levo, daughter was contacted regarding goals of care, pt status was changed to partial code upon family request.  02/20 Pt still requiring levo, try to wean off slowly, family did not show for the meeting.  02/21 family meeting was done yesterday, no acclation of care per daughter wishes.   02/25 Pt lost IV access, PICC team consulted for midline,still unable to wean off the went, neurological status unchanged she is alert but not following commands, fails her SBT every morning.    Interval History/Significant Events: PT had episode of tachypnea and tachycardia following reposition her. Pt has nasal fentanyl as IV access was lost. Pt became stable 15 mins later.      Review of Systems   Unable to perform ROS: Intubated     Objective:     Vital Signs (Most Recent):  Temp: 99.4 °F (37.4 °C) (02/25/18 1100)  Pulse: 75 (02/25/18 1301)  Resp: 14 (02/25/18 1301)  BP: 100/64 (02/25/18 1100)  SpO2: 97 % (02/25/18 1301) Vital Signs (24h Range):  Temp:  [98.1 °F (36.7 °C)-99.4 °F (37.4 °C)] 99.4 °F (37.4 °C)  Pulse:  [63-91] 75  Resp:  [0-26] 14  SpO2:  [94 %-99 %] 97 %  BP: ()/(55-81) 100/64   Weight: 89.5 kg (197 lb 5 oz)  Body mass index is 30 kg/m².      Intake/Output Summary (Last 24 hours) at 02/25/18 1326  Last data filed at 02/25/18 1101   Gross per 24 hour   Intake              790 ml   Output              860 ml   Net              -70 ml       Physical Exam   Constitutional: No distress.   HENT:   Head: Normocephalic and atraumatic.    Eyes: Right eye exhibits no discharge. Left eye exhibits no discharge.   Neck: Normal range of motion. Neck supple.   Cardiovascular: Normal rate and regular rhythm.    Pulmonary/Chest: Effort normal. No respiratory distress. She has no wheezes. She has rales (b/l bases).   intubated   Abdominal: Soft. She exhibits no distension.   Musculoskeletal: She exhibits deformity (contracted upper and lower extremities).   Neurological: She is alert.   Does not following commands. No purposeful movement. Opens eyes, but does not track.     Skin: Skin is warm and dry.       Vents:  Vent Mode: A/C (02/25/18 1301)  Ventilator Initiated: Yes (02/04/18 2344)  Set Rate: 14 bmp (02/25/18 1301)  Vt Set: 400 mL (02/25/18 1301)  Pressure Support: 0 cmH20 (02/25/18 1301)  PEEP/CPAP: 5 cmH20 (02/25/18 1301)  Oxygen Concentration (%): 30 (02/25/18 1301)  Peak Airway Pressure: 32 cmH2O (02/25/18 1301)  Plateau Pressure: 18 cmH20 (02/25/18 1301)  Total Ve: 5.76 mL (02/25/18 1301)  F/VT Ratio<105 (RSBI): (!) 33.73 (02/25/18 1301)  Lines/Drains/Airways     Drain                 Gastrostomy/Enterostomy 10/25/16 1401 Percutaneous endoscopic gastrostomy (PEG) midline feeding 488 days         Urethral Catheter 02/05/18 1006 Latex 20 days          Airway                 Airway - Non-Surgical 02/04/18 2329 Endotracheal Tube 20 days          Pressure Ulcer                 Pressure Injury 02/05/18 1045 Left medial Foot Deep tissue injury 20 days         Pressure Injury 02/05/18 1045 Right lateral Foot Deep tissue injury 20 days              Significant Labs:    CBC/Anemia Profile:  No results for input(s): WBC, HGB, HCT, PLT, MCV, RDW, IRON, FERRITIN, RETIC, FOLATE, [id number], OCCULTBLOOD in the last 48 hours.     Chemistries:  No results for input(s): NA, K, CL, CO2, BUN, CREATININE, CALCIUM, ALBUMIN, PROT, BILITOT, ALKPHOS, ALT, AST, GLUCOSE, MG, PHOS in the last 48 hours.    Recent Lab Results       02/24/18  1448      POCT Glucose 75            Significant Imaging:  No new imaging    Assessment/Plan:     Neuro   Seizure-like activity    - continue keppra         Dysphagia as late effect of cerebrovascular disease    - S/P PEG tube.  - on Tube feeds, Pt has on-off episodes of emesis, but can tolerate trickle.        CVA, old, hemiparesis    -patient suffered multiple strokes in past, last one in 2008   -nonverbal at baseline, alert, makes eye contact but doesn't follow commands.    MRI (2/10)  Findings: The diffusion sequence demonstrates no evidence of acute infarct. There is severe white matter small vessel ischemic change. There is a remote infarct of the right occipital lobe. There is a mild hemosiderin staining of the brainstem probably from a prior subarachnoid hemorrhage. There is a small focus of hemosiderin posterior right midline medulla. Pituitary and craniocervical junction show nothing unusual. There is left frontal sinusitis change. There is involutional change.  - Records requested from [facility] and [facility]. Did not include brain images or neurology notes.  - Neurology consulted: differential includes but is not limited to CADASIL (given h/o strokes, extensive white matter involvement including the temporal lobes), adult onset adrenoleukodystrophy, chronic microvascular changes (less likely), vanishing white matter disease (less likely given predominance in children), or Binswanger disease (subcortical leukoencephalopathy or vascular dementia 2/2 chronic HTN).   - NOTCH3 genetic testing sent, results pending        Derm   Unstageable pressure ulcer of left buttock    - multiple area.  - wound care per nursing.        Pulmonary   * Acute respiratory failure with hypoxia    - Intubated, Failing daily SBT.  - Causes most likely due to large amount secretion, pt neurological disease  That affect her to follow command and possible weakness her muscles.    Vent Mode: A/C  Oxygen Concentration (%):  [30] 30  Resp Rate Total:  [14 br/min-110 br/min]  14 br/min  Vt Set:  [400 mL] 400 mL  PEEP/CPAP:  [5 cmH20] 5 cmH20  Pressure Support:  [0 cmH20] 0 cmH20  Mean Airway Pressure:  [8.8 maE01-35 cmH20] 8.8 cmH20          Aspiration pneumonia    Extensive consolidation of the right lung and debris in the right mainstem bronchus and its branches consistent with aspiration pneumonia.  - S/p course of rocephin; s/p vanco & cefepime & flagyl   - Not on any antibiotics currently  - Intubated        Cardiac/Vascular   Paroxysmal atrial fibrillation    - NSR  - no ECG done at [facility] shows Afib.  - Pt home BB held due to hypotension           Renal/   Hypernatremia    - Resolved           UTI (urinary tract infection)    - previous UA (Bacteria, WBC, leukocyte positive)  - Urine cultures proteus  - Course of Rocephin completed   - Resolved        GI   Partial small bowel obstruction    - KUB in 02/14 showed distended stomach and partial SOB.  - CT abd pelvis w contrast showed no acute intra-abdominal abnormality.  - Emesis happens on/off, no residual. respond to antemetic and feed holding.          PEG (percutaneous endoscopic gastrostomy) status    - on tube feed on/off due to emesis         Orthopedic   Pressure injury of deep tissue    - multiple areas.  - wound care per nursing.        Other   Circulatory failure    - hypotensive requiring pressors. >>> OFF pressors 02/20  - etiology septic (VAP) vs adrenal insuf   - Pt was started on vanc and cefepime 02/19.d/c'ed 02/20    RESOLVED          Palliative care encounter    - partial code  - Palliative care following.  - Plan for terminal extubation 2/26/18        Goals of care, counseling/discussion    - Palliative consulted.   - Plan for terminal extubation on 2/26/2018          Critical care was time spent personally by me on the following activities: development of treatment plan with patient or surrogate and bedside caregivers, discussions with consultants, evaluation of patient's response to treatment, examination of  patient, ordering and performing treatments and interventions, ordering and review of laboratory studies, ordering and review of radiographic studies, pulse oximetry, re-evaluation of patient's condition. This critical care time did not overlap with that of any other provider or involve time for any procedures.     Ana Chauhan MD  Critical Care Medicine  [facility]

## 2018-02-25 NOTE — PLAN OF CARE
Problem: Patient Care Overview  Goal: Plan of Care Review  VSS within the shift. Patient opens eyes spontaneously and follows commands. On sinus rhythm. No hypotension noted. On  mecahnical ventilator set at 30% FIO2,14 BUR, 400 TV and 5 PEEP. On tube feeding at 10cc/hour. Water boluses administered. No residuals noted. FC in place (see flowsheet for the amount and characteristic). For establishment of IV access. For withdrawal of care this coming Monday. Will continue to monitor.

## 2018-02-25 NOTE — PLAN OF CARE
Problem: Patient Care Overview  Goal: Plan of Care Review  Outcome: Ongoing (interventions implemented as appropriate)  No acute events throughout day. See vital signs and assessments in flowsheets. See below for updates on today's progress.     Pulmonary: Remains on mechanical ventilation with no changes to vent settings.    Cardiovascular: NSR, BP WNL.    Neurological: Not following commands, contracted x4 extremities. Withdraws to pain.    Gastrointestinal: WNL, no residuals. Trickle feeds restarted.    Genitourinary: De Leon intact, minimal UO.     Endocrine: Accu check 75    Integumentary/Other: Multiple wounds, see flowsheets for charting.      Patient progressing towards goals as tolerated, plan of care communicated and reviewed with Jonathan Nieves and family. All concerns addressed. Will continue to monitor.

## 2018-02-25 NOTE — SUBJECTIVE & OBJECTIVE
Interval History/Significant Events: Pt had episode of emesis yesterday, tube feeds were held.    Review of Systems   Unable to perform ROS: Intubated     Objective:     Vital Signs (Most Recent):  Temp: 99.4 °F (37.4 °C) (02/25/18 1100)  Pulse: 75 (02/25/18 1301)  Resp: 14 (02/25/18 1301)  BP: 100/64 (02/25/18 1100)  SpO2: 97 % (02/25/18 1301) Vital Signs (24h Range):  Temp:  [98.1 °F (36.7 °C)-99.4 °F (37.4 °C)] 99.4 °F (37.4 °C)  Pulse:  [63-91] 75  Resp:  [0-26] 14  SpO2:  [94 %-99 %] 97 %  BP: ()/(55-81) 100/64   Weight: 89.5 kg (197 lb 5 oz)  Body mass index is 30 kg/m².      Intake/Output Summary (Last 24 hours) at 02/25/18 1326  Last data filed at 02/25/18 1101   Gross per 24 hour   Intake              790 ml   Output              860 ml   Net              -70 ml       Physical Exam   Constitutional: No distress.   HENT:   Head: Normocephalic and atraumatic.   Eyes: Right eye exhibits no discharge. Left eye exhibits no discharge.   Neck: Normal range of motion. Neck supple.   Cardiovascular: Normal rate and regular rhythm.    Pulmonary/Chest: Effort normal. No respiratory distress. She has no wheezes. She has rales (b/l bases).   intubated   Abdominal: Soft. She exhibits no distension.   Musculoskeletal: She exhibits deformity (contracted upper and lower extremities).   Neurological: She is alert.   Does not following commands. No purposeful movement. Opens eyes, but does not track.     Skin: Skin is warm and dry.       Vents:  Vent Mode: A/C (02/25/18 1301)  Ventilator Initiated: Yes (02/04/18 2344)  Set Rate: 14 bmp (02/25/18 1301)  Vt Set: 400 mL (02/25/18 1301)  Pressure Support: 0 cmH20 (02/25/18 1301)  PEEP/CPAP: 5 cmH20 (02/25/18 1301)  Oxygen Concentration (%): 30 (02/25/18 1301)  Peak Airway Pressure: 32 cmH2O (02/25/18 1301)  Plateau Pressure: 18 cmH20 (02/25/18 1301)  Total Ve: 5.76 mL (02/25/18 1301)  F/VT Ratio<105 (RSBI): (!) 33.73 (02/25/18 1301)  Lines/Drains/Airways     Drain                  Gastrostomy/Enterostomy 10/25/16 1401 Percutaneous endoscopic gastrostomy (PEG) midline feeding 488 days         Urethral Catheter 02/05/18 1006 Latex 20 days          Airway                 Airway - Non-Surgical 02/04/18 2329 Endotracheal Tube 20 days          Pressure Ulcer                 Pressure Injury 02/05/18 1045 Left medial Foot Deep tissue injury 20 days         Pressure Injury 02/05/18 1045 Right lateral Foot Deep tissue injury 20 days              Significant Labs:    CBC/Anemia Profile:  No results for input(s): WBC, HGB, HCT, PLT, MCV, RDW, IRON, FERRITIN, RETIC, FOLATE, [id number], OCCULTBLOOD in the last 48 hours.     Chemistries:  No results for input(s): NA, K, CL, CO2, BUN, CREATININE, CALCIUM, ALBUMIN, PROT, BILITOT, ALKPHOS, ALT, AST, GLUCOSE, MG, PHOS in the last 48 hours.    Recent Lab Results       02/24/18  1448      POCT Glucose 75           Significant Imaging:  No new imaging

## 2018-02-26 PROBLEM — E87.0 HYPERNATREMIA: Status: RESOLVED | Noted: 2018-01-01 | Resolved: 2018-01-01

## 2018-02-26 PROBLEM — R57.9 CIRCULATORY FAILURE: Status: RESOLVED | Noted: 2018-01-01 | Resolved: 2018-01-01

## 2018-02-26 NOTE — CARE UPDATE
Patient still in ICU. NO family available. Patient does not have access. Suggested using morphine subcutaneously (can be given by continuous infusion) if needed for planned extubation.

## 2018-02-26 NOTE — PLAN OF CARE
Patient remains intubated.  Plan for palliative extubation today.  CC[facility] physician team awaiting family arrival.  CM will continue to follow.       02/26/18 1524   Right Care Assessment   How often would a person be available to care for the patient? Often   Describe the patient's ability to walk at the present time. Does not walk or unable to take any steps at all   How does the patient rate their overall health at the present time? Poor   Number of comorbid conditions (as recorded on the chart) Five or more   During the past month, has the patient often been bothered by feeling down, depressed or hopeless? (JONH)   During the past month, has the patient often been bothered by little interest or pleasure in doing things? (JOHN)   Lisa Landers RN, BSN  Case Management  [facility]  Ext. [phone]

## 2018-02-26 NOTE — PLAN OF CARE
Problem: Patient Care Overview  Goal: Plan of Care Review  Outcome: Ongoing (interventions implemented as appropriate)  Pulmonary: Remains on mechanical ventilation with no changes to vent settings.     Cardiovascular: NSR, BP WNL.     Neurological: Following commands intermittently, contracted x4 extremities. Withdraws to pain.     Gastrointestinal: TF turned off due to vomiting x1 at 1730. No residuals throughout shift.      Genitourinary: De Leon intact, minimal UO.      Integumentary/Other: Multiple wounds, see flowsheets for charting.        Patient progressing towards goals as tolerated, plan of care communicated and reviewed with Jonathan Nieves. Family not at bedside this shift for family meeting. Team to continue to make attempts to set meeting with family. All concerns addressed. Will continue to monitor.

## 2018-02-26 NOTE — PLAN OF CARE
Problem: Patient Care Overview  Goal: Plan of Care Review  Outcome: Ongoing (interventions implemented as appropriate)  VSS within the shift. Patient opens eyes spontaneously and follows command. On sinus rhythm. No episode of hypotension. Ventilator settings kept with 30% FIO2, BUR 14  and PEEP 5. No desaturations noted. Kept on NPO. Tube feeding continued at 10cc per hour. No residuals obtained. NO BM noted within the shift. FC in place with noted concentrated urine output. Plan is for withdrawal of care today. For IV line insertion. Will continue to monitor.

## 2018-02-26 NOTE — CONSULTS
Attempted venous access placement x4 unsuccessfully. Also 2 recent unsuccessful Midline placements as well.  Due to patient level of contraction to the upper extremities, unable to properly place midline and patient not a upper PICC candidate either.  If patient is requiring long term access,  a tunneled PICC or port a cath likely to be more successful than any line placed at beside.  Please contact [facility] at [phone] with any questions.

## 2018-02-26 NOTE — PROGRESS NOTES
[facility]  Adult Nutrition  Consult Note    SUMMARY       Reason for Assessment    Reason for Assessment: RD follow-up   Diagnosis: other (see comments) (Resp fx.)   Relevent Medical History: HTN, HLD   Interdisciplinary Rounds: attended     General Information Comments: Pt remains intubated, plan for withdraw of care today. TF of Isosource infusing @ 10 mL/hr.  Nutrition Discharge Planning: If pt situation changes, please re-consult RD.     Nutrition Follow-Up    RD Follow-up?: No

## 2018-02-27 PROBLEM — L89.023: Status: ACTIVE | Noted: 2018-01-01

## 2018-02-27 NOTE — PLAN OF CARE
Problem: Patient Care Overview  Goal: Plan of Care Review  Outcome: Ongoing (interventions implemented as appropriate)  Patient turned Q2H.

## 2018-02-27 NOTE — ASSESSMENT & PLAN NOTE
- Palliative consulted.   - terminal extubation on 2/26/2018 discontinued due to family no longer wanting this  - plan for further goals of care discussion

## 2018-02-27 NOTE — PHYSICIAN QUERY
"PT Name: Jonathan Nieves  MR #: [id number]    Physician Query Form -Present on Admission (POA) Diagnosis Clarification     CDS/: Kaykay Nance RN CDI   Contact information: [email or web address]    This form is a permanent document in the medical record.     Query Date: February 27, 2018    By submitting this query, we are merely seeking further clarification of documentation. Please utilize your independent clinical judgment when addressing the question(s) below.       The Medical record contains the following:    Diagnosis      Supporting Clinical Information   Location in Medical Record   Pressure ulcers to Cyrus. feet       Pressure injury 2/5/18 1045 Left medial foot deep tissue injury   Progress note 2/27 816 am         Doctor, Please specify Present On Admission (POA) status of 'Pressure ulcers".  [x  ] Present on Admission   [  ] Not Present on Admission  [  ] Clinically undetermined            "

## 2018-02-27 NOTE — PROGRESS NOTES
"[facility]  Critical Care Medicine  Progress Note    Patient Name: Jonathan Nieves  MRN: [id number]  Admission Date: 2/4/2018  Hospital Length of Stay: 22 days  Code Status: Partial Code  Attending Provider: Maki Medeiros MD  Primary Care Provider: Primary Doctor No   Principal Problem: Acute respiratory failure with hypoxia    Subjective:     HPI:  Mr. Jonathan Nieves is a 53yo female with history of CVA with residual aphasia and dysphagia, non-verbal, s/p PEG tube placement and bed riddenness, osteomyelitis s/p amputation of toe, who comes to the ED from [facility] via EMS in respiratory distress. Per ED/EMS the patient was noted to have "projectile vomiting" yesterday evening (24 hours prior to presentation), which was treated with zofran. During the event there is suspicion the patient possible aspirated. Today the patient was noted to be in resp distress with a fever.     Patient was satting 88% on 4L on EMS arrival, breathing 40 times per minute.  Improved to 96% on 15L NRB with RR high 20s.  Axillary temp 102.    CT chest concerning for aspiration pneumonia.  UA concern for a UTI.       Patient intubated in the ED & Critical Care consulted.         Hospital/ICU Course:  2/5: Critical Care Consulted. Patient intubated in the ED.   2/6: Patient doing well, on SBT; will extubate maria e. Continue treatment for aspiration PNA & UTI.   2/7: abx deescalated, continue to treat for PNA and UTI. Required pressors overnight. Off this morning.   2/8-9: still with secretions and questionable mentation? Baseline?. Will try to dry the secretions and extubated today or maria e.    2/10: MRI completed. Family called, had family meeting with one daughter. Need to re-visit with all daughters. Records requested from [facility] and [facility].   02/11-13: neurology consulted, no acute changes, wbc and LFT trending up, keep monitoring.  2/14: Concerns for CADASIL vs B[place] syndrome per Neuro. NOTCH3 genetic " testing sent. Palliative consulted regarding goals of care.   02/15 pt is not tolerating tube feed, had two episodes of projectile vomiting, KUB 02/14 shows partial SOB and distended stomach.  02/16 CT abdomen was unremarkable, family meeting was done.  02/17 no more vomiting, tolerating 10 cc/hr tube feeds.  02/19 trial to advance tube feeding, had residual back to trickle feed will try to advance slowly. 02/19 PT became hypotensive this morning with MAPs of 50's started on levo, daughter was contacted regarding goals of care, pt status was changed to partial code upon family request.  02/20 Pt still requiring levo, try to wean off slowly, family did not show for the meeting.  02/21 family meeting was done yesterday, no acclation of care per daughter wishes.   02/25 Pt lost IV access,still unable to wean off the went, neurological status unchanged she is alert but not following commands, fails her SBT every morning.    Interval History/Significant Events: No acute overnight events.     Review of Systems   Unable to perform ROS: Intubated     Objective:     Vital Signs (Most Recent):  Temp: 97.5 °F (36.4 °C) (02/27/18 0701)  Pulse: 63 (02/27/18 0802)  Resp: 14 (02/26/18 1800)  BP: 100/64 (02/27/18 0802)  SpO2: 97 % (02/27/18 0802) Vital Signs (24h Range):  Temp:  [97.5 °F (36.4 °C)-99.3 °F (37.4 °C)] 97.5 °F (36.4 °C)  Pulse:  [60-83] 63  Resp:  [14-29] 14  SpO2:  [93 %-100 %] 97 %  BP: ()/(57-73) 100/64   Weight: 89.5 kg (197 lb 5 oz)  Body mass index is 30 kg/m².      Intake/Output Summary (Last 24 hours) at 02/27/18 0807  Last data filed at 02/27/18 0701   Gross per 24 hour   Intake              340 ml   Output              393 ml   Net              -53 ml       Physical Exam   HENT:   Head: Normocephalic and atraumatic.   Eyes: Conjunctivae are normal. No scleral icterus.   Cardiovascular: Normal rate and regular rhythm.    Pulmonary/Chest: She has no wheezes. She has rales.   On the ventilator    Abdominal: Soft. She exhibits distension. There is no tenderness. There is no guarding.   Musculoskeletal: She exhibits no edema.   Upper and lower ext contratures   Neurological:   Opens eyes to voice, does not perform purposeful movements   Skin: Skin is warm. No rash noted. Multiple decubitus ulcers throughout body      Vents:  Vent Mode: A/C (02/27/18 0802)  Ventilator Initiated: Yes (02/04/18 2344)  Set Rate: 14 bmp (02/27/18 0802)  Vt Set: 400 mL (02/27/18 0802)  Pressure Support: 0 cmH20 (02/27/18 0802)  PEEP/CPAP: 5 cmH20 (02/27/18 0802)  Oxygen Concentration (%): 30 (02/27/18 0802)  Peak Airway Pressure: 32 cmH2O (02/27/18 0802)  Plateau Pressure: 18 cmH20 (02/27/18 0802)  Total Ve: 6.43 mL (02/27/18 0802)  F/VT Ratio<105 (RSBI): (!) 33.9 (02/26/18 0909)  Lines/Drains/Airways     Drain                 Gastrostomy/Enterostomy 10/25/16 1401 Percutaneous endoscopic gastrostomy (PEG) midline feeding 489 days         Urethral Catheter 02/05/18 1006 Latex 21 days          Airway                 Airway - Non-Surgical 02/04/18 2329 Endotracheal Tube 22 days          Pressure Ulcer                 Pressure Injury 02/05/18 1045 Left medial Foot Deep tissue injury 21 days         Pressure Injury 02/05/18 1045 Right lateral Foot Deep tissue injury 21 days              Significant Labs:    CBC/Anemia Profile:  No results for input(s): WBC, HGB, HCT, PLT, MCV, RDW, IRON, FERRITIN, RETIC, FOLATE, [id number], OCCULTBLOOD in the last 48 hours.     Chemistries:  No results for input(s): NA, K, CL, CO2, BUN, CREATININE, CALCIUM, ALBUMIN, PROT, BILITOT, ALKPHOS, ALT, AST, GLUCOSE, MG, PHOS in the last 48 hours.      Assessment/Plan:     Neuro   Seizure-like activity    - continue keppra         Dysphagia as late effect of cerebrovascular disease    - S/P PEG tube.  - on Tube feeds, Pt has on-off episodes of emesis, but can tolerate trickle.        CVA, old, hemiparesis    -patient suffered multiple strokes in past, last one  in 2008   -nonverbal at baseline, alert, makes eye contact but doesn't follow commands.    MRI (2/10)  Findings: The diffusion sequence demonstrates no evidence of acute infarct, remote infarct of the right occipital lobe. There is a mild hemosiderin staining of the brainstem probably from a prior subarachnoid hemorrhage. There is a small focus of hemosiderin posterior right midline medulla.       Derm   Unstageable pressure ulcer of left buttock    - multiple area.  - wound care per nursing.        Pulmonary   * Acute respiratory failure with hypoxia    - Intubated  - has large amount secretion (c/w glycopyrrol), as well as neurological disease, inability to follow commands        Aspiration pneumonia    Extensive consolidation of the right lung and debris in the right mainstem bronchus and its branches consistent with aspiration pneumonia.  - S/p course of rocephin; s/p vanco & cefepime & flagyl   - Not on any antibiotics currently  - Intubated        Cardiac/Vascular   Paroxysmal atrial fibrillation    - Pt home BB held due to hypotension           GI   Partial small bowel obstruction    - KUB in 02/14 showed distended stomach and partial SOB.  - CT abd pelvis w contrast showed no acute intra-abdominal abnormality.  - Emesis happens on/off, no residual. respond to antemetic and feed holding.          PEG (percutaneous endoscopic gastrostomy) status    - on tube feed on/off due to emesis         Orthopedic   Pressure injury of deep tissue    - multiple areas.  - wound care per nursing.        Other   Palliative care encounter    - partial code  - Palliative care following.  - Plan for terminal extubation 2/26/18        Goals of care, counseling/discussion    - Palliative consulted.   - terminal extubation on 2/26/2018 discontinued due to family no longer wanting this  - plan for further goals of care discussion           Critical Care Daily Checklist:    A: Awake: RASS Goal/Actual Goal: RASS Goal: 0-->alert and  calm  Actual: Krishnamurthy Agitation Sedation Scale (RASS): Alert and calm   B: Spontaneous Breathing Trial Performed?     C: SAT & SBT Coordinated?  Not on sedation                      D: Delirium: CAM-ICU Overall CAM-ICU: Positive   E: Early Mobility Performed? No, contracted   F: Feeding Goal: Goals: Meet % EEN, EPN  Status: Nutrition Goal Status: goal not met   Current Diet Order   Procedures    Diet NPO      AS: Analgesia/Sedation none   T: Thromboembolic Prophylaxis    H: HOB > 300 Yes   U: Stress Ulcer Prophylaxis (if needed)    G: Glucose Control    B: Bowel Function Stool Occurrence: 1   I: Indwelling Catheter (Lines & Whitmore) Necessity whitmore   D: De-escalation of Antimicrobials/Pharmacotherapies No antibiotics    Plan for the day/ETD Goals of care discussion    Code Status:  Family/Goals of Care: Partial Code       Critical secondary to Patient has a condition that poses threat to life and bodily function: respiratory failure      Critical care was time spent personally by me on the following activities: development of treatment plan with patient or surrogate and bedside caregivers, discussions with consultants, evaluation of patient's response to treatment, examination of patient, ordering and performing treatments and interventions, ordering and review of laboratory studies, ordering and review of radiographic studies, pulse oximetry, re-evaluation of patient's condition. This critical care time did not overlap with that of any other provider or involve time for any procedures.     Fabian Rodriguez MD  Critical Care Medicine  [facility]

## 2018-02-27 NOTE — ASSESSMENT & PLAN NOTE
- Intubated  - has large amount secretion (c/w glycopyrrol), as well as neurological disease, inability to follow commands

## 2018-02-27 NOTE — PHYSICIAN QUERY
"PT Name: Jonathan Nieves  MR #: [id number]     Physician Query Form - Documentation Clarification      CDS/: Kaykay Nance RN CDI      Contact information:  [email or web address]  [phone]    This form is a permanent document in the medical record.     Query Date: February 27, 2018    By submitting this query, we are merely seeking further clarification of documentation. Please utilize your independent clinical judgment when addressing the question(s) below.    The Medical record reflects the following:    Supporting Clinical Findings Location in Medical Record       Wound 02/05/18 0300 Elbow   Date First Assessed/Time First Assessed: 02/05/18 0300      Pre-existing: Yes     Side: Left  Location: Elbow   Wound Image    Wound WDL ex   Dressing Appearance Clean;Intact;Dry   Drainage Amount Scant   Drainage Characteristics/Odor Clear   Appearance Red;Blistered   Red (%), Wound Tissue Color 100 %   Periwound Area Moist   Wound Edges Open   Wound Length (cm) 1.5   Wound Width (cm) 1.5   Depth (cm) 0.1       Wound care note  2/5 212 pm     Adaptic dressing then aquacel foam dressing overleft elbow every Mon/Thurs      Wound care note  2/5 212 pm   Pressure ulcer 1/26/1626/16/1502 Left elbow Stage III   H&P   Left elbow ulcer without erythema or purulent drainage   ID progress note 2/7 408 pm                                                                            Doctor, Please specify diagnosis or diagnoses associated with above clinical findings.               Doctor, please further specify Present on admit status of "Pressure ulcer left elbow stage three."      Provider Use Only        [ x ] Pressure ulcer left elbow stage three                         POA (Present on admit)  Yes        [  ] Pressure ulcer left elbow stage three                         POA (Present on admit)  No                             [  ] Other: specify ___________________                                                                     "                                          [  ] Clinically undetermined

## 2018-02-27 NOTE — SUBJECTIVE & OBJECTIVE
Interval History/Significant Events: naeon    Review of Systems   Unable to perform ROS: Intubated     Objective:     Vital Signs (Most Recent):  Temp: 99.3 °F (37.4 °C) (02/26/18 1500)  Pulse: 71 (02/26/18 1800)  Resp: 14 (02/26/18 1800)  BP: 104/67 (02/26/18 1800)  SpO2: 96 % (02/26/18 1800) Vital Signs (24h Range):  Temp:  [98.6 °F (37 °C)-99.3 °F (37.4 °C)] 99.3 °F (37.4 °C)  Pulse:  [61-83] 71  Resp:  [14-29] 14  SpO2:  [95 %-99 %] 96 %  BP: ()/(57-71) 104/67   Weight: 89.5 kg (197 lb 5 oz)  Body mass index is 30 kg/m².      Intake/Output Summary (Last 24 hours) at 02/26/18 1812  Last data filed at 02/26/18 1726   Gross per 24 hour   Intake              850 ml   Output              635 ml   Net              215 ml       Physical Exam   Constitutional: No distress.   HENT:   Head: Normocephalic and atraumatic.   Eyes: Right eye exhibits no discharge. Left eye exhibits no discharge.   Neck: Normal range of motion. Neck supple.   Cardiovascular: Normal rate and regular rhythm.    Pulmonary/Chest: Effort normal. No respiratory distress. She has no wheezes. She has rales (b/l bases).   intubated   Abdominal: Soft. She exhibits no distension.   Musculoskeletal: She exhibits deformity (contracted upper and lower extremities).   Neurological: She is alert.   Does not following commands. No purposeful movement. Opens eyes, but does not track.     Skin: Skin is warm and dry.   Vitals reviewed.      Vents:  Vent Mode: A/C (02/26/18 1746)  Ventilator Initiated: Yes (02/04/18 2344)  Set Rate: 14 bmp (02/26/18 1746)  Vt Set: 400 mL (02/26/18 1746)  Pressure Support: 0 cmH20 (02/26/18 1746)  PEEP/CPAP: 5 cmH20 (02/26/18 1746)  Oxygen Concentration (%): 30 (02/26/18 1800)  Peak Airway Pressure: 35 cmH2O (02/26/18 1746)  Plateau Pressure: 18 cmH20 (02/26/18 1746)  Total Ve: 5.94 mL (02/26/18 1746)  F/VT Ratio<105 (RSBI): (!) 33.9 (02/26/18 0909)  Lines/Drains/Airways     Drain                 Gastrostomy/Enterostomy  10/25/16 1401 Percutaneous endoscopic gastrostomy (PEG) midline feeding 489 days         Urethral Catheter 02/05/18 1006 Latex 21 days          Airway                 Airway - Non-Surgical 02/04/18 2329 Endotracheal Tube 21 days          Pressure Ulcer                 Pressure Injury 02/05/18 1045 Left medial Foot Deep tissue injury 21 days         Pressure Injury 02/05/18 1045 Right lateral Foot Deep tissue injury 21 days              Significant Labs:    CBC/Anemia Profile:  No results for input(s): WBC, HGB, HCT, PLT, MCV, RDW, IRON, FERRITIN, RETIC, FOLATE, [id number], OCCULTBLOOD in the last 48 hours.     Chemistries:  No results for input(s): NA, K, CL, CO2, BUN, CREATININE, CALCIUM, ALBUMIN, PROT, BILITOT, ALKPHOS, ALT, AST, GLUCOSE, MG, PHOS in the last 48 hours.    All pertinent labs within the past 24 hours have been reviewed.    Significant Imaging:  I have reviewed all pertinent imaging results/findings within the past 24 hours.

## 2018-02-27 NOTE — PROGRESS NOTES
"[facility]  Palliative Medicine  Progress Note    Patient Name: Jonathan Nieves  MRN: [id number]  Admission Date: 2/4/2018  Hospital Length of Stay: 22 days  Code Status: Partial Code   Attending Provider: Maki Medeiros MD  Consulting Provider: Mariella Rivas MD  Primary Care Physician: Primary Doctor No  Principal Problem:Acute respiratory failure with hypoxia    Patient information was obtained from past medical records and ER records.      Assessment/Plan:     Palliative care encounter    Family not available. Will follow if you need anything.            I will follow-up with patient. Please contact us if you have any additional questions.    Subjective:     Chief Complaint:   Chief Complaint   Patient presents with    Respiratory Distress     Pt from [facility] presents to ED with suspected aspiration following episode of reported projectile vomiting 3 Jan. Upon EMs arrival pt was 88% on 4L NC.        HPI:   Asked to see patient by Dr. Sales for assistance with goals of care and symptom management. Chart reviewed, patient examined.  Mr. Jonathan Nieves is a 51yo female with history of CVA with residual aphasia and dysphagia, non-verbal, s/p PEG tube placement and bed riddenness, osteomyelitis s/p amputation of toe, who came to the ED from [facility] via EMS in respiratory distress. Per ED/EMS the patient was noted to have "projectile vomiting" yesterday evening (24 hours prior to presentation), which was treated with zofran. During the event there is suspicion the patient possible aspirated. Currently, she opens eyes to stimulation, intubated, with contractures of knees/hips/elbows/hands and multiple areas of pressure injury.   I spoke with Daughter Matias about her mother. Family meeting for today was cancelled due to lack of participation.    Hospital Course:  No notes on file    No new subjective & objective note has been filed under this hospital service since the " last note was generated.          Mariella Rivas MD  Palliative Medicine  [facility]

## 2018-02-27 NOTE — PLAN OF CARE
Problem: Patient Care Overview  Goal: Plan of Care Review  Outcome: Ongoing (interventions implemented as appropriate)  See vital signs and assessments in flowsheets. See below for updates on today's progress.     Pulmonary: intubated, AC 30% fio2. SBT done today and patient failed. VBG 61    Cardiovascular:SR 60-90's. BP 's systolic. MAP >65.     Neurological: afebrile. Pupils equal and reactive. Withdraws to pain. Open eyes spontaneously.     Gastrointestinal: BM x1. NPO. PEG tube no residuals.     Genitourinary: 5-15ml/hr    Endocrine: normoglycemic     Integumentary/Other: wound care done.     Infusions: none    Patient progressing towards goals as tolerated, plan of care communicated and reviewed with patient and family. All concerns addressed. Will continue to monitor.

## 2018-02-27 NOTE — PHYSICIAN QUERY
PT Name: Jonathan Nieves  MR #: [id number]     Physician Query Form - Documentation Clarification      CDS/: Kaykay Nance RN  CDI            Contact information: [email or web address]    This form is a permanent document in the medical record.     Query Date: February 27, 2018    By submitting this query, we are merely seeking further clarification of documentation. Please utilize your independent clinical judgment when addressing the question(s) below.    The Medical record reflects the following:    Supporting Clinical Findings Location in Medical Record   Derm  Unstageable pressure ulcer of left buttock  - multiple area, all looks clean, not infected.     Critical care note 2/21 839 am   Wound 02/05/18 0300 upper Buttocks -     Pre-existing: Yes      Date First Assessed/Time First Assessed: 02/05/18 0300    Pre-existing: Yes     Side: (c)   Orientation: upper  Location: Buttocks     Wound Image    Drainage Amount Scant   Drainage Characteristics/Odor Serosanguineous   Appearance Red;Eschar   Black (%), Wound Tissue Color (100 on left)   Red (%), Wound Tissue Color (100 on right)   Periwound Area Intact   Wound Edges Open   Wound Length (cm) 3   Wound Width (cm) 1     wound care consult                                                                            Doctor, Please specify diagnosis or diagnoses associated with above clinical findings.         Doctor, please further specify Pressure ulcer(s) of buttocks, left and right with POA (present on admit) status.    Provider Use Only         [    ] Unstageable pressure ulcer of left buttock                 POA -    [ x ] Yes,   [  ] No,  [  ] Other,   [  ] Undetermined       [    ] Other, please specify_____________.             [    ] Pressure ulcer/injury of right buttocks,                 specify stage; ____________.                 POA -    [ x ] Yes,   [  ] No,  [  ] Other,   [  ] Undetermined       [ x   ]  Right buttocks no pressure ulcer, specify  wound________.       [    ]  Other, please specify______________.                                                                                                             [  ] Clinically undetermined

## 2018-02-27 NOTE — SUBJECTIVE & OBJECTIVE
Interval History/Significant Events: No acute overnight events.     Review of Systems   Unable to perform ROS: Intubated     Objective:     Vital Signs (Most Recent):  Temp: 97.5 °F (36.4 °C) (02/27/18 0701)  Pulse: 63 (02/27/18 0802)  Resp: 14 (02/26/18 1800)  BP: 100/64 (02/27/18 0802)  SpO2: 97 % (02/27/18 0802) Vital Signs (24h Range):  Temp:  [97.5 °F (36.4 °C)-99.3 °F (37.4 °C)] 97.5 °F (36.4 °C)  Pulse:  [60-83] 63  Resp:  [14-29] 14  SpO2:  [93 %-100 %] 97 %  BP: ()/(57-73) 100/64   Weight: 89.5 kg (197 lb 5 oz)  Body mass index is 30 kg/m².      Intake/Output Summary (Last 24 hours) at 02/27/18 0807  Last data filed at 02/27/18 0701   Gross per 24 hour   Intake              340 ml   Output              393 ml   Net              -53 ml       Physical Exam   HENT:   Head: Normocephalic and atraumatic.   Eyes: Conjunctivae are normal. No scleral icterus.   Cardiovascular: Normal rate and regular rhythm.    Pulmonary/Chest: She has no wheezes. She has rales.   On the ventilator   Abdominal: Soft. She exhibits distension. There is no tenderness. There is no guarding.   Musculoskeletal: She exhibits no edema.   Upper and lower ext contratures   Neurological:   Opens eyes to voice, does not perform purposeful movements   Skin: Skin is warm. No rash noted.       Vents:  Vent Mode: A/C (02/27/18 0802)  Ventilator Initiated: Yes (02/04/18 2344)  Set Rate: 14 bmp (02/27/18 0802)  Vt Set: 400 mL (02/27/18 0802)  Pressure Support: 0 cmH20 (02/27/18 0802)  PEEP/CPAP: 5 cmH20 (02/27/18 0802)  Oxygen Concentration (%): 30 (02/27/18 0802)  Peak Airway Pressure: 32 cmH2O (02/27/18 0802)  Plateau Pressure: 18 cmH20 (02/27/18 0802)  Total Ve: 6.43 mL (02/27/18 0802)  F/VT Ratio<105 (RSBI): (!) 33.9 (02/26/18 0909)  Lines/Drains/Airways     Drain                 Gastrostomy/Enterostomy 10/25/16 1401 Percutaneous endoscopic gastrostomy (PEG) midline feeding 489 days         Urethral Catheter 02/05/18 1006 Latex 21 days           Airway                 Airway - Non-Surgical 02/04/18 2329 Endotracheal Tube 22 days          Pressure Ulcer                 Pressure Injury 02/05/18 1045 Left medial Foot Deep tissue injury 21 days         Pressure Injury 02/05/18 1045 Right lateral Foot Deep tissue injury 21 days              Significant Labs:    CBC/Anemia Profile:  No results for input(s): WBC, HGB, HCT, PLT, MCV, RDW, IRON, FERRITIN, RETIC, FOLATE, [id number], OCCULTBLOOD in the last 48 hours.     Chemistries:  No results for input(s): NA, K, CL, CO2, BUN, CREATININE, CALCIUM, ALBUMIN, PROT, BILITOT, ALKPHOS, ALT, AST, GLUCOSE, MG, PHOS in the last 48 hours.

## 2018-02-27 NOTE — PHYSICIAN QUERY
"PT Name: Jonathan Nieves  MR #: [id number]    Physician Query Form -Present on Admission (POA) Diagnosis Clarification     CDS/: Kaykay Nance RN CDI   Contact information: [email or web address]     This form is a permanent document in the medical record.     Query Date: February 27, 2018    By submitting this query, we are merely seeking further clarification of documentation. Please utilize your independent clinical judgment when addressing the question(s) below.       The Medical record contains the following:    Diagnosis      Supporting Clinical Information   Location in Medical Record   Pressure ulcer of foot         Pressure Injury 02/05/18 1045 Right lateral Foot Deep tissue injury Progress note 2/27 441 pm         Doctor, Please specify Present On Admission (POA) status of" Pressure ulcer Right lateral foot."    [ x ] Present on Admission   [  ] Not Present on Admission  [  ] Clinically undetermined            "

## 2018-02-27 NOTE — PROGRESS NOTES
"[facility]  Critical Care Medicine  Progress Note    Patient Name: Jonathan Nieves  MRN: [id number]  Admission Date: 2/4/2018  Hospital Length of Stay: 21 days  Code Status: Partial Code  Attending Provider: Maki Medeiros MD  Primary Care Provider: Primary Doctor No   Principal Problem: Acute respiratory failure with hypoxia    Subjective:     HPI:  Mr. Jonathan Nieves is a 53yo female with history of CVA with residual aphasia and dysphagia, non-verbal, s/p PEG tube placement and bed riddenness, osteomyelitis s/p amputation of toe, who comes to the ED from [facility] via EMS in respiratory distress. Per ED/EMS the patient was noted to have "projectile vomiting" yesterday evening (24 hours prior to presentation), which was treated with zofran. During the event there is suspicion the patient possible aspirated. Today the patient was noted to be in resp distress with a fever.     Patient was satting 88% on 4L on EMS arrival, breathing 40 times per minute.  Improved to 96% on 15L NRB with RR high 20s.  Axillary temp 102.    CT chest concerning for aspiration pneumonia.  UA concern for a UTI.       Patient intubated in the ED & Critical Care consulted.         Hospital/ICU Course:  2/5: Critical Care Consulted. Patient intubated in the ED.   2/6: Patient doing well, on SBT; will extubate maria e. Continue treatment for aspiration PNA & UTI.   2/7: abx deescalated, continue to treat for PNA and UTI. Required pressors overnight. Off this morning.   2/8-9: still with secretions and questionable mentation? Baseline?. Will try to dry the secretions and extubated today or maria e.    2/10: MRI completed. Family called, had family meeting with one daughter. Need to re-visit with all daughters. Records requested from [facility] and [facility].   02/11-13: neurology consulted, no acute changes, wbc and LFT trending up, keep monitoring.  2/14: Concerns for CADASIL vs Binswanger syndrome per Neuro. NOTCH3 genetic " testing sent. Palliative consulted regarding goals of care.   02/15 pt is not tolerating tube feed, had two episodes of projectile vomiting, KUB 02/14 shows partial SOB and distended stomach.  02/16 CT abdomen was unremarkable, family meeting was done.  02/17 no more vomiting, tolerating 10 cc/hr tube feeds.  02/19 trial to advance tube feeding, had residual back to trickle feed will try to advance slowly. 02/19 PT became hypotensive this morning with MAPs of 50's started on levo, daughter was contacted regarding goals of care, pt status was changed to partial code upon family request.  02/20 Pt still requiring levo, try to wean off slowly, family did not show for the meeting.  02/21 family meeting was done yesterday, no acclation of care per daughter wishes.   02/25 Pt lost IV access,still unable to wean off the went, neurological status unchanged she is alert but not following commands, fails her SBT every morning.    Interval History/Significant Events: naeon    Review of Systems   Unable to perform ROS: Intubated     Objective:     Vital Signs (Most Recent):  Temp: 99.3 °F (37.4 °C) (02/26/18 1500)  Pulse: 71 (02/26/18 1800)  Resp: 14 (02/26/18 1800)  BP: 104/67 (02/26/18 1800)  SpO2: 96 % (02/26/18 1800) Vital Signs (24h Range):  Temp:  [98.6 °F (37 °C)-99.3 °F (37.4 °C)] 99.3 °F (37.4 °C)  Pulse:  [61-83] 71  Resp:  [14-29] 14  SpO2:  [95 %-99 %] 96 %  BP: ()/(57-71) 104/67   Weight: 89.5 kg (197 lb 5 oz)  Body mass index is 30 kg/m².      Intake/Output Summary (Last 24 hours) at 02/26/18 1812  Last data filed at 02/26/18 1726   Gross per 24 hour   Intake              850 ml   Output              635 ml   Net              215 ml       Physical Exam   Constitutional: No distress.   HENT:   Head: Normocephalic and atraumatic.   Eyes: Right eye exhibits no discharge. Left eye exhibits no discharge.   Neck: Normal range of motion. Neck supple.   Cardiovascular: Normal rate and regular rhythm.     Pulmonary/Chest: Effort normal. No respiratory distress. She has no wheezes. She has rales (b/l bases).   intubated   Abdominal: Soft. She exhibits no distension.   Musculoskeletal: She exhibits deformity (contracted upper and lower extremities).   Neurological: She is alert.   Does not following commands. No purposeful movement. Opens eyes, but does not track.     Skin: Skin is warm and dry.   Vitals reviewed.      Vents:  Vent Mode: A/C (02/26/18 1746)  Ventilator Initiated: Yes (02/04/18 2344)  Set Rate: 14 bmp (02/26/18 1746)  Vt Set: 400 mL (02/26/18 1746)  Pressure Support: 0 cmH20 (02/26/18 1746)  PEEP/CPAP: 5 cmH20 (02/26/18 1746)  Oxygen Concentration (%): 30 (02/26/18 1800)  Peak Airway Pressure: 35 cmH2O (02/26/18 1746)  Plateau Pressure: 18 cmH20 (02/26/18 1746)  Total Ve: 5.94 mL (02/26/18 1746)  F/VT Ratio<105 (RSBI): (!) 33.9 (02/26/18 0909)  Lines/Drains/Airways     Drain                 Gastrostomy/Enterostomy 10/25/16 1401 Percutaneous endoscopic gastrostomy (PEG) midline feeding 489 days         Urethral Catheter 02/05/18 1006 Latex 21 days          Airway                 Airway - Non-Surgical 02/04/18 2329 Endotracheal Tube 21 days          Pressure Ulcer                 Pressure Injury 02/05/18 1045 Left medial Foot Deep tissue injury 21 days         Pressure Injury 02/05/18 1045 Right lateral Foot Deep tissue injury 21 days              Significant Labs:    CBC/Anemia Profile:  No results for input(s): WBC, HGB, HCT, PLT, MCV, RDW, IRON, FERRITIN, RETIC, FOLATE, [id number], OCCULTBLOOD in the last 48 hours.     Chemistries:  No results for input(s): NA, K, CL, CO2, BUN, CREATININE, CALCIUM, ALBUMIN, PROT, BILITOT, ALKPHOS, ALT, AST, GLUCOSE, MG, PHOS in the last 48 hours.    All pertinent labs within the past 24 hours have been reviewed.    Significant Imaging:  I have reviewed all pertinent imaging results/findings within the past 24 hours.    Assessment/Plan:     Neuro   Seizure-like  activity    - continue keppra         Dysphagia as late effect of cerebrovascular disease    - S/P PEG tube.  - on Tube feeds, Pt has on-off episodes of emesis, but can tolerate trickle.        CVA, old, hemiparesis    -patient suffered multiple strokes in past, last one in 2008   -nonverbal at baseline, alert, makes eye contact but doesn't follow commands.    MRI (2/10)  Findings: The diffusion sequence demonstrates no evidence of acute infarct. There is severe white matter small vessel ischemic change. There is a remote infarct of the right occipital lobe. There is a mild hemosiderin staining of the brainstem probably from a prior subarachnoid hemorrhage. There is a small focus of hemosiderin posterior right midline medulla. Pituitary and craniocervical junction show nothing unusual. There is left frontal sinusitis change. There is involutional change.  - Records requested from [facility] and [facility]. Did not include brain images or neurology notes.  - Neurology consulted: differential includes but is not limited to CADASIL (given h/o strokes, extensive white matter involvement including the temporal lobes), adult onset adrenoleukodystrophy, chronic microvascular changes (less likely), vanishing white matter disease (less likely given predominance in children), or Binswanger disease (subcortical leukoencephalopathy or vascular dementia 2/2 chronic HTN).   - NOTCH3 genetic testing sent, results pending        Derm   Unstageable pressure ulcer of left buttock    - multiple area.  - wound care per nursing.        Pulmonary   * Acute respiratory failure with hypoxia    - Intubated, Failing daily SBT.  - Causes most likely due to large amount secretion, pt neurological disease  That affect her to follow command and possible weakness her muscles.    Vent Mode: A/C  Oxygen Concentration (%):  [30] 30  Resp Rate Total:  [14 br/min-110 br/min] 14 br/min  Vt Set:  [400 mL] 400 mL  PEEP/CPAP:  [5 cmH20] 5 cmH20  Pressure Support:  [0  cmH20] 0 cmH20  Mean Airway Pressure:  [8.8 viL57-26 cmH20] 8.8 cmH20          Aspiration pneumonia    Extensive consolidation of the right lung and debris in the right mainstem bronchus and its branches consistent with aspiration pneumonia.  - S/p course of rocephin; s/p vanco & cefepime & flagyl   - Not on any antibiotics currently  - Intubated        Cardiac/Vascular   Paroxysmal atrial fibrillation    - NSR  - no ECG done at [facility] shows Afib.  - Pt home BB held due to hypotension           GI   Partial small bowel obstruction    - KUB in 02/14 showed distended stomach and partial SOB.  - CT abd pelvis w contrast showed no acute intra-abdominal abnormality.  - Emesis happens on/off, no residual. respond to antemetic and feed holding.          PEG (percutaneous endoscopic gastrostomy) status    - on tube feed on/off due to emesis         Orthopedic   Pressure injury of deep tissue    - multiple areas.  - wound care per nursing.        Other   Palliative care encounter    - partial code  - Palliative care following.  - Plan for terminal extubation 2/26/18        Goals of care, counseling/discussion    - Palliative consulted.   - Plan for terminal extubation on 2/26/2018             Critical care was time spent personally by me on the following activities: development of treatment plan with patient or surrogate and bedside caregivers, discussions with consultants, evaluation of patient's response to treatment, examination of patient, ordering and performing treatments and interventions, ordering and review of laboratory studies, ordering and review of radiographic studies, pulse oximetry, re-evaluation of patient's condition. This critical care time did not overlap with that of any other provider or involve time for any procedures.     Khalida Cheung MD  Critical Care Medicine  [facility]

## 2018-02-27 NOTE — PHYSICIAN QUERY
"PT Name: Jonathan Nieves  MR #: [id number]     Physician Query Form - Documentation Clarification      CDS/: Kayaky Nance RN CDI    Contact information: [email or web address] [phone]    This form is a permanent document in the medical record.     Query Date: February 27, 2018    By submitting this query, we are merely seeking further clarification of documentation. Please utilize your independent clinical judgment when addressing the question(s) below.    The Medical record reflects the following:    Supporting Clinical Findings Location in Medical Record     Wound 02/05/18 0300 posterior Thigh   Date First Assessed/Time First Assessed: 02/05/18 0300   Pre-existing: Yes  Side: Right  Orientation: posterior  Location: Thigh           Wound care notes 2/5 212 pm     Recommendations:  Adaptic dressing then aquacel foam  dressing over right thigh every Mon/Thur    CHENCHO mattress,  turn q 2 hours.       Wound care notes 2/5 212 pm   R posterior thigh wound without purulent drainage. Clean and dry.   ID consult 2/5 312 pm                                                                            Doctor, Please specify diagnosis or diagnoses associated with above clinical findings.          Doctor, please further specify "right posterior thigh wound."      Provider Use Only     [x  ] Right posterior thigh Pressure Ulcer/Pressure injury -              Further specify:  Stage: ____________________.              Present on admit - Yes  [x  ]  No [  ]  [  ] Undetermined                                [  ] Other wound type: specify ___________________                                                                                                             [  ] Clinically undetermined            "

## 2018-02-28 NOTE — ASSESSMENT & PLAN NOTE
- Palliative consulted.   - terminal extubation initially planned for 2/26/2018 discontinued due to family no longer wanting this  - plan for further goals of care discussion  - Family no-showed for 2/27 [facility] Care meeting.

## 2018-02-28 NOTE — ASSESSMENT & PLAN NOTE
-patient suffered multiple strokes in past, last one in 2008   -nonverbal at baseline, alert, makes eye contact but doesn't follow commands.    MRI (2/10)  Findings: The diffusion sequence demonstrates no evidence of acute infarct. There is severe white matter small vessel ischemic change. There is a remote infarct of the right occipital lobe. There is a mild hemosiderin staining of the brainstem probably from a prior subarachnoid hemorrhage. There is a small focus of hemosiderin posterior right midline medulla. Pituitary and craniocervical junction show nothing unusual. There is left frontal sinusitis change. There is involutional change.  - Records requested from [facility] and [facility]. Did not include brain images or neurology notes.  - Neurology consulted: differential includes but is not limited to CADASIL (given h/o strokes, extensive white matter involvement including the temporal lobes), adult onset adrenoleukodystrophy, chronic microvascular changes (less likely), vanishing white matter disease (less likely given predominance in children), or Binswanger disease (subcortical leukoencephalopathy or vascular dementia 2/2 chronic HTN).   - NOTCH3 genetic testing sent, results pending

## 2018-02-28 NOTE — PLAN OF CARE
Problem: Patient Care Overview  Goal: Plan of Care Review  Outcome: Revised  Plan of care discussed with patient. No family at bedside. VSS. Ex- WNL BG- D50% given per protocol. tube feedings restarted @10 cc/hr- patient tolerating well, no n/v noted during shift. Diligent ordered wound care implemented. Music theraphy implmeneted during shift. SCDs ordered & to be implemented. Patient is free of fall/trauma/injury. Faces scale remains 0 throughout shift. Will continue to monitor

## 2018-02-28 NOTE — SUBJECTIVE & OBJECTIVE
Interval History/Significant Events: NAEON.     Review of Systems   Unable to perform ROS: Intubated     Objective:     Vital Signs (Most Recent):  Temp: 98.6 °F (37 °C) (02/27/18 2300)  Pulse: 69 (02/28/18 0507)  Resp: 14 (02/28/18 0346)  BP: (!) 92/55 (02/28/18 0500)  SpO2: 98 % (02/28/18 0507) Vital Signs (24h Range):  Temp:  [97.5 °F (36.4 °C)-98.6 °F (37 °C)] 98.6 °F (37 °C)  Pulse:  [] 69  Resp:  [14-15] 14  SpO2:  [92 %-100 %] 98 %  BP: ()/(53-91) 92/55   Weight: 89.5 kg (197 lb 5 oz)  Body mass index is 30 kg/m².      Intake/Output Summary (Last 24 hours) at 02/28/18 0652  Last data filed at 02/27/18 1800   Gross per 24 hour   Intake              210 ml   Output              172 ml   Net               38 ml       Physical Exam   Constitutional: No distress.   HENT:   Head: Normocephalic and atraumatic.   Eyes: Conjunctivae are normal. No scleral icterus.   Cardiovascular: Normal rate and regular rhythm.    Pulmonary/Chest: She has no wheezes. She has rales (bases).   On the ventilator   Abdominal: Soft. There is no tenderness. There is no guarding.   Musculoskeletal: She exhibits no edema.   Upper and lower ext contractures   Neurological:   Opens eyes to voice, does not perform purposeful movements   Skin: Skin is warm and dry. No rash noted.       Vents:  Vent Mode: A/C (02/28/18 0507)  Ventilator Initiated: Yes (02/04/18 2344)  Set Rate: 14 bmp (02/28/18 0507)  Vt Set: 400 mL (02/28/18 0507)  Pressure Support: 0 cmH20 (02/28/18 0507)  PEEP/CPAP: 5 cmH20 (02/28/18 0507)  Oxygen Concentration (%): 30 (02/28/18 0507)  Peak Airway Pressure: 29 cmH2O (02/28/18 0507)  Plateau Pressure: 18 cmH20 (02/28/18 0507)  Total Ve: 6.04 mL (02/28/18 0507)  F/VT Ratio<105 (RSBI): (!) 33.9 (02/28/18 0346)  Lines/Drains/Airways     Drain                 Gastrostomy/Enterostomy 10/25/16 1401 Percutaneous endoscopic gastrostomy (PEG) midline feeding 490 days         Urethral Catheter 02/05/18 1006 Latex 22 days           Airway                 Airway - Non-Surgical 02/04/18 2329 Endotracheal Tube 23 days          Pressure Ulcer                 Pressure Injury 02/05/18 1045 Left medial Foot Deep tissue injury 22 days         Pressure Injury 02/05/18 1045 Right lateral Foot Deep tissue injury 22 days          Peripheral Intravenous Line                 Peripheral IV - Single Lumen 02/27/18 1753 Left;Posterior Forearm less than 1 day              Significant Labs:    CBC/Anemia Profile:  No results for input(s): WBC, HGB, HCT, PLT, MCV, RDW, IRON, FERRITIN, RETIC, FOLATE, [id number], OCCULTBLOOD in the last 48 hours.     Chemistries:  No results for input(s): NA, K, CL, CO2, BUN, CREATININE, CALCIUM, ALBUMIN, PROT, BILITOT, ALKPHOS, ALT, AST, GLUCOSE, MG, PHOS in the last 48 hours.    All pertinent labs within the past 24 hours have been reviewed.    Significant Imaging:  No new imaging.

## 2018-02-28 NOTE — PLAN OF CARE
Problem: Patient Care Overview  Goal: Plan of Care Review  Outcome: Ongoing (interventions implemented as appropriate)  Turned Q2H.

## 2018-02-28 NOTE — ASSESSMENT & PLAN NOTE
- partial code  - Palliative care following.  - Plan for terminal extubation 2/26/18, however family has changed their minds  - F/u with Pall Care consult

## 2018-02-28 NOTE — PROGRESS NOTES
Patient UOP ~14.5 cc/hr. 150 water bolus given via PEG tube at 1200 per order. Peg tube feedings infusing @10 cc/hr. No goal rate set. BG <70 x2. CC resident updated. No new orders

## 2018-02-28 NOTE — PROGRESS NOTES
"[facility]  Palliative Medicine  Progress Note    Patient Name: Jonathan Nieves  MRN: [id number]  Admission Date: 2/4/2018  Hospital Length of Stay: 23 days  Code Status: Partial Code   Attending Provider: Maki Medeiros MD  Consulting Provider: Mariella Rivas MD  Primary Care Physician: Primary Doctor No  Principal Problem:Acute respiratory failure with hypoxia    Patient information was obtained from past medical records and ER records.      Assessment/Plan:     Palliative care encounter    Family not available by phone (called x 2) or in person. Will follow if you need anything.            I will follow-up with family and team. Please contact us if you have any additional questions.    Subjective:     Chief Complaint:   Chief Complaint   Patient presents with    Respiratory Distress     Pt from [facility] presents to ED with suspected aspiration following episode of reported projectile vomiting 3 Jan. Upon EMs arrival pt was 88% on 4L NC.        HPI:   Asked to see patient by Dr. Sales for assistance with goals of care and symptom management. Chart reviewed, patient examined.  Mr. Jonathan Nieves is a 51yo female with history of CVA with residual aphasia and dysphagia, non-verbal, s/p PEG tube placement and bed riddenness, osteomyelitis s/p amputation of toe, who came to the ED from [facility] via EMS in respiratory distress. Per ED/EMS the patient was noted to have "projectile vomiting" yesterday evening (24 hours prior to presentation), which was treated with zofran. During the event there is suspicion the patient possible aspirated. Currently, she opens eyes to stimulation, intubated, with contractures of knees/hips/elbows/hands and multiple areas of pressure injury.   I spoke with Daughter Matias about her mother. Family meeting for today was cancelled due to lack of participation.    Hospital Course:  No notes on file    No new subjective & objective note has been " filed under this hospital service since the last note was generated.          Mariella Rivas MD  Palliative Medicine  [facility]

## 2018-02-28 NOTE — PROGRESS NOTES
"[facility]  Critical Care Medicine  Progress Note    Patient Name: Jonathan Nieves  MRN: [id number]  Admission Date: 2/4/2018  Hospital Length of Stay: 23 days  Code Status: Partial Code  Attending Provider: Maki Medeiros MD  Primary Care Provider: Primary Doctor No   Principal Problem: Acute respiratory failure with hypoxia    Subjective:     HPI:  Mr. Jonathan Nieves is a 53yo female with history of CVA with residual aphasia and dysphagia, non-verbal, s/p PEG tube placement and bed riddenness, osteomyelitis s/p amputation of toe, who comes to the ED from [facility] via EMS in respiratory distress. Per ED/EMS the patient was noted to have "projectile vomiting" yesterday evening (24 hours prior to presentation), which was treated with zofran. During the event there is suspicion the patient possible aspirated. Today the patient was noted to be in resp distress with a fever.     Patient was satting 88% on 4L on EMS arrival, breathing 40 times per minute.  Improved to 96% on 15L NRB with RR high 20s.  Axillary temp 102.    CT chest concerning for aspiration pneumonia.  UA concern for a UTI.       Patient intubated in the ED & Critical Care consulted.         Hospital/ICU Course:  2/5: Critical Care Consulted. Patient intubated in the ED.   2/6: Patient doing well, on SBT; will extubate maria e. Continue treatment for aspiration PNA & UTI.   2/7: abx deescalated, continue to treat for PNA and UTI. Required pressors overnight. Off this morning.   2/8-9: still with secretions and questionable mentation? Baseline?. Will try to dry the secretions and extubated today or maria e.    2/10: MRI completed. Family called, had family meeting with one daughter. Need to re-visit with all daughters. Records requested from [facility] and [facility].   02/11-13: neurology consulted, no acute changes, wbc and LFT trending up, keep monitoring.  2/14: Concerns for CADASIL vs Binswanger syndrome per Neuro. NOTCH3 genetic " testing sent. Palliative consulted regarding goals of care.   02/15 pt is not tolerating tube feed, had two episodes of projectile vomiting, KUB 02/14 shows partial SOB and distended stomach.  02/16 CT abdomen was unremarkable, family meeting was done.  02/17 no more vomiting, tolerating 10 cc/hr tube feeds.  02/19 trial to advance tube feeding, had residual back to trickle feed will try to advance slowly. 02/19 PT became hypotensive this morning with MAPs of 50's started on levo, daughter was contacted regarding goals of care, pt status was changed to partial code upon family request.  02/20 Pt still requiring levo, try to wean off slowly, family did not show for the meeting.  02/21 family meeting was done yesterday, no acclation of care per daughter wishes.   02/25 Pt lost IV access,still unable to wean off the went, neurological status unchanged she is alert but not following commands, fails her SBT every morning.  2/27: Failed SBT. Family did not show up for Palliative Care meeting.  2/28: NAEON.     Interval History/Significant Events: NAEON.     Review of Systems   Unable to perform ROS: Intubated     Objective:     Vital Signs (Most Recent):  Temp: 98.6 °F (37 °C) (02/27/18 2300)  Pulse: 69 (02/28/18 0507)  Resp: 14 (02/28/18 0346)  BP: (!) 92/55 (02/28/18 0500)  SpO2: 98 % (02/28/18 0507) Vital Signs (24h Range):  Temp:  [97.5 °F (36.4 °C)-98.6 °F (37 °C)] 98.6 °F (37 °C)  Pulse:  [] 69  Resp:  [14-15] 14  SpO2:  [92 %-100 %] 98 %  BP: ()/(53-91) 92/55   Weight: 89.5 kg (197 lb 5 oz)  Body mass index is 30 kg/m².      Intake/Output Summary (Last 24 hours) at 02/28/18 0652  Last data filed at 02/27/18 1800   Gross per 24 hour   Intake              210 ml   Output              172 ml   Net               38 ml       Physical Exam   Constitutional: No distress.   HENT:   Head: Normocephalic and atraumatic.   Eyes: Conjunctivae are normal. No scleral icterus.   Cardiovascular: Normal rate and regular  rhythm.    Pulmonary/Chest: She has no wheezes. She has rales (bases).   On the ventilator   Abdominal: Soft. There is no tenderness. There is no guarding.   Musculoskeletal: She exhibits no edema.   Upper and lower ext contractures   Neurological:   Opens eyes to voice, does not perform purposeful movements   Skin: Skin is warm and dry. No rash noted.       Vents:  Vent Mode: A/C (02/28/18 0507)  Ventilator Initiated: Yes (02/04/18 2344)  Set Rate: 14 bmp (02/28/18 0507)  Vt Set: 400 mL (02/28/18 0507)  Pressure Support: 0 cmH20 (02/28/18 0507)  PEEP/CPAP: 5 cmH20 (02/28/18 0507)  Oxygen Concentration (%): 30 (02/28/18 0507)  Peak Airway Pressure: 29 cmH2O (02/28/18 0507)  Plateau Pressure: 18 cmH20 (02/28/18 0507)  Total Ve: 6.04 mL (02/28/18 0507)  F/VT Ratio<105 (RSBI): (!) 33.9 (02/28/18 0346)  Lines/Drains/Airways     Drain                 Gastrostomy/Enterostomy 10/25/16 1401 Percutaneous endoscopic gastrostomy (PEG) midline feeding 490 days         Urethral Catheter 02/05/18 1006 Latex 22 days          Airway                 Airway - Non-Surgical 02/04/18 2329 Endotracheal Tube 23 days          Pressure Ulcer                 Pressure Injury 02/05/18 1045 Left medial Foot Deep tissue injury 22 days         Pressure Injury 02/05/18 1045 Right lateral Foot Deep tissue injury 22 days          Peripheral Intravenous Line                 Peripheral IV - Single Lumen 02/27/18 1753 Left;Posterior Forearm less than 1 day              Significant Labs:    CBC/Anemia Profile:  No results for input(s): WBC, HGB, HCT, PLT, MCV, RDW, IRON, FERRITIN, RETIC, FOLATE, [id number], OCCULTBLOOD in the last 48 hours.     Chemistries:  No results for input(s): NA, K, CL, CO2, BUN, CREATININE, CALCIUM, ALBUMIN, PROT, BILITOT, ALKPHOS, ALT, AST, GLUCOSE, MG, PHOS in the last 48 hours.    All pertinent labs within the past 24 hours have been reviewed.    Significant Imaging:  No new imaging.     Assessment/Plan:     Neuro    Seizure-like activity    - continue keppra         Dysphagia as late effect of cerebrovascular disease    - S/P PEG tube.  - on Tube feeds, Pt has on-off episodes of emesis, but can tolerate trickle.        CVA, old, hemiparesis    -patient suffered multiple strokes in past, last one in 2008   -nonverbal at baseline, alert, makes eye contact but doesn't follow commands.    MRI (2/10)  Findings: The diffusion sequence demonstrates no evidence of acute infarct. There is severe white matter small vessel ischemic change. There is a remote infarct of the right occipital lobe. There is a mild hemosiderin staining of the brainstem probably from a prior subarachnoid hemorrhage. There is a small focus of hemosiderin posterior right midline medulla. Pituitary and craniocervical junction show nothing unusual. There is left frontal sinusitis change. There is involutional change.  - Records requested from [facility] and [facility]. Did not include brain images or neurology notes.  - Neurology consulted: differential includes but is not limited to CADASIL (given h/o strokes, extensive white matter involvement including the temporal lobes), adult onset adrenoleukodystrophy, chronic microvascular changes (less likely), vanishing white matter disease (less likely given predominance in children), or Binswanger disease (subcortical leukoencephalopathy or vascular dementia 2/2 chronic HTN).   - NOTCH3 genetic testing sent, results pending        Derm   Unstageable pressure ulcer of left buttock    - multiple area.  - wound care per nursing.        Pulmonary   * Acute respiratory failure with hypoxia    - Intubated  - has large amount secretion (c/w glycopyrrol), as well as neurological disease, inability to follow commands        Aspiration pneumonia    Extensive consolidation of the right lung and debris in the right mainstem bronchus and its branches consistent with aspiration pneumonia.  - S/p course of rocephin; s/p vanco & cefepime & flagyl    - Not on any antibiotics currently  - Intubated        Cardiac/Vascular   Paroxysmal atrial fibrillation    - Pt home BB held due to hypotension           GI   Partial small bowel obstruction    - KUB in 02/14 showed distended stomach and partial SOB.  - CT abd pelvis w contrast showed no acute intra-abdominal abnormality.  - Emesis happens on/off, no residual. respond to antemetic and feed holding.          PEG (percutaneous endoscopic gastrostomy) status    - on tube feed on/off due to emesis         Orthopedic   Pressure injury of deep tissue    - multiple areas.  - wound care per nursing.        Other   Palliative care encounter    - partial code  - Palliative care following.  - Plan for terminal extubation 2/26/18, however family has changed their minds  - F/u with Pall Care consult        Goals of care, counseling/discussion    - Palliative consulted.   - terminal extubation initially planned for 2/26/2018 discontinued due to family no longer wanting this  - plan for further goals of care discussion  - Family no-showed for 2/27 Pall Care meeting.              Critical care was time spent personally by me on the following activities: development of treatment plan with patient or surrogate and bedside caregivers, discussions with consultants, evaluation of patient's response to treatment, examination of patient, ordering and performing treatments and interventions, ordering and review of laboratory studies, ordering and review of radiographic studies, pulse oximetry, re-evaluation of patient's condition. This critical care time did not overlap with that of any other provider or involve time for any procedures.     Rebekah Saunders MD  Critical Care Medicine  [facility]

## 2018-03-01 NOTE — SUBJECTIVE & OBJECTIVE
Interval History/Significant Events: No acute overnight events.     Review of Systems   Unable to perform ROS: Intubated     Objective:     Vital Signs (Most Recent):  Temp: 98.6 °F (37 °C) (03/01/18 0701)  Pulse: 71 (03/01/18 0851)  Resp: 14 (02/28/18 0346)  BP: 97/72 (03/01/18 0701)  SpO2: 98 % (03/01/18 0851) Vital Signs (24h Range):  Temp:  [98.6 °F (37 °C)-99.6 °F (37.6 °C)] 98.6 °F (37 °C)  Pulse:  [71-89] 71  SpO2:  [88 %-99 %] 98 %  BP: ()/(61-81) 97/72   Weight: 79.3 kg (174 lb 13.2 oz)  Body mass index is 26.58 kg/m².      Intake/Output Summary (Last 24 hours) at 03/01/18 0921  Last data filed at 03/01/18 0700   Gross per 24 hour   Intake              930 ml   Output              397 ml   Net              533 ml       Physical Exam   HENT:   Head: Normocephalic and atraumatic.   Eyes: Conjunctivae are normal. No scleral icterus.   Cardiovascular: Normal rate and regular rhythm.    Pulmonary/Chest: She has no wheezes. She has rales.   On the ventilator   Abdominal: Soft. She exhibits distension. There is no tenderness. There is no guarding.   Musculoskeletal: She exhibits no edema.   Upper and lower ext contratures   Neurological:   Opens eyes to voice, does not perform purposeful movements   Skin: Skin is warm. No rash noted.       Vents:  Vent Mode: A/C (03/01/18 0851)  Ventilator Initiated: Yes (02/04/18 2344)  Set Rate: 14 bmp (03/01/18 0851)  Vt Set: 400 mL (03/01/18 0851)  Pressure Support: 0 cmH20 (03/01/18 0851)  PEEP/CPAP: 5 cmH20 (03/01/18 0851)  Oxygen Concentration (%): 30 (03/01/18 0851)  Peak Airway Pressure: 33 cmH2O (03/01/18 0851)  Plateau Pressure: 20 cmH20 (03/01/18 0851)  Total Ve: 7.26 mL (03/01/18 0851)  F/VT Ratio<105 (RSBI): (!) 33.9 (02/28/18 0346)  Lines/Drains/Airways     Drain                 Gastrostomy/Enterostomy 10/25/16 1401 Percutaneous endoscopic gastrostomy (PEG) midline feeding 491 days         Urethral Catheter 02/05/18 1006 Latex 23 days          Airway                  Airway - Non-Surgical 02/04/18 2329 Endotracheal Tube 24 days          Pressure Ulcer                 Pressure Injury 02/05/18 1045 Left medial Foot Deep tissue injury 23 days         Pressure Injury 02/05/18 1045 Right lateral Foot Deep tissue injury 23 days          Peripheral Intravenous Line                 Peripheral IV - Single Lumen 02/27/18 1753 Left;Posterior Forearm 1 day              Significant Labs:    CBC/Anemia Profile:  No results for input(s): WBC, HGB, HCT, PLT, MCV, RDW, IRON, FERRITIN, RETIC, FOLATE, [id number], OCCULTBLOOD in the last 48 hours.     Chemistries:  No results for input(s): NA, K, CL, CO2, BUN, CREATININE, CALCIUM, ALBUMIN, PROT, BILITOT, ALKPHOS, ALT, AST, GLUCOSE, MG, PHOS in the last 48 hours.

## 2018-03-01 NOTE — PROGRESS NOTES
Informed MD Joury with [facility] pt projectile vomited. zofran given. Tube feeding turned off. No new orders

## 2018-03-01 NOTE — PLAN OF CARE
Problem: Patient Care Overview  Goal: Plan of Care Review  Outcome: Ongoing (interventions implemented as appropriate)  Pt opens eyes spontaneously, tracks, squeeze L. Hand. All extremities are contracted. Intubated, no s/s of resp distress, excessive salivation, suctioned frequently. Bathed and linens changed. PEG tube with TF @20 cc/hr, tolerating well, no residuals, no vomiting. Turned q2h. 1 BM. [place] care performed, minimal UOP, MD aware. POC: possible family discussion in am. No acute events throughout shift, VS and assessment per flow sheet, patient progressing towards goals as tolerated, will continue to monitor.

## 2018-03-01 NOTE — PROGRESS NOTES
"[facility]  Critical Care Medicine  Progress Note    Patient Name: Jonathan Nieves  MRN: [id number]  Admission Date: 2/4/2018  Hospital Length of Stay: 24 days  Code Status: Partial Code  Attending Provider: Maki Medeiros MD  Primary Care Provider: Primary Doctor No   Principal Problem: Acute respiratory failure with hypoxia    Subjective:     HPI:  Mr. Jonathan Nieves is a 51yo female with history of CVA with residual aphasia and dysphagia, non-verbal, s/p PEG tube placement and bed riddenness, osteomyelitis s/p amputation of toe, who comes to the ED from [facility] via EMS in respiratory distress. Per ED/EMS the patient was noted to have "projectile vomiting" yesterday evening (24 hours prior to presentation), which was treated with zofran. During the event there is suspicion the patient possible aspirated. Today the patient was noted to be in resp distress with a fever.     Patient was satting 88% on 4L on EMS arrival, breathing 40 times per minute.  Improved to 96% on 15L NRB with RR high 20s.  Axillary temp 102.    CT chest concerning for aspiration pneumonia.  UA concern for a UTI.       Patient intubated in the ED & Critical Care consulted.         Hospital/ICU Course:  2/5: Critical Care Consulted. Patient intubated in the ED.   2/6: Patient doing well, on SBT; will extubate maria e. Continue treatment for aspiration PNA & UTI.   2/7: abx deescalated, continue to treat for PNA and UTI. Required pressors overnight. Off this morning.   2/8-9: still with secretions and questionable mentation? Baseline?. Will try to dry the secretions and extubated today or maria e.    2/10: MRI completed. Family called, had family meeting with one daughter. Need to re-visit with all daughters. Records requested from [facility] and [facility].   02/11-13: neurology consulted, no acute changes, wbc and LFT trending up, keep monitoring.  2/14: Concerns for CADASIL vs Binswanger syndrome per Neuro. NOTCH3 genetic " testing sent. Palliative consulted regarding goals of care.   02/15 pt is not tolerating tube feed, had two episodes of projectile vomiting, KUB 02/14 shows partial SOB and distended stomach.  02/16 CT abdomen was unremarkable, family meeting was done.  02/17 no more vomiting, tolerating 10 cc/hr tube feeds.  02/19 trial to advance tube feeding, had residual back to trickle feed will try to advance slowly. 02/19 PT became hypotensive this morning with MAPs of 50's started on levo, daughter was contacted regarding goals of care, pt status was changed to partial code upon family request.  02/20 Pt still requiring levo, try to wean off slowly, family did not show for the meeting.  02/21 family meeting was done yesterday, no acclation of care per daughter wishes.   02/25 Pt lost IV access,still unable to wean off the went, neurological status unchanged she is alert but not following commands, fails her SBT every morning.  2/27: Failed SBT. Family did not show up for Palliative Care meeting.  2/28: NAEON.     Interval History/Significant Events: No acute overnight events.     Review of Systems   Unable to perform ROS: Intubated     Objective:     Vital Signs (Most Recent):  Temp: 98.6 °F (37 °C) (03/01/18 0701)  Pulse: 71 (03/01/18 0851)  Resp: 14 (02/28/18 0346)  BP: 97/72 (03/01/18 0701)  SpO2: 98 % (03/01/18 0851) Vital Signs (24h Range):  Temp:  [98.6 °F (37 °C)-99.6 °F (37.6 °C)] 98.6 °F (37 °C)  Pulse:  [71-89] 71  SpO2:  [88 %-99 %] 98 %  BP: ()/(61-81) 97/72   Weight: 79.3 kg (174 lb 13.2 oz)  Body mass index is 26.58 kg/m².      Intake/Output Summary (Last 24 hours) at 03/01/18 0921  Last data filed at 03/01/18 0700   Gross per 24 hour   Intake              930 ml   Output              397 ml   Net              533 ml       Physical Exam   HENT:   Head: Normocephalic and atraumatic.   Eyes: Conjunctivae are normal. No scleral icterus.   Cardiovascular: Normal rate and regular rhythm.    Pulmonary/Chest:  She has no wheezes. She has rales.   On the ventilator   Abdominal: Soft. She exhibits distension. There is no tenderness. There is no guarding.   Musculoskeletal: She exhibits no edema.   Upper and lower ext contratures   Neurological:   Opens eyes to voice, does not perform purposeful movements   Skin: Skin is warm. No rash noted.       Vents:  Vent Mode: A/C (03/01/18 0851)  Ventilator Initiated: Yes (02/04/18 2344)  Set Rate: 14 bmp (03/01/18 0851)  Vt Set: 400 mL (03/01/18 0851)  Pressure Support: 0 cmH20 (03/01/18 0851)  PEEP/CPAP: 5 cmH20 (03/01/18 0851)  Oxygen Concentration (%): 30 (03/01/18 0851)  Peak Airway Pressure: 33 cmH2O (03/01/18 0851)  Plateau Pressure: 20 cmH20 (03/01/18 0851)  Total Ve: 7.26 mL (03/01/18 0851)  F/VT Ratio<105 (RSBI): (!) 33.9 (02/28/18 0346)  Lines/Drains/Airways     Drain                 Gastrostomy/Enterostomy 10/25/16 1401 Percutaneous endoscopic gastrostomy (PEG) midline feeding 491 days         Urethral Catheter 02/05/18 1006 Latex 23 days          Airway                 Airway - Non-Surgical 02/04/18 2329 Endotracheal Tube 24 days          Pressure Ulcer                 Pressure Injury 02/05/18 1045 Left medial Foot Deep tissue injury 23 days         Pressure Injury 02/05/18 1045 Right lateral Foot Deep tissue injury 23 days          Peripheral Intravenous Line                 Peripheral IV - Single Lumen 02/27/18 1753 Left;Posterior Forearm 1 day              Significant Labs:    CBC/Anemia Profile:  No results for input(s): WBC, HGB, HCT, PLT, MCV, RDW, IRON, FERRITIN, RETIC, FOLATE, [id number], OCCULTBLOOD in the last 48 hours.     Chemistries:  No results for input(s): NA, K, CL, CO2, BUN, CREATININE, CALCIUM, ALBUMIN, PROT, BILITOT, ALKPHOS, ALT, AST, GLUCOSE, MG, PHOS in the last 48 hours.      Assessment/Plan:     Neuro   Seizure-like activity    - continue keppra         Dysphagia as late effect of cerebrovascular disease    - S/P PEG tube.  - on Tube feeds, Pt has  on-off episodes of emesis, but can tolerate trickle.        CVA, old, hemiparesis    -patient suffered multiple strokes in past, last one in 2008   -nonverbal at baseline, alert, makes eye contact but doesn't follow commands.    MRI (2/10)  Findings: The diffusion sequence demonstrates no evidence of acute infarct. There is severe white matter small vessel ischemic change. There is a remote infarct of the right occipital lobe. There is a mild hemosiderin staining of the brainstem probably from a prior subarachnoid hemorrhage. There is a small focus of hemosiderin posterior right midline medulla. Pituitary and craniocervical junction show nothing unusual. There is left frontal sinusitis change. There is involutional change.  - [facility]. Did not include brain images or neurology notes.  - Neurology consulted: differential includes but is not limited to CADASIL (given h/o strokes, extensive white matter involvement including the temporal lobes), adult onset adrenoleukodystrophy, chronic microvascular changes (less likely), vanishing white matter disease (less likely given predominance in children), or Binswanger disease (subcortical leukoencephalopathy or vascular dementia 2/2 chronic HTN).   - NOTCH3 genetic testing sent, results pending        Derm   Unstageable pressure ulcer of left buttock    - multiple area.  - wound care per nursing.        Pulmonary   * Acute respiratory failure with hypoxia    - Intubated  - has large amount secretion (c/w glycopyrrol), as well as neurological disease, inability to follow commands        Aspiration pneumonia    Extensive consolidation of the right lung and debris in the right mainstem bronchus and its branches consistent with aspiration pneumonia.  - S/p course of rocephin; s/p vanco & cefepime & flagyl   - Not on any antibiotics currently  - Intubated        Cardiac/Vascular   Paroxysmal atrial fibrillation    - Pt home BB held due to hypotension            GI   Partial small bowel obstruction    - KUB in 02/14 showed distended stomach and partial SOB.  - CT abd pelvis w contrast showed no acute intra-abdominal abnormality.  - Emesis happens on/off, no residual. respond to antemetic and feed holding.          PEG (percutaneous endoscopic gastrostomy) status    - on tube feed on/off due to emesis         Orthopedic   Pressure injury of deep tissue    - multiple areas.  - wound care per nursing.        Other   Palliative care encounter    - partial code  - Palliative care following.  - Plan for terminal extubation 2/26/18, however family has changed their minds  - F/u with Pall Care consult        Goals of care, counseling/discussion    - Palliative consulted.   - terminal extubation initially planned for 2/26/2018 discontinued due to family no longer wanting this  - plan for further goals of care discussion  - Family no-showed for 2/27 Pall Care meeting.              Critical care was time spent personally by me on the following activities: development of treatment plan with patient or surrogate and bedside caregivers, discussions with consultants, evaluation of patient's response to treatment, examination of patient, ordering and performing treatments and interventions, ordering and review of laboratory studies, ordering and review of radiographic studies, pulse oximetry, re-evaluation of patient's condition. This critical care time did not overlap with that of any other provider or involve time for any procedures.     Khalida Cheung MD  Critical Care Medicine  [facility]

## 2018-03-01 NOTE — ASSESSMENT & PLAN NOTE
Was able to talk with daughter Matias. She will gather the family for 3 pm today. Will follow if you need anything.

## 2018-03-01 NOTE — SUBJECTIVE & OBJECTIVE
Interval History: Was able to get daughter Catrine on phone. She understands and would like to withdraw life support however other siblings disagree. I reviewed with her that her mother is not tolerating feeding and that her GI tract is not able to absorb nutrients. She is having more skin breakdown despite turn/position/alternating pressure reduction mattress/bed. Reviewed brain failure with daughter and that since her brain can't direct the rest of the body on what to do, she is dying. She said she would call her siblings to meet today at 3 PM.  Medications:  Continuous Infusions:  Scheduled Meds:   baclofen  10 mg Per G Tube TID    chlorhexidine  15 mL Mouth/Throat BID    citalopram  10 mg Per G Tube Daily    famotidine  20 mg Per G Tube BID    glycopyrrolate  0.1 mg Intravenous Daily    levetiracetam oral soln  500 mg Per G Tube BID    scopolamine  1 patch Transdermal Q3 Days     PRN Meds:dextrose 50%, [] fentaNYL **FOLLOWED BY** fentaNYL, glucagon (human recombinant), LORazepam, magnesium sulfate IVPB, magnesium sulfate IVPB, midazolam, ondansetron, potassium chloride 10%, potassium chloride 10%, potassium chloride 10%, potassium, sodium phosphates, potassium, sodium phosphates, potassium, sodium phosphates, sodium chloride 0.9%    Objective:     Vital Signs (Most Recent):  Temp: 98.6 °F (37 °C) (18 0701)  Pulse: 78 (18 1000)  Resp: 14 (18 0346)  BP: 124/76 (18 0900)  SpO2: 98 % (18 1000) Vital Signs (24h Range):  Temp:  [98.6 °F (37 °C)-99.6 °F (37.6 °C)] 98.6 °F (37 °C)  Pulse:  [69-89] 78  SpO2:  [88 %-99 %] 98 %  BP: ()/(61-81) 124/76     Weight: 79.3 kg (174 lb 13.2 oz)  Body mass index is 26.58 kg/m².    Review of Symptoms  Symptom Assessment (ESAS 0-10 scale)  ESAS 0 1 2 3 4 5 6 7 8 9 10   Pain x             Dyspnea x             Anxiety x             Nausea x             Depression  x             Anorexia x             Fatigue x             Insomnia x              Restlessness  x             Agitation x             CAM / Delirium _x_ --  ___+   Constipation     x__ --  ___+   Diarrhea           _x_ --  ___+  Bowel Management Plan (BMP): Yes    Comments: tolerating feeding poorly    Pain Assessment: 0    OME in 24 hours: 0    Performance Status: 10    ECOG Performance Status Grade: 4 - Completely disabled    Physical Exam   Skin:   More breakdown. Patient dying.   Vitals reviewed.      Significant Labs: All pertinent labs within the past 24 hours have been reviewed.  CBC:   No results for input(s): WBC, HGB, HCT, MCV, PLT in the last 168 hours.  BMP:  No results for input(s): GLU, NA, K, CL, CO2, BUN, CREATININE, CALCIUM, MG in the last 24 hours.  LFT:  Lab Results   Component Value Date    AST 22 02/21/2018    ALKPHOS 99 02/21/2018    BILITOT 0.3 02/21/2018     Albumin:   Albumin   Date Value Ref Range Status   02/21/2018 2.2 (L) 3.5 - 5.2 g/dL Final     Protein:   Total Protein   Date Value Ref Range Status   02/21/2018 7.4 6.0 - 8.4 g/dL Final     Lactic acid:   Lab Results   Component Value Date    LACTATE 0.9 02/07/2018    LACTATE 1.6 02/05/2018       Significant Imaging: I have reviewed all pertinent imaging results/findings within the past 24 hours.    Advanced Directives::  Living Will: No  LaPOST: No  Do Not Resuscitate Status: Yes  Medical Power of : No    Decision-Making Capacity: Patient unable to communicate due to disease severity/cognitive impairment    Living Arrangements: Lives in nursing home    Psychosocial/Cultural:  Patient's most important priorities:  Family unable to answer.    Patient's biggest concerns/fears:  Family unable to answer.    Previous death/end of life care history:  Family unable to answer.    Patient's goals/hopes:  Family unable to answer.    Spiritual:     F- Vielka and Belief: Family unable to answer.    I - Importance: Family unable to answer.  .  C - Community: Family unable to answer.    A - Address in Care: Family  unable to answer.

## 2018-03-01 NOTE — PROGRESS NOTES
"[facility]  Palliative Medicine  Progress Note    Patient Name: Jonathan Nieves  MRN: [id number]  Admission Date: 2/4/2018  Hospital Length of Stay: 24 days  Code Status: Partial Code   Attending Provider: Maki Medeiros MD  Consulting Provider: Mariella Rivas MD  Primary Care Physician: Primary Doctor No  Principal Problem:Acute respiratory failure with hypoxia    Patient information was obtained from relative(s) and ER records.      Assessment/Plan:     Palliative care encounter    Was able to talk with erika Salcedo. She will gather the family for 3 pm today. Will follow if you need anything.        Goals of care, counseling/discussion    03/01/2018 Discussed further with Matias. She will attempt to gather the family for 3 pm today.  02/15/2018  Patient has irreversible neurologic injury  for which there is no curative treatment.   She is currently on ventilator with increased secretions. She already has a PEG and despite previous appropriate feeding has low albumin and multiple contractures/wounds. These wounds will not heal as she is dying. She is not tolerating TF or water flushes today.  Plan meeting tomorrow with patient's children at 7 am 2/16. Matias has called the other siblings. One cannot attend until tomorrow at 3. 2 of the siblings find that looking at MOM in this condition if hard and will not participate. Patients mother is still alive but has Alzheimers. Daughter Matias "has my hands full".  Patient is at the end of her life and ongoing discussions will occur tomorrow about focusing her care on comfort. At this point, she does not appear to be in pain except with procedures/turning and has prn meds ordered.          CVA, old, hemiparesis    Patient has irreversible neurologic injury. Regardless there is no curative treatment.               I will follow-up with patient. Please contact us if you have any additional questions.    Subjective:     Chief Complaint:   Chief " "Complaint   Patient presents with    Respiratory Distress     [facility] to ED with suspected aspiration following episode of reported projectile vomiting 3 Jan. Upon EMs arrival pt was 88% on 4L NC.        HPI:   Asked to see patient by Dr. Sales for assistance with goals of care and symptom management. Chart reviewed, patient examined.  Mr. Jonathan Nieves is a 53yo female with history of CVA with residual aphasia and dysphagia, non-verbal, s/p PEG tube placement and bed riddenness, osteomyelitis s/p amputation of toe, who came to the ED from [facility] via EMS in respiratory distress. Per ED/EMS the patient was noted to have "projectile vomiting" yesterday evening (24 hours prior to presentation), which was treated with zofran. During the event there is suspicion the patient possible aspirated. Currently, she opens eyes to stimulation, intubated, with contractures of knees/hips/elbows/hands and multiple areas of pressure injury.   I spoke with Erika Salcedo about her mother. Family meeting for today was cancelled due to lack of participation.    Hospital Course:  No notes on file    Interval History: Was able to get erika Salcedo on phone. She understands and would like to withdraw life support however other siblings disagree. I reviewed with her that her mother is not tolerating feeding and that her GI tract is not able to absorb nutrients. She is having more skin breakdown despite turn/position/alternating pressure reduction mattress/bed. Reviewed brain failure with daughter and that since her brain can't direct the rest of the body on what to do, she is dying. She said she would call her siblings to meet today at 3 PM.  Medications:  Continuous Infusions:  Scheduled Meds:   baclofen  10 mg Per G Tube TID    chlorhexidine  15 mL Mouth/Throat BID    citalopram  10 mg Per G Tube Daily    famotidine  20 mg Per G Tube BID    glycopyrrolate  0.1 mg Intravenous Daily "    levetiracetam oral soln  500 mg Per G Tube BID    scopolamine  1 patch Transdermal Q3 Days     PRN Meds:dextrose 50%, [] fentaNYL **FOLLOWED BY** fentaNYL, glucagon (human recombinant), LORazepam, magnesium sulfate IVPB, magnesium sulfate IVPB, midazolam, ondansetron, potassium chloride 10%, potassium chloride 10%, potassium chloride 10%, potassium, sodium phosphates, potassium, sodium phosphates, potassium, sodium phosphates, sodium chloride 0.9%    Objective:     Vital Signs (Most Recent):  Temp: 98.6 °F (37 °C) (18 0701)  Pulse: 78 (18 1000)  Resp: 14 (18 0346)  BP: 124/76 (18 0900)  SpO2: 98 % (18 1000) Vital Signs (24h Range):  Temp:  [98.6 °F (37 °C)-99.6 °F (37.6 °C)] 98.6 °F (37 °C)  Pulse:  [69-89] 78  SpO2:  [88 %-99 %] 98 %  BP: ()/(61-81) 124/76     Weight: 79.3 kg (174 lb 13.2 oz)  Body mass index is 26.58 kg/m².    Review of Symptoms  Symptom Assessment (ESAS 0-10 scale)  ESAS 0 1 2 3 4 5 6 7 8 9 10   Pain x             Dyspnea x             Anxiety x             Nausea x             Depression  x             Anorexia x             Fatigue x             Insomnia x             Restlessness  x             Agitation x             CAM / Delirium _x_ --  ___+   Constipation     x__ --  ___+   Diarrhea           _x_ --  ___+  Bowel Management Plan (BMP): Yes    Comments: tolerating feeding poorly    Pain Assessment: 0    OME in 24 hours: 0    Performance Status: 10    ECOG Performance Status Grade: 4 - Completely disabled    Physical Exam   Skin:   More breakdown. Patient dying.   Vitals reviewed.      Significant Labs: All pertinent labs within the past 24 hours have been reviewed.  CBC:   No results for input(s): WBC, HGB, HCT, MCV, PLT in the last 168 hours.  BMP:  No results for input(s): GLU, NA, K, CL, CO2, BUN, CREATININE, CALCIUM, MG in the last 24 hours.  LFT:  Lab Results   Component Value Date    AST 2018    ALKPHOS 99 2018    BILITOT  0.3 02/21/2018     Albumin:   Albumin   Date Value Ref Range Status   02/21/2018 2.2 (L) 3.5 - 5.2 g/dL Final     Protein:   Total Protein   Date Value Ref Range Status   02/21/2018 7.4 6.0 - 8.4 g/dL Final     Lactic acid:   Lab Results   Component Value Date    LACTATE 0.9 02/07/2018    LACTATE 1.6 02/05/2018       Significant Imaging: I have reviewed all pertinent imaging results/findings within the past 24 hours.    Advanced Directives::  Living Will: No  LaPOST: No  Do Not Resuscitate Status: Yes  Medical Power of : No    Decision-Making Capacity: Patient unable to communicate due to disease severity/cognitive impairment    Living Arrangements: Lives in nursing home    Psychosocial/Cultural:  Patient's most important priorities:  Family unable to answer.    Patient's biggest concerns/fears:  Family unable to answer.    Previous death/end of life care history:  Family unable to answer.    Patient's goals/hopes:  Family unable to answer.    Spiritual:     F- Vielka and Belief: Family unable to answer.    I - Importance: Family unable to answer.  .  C - Community: Family unable to answer.    A - Address in Care: Family unable to answer.      > 50% of 35 min visit spent in chart review, face to face discussion of goals of care,  symptom assessment, coordination of care and emotional support.    Mariella Rivas MD  Palliative Medicine  [facility]

## 2018-03-02 NOTE — PLAN OF CARE
Problem: Patient Care Overview  Goal: Plan of Care Review  Outcome: Ongoing (interventions implemented as appropriate)  Patient unable to participate in care plan at this time , no family available.

## 2018-03-02 NOTE — PLAN OF CARE
Patient remains intubated and in ICU.  Palliative care following, [facility] meetings scheduled for 2/27 and 3/1 after family changed plan from palliative extubation on 2/26/18, with no show from family on both accounts.  New family meeting scheduled for 3/3/18 and ethics consulted.  CM will continue to follow.       03/02/18 1241   Right Care Assessment   Can the patient answer the patient profile reliably? No, cognitively impaired;No historian available   How often would a person be available to care for the patient? Often  (facility resident)   Describe the patient's ability to walk at the present time. Does not walk or unable to take any steps at all   How does the patient rate their overall health at the present time? Poor   Number of comorbid conditions (as recorded on the chart) Five or more   During the past month, has the patient often been bothered by feeling down, depressed or hopeless? (JONH)   During the past month, has the patient often been bothered by little interest or pleasure in doing things? (JONH)   Lisa Landers, RN, BSN  Case Management  [facility]  Ext. [phone]

## 2018-03-02 NOTE — SUBJECTIVE & OBJECTIVE
Interval History: Patient not tolerating tube feeding.    Medications:  Continuous Infusions:  Scheduled Meds:   baclofen  10 mg Per G Tube TID    chlorhexidine  15 mL Mouth/Throat BID    citalopram  10 mg Per G Tube Daily    famotidine  20 mg Per G Tube BID    glycopyrrolate  0.1 mg Intravenous Daily    levetiracetam oral soln  500 mg Per G Tube BID    scopolamine  1 patch Transdermal Q3 Days     PRN Meds:dextrose 50%, [] fentaNYL **FOLLOWED BY** fentaNYL, glucagon (human recombinant), LORazepam, magnesium sulfate IVPB, magnesium sulfate IVPB, midazolam, ondansetron, potassium chloride 10%, potassium chloride 10%, potassium chloride 10%, potassium, sodium phosphates, potassium, sodium phosphates, potassium, sodium phosphates, sodium chloride 0.9%    Objective:     Vital Signs (Most Recent):  Temp: 98.2 °F (36.8 °C) (18 1100)  Pulse: 85 (18 1100)  Resp: 18 (18 1100)  BP: 93/61 (18 1100)  SpO2: 98 % (18 1100) Vital Signs (24h Range):  Temp:  [98 °F (36.7 °C)-99.8 °F (37.7 °C)] 98.2 °F (36.8 °C)  Pulse:  [71-91] 85  Resp:  [13-18] 18  SpO2:  [94 %-99 %] 98 %  BP: ()/(61-85) 93/61     Weight: 80 kg (176 lb 5.9 oz)  Body mass index is 26.82 kg/m².    Review of Symptoms  Symptom Assessment (ESAS 0-10 scale)  ESAS 0 1 2 3 4 5 6 7 8 9 10   Pain              Dyspnea              Anxiety              Nausea              Depression               Anorexia              Fatigue              Insomnia              Restlessness               Agitation              CAM / Delirium __ --  ___+   Constipation     __ --  ___+   Diarrhea           __ --  ___+  Bowel Management Plan (BMP): No    Comments:     Pain Assessment:   OME in 24 hours:     Performance Status: 10    ECOG Performance Status Grade: 4 - Completely disabled    Physical Exam   Vitals reviewed.      Significant Labs: All pertinent labs within the past 24 hours have been reviewed.  CBC:   No results for input(s): WBC,  HGB, HCT, MCV, PLT in the last 168 hours.  BMP:  No results for input(s): GLU, NA, K, CL, CO2, BUN, CREATININE, CALCIUM, MG in the last 24 hours.  LFT:  Lab Results   Component Value Date    AST 22 02/21/2018    ALKPHOS 99 02/21/2018    BILITOT 0.3 02/21/2018     Albumin:   Albumin   Date Value Ref Range Status   02/21/2018 2.2 (L) 3.5 - 5.2 g/dL Final     Protein:   Total Protein   Date Value Ref Range Status   02/21/2018 7.4 6.0 - 8.4 g/dL Final     Lactic acid:   Lab Results   Component Value Date    LACTATE 0.9 02/07/2018    LACTATE 1.6 02/05/2018       Significant Imaging: None    Advanced Directives::  Living Will: No  LaPOST: No  Do Not Resuscitate Status: Yes  Medical Power of : No    Decision-Making Capacity: Patient unable to communicate due to disease severity/cognitive impairment    Living Arrangements: Lives in nursing home    Psychosocial/Cultural:  Patient's most important priorities:      Patient's biggest concerns/fears:      Previous death/end of life care history:      Patient's goals/hopes:      Spiritual:     F- Vielka and Belief:     I - Importance:   .  C - Community:     A - Address in Care:

## 2018-03-02 NOTE — ASSESSMENT & PLAN NOTE
Was able to talk with daughter Matias again this am. She will gather the family for tomorrow morning. I will not be here. Will let primary team know.  I have asked for ethics consultation regarding management of this patient with a life limiting illness for who family is not willing to participate in decision making.

## 2018-03-02 NOTE — PLAN OF CARE
Problem: Patient Care Overview  Goal: Plan of Care Review  Outcome: Ongoing (interventions implemented as appropriate)  Pt remains intubated,afebrile, and VSS. Pt NSR, MAP always greater than 65, sa02 % on AC. See flow sheet for full assessment. Pt remains on continuous tele and pulse ox monitor. No falls. No complaints of pain or nausea.Nurse left unit at 1300 to take other pt to procedure and did not return until now, charge nurse Doug notified and multiple nurses helped take care of pt as seen by charting. Tube feeds turned off for spitting up. BG checks normal.  POC reviewed w/Pt who was unable to verbalize understanding.

## 2018-03-02 NOTE — PROGRESS NOTES
"[facility]  Palliative Medicine  Progress Note    Patient Name: Jonathan Nieves  MRN: [id number]  Admission Date: 2/4/2018  Hospital Length of Stay: 25 days  Code Status: Partial Code   Attending Provider: Maki Medeiros MD  Consulting Provider: Mariella Rivas MD  Primary Care Physician: Primary Doctor No  Principal Problem:Acute respiratory failure with hypoxia    Patient information was obtained from past medical records and ER records.      Assessment/Plan:     Palliative care encounter    Was able to talk with daughter Matias again this am. She will gather the family for tomorrow morning. I will not be here. Will let primary team know.  I have asked for ethics consultation regarding management of this patient with a life limiting illness for who family is not willing to participate in decision making.            I will follow-up with patient. Please contact us if you have any additional questions.    Subjective:     Chief Complaint:   Chief Complaint   Patient presents with    Respiratory Distress     [facility] to ED with suspected aspiration following episode of reported projectile vomiting 3 Jan. Upon EMs arrival pt was 88% on 4L NC.        HPI:   Asked to see patient by Dr. Sales for assistance with goals of care and symptom management. Chart reviewed, patient examined.  Mr. Jonathan Nieves is a 51yo female with history of CVA with residual aphasia and dysphagia, non-verbal, s/p PEG tube placement and bed riddenness, osteomyelitis s/p amputation of toe, who came to the ED from [facility] via EMS in respiratory distress. Per ED/EMS the patient was noted to have "projectile vomiting" yesterday evening (24 hours prior to presentation), which was treated with zofran. During the event there is suspicion the patient possible aspirated. Currently, she opens eyes to stimulation, intubated, with contractures of knees/hips/elbows/hands and multiple areas of " pressure injury.   I spoke with Daughter Matias about her mother. Family meeting for today was cancelled due to lack of participation.    Hospital Course:  No notes on file    Interval History: Patient not tolerating tube feeding.    Medications:  Continuous Infusions:  Scheduled Meds:   baclofen  10 mg Per G Tube TID    chlorhexidine  15 mL Mouth/Throat BID    citalopram  10 mg Per G Tube Daily    famotidine  20 mg Per G Tube BID    glycopyrrolate  0.1 mg Intravenous Daily    levetiracetam oral soln  500 mg Per G Tube BID    scopolamine  1 patch Transdermal Q3 Days     PRN Meds:dextrose 50%, [] fentaNYL **FOLLOWED BY** fentaNYL, glucagon (human recombinant), LORazepam, magnesium sulfate IVPB, magnesium sulfate IVPB, midazolam, ondansetron, potassium chloride 10%, potassium chloride 10%, potassium chloride 10%, potassium, sodium phosphates, potassium, sodium phosphates, potassium, sodium phosphates, sodium chloride 0.9%    Objective:     Vital Signs (Most Recent):  Temp: 98.2 °F (36.8 °C) (18 1100)  Pulse: 85 (18 1100)  Resp: 18 (18 1100)  BP: 93/61 (18 1100)  SpO2: 98 % (18 1100) Vital Signs (24h Range):  Temp:  [98 °F (36.7 °C)-99.8 °F (37.7 °C)] 98.2 °F (36.8 °C)  Pulse:  [71-91] 85  Resp:  [13-18] 18  SpO2:  [94 %-99 %] 98 %  BP: ()/(61-85) 93/61     Weight: 80 kg (176 lb 5.9 oz)  Body mass index is 26.82 kg/m².    Review of Symptoms  Symptom Assessment (ESAS 0-10 scale)  ESAS 0 1 2 3 4 5 6 7 8 9 10   Pain              Dyspnea              Anxiety              Nausea              Depression               Anorexia              Fatigue              Insomnia              Restlessness               Agitation              CAM / Delirium __ --  ___+   Constipation     __ --  ___+   Diarrhea           __ --  ___+  Bowel Management Plan (BMP): No    Comments:     Pain Assessment:   OME in 24 hours:     Performance Status: 10    ECOG Performance Status Grade: 4 -  Completely disabled    Physical Exam   Vitals reviewed.      Significant Labs: All pertinent labs within the past 24 hours have been reviewed.  CBC:   No results for input(s): WBC, HGB, HCT, MCV, PLT in the last 168 hours.  BMP:  No results for input(s): GLU, NA, K, CL, CO2, BUN, CREATININE, CALCIUM, MG in the last 24 hours.  LFT:  Lab Results   Component Value Date    AST 22 02/21/2018    ALKPHOS 99 02/21/2018    BILITOT 0.3 02/21/2018     Albumin:   Albumin   Date Value Ref Range Status   02/21/2018 2.2 (L) 3.5 - 5.2 g/dL Final     Protein:   Total Protein   Date Value Ref Range Status   02/21/2018 7.4 6.0 - 8.4 g/dL Final     Lactic acid:   Lab Results   Component Value Date    LACTATE 0.9 02/07/2018    LACTATE 1.6 02/05/2018       Significant Imaging: None    Advanced Directives::  Living Will: No  LaPOST: No  Do Not Resuscitate Status: Yes  Medical Power of : No    Decision-Making Capacity: Patient unable to communicate due to disease severity/cognitive impairment    Living Arrangements: Lives in nursing home    Psychosocial/Cultural:  Patient's most important priorities:      Patient's biggest concerns/fears:      Previous death/end of life care history:      Patient's goals/hopes:      Spiritual:     F- Vielka and Belief:     I - Importance:   .  C - Community:     A - Address in Care:       > 50% of 35 min visit spent in chart review, face to face discussion of goals of care,  symptom assessment, coordination of care and emotional support.    Mariella Rivas MD  Palliative Medicine  [facility]

## 2018-03-02 NOTE — PLAN OF CARE
Problem: Patient Care Overview  Goal: Plan of Care Review  Outcome: Ongoing (interventions implemented as appropriate)  Pt opens eyes, tracks. Contracted to all extremities. Wound care performed. Bathed and linens changed. [place] care performed, UOP 10 cc/hr. Tube feeding held overnight. CBG checked frequently in 90s. No emesis noted. Water bolus given. POC: family meeting to discuss pt's status. No acute events throughout shift, VS and assessment per flow sheet, patient progressing towards goals as tolerated, will continue to monitor.

## 2018-03-02 NOTE — PROGRESS NOTES
Informed MD Paccione pt not tolerating tube feeding, noted emesis on gown when entering room. Previous nurse state pt had vomited several times during morning shift. MD states ok to hold tube feeding for now. Recheck CBG often, when CBG drops, restart tube feeding. RN verbalizes understanding. MARIA ISABEL.

## 2018-03-02 NOTE — PROGRESS NOTES
"[facility]  Critical Care Medicine  Progress Note    Patient Name: Jonathan Nieves  MRN: [id number]  Admission Date: 2/4/2018  Hospital Length of Stay: 25 days  Code Status: Partial Code  Attending Provider: Maki Medeiros MD  Primary Care Provider: Primary Doctor No   Principal Problem: Acute respiratory failure with hypoxia    Subjective:     HPI:  Mr. Jonathan Nieves is a 53yo female with history of CVA with residual aphasia and dysphagia, non-verbal, s/p PEG tube placement and bed riddenness, osteomyelitis s/p amputation of toe, who comes to the ED from [facility] via EMS in respiratory distress. Per ED/EMS the patient was noted to have "projectile vomiting" yesterday evening (24 hours prior to presentation), which was treated with zofran. During the event there is suspicion the patient possible aspirated. Today the patient was noted to be in resp distress with a fever.     Patient was satting 88% on 4L on EMS arrival, breathing 40 times per minute.  Improved to 96% on 15L NRB with RR high 20s.  Axillary temp 102.    CT chest concerning for aspiration pneumonia.  UA concern for a UTI.       Patient intubated in the ED & Critical Care consulted.         Hospital/ICU Course:  2/5: Critical Care Consulted. Patient intubated in the ED.   2/6: Patient doing well, on SBT; will extubate maria e. Continue treatment for aspiration PNA & UTI.   2/7: abx deescalated, continue to treat for PNA and UTI. Required pressors overnight. Off this morning.   2/8-9: still with secretions and questionable mentation? Baseline?. Will try to dry the secretions and extubated today or maria e.    2/10: MRI completed. Family called, had family meeting with one daughter. Need to re-visit with all daughters. Records requested from [facility] and [facility].   02/11-13: neurology consulted, no acute changes, wbc and LFT trending up, keep monitoring.  2/14: Concerns for CADASIL vs Binswanger syndrome per Neuro. NOTCH3 genetic " testing sent. Palliative consulted regarding goals of care.   02/15 pt is not tolerating tube feed, had two episodes of projectile vomiting, KUB 02/14 shows partial SOB and distended stomach.  02/16 CT abdomen was unremarkable, family meeting was done.  02/17 no more vomiting, tolerating 10 cc/hr tube feeds.  02/19 trial to advance tube feeding, had residual back to trickle feed will try to advance slowly. 02/19 PT became hypotensive this morning with MAPs of 50's started on levo, daughter was contacted regarding goals of care, pt status was changed to partial code upon family request.  02/20 Pt still requiring levo, try to wean off slowly, family did not show for the meeting.  02/21 family meeting was done yesterday, no acclation of care per daughter wishes.   02/25 Pt lost IV access,still unable to wean off the went, neurological status unchanged she is alert but not following commands, fails her SBT every morning.  2/27: Failed SBT. Family did not show up for Palliative Care meeting.  2/28: NAEON.     Interval History/Significant Events: NAEON. Family no-showed for family meeting yesterday    Review of Systems   Unable to perform ROS: Intubated     Objective:     Vital Signs (Most Recent):  Temp: 98 °F (36.7 °C) (03/02/18 0701)  Pulse: 81 (03/02/18 0800)  Resp: 14 (02/28/18 0346)  BP: 98/65 (03/02/18 0800)  SpO2: 97 % (03/02/18 0800) Vital Signs (24h Range):  Temp:  [98 °F (36.7 °C)-99.8 °F (37.7 °C)] 98 °F (36.7 °C)  Pulse:  [69-91] 81  SpO2:  [94 %-99 %] 97 %  BP: ()/(61-89) 98/65   Weight: 80 kg (176 lb 5.9 oz)  Body mass index is 26.82 kg/m².      Intake/Output Summary (Last 24 hours) at 03/02/18 0824  Last data filed at 03/02/18 0701   Gross per 24 hour   Intake              420 ml   Output              220 ml   Net              200 ml       Physical Exam   HENT:   Head: Normocephalic and atraumatic.   Eyes: Conjunctivae are normal. No scleral icterus.   Cardiovascular: Normal rate and regular rhythm.     Pulmonary/Chest: She has no wheezes. She has rales.   On the ventilator   Abdominal: Soft. She exhibits distension. There is no tenderness. There is no guarding.   Musculoskeletal: She exhibits no edema.   Upper and lower ext contratures   Neurological:   Opens eyes to voice, does not perform purposeful movements   Skin: Skin is warm. No rash noted.   Vitals reviewed.      Vents:  Vent Mode: A/C (03/02/18 0746)  Ventilator Initiated: Yes (02/04/18 2344)  Set Rate: 14 bmp (03/02/18 0746)  Vt Set: 400 mL (03/02/18 0746)  Pressure Support: 0 cmH20 (03/02/18 0746)  PEEP/CPAP: 5 cmH20 (03/02/18 0746)  Oxygen Concentration (%): 30 (03/02/18 0800)  Peak Airway Pressure: 29 cmH2O (03/02/18 0746)  Plateau Pressure: 20 cmH20 (03/02/18 0746)  Total Ve: 6.26 mL (03/02/18 0746)  F/VT Ratio<105 (RSBI): (!) 33.9 (02/28/18 0346)  Lines/Drains/Airways     Drain                 Gastrostomy/Enterostomy 10/25/16 1401 Percutaneous endoscopic gastrostomy (PEG) midline feeding 492 days         Urethral Catheter 02/05/18 1006 Latex 24 days          Airway                 Airway - Non-Surgical 02/04/18 2329 Endotracheal Tube 25 days          Pressure Ulcer                 Pressure Injury 02/05/18 1045 Left medial Foot Deep tissue injury 24 days         Pressure Injury 02/05/18 1045 Right lateral Foot Deep tissue injury 24 days          Peripheral Intravenous Line                 Peripheral IV - Single Lumen 02/27/18 1753 Left;Posterior Forearm 2 days              Significant Labs:    CBC/Anemia Profile:  No results for input(s): WBC, HGB, HCT, PLT, MCV, RDW, IRON, FERRITIN, RETIC, FOLATE, [id number], OCCULTBLOOD in the last 48 hours.     Chemistries:  No results for input(s): NA, K, CL, CO2, BUN, CREATININE, CALCIUM, ALBUMIN, PROT, BILITOT, ALKPHOS, ALT, AST, GLUCOSE, MG, PHOS in the last 48 hours.    All pertinent labs within the past 24 hours have been reviewed.    Significant Imaging:  I have reviewed all pertinent imaging  results/findings within the past 24 hours.    Assessment/Plan:     Neuro   Seizure-like activity    - continue keppra         Dysphagia as late effect of cerebrovascular disease    - S/P PEG tube.  - on Tube feeds, Pt has on-off episodes of emesis, but can tolerate trickle.        CVA, old, hemiparesis    -patient suffered multiple strokes in past, last one in 2008   -nonverbal at baseline, alert, makes eye contact but doesn't follow commands.    MRI (2/10)  Findings: The diffusion sequence demonstrates no evidence of acute infarct. There is severe white matter small vessel ischemic change. There is a remote infarct of the right occipital lobe. There is a mild hemosiderin staining of the brainstem probably from a prior subarachnoid hemorrhage. There is a small focus of hemosiderin posterior right midline medulla. Pituitary and craniocervical junction show nothing unusual. There is left frontal sinusitis change. There is involutional change.  - [facility] and [facility]. Did not include brain images or neurology notes.  - Neurology consulted: differential includes but is not limited to CADASIL (given h/o strokes, extensive white matter involvement including the temporal lobes), adult onset adrenoleukodystrophy, chronic microvascular changes (less likely), vanishing white matter disease (less likely given predominance in children), or Binswanger disease (subcortical leukoencephalopathy or vascular dementia 2/2 chronic HTN).   - NOTCH3 genetic testing sent, results pending        Derm   Unstageable pressure ulcer of left buttock    - multiple area.  - wound care per nursing.        Pulmonary   * Acute respiratory failure with hypoxia    - Intubated  - has large amount secretion (c/w glycopyrrol), as well as neurological disease, inability to follow commands        Aspiration pneumonia    Extensive consolidation of the right lung and debris in the right mainstem bronchus and its branches consistent with  aspiration pneumonia.  - S/p course of rocephin; s/p vanco & cefepime & flagyl   - Not on any antibiotics currently  - Intubated        Cardiac/Vascular   Paroxysmal atrial fibrillation    - Pt home BB held due to hypotension           GI   Partial small bowel obstruction    - KUB in 02/14 showed distended stomach and partial SOB.  - CT abd pelvis w contrast showed no acute intra-abdominal abnormality.  - Emesis happens on/off, no residual. respond to antemetic and feed holding.          PEG (percutaneous endoscopic gastrostomy) status    - on tube feed on/off due to emesis         Orthopedic   Pressure injury of deep tissue    - multiple areas.  - wound care per nursing.        Other   Palliative care encounter    - partial code  - Palliative care following.  - Plan for terminal extubation 2/26/18, however family has changed their minds  - F/u with Pall Care consult        Goals of care, counseling/discussion    - Palliative consulted.   - terminal extubation initially planned for 2/26/2018 discontinued due to family no longer wanting this  - plan for further goals of care discussion  - Family no-showed for 2/27 & 3/1 Pall Care meeting.              Critical care was time spent personally by me on the following activities: development of treatment plan with patient or surrogate and bedside caregivers, discussions with consultants, evaluation of patient's response to treatment, examination of patient, ordering and performing treatments and interventions, ordering and review of laboratory studies, ordering and review of radiographic studies, pulse oximetry, re-evaluation of patient's condition. This critical care time did not overlap with that of any other provider or involve time for any procedures.     Khalida Cheung MD  Critical Care Medicine  [facility]

## 2018-03-02 NOTE — PLAN OF CARE
Problem: Patient Care Overview  Goal: Plan of Care Review  Outcome: Ongoing (interventions implemented as appropriate)  POC reviewed with pt [id number]. Pt unable to verbalize understanding. Questions and concerns addressed. No acute events today. Pt progressing toward goals. Will continue to monitor. See flowsheets for full assessment and VS info.

## 2018-03-02 NOTE — ASSESSMENT & PLAN NOTE
- Palliative consulted.   - terminal extubation initially planned for 2/26/2018 discontinued due to family no longer wanting this  - plan for further goals of care discussion  - Family no-showed for 2/27 & 3/1 Pall Care meeting.

## 2018-03-02 NOTE — SUBJECTIVE & OBJECTIVE
Interval History/Significant Events: NAEON. Family no-showed for family meeting yesterday    Review of Systems   Unable to perform ROS: Intubated     Objective:     Vital Signs (Most Recent):  Temp: 98 °F (36.7 °C) (03/02/18 0701)  Pulse: 81 (03/02/18 0800)  Resp: 14 (02/28/18 0346)  BP: 98/65 (03/02/18 0800)  SpO2: 97 % (03/02/18 0800) Vital Signs (24h Range):  Temp:  [98 °F (36.7 °C)-99.8 °F (37.7 °C)] 98 °F (36.7 °C)  Pulse:  [69-91] 81  SpO2:  [94 %-99 %] 97 %  BP: ()/(61-89) 98/65   Weight: 80 kg (176 lb 5.9 oz)  Body mass index is 26.82 kg/m².      Intake/Output Summary (Last 24 hours) at 03/02/18 0824  Last data filed at 03/02/18 0701   Gross per 24 hour   Intake              420 ml   Output              220 ml   Net              200 ml       Physical Exam   HENT:   Head: Normocephalic and atraumatic.   Eyes: Conjunctivae are normal. No scleral icterus.   Cardiovascular: Normal rate and regular rhythm.    Pulmonary/Chest: She has no wheezes. She has rales.   On the ventilator   Abdominal: Soft. She exhibits distension. There is no tenderness. There is no guarding.   Musculoskeletal: She exhibits no edema.   Upper and lower ext contratures   Neurological:   Opens eyes to voice, does not perform purposeful movements   Skin: Skin is warm. No rash noted.   Vitals reviewed.      Vents:  Vent Mode: A/C (03/02/18 0746)  Ventilator Initiated: Yes (02/04/18 2344)  Set Rate: 14 bmp (03/02/18 0746)  Vt Set: 400 mL (03/02/18 0746)  Pressure Support: 0 cmH20 (03/02/18 0746)  PEEP/CPAP: 5 cmH20 (03/02/18 0746)  Oxygen Concentration (%): 30 (03/02/18 0800)  Peak Airway Pressure: 29 cmH2O (03/02/18 0746)  Plateau Pressure: 20 cmH20 (03/02/18 0746)  Total Ve: 6.26 mL (03/02/18 0746)  F/VT Ratio<105 (RSBI): (!) 33.9 (02/28/18 0346)  Lines/Drains/Airways     Drain                 Gastrostomy/Enterostomy 10/25/16 1401 Percutaneous endoscopic gastrostomy (PEG) midline feeding 492 days         Urethral Catheter 02/05/18 1006  Latex 24 days          Airway                 Airway - Non-Surgical 02/04/18 2329 Endotracheal Tube 25 days          Pressure Ulcer                 Pressure Injury 02/05/18 1045 Left medial Foot Deep tissue injury 24 days         Pressure Injury 02/05/18 1045 Right lateral Foot Deep tissue injury 24 days          Peripheral Intravenous Line                 Peripheral IV - Single Lumen 02/27/18 1753 Left;Posterior Forearm 2 days              Significant Labs:    CBC/Anemia Profile:  No results for input(s): WBC, HGB, HCT, PLT, MCV, RDW, IRON, FERRITIN, RETIC, FOLATE, [id number], OCCULTBLOOD in the last 48 hours.     Chemistries:  No results for input(s): NA, K, CL, CO2, BUN, CREATININE, CALCIUM, ALBUMIN, PROT, BILITOT, ALKPHOS, ALT, AST, GLUCOSE, MG, PHOS in the last 48 hours.    All pertinent labs within the past 24 hours have been reviewed.    Significant Imaging:  I have reviewed all pertinent imaging results/findings within the past 24 hours.

## 2018-03-03 PROBLEM — N30.00 ACUTE CYSTITIS WITHOUT HEMATURIA: Status: RESOLVED | Noted: 2017-01-01 | Resolved: 2018-01-01

## 2018-03-03 NOTE — SUBJECTIVE & OBJECTIVE
Interval History/Significant Events: NAEON. Ethics committee getting involved in case per palliative    Review of Systems   Unable to perform ROS: Intubated     Objective:     Vital Signs (Most Recent):  Temp: 98.7 °F (37.1 °C) (03/03/18 0300)  Pulse: 67 (03/03/18 0732)  Resp: 16 (03/02/18 1500)  BP: (!) 87/59 (03/03/18 0700)  SpO2: 96 % (03/03/18 0732) Vital Signs (24h Range):  Temp:  [98.2 °F (36.8 °C)-99.3 °F (37.4 °C)] 98.7 °F (37.1 °C)  Pulse:  [62-86] 67  Resp:  [16-18] 16  SpO2:  [94 %-100 %] 96 %  BP: ()/(53-74) 87/59   Weight: 79.5 kg (175 lb 4.3 oz)  Body mass index is 26.65 kg/m².      Intake/Output Summary (Last 24 hours) at 03/03/18 0804  Last data filed at 03/03/18 0600   Gross per 24 hour   Intake              680 ml   Output              225 ml   Net              455 ml       Physical Exam   HENT:   Head: Normocephalic and atraumatic.   Eyes: Conjunctivae are normal. No scleral icterus.   Cardiovascular: Normal rate and regular rhythm.    Pulmonary/Chest: She has no wheezes. She has rales.   On the ventilator   Abdominal: Soft. She exhibits distension. There is no tenderness. There is no guarding.   Musculoskeletal: She exhibits no edema.   Upper and lower ext contratures   Neurological:   Opens eyes to voice, does not perform purposeful movements   Skin: Skin is warm. No rash noted.   Vitals reviewed.      Vents:  Vent Mode: A/C (03/03/18 0732)  Ventilator Initiated: Yes (02/04/18 2344)  Set Rate: 14 bmp (03/03/18 0732)  Vt Set: 400 mL (03/03/18 0732)  Pressure Support: 0 cmH20 (03/03/18 0732)  PEEP/CPAP: 5 cmH20 (03/03/18 0732)  Oxygen Concentration (%): 30 (03/03/18 0732)  Peak Airway Pressure: 29 cmH2O (03/03/18 0732)  Plateau Pressure: 20 cmH20 (03/03/18 0732)  Total Ve: 6.49 mL (03/03/18 0732)  F/VT Ratio<105 (RSBI): (!) 33.9 (02/28/18 0346)  Lines/Drains/Airways     Drain                 Gastrostomy/Enterostomy 10/25/16 1401 Percutaneous endoscopic gastrostomy (PEG) midline feeding 493  days         Urethral Catheter 02/05/18 1006 Latex 25 days          Airway                 Airway - Non-Surgical 02/04/18 2329 Endotracheal Tube 26 days          Pressure Ulcer                 Pressure Injury 02/05/18 1045 Left medial Foot Deep tissue injury 25 days         Pressure Injury 02/05/18 1045 Right lateral Foot Deep tissue injury 25 days          Peripheral Intravenous Line                 Peripheral IV - Single Lumen 02/27/18 1753 Left;Posterior Forearm 3 days              Significant Labs:    CBC/Anemia Profile:  No results for input(s): WBC, HGB, HCT, PLT, MCV, RDW, IRON, FERRITIN, RETIC, FOLATE, [id number], OCCULTBLOOD in the last 48 hours.     Chemistries:  No results for input(s): NA, K, CL, CO2, BUN, CREATININE, CALCIUM, ALBUMIN, PROT, BILITOT, ALKPHOS, ALT, AST, GLUCOSE, MG, PHOS in the last 48 hours.    None    Significant Imaging:  I have reviewed all pertinent imaging results/findings within the past 24 hours.

## 2018-03-03 NOTE — CARE UPDATE
UA obtained and urine appears dirty; will exchange whitmore catheter. Patient has no other S&S of infection; has not had any fevers and is not tachycardic. Will exchange whitmore and hold on treatment for UTI.    Khalida Cheung MD  Internal Medicine PGY3

## 2018-03-03 NOTE — PROGRESS NOTES
"[facility]  Critical Care Medicine  Progress Note    Patient Name: Jonathan Nieves  MRN: [id number]  Admission Date: 2/4/2018  Hospital Length of Stay: 26 days  Code Status: Partial Code  Attending Provider: Maki Medeiros MD  Primary Care Provider: Primary Doctor No   Principal Problem: Acute respiratory failure with hypoxia    Subjective:     HPI:  Mr. Jonathan Nieves is a 51yo female with history of CVA with residual aphasia and dysphagia, non-verbal, s/p PEG tube placement and bed riddenness, osteomyelitis s/p amputation of toe, who comes to the ED from [facility] via EMS in respiratory distress. Per ED/EMS the patient was noted to have "projectile vomiting" yesterday evening (24 hours prior to presentation), which was treated with zofran. During the event there is suspicion the patient possible aspirated. Today the patient was noted to be in resp distress with a fever.     Patient was satting 88% on 4L on EMS arrival, breathing 40 times per minute.  Improved to 96% on 15L NRB with RR high 20s.  Axillary temp 102.    CT chest concerning for aspiration pneumonia.  UA concern for a UTI.       Patient intubated in the ED & Critical Care consulted.         Hospital/ICU Course:  2/5: Critical Care Consulted. Patient intubated in the ED.   2/6: Patient doing well, on SBT; will extubate maria e. Continue treatment for aspiration PNA & UTI.   2/7: abx deescalated, continue to treat for PNA and UTI. Required pressors overnight. Off this morning.   2/8-9: still with secretions and questionable mentation? Baseline?. Will try to dry the secretions and extubated today or maria e.    2/10: MRI completed. Family called, had family meeting with one daughter. Need to re-visit with all daughters. Records requested from [facility] and [facility].   02/11-13: neurology consulted, no acute changes, wbc and LFT trending up, keep monitoring.  2/14: Concerns for CADASIL vs Binswanger syndrome per Neuro. NOTCH3 genetic " testing sent. Palliative consulted regarding goals of care.   02/15 pt is not tolerating tube feed, had two episodes of projectile vomiting, KUB 02/14 shows partial SOB and distended stomach.  02/16 CT abdomen was unremarkable, family meeting was done.  02/17 no more vomiting, tolerating 10 cc/hr tube feeds.  02/19 trial to advance tube feeding, had residual back to trickle feed will try to advance slowly. 02/19 PT became hypotensive this morning with MAPs of 50's started on levo, daughter was contacted regarding goals of care, pt status was changed to partial code upon family request.  02/20 Pt still requiring levo, try to wean off slowly, family did not show for the meeting.  02/21 family meeting was done yesterday, no acclation of care per daughter wishes.   02/25 Pt lost IV access,still unable to wean off the went, neurological status unchanged she is alert but not following commands, fails her SBT every morning.  2/27: Failed SBT. Family did not show up for Palliative Care meeting.  2/28: NAEON.   3/1: family no showed for meeting  3/2: ethics committee consult by palliative  3/3:     Interval History/Significant Events: NAEON. Ethics committee getting involved in case per palliative    Review of Systems   Unable to perform ROS: Intubated     Objective:     Vital Signs (Most Recent):  Temp: 98.7 °F (37.1 °C) (03/03/18 0300)  Pulse: 67 (03/03/18 0732)  Resp: 16 (03/02/18 1500)  BP: (!) 87/59 (03/03/18 0700)  SpO2: 96 % (03/03/18 0732) Vital Signs (24h Range):  Temp:  [98.2 °F (36.8 °C)-99.3 °F (37.4 °C)] 98.7 °F (37.1 °C)  Pulse:  [62-86] 67  Resp:  [16-18] 16  SpO2:  [94 %-100 %] 96 %  BP: ()/(53-74) 87/59   Weight: 79.5 kg (175 lb 4.3 oz)  Body mass index is 26.65 kg/m².      Intake/Output Summary (Last 24 hours) at 03/03/18 0804  Last data filed at 03/03/18 0600   Gross per 24 hour   Intake              680 ml   Output              225 ml   Net              455 ml       Physical Exam   HENT:   Head:  Normocephalic and atraumatic.   Eyes: Conjunctivae are normal. No scleral icterus.   Cardiovascular: Normal rate and regular rhythm.    Pulmonary/Chest: She has no wheezes. She has rales.   On the ventilator   Abdominal: Soft. She exhibits distension. There is no tenderness. There is no guarding.   Musculoskeletal: She exhibits no edema.   Upper and lower ext contratures   Neurological:   Opens eyes to voice, does not perform purposeful movements   Skin: Skin is warm. No rash noted.   Vitals reviewed.      Vents:  Vent Mode: A/C (03/03/18 0732)  Ventilator Initiated: Yes (02/04/18 2344)  Set Rate: 14 bmp (03/03/18 0732)  Vt Set: 400 mL (03/03/18 0732)  Pressure Support: 0 cmH20 (03/03/18 0732)  PEEP/CPAP: 5 cmH20 (03/03/18 0732)  Oxygen Concentration (%): 30 (03/03/18 0732)  Peak Airway Pressure: 29 cmH2O (03/03/18 0732)  Plateau Pressure: 20 cmH20 (03/03/18 0732)  Total Ve: 6.49 mL (03/03/18 0732)  F/VT Ratio<105 (RSBI): (!) 33.9 (02/28/18 0346)  Lines/Drains/Airways     Drain                 Gastrostomy/Enterostomy 10/25/16 1401 Percutaneous endoscopic gastrostomy (PEG) midline feeding 493 days         Urethral Catheter 02/05/18 1006 Latex 25 days          Airway                 Airway - Non-Surgical 02/04/18 2329 Endotracheal Tube 26 days          Pressure Ulcer                 Pressure Injury 02/05/18 1045 Left medial Foot Deep tissue injury 25 days         Pressure Injury 02/05/18 1045 Right lateral Foot Deep tissue injury 25 days          Peripheral Intravenous Line                 Peripheral IV - Single Lumen 02/27/18 1753 Left;Posterior Forearm 3 days              Significant Labs:    CBC/Anemia Profile:  No results for input(s): WBC, HGB, HCT, PLT, MCV, RDW, IRON, FERRITIN, RETIC, FOLATE, [id number], OCCULTBLOOD in the last 48 hours.     Chemistries:  No results for input(s): NA, K, CL, CO2, BUN, CREATININE, CALCIUM, ALBUMIN, PROT, BILITOT, ALKPHOS, ALT, AST, GLUCOSE, MG, PHOS in the last 48  hours.    None    Significant Imaging:  I have reviewed all pertinent imaging results/findings within the past 24 hours.    Assessment/Plan:     Neuro   Seizure-like activity    - continue keppra         Dysphagia as late effect of cerebrovascular disease    - S/P PEG tube.  - on Tube feeds, Pt has on-off episodes of emesis, but can tolerate trickle.        CVA, old, hemiparesis    -patient suffered multiple strokes in past, last one in 2008   -nonverbal at baseline, alert, makes eye contact but doesn't follow commands.    MRI (2/10)  Findings: The diffusion sequence demonstrates no evidence of acute infarct. There is severe white matter small vessel ischemic change. There is a remote infarct of the right occipital lobe. There is a mild hemosiderin staining of the brainstem probably from a prior subarachnoid hemorrhage. There is a small focus of hemosiderin posterior right midline medulla. Pituitary and craniocervical junction show nothing unusual. There is left frontal sinusitis change. There is involutional change.  - [facility] requested from [facility] and [facility]. Did not include brain images or neurology notes.  - Neurology consulted: differential includes but is not limited to CADASIL (given h/o strokes, extensive white matter involvement including the temporal lobes), adult onset adrenoleukodystrophy, chronic microvascular changes (less likely), vanishing white matter disease (less likely given predominance in children), or Binswanger disease (subcortical leukoencephalopathy or vascular dementia 2/2 chronic HTN).   - NOTCH3 genetic testing sent, results pending        Derm   Unstageable pressure ulcer of left buttock    - multiple area.  - wound care per nursing.        Pulmonary   * Acute respiratory failure with hypoxia    - Intubated  - has large amount secretion (c/w glycopyrrol), as well as neurological disease, inability to follow commands        Aspiration pneumonia    Extensive consolidation of the right lung  and debris in the right mainstem bronchus and its branches consistent with aspiration pneumonia.  - S/p course of rocephin; s/p vanco & cefepime & flagyl   - Not on any antibiotics currently  - Intubated        Cardiac/Vascular   Paroxysmal atrial fibrillation    - Pt home BB held due to hypotension           GI   Partial small bowel obstruction    - KUB in 02/14 showed distended stomach and partial SOB.  - CT abd pelvis w contrast showed no acute intra-abdominal abnormality.  - Emesis happens on/off, no residual. respond to antemetic and feed holding.          PEG (percutaneous endoscopic gastrostomy) status    - on tube feed on/off due to emesis         Orthopedic   Pressure injury of deep tissue    - multiple areas.  - wound care per nursing.        Other   Palliative care encounter    - partial code  - Palliative care following.  - Plan for terminal extubation 2/26/18, however family has changed their minds  - F/u with Pall Care consult        Goals of care, counseling/discussion    - Palliative consulted.   - terminal extubation initially planned for 2/26/2018 discontinued due to family no longer wanting this  - plan for further goals of care discussion  - Family no-showed for 2/27 & 3/1 Pall Care meeting.              Critical care was time spent personally by me on the following activities: development of treatment plan with patient or surrogate and bedside caregivers, discussions with consultants, evaluation of patient's response to treatment, examination of patient, ordering and performing treatments and interventions, ordering and review of laboratory studies, ordering and review of radiographic studies, pulse oximetry, re-evaluation of patient's condition. This critical care time did not overlap with that of any other provider or involve time for any procedures.     Khalida Cheung MD  Critical Care Medicine  [facility]

## 2018-03-03 NOTE — PLAN OF CARE
Problem: Patient Care Overview  Goal: Plan of Care Review  Outcome: Ongoing (interventions implemented as appropriate)  Pt opens eyes, tracks. Bathed and linens changed. Wound care performed. 2 BM overnight. No emesis. Tolerating water bolus well. turned q2h. POC: need GOC discussion with family members. No acute events throughout shift, VS and assessment per flow sheet, patient progressing towards goals as tolerated, all concerns addressed, will continue to monitor.

## 2018-03-04 NOTE — PROGRESS NOTES
"[facility]  Critical Care Medicine  Progress Note    Patient Name: Jonathan Nieves  MRN: [id number]  Admission Date: 2/4/2018  Hospital Length of Stay: 27 days  Code Status: Partial Code  Attending Provider: Maki Medeiros MD  Primary Care Provider: Primary Doctor No   Principal Problem: Acute respiratory failure with hypoxia    Subjective:     HPI:  Mr. Jonathan Nieves is a 53yo female with history of CVA with residual aphasia and dysphagia, non-verbal, s/p PEG tube placement and bed riddenness, osteomyelitis s/p amputation of toe, who comes to the ED from [facility] via EMS in respiratory distress. Per ED/EMS the patient was noted to have "projectile vomiting" yesterday evening (24 hours prior to presentation), which was treated with zofran. During the event there is suspicion the patient possible aspirated. Today the patient was noted to be in resp distress with a fever.     Patient was satting 88% on 4L on EMS arrival, breathing 40 times per minute.  Improved to 96% on 15L NRB with RR high 20s.  Axillary temp 102.    CT chest concerning for aspiration pneumonia.  UA concern for a UTI.       Patient intubated in the ED & Critical Care consulted.         Hospital/ICU Course:  2/5: Critical Care Consulted. Patient intubated in the ED.   2/6: Patient doing well, on SBT; will extubate maria e. Continue treatment for aspiration PNA & UTI.   2/7: abx deescalated, continue to treat for PNA and UTI. Required pressors overnight. Off this morning.   2/8-9: still with secretions and questionable mentation? Baseline?. Will try to dry the secretions and extubated today or maria e.    2/10: MRI completed. Family called, had family meeting with one daughter. Need to re-visit with all daughters. Records requested from [facility] and [facility].   02/11-13: neurology consulted, no acute changes, wbc and LFT trending up, keep monitoring.  2/14: Concerns for CADASIL vs Binswanger syndrome per Neuro. NOTCH3 genetic " testing sent. Palliative consulted regarding goals of care.   02/15 pt is not tolerating tube feed, had two episodes of projectile vomiting, KUB 02/14 shows partial SOB and distended stomach.  02/16 CT abdomen was unremarkable, family meeting was done.  02/17 no more vomiting, tolerating 10 cc/hr tube feeds.  02/19 trial to advance tube feeding, had residual back to trickle feed will try to advance slowly. 02/19 PT became hypotensive this morning with MAPs of 50's started on levo, daughter was contacted regarding goals of care, pt status was changed to partial code upon family request.  02/20 Pt still requiring levo, try to wean off slowly, family did not show for the meeting.  02/21 family meeting was done yesterday, no acclation of care per daughter wishes.   02/25 Pt lost IV access,still unable to wean off the went, neurological status unchanged she is alert but not following commands, fails her SBT every morning.  2/27: Failed SBT. Family did not show up for Palliative Care meeting.  2/28: NAEON.   3/1: family no showed for meeting  3/2: ethics committee consult by palliative  3/3: spoke to daughter zainab, informed she was awaiting ride to hospital. She no showed again      Interval History/Significant Events: NAEON. Ethics committee getting involved in case per palliative. Family no-showed yesterday again    Review of Systems   Unable to perform ROS: Intubated     Objective:     Vital Signs (Most Recent):  Temp: 99.2 °F (37.3 °C) (03/04/18 0701)  Pulse: 71 (03/04/18 0909)  Resp: 16 (03/03/18 0732)  BP: (!) 88/65 (03/04/18 0900)  SpO2: 100 % (03/04/18 0909) Vital Signs (24h Range):  Temp:  [98.2 °F (36.8 °C)-99.4 °F (37.4 °C)] 99.2 °F (37.3 °C)  Pulse:  [66-94] 71  SpO2:  [95 %-100 %] 100 %  BP: ()/(52-74) 88/65   Weight: 78.3 kg (172 lb 9.9 oz)  Body mass index is 26.25 kg/m².      Intake/Output Summary (Last 24 hours) at 03/04/18 1002  Last data filed at 03/04/18 0900   Gross per 24 hour   Intake               750 ml   Output              345 ml   Net              405 ml       Physical Exam   HENT:   Head: Normocephalic and atraumatic.   Eyes: Conjunctivae are normal. No scleral icterus.   Cardiovascular: Normal rate and regular rhythm.    Pulmonary/Chest: She has no wheezes. She has rales.   On the ventilator   Abdominal: Soft. She exhibits distension. There is no tenderness. There is no guarding.   Musculoskeletal: She exhibits no edema.   Upper and lower ext contratures   Neurological:   Opens eyes to voice, does not perform purposeful movements   Skin: Skin is warm. No rash noted.   Vitals reviewed.      Vents:  Vent Mode: A/C (03/04/18 0909)  Ventilator Initiated: Yes (02/04/18 2344)  Set Rate: 14 bmp (03/04/18 0909)  Vt Set: 400 mL (03/04/18 0909)  Pressure Support: 0 cmH20 (03/04/18 0529)  PEEP/CPAP: 5 cmH20 (03/04/18 0909)  Oxygen Concentration (%): 30 (03/04/18 0909)  Peak Airway Pressure: 27 cmH2O (03/04/18 0909)  Plateau Pressure: 19 cmH20 (03/04/18 0756)  Total Ve: 6.34 mL (03/04/18 0909)  F/VT Ratio<105 (RSBI): (!) 45.2 (03/03/18 0732)  Lines/Drains/Airways     Drain                 Gastrostomy/Enterostomy 10/25/16 1401 Percutaneous endoscopic gastrostomy (PEG) midline feeding 494 days         Urethral Catheter 03/03/18 1816 Latex less than 1 day          Airway                 Airway - Non-Surgical 02/04/18 2329 Endotracheal Tube 27 days          Pressure Ulcer                 Pressure Injury 02/05/18 1045 Left medial Foot Deep tissue injury 26 days         Pressure Injury 02/05/18 1045 Right lateral Foot Deep tissue injury 26 days          Peripheral Intravenous Line                 Peripheral IV - Single Lumen 02/27/18 1753 Left;Posterior Forearm 4 days              Significant Labs:    CBC/Anemia Profile:  No results for input(s): WBC, HGB, HCT, PLT, MCV, RDW, IRON, FERRITIN, RETIC, FOLATE, [id number], OCCULTBLOOD in the last 48 hours.     Chemistries:  No results for input(s): NA, K, CL, CO2, BUN,  CREATININE, CALCIUM, ALBUMIN, PROT, BILITOT, ALKPHOS, ALT, AST, GLUCOSE, MG, PHOS in the last 48 hours.    None    Significant Imaging:  I have reviewed all pertinent imaging results/findings within the past 24 hours.    Assessment/Plan:     Neuro   Seizure-like activity    - continue keppra         Dysphagia as late effect of cerebrovascular disease    - S/P PEG tube.  - on Tube feeds, Pt has on-off episodes of emesis, but can tolerate trickle.        CVA, old, hemiparesis    -patient suffered multiple strokes in past, last one in 2008   -nonverbal at baseline, alert, makes eye contact but doesn't follow commands.    MRI (2/10)  Findings: The diffusion sequence demonstrates no evidence of acute infarct. There is severe white matter small vessel ischemic change. There is a remote infarct of the right occipital lobe. There is a mild hemosiderin staining of the brainstem probably from a prior subarachnoid hemorrhage. There is a small focus of hemosiderin posterior right midline medulla. Pituitary and craniocervical junction show nothing unusual. There is left frontal sinusitis change. There is involutional change.  - Records requested from [facility] and [facility]. Did not include brain images or neurology notes.  - Neurology consulted: differential includes but is not limited to CADASIL (given h/o strokes, extensive white matter involvement including the temporal lobes), adult onset adrenoleukodystrophy, chronic microvascular changes (less likely), vanishing white matter disease (less likely given predominance in children), or Binswanger disease (subcortical leukoencephalopathy or vascular dementia 2/2 chronic HTN).   - NOTCH3 genetic testing sent, results pending        Derm   Unstageable pressure ulcer of left buttock    - multiple area.  - wound care per nursing.        Pulmonary   * Acute respiratory failure with hypoxia    - Intubated  - has large amount secretion (c/w glycopyrrol), as well as neurological disease,  inability to follow commands        Aspiration pneumonia    Extensive consolidation of the right lung and debris in the right mainstem bronchus and its branches consistent with aspiration pneumonia.  - S/p course of rocephin; s/p vanco & cefepime & flagyl   - Not on any antibiotics currently  - Intubated        Cardiac/Vascular   Paroxysmal atrial fibrillation    - Pt home BB held due to hypotension           GI   Partial small bowel obstruction    - KUB in 02/14 showed distended stomach and partial SOB.  - CT abd pelvis w contrast showed no acute intra-abdominal abnormality.  - Emesis happens on/off, no residual. respond to antemetic and feed holding.          PEG (percutaneous endoscopic gastrostomy) status    - on tube feed on/off due to emesis         Orthopedic   Pressure injury of deep tissue    - multiple areas.  - wound care per nursing.        Other   Palliative care encounter    - partial code  - Palliative care following.  - Plan for terminal extubation 2/26/18, however family has changed their minds  - F/u with Pall Care consult        Goals of care, counseling/discussion    - Palliative consulted.   - terminal extubation initially planned for 2/26/2018 discontinued due to family no longer wanting this  - plan for further goals of care discussion  - Family no-showed for 2/27 & 3/1 Pall Care meeting.           Critical care was time spent personally by me on the following activities: development of treatment plan with patient or surrogate and bedside caregivers, discussions with consultants, evaluation of patient's response to treatment, examination of patient, ordering and performing treatments and interventions, ordering and review of laboratory studies, ordering and review of radiographic studies, pulse oximetry, re-evaluation of patient's condition. This critical care time did not overlap with that of any other provider or involve time for any procedures.     Khalida Cheung MD  Critical Care  Medicine  [facility]

## 2018-03-04 NOTE — CARE UPDATE
Spoke with patient's daughter, Matias, around 5:45 pm. Matias reported she is waiting on her sister to pick her up and they are coming to the hospital.    Khalida Cheung MD  Internal Medicine PGY3

## 2018-03-04 NOTE — PLAN OF CARE
Problem: Patient Care Overview  Goal: Plan of Care Review  Outcome: Ongoing (interventions implemented as appropriate)  Neuro: Patient's neurological status remains unchanged from prior shift. Nonverbal, does not follow command, eye tracking is spontaneous but does not track at command. Body is completely contracted. PERRLA. Withdraws from painful stimuli.      Pulmonary: Breath sounds are bilaterally diminished with no signs of distress. Remains on same ventilator settings.     Cardiovascular: No changes to heart rhythm or blood pressure.     Gastrointestinal: PEG to RLQ and intact, TF restarted at 10cc/hr. Had no residual at recheck. Patient vomited once overshift. Currently stopped tube feedings.  BS active and audible in all 4 quadrants. Last BM on 3/3.      Genitourinary: Changed indwelling whitmore today.      Endocrine: BG 65 - administered D50W 25mg. Rechecked BG of 123.     Electrolytes/Blood: No pRBCs or PLT given throughout shift. Magnesium replaced as scheduled.      Integumentary/Other: Multiple lesions on feet, hips, sacrum and elbows - applied meplex.    No falls or acute changes throughout shift. Will continue to monitor.

## 2018-03-04 NOTE — PLAN OF CARE
Problem: Patient Care Overview  Goal: Plan of Care Review  Outcome: Ongoing (interventions implemented as appropriate)  Neuro: Patient's neurological status remains unchanged from prior shift. Nonverbal, does not follow command, eye tracking is spontaneous but does not track at command. Body is completely contracted. PERRLA. Withdraws from painful stimuli.      Pulmonary: Breath sounds are bilaterally diminished with no signs of distress. Remains on same ventilator settings.     Cardiovascular: No changes to heart rhythm or blood pressure.     Gastrointestinal: PEG to RLQ and intact, TF stopped D/T patient having an episode of emesis prior to shift. Had no residual at recheck.  BS active and audible in all 4 quadrants. Last BM on 3/3.      Genitourinary: Indwelling whitmore placed on 3/3. UO of 5-15 cc.hr.       Endocrine: BG 90 - no insulin given throughout shift.      Electrolytes/Blood: No pRBCs or PLT given throughout shift. Magnesium replaced as scheduled.      Integumentary/Other: Multiple lesions on feet, hips, sacrum and elbows - applied meplex.     No falls or acute changes throughout shift. Will continue to monitor.

## 2018-03-04 NOTE — SUBJECTIVE & OBJECTIVE
Interval History/Significant Events: ELICIAEON. Ethics committee getting involved in case per palliative. Family no-showed yesterday again    Review of Systems   Unable to perform ROS: Intubated     Objective:     Vital Signs (Most Recent):  Temp: 99.2 °F (37.3 °C) (03/04/18 0701)  Pulse: 71 (03/04/18 0909)  Resp: 16 (03/03/18 0732)  BP: (!) 88/65 (03/04/18 0900)  SpO2: 100 % (03/04/18 0909) Vital Signs (24h Range):  Temp:  [98.2 °F (36.8 °C)-99.4 °F (37.4 °C)] 99.2 °F (37.3 °C)  Pulse:  [66-94] 71  SpO2:  [95 %-100 %] 100 %  BP: ()/(52-74) 88/65   Weight: 78.3 kg (172 lb 9.9 oz)  Body mass index is 26.25 kg/m².      Intake/Output Summary (Last 24 hours) at 03/04/18 1002  Last data filed at 03/04/18 0900   Gross per 24 hour   Intake              750 ml   Output              345 ml   Net              405 ml       Physical Exam   HENT:   Head: Normocephalic and atraumatic.   Eyes: Conjunctivae are normal. No scleral icterus.   Cardiovascular: Normal rate and regular rhythm.    Pulmonary/Chest: She has no wheezes. She has rales.   On the ventilator   Abdominal: Soft. She exhibits distension. There is no tenderness. There is no guarding.   Musculoskeletal: She exhibits no edema.   Upper and lower ext contratures   Neurological:   Opens eyes to voice, does not perform purposeful movements   Skin: Skin is warm. No rash noted.   Vitals reviewed.      Vents:  Vent Mode: A/C (03/04/18 0909)  Ventilator Initiated: Yes (02/04/18 2344)  Set Rate: 14 bmp (03/04/18 0909)  Vt Set: 400 mL (03/04/18 0909)  Pressure Support: 0 cmH20 (03/04/18 0529)  PEEP/CPAP: 5 cmH20 (03/04/18 0909)  Oxygen Concentration (%): 30 (03/04/18 0909)  Peak Airway Pressure: 27 cmH2O (03/04/18 0909)  Plateau Pressure: 19 cmH20 (03/04/18 0756)  Total Ve: 6.34 mL (03/04/18 0909)  F/VT Ratio<105 (RSBI): (!) 45.2 (03/03/18 0732)  Lines/Drains/Airways     Drain                 Gastrostomy/Enterostomy 10/25/16 1401 Percutaneous endoscopic gastrostomy (PEG)  midline feeding 494 days         Urethral Catheter 03/03/18 1816 Latex less than 1 day          Airway                 Airway - Non-Surgical 02/04/18 2329 Endotracheal Tube 27 days          Pressure Ulcer                 Pressure Injury 02/05/18 1045 Left medial Foot Deep tissue injury 26 days         Pressure Injury 02/05/18 1045 Right lateral Foot Deep tissue injury 26 days          Peripheral Intravenous Line                 Peripheral IV - Single Lumen 02/27/18 1753 Left;Posterior Forearm 4 days              Significant Labs:    CBC/Anemia Profile:  No results for input(s): WBC, HGB, HCT, PLT, MCV, RDW, IRON, FERRITIN, RETIC, FOLATE, [id number], OCCULTBLOOD in the last 48 hours.     Chemistries:  No results for input(s): NA, K, CL, CO2, BUN, CREATININE, CALCIUM, ALBUMIN, PROT, BILITOT, ALKPHOS, ALT, AST, GLUCOSE, MG, PHOS in the last 48 hours.    None    Significant Imaging:  I have reviewed all pertinent imaging results/findings within the past 24 hours.

## 2018-03-04 NOTE — PLAN OF CARE
Problem: Patient Care Overview  Goal: Plan of Care Review  Outcome: Ongoing (interventions implemented as appropriate)  Pt opens eyes spontaneously, tracks. Contracted in all extremities. Emesis x1, zofran administered twice during shift. TF restarted, currently going at 10 cc/hr. Turned q2h. Wound care performed. No family members came overnight to visit. Continues to be intubated, no s/s of resp distress, 02 sat > 92%. Bathed and linens changed. CBG WNL. POC: awaiting family meeting to discuss goal of care. VS and assessment per flow sheet, patient progressing towards goals as tolerated,  will continue to monitor.

## 2018-03-05 NOTE — PROGRESS NOTES
"[facility]  Critical Care Medicine  Progress Note    Patient Name: Jonathan Nieves  MRN: [id number]  Admission Date: 2/4/2018  Hospital Length of Stay: 28 days  Code Status: Partial Code  Attending Provider: Maki Medeiros MD  Primary Care Provider: Primary Doctor No   Principal Problem: Acute respiratory failure with hypoxia    Subjective:     HPI:  Mr. Jonathan Nieves is a 53yo female with history of CVA with residual aphasia and dysphagia, non-verbal, s/p PEG tube placement and bed riddenness, osteomyelitis s/p amputation of toe, who comes to the ED from [facility] via EMS in respiratory distress. Per ED/EMS the patient was noted to have "projectile vomiting" yesterday evening (24 hours prior to presentation), which was treated with zofran. During the event there is suspicion the patient possible aspirated. Today the patient was noted to be in resp distress with a fever.     Patient was satting 88% on 4L on EMS arrival, breathing 40 times per minute.  Improved to 96% on 15L NRB with RR high 20s.  Axillary temp 102.    CT chest concerning for aspiration pneumonia.  UA concern for a UTI.       Patient intubated in the ED & Critical Care consulted.         Hospital/ICU Course:  2/5: Critical Care Consulted. Patient intubated in the ED.   2/6: Patient doing well, on SBT; will extubate maria e. Continue treatment for aspiration PNA & UTI.   2/7: abx deescalated, continue to treat for PNA and UTI. Required pressors overnight. Off this morning.   2/8-9: still with secretions and questionable mentation? Baseline?. Will try to dry the secretions and extubated today or maria e.    2/10: MRI completed. Family called, had family meeting with one daughter. Need to re-visit with all daughters. Records requested from [facility] and [facility].   02/11-13: neurology consulted, no acute changes, wbc and LFT trending up, keep monitoring.  2/14: Concerns for CADASIL vs Binswanger syndrome per Neuro. NOTCH3 genetic " testing sent. Palliative consulted regarding goals of care.   02/15 pt is not tolerating tube feed, had two episodes of projectile vomiting, KUB 02/14 shows partial SOB and distended stomach.  02/16 CT abdomen was unremarkable, family meeting was done.  02/17 no more vomiting, tolerating 10 cc/hr tube feeds.  02/19 trial to advance tube feeding, had residual back to trickle feed will try to advance slowly. 02/19 PT became hypotensive this morning with MAPs of 50's started on levo, daughter was contacted regarding goals of care, pt status was changed to partial code upon family request.  02/20 Pt still requiring levo, try to wean off slowly, family did not show for the meeting.  02/21 family meeting was done yesterday, no acclation of care per daughter wishes.   02/25 Pt lost IV access,still unable to wean off the went, neurological status unchanged she is alert but not following commands, fails her SBT every morning.  2/27: Failed SBT. Family did not show up for Palliative Care meeting.  2/28: NAEON.   3/1: family no showed for meeting  3/2: ethics committee consult by palliative  3/3: spoke to daughter zainab, informed she was awaiting her sister to pick her up and they are coming to hospital. She no showed again  3/4: morphine drip started as patient appeared to be in pain, stopped when patient became hypotensive.  3/5: will attempt to contact family for another meeting today    Interval History/Significant Events: NAEON    Review of Systems   Unable to perform ROS: Intubated     Objective:     Vital Signs (Most Recent):  Temp: 98.4 °F (36.9 °C) (03/05/18 1100)  Pulse: (!) 56 (03/05/18 1200)  Resp: 14 (03/05/18 1200)  BP: (!) 69/45 (03/05/18 1200)  SpO2: 100 % (03/05/18 1200) Vital Signs (24h Range):  Temp:  [98 °F (36.7 °C)-99.3 °F (37.4 °C)] 98.4 °F (36.9 °C)  Pulse:  [56-78] 56  Resp:  [14] 14  SpO2:  [96 %-100 %] 100 %  BP: (64-92)/(39-65) 69/45   Weight: 78.3 kg (172 lb 9.9 oz)  Body mass index is 26.25  kg/m².      Intake/Output Summary (Last 24 hours) at 03/05/18 1245  Last data filed at 03/05/18 1200   Gross per 24 hour   Intake          2693.82 ml   Output              155 ml   Net          2538.82 ml       Physical Exam   HENT:   Head: Normocephalic and atraumatic.   Eyes: Conjunctivae are normal. No scleral icterus.   Cardiovascular: Normal rate and regular rhythm.    Pulmonary/Chest: She has no wheezes. She has rales.   On the ventilator   Abdominal: Soft. She exhibits distension. There is no tenderness. There is no guarding.   Musculoskeletal: She exhibits no edema.   Upper and lower ext contratures   Neurological:   Opens eyes to voice, does not perform purposeful movements   Skin: Skin is warm. No rash noted.   Vitals reviewed.      Vents:  Vent Mode: A/C (03/05/18 1130)  Ventilator Initiated: Yes (02/04/18 2344)  Set Rate: 14 bmp (03/05/18 1130)  Vt Set: 400 mL (03/05/18 1130)  Pressure Support: 0 cmH20 (03/05/18 1130)  PEEP/CPAP: 5 cmH20 (03/05/18 1130)  Oxygen Concentration (%): 30 (03/05/18 1200)  Peak Airway Pressure: 27 cmH2O (03/05/18 1130)  Plateau Pressure: 19 cmH20 (03/05/18 1130)  Total Ve: 5.75 mL (03/05/18 1130)  F/VT Ratio<105 (RSBI): (!) 32.26 (03/05/18 1130)  Lines/Drains/Airways     Drain                 Gastrostomy/Enterostomy 10/25/16 1401 Percutaneous endoscopic gastrostomy (PEG) midline feeding 495 days         Urethral Catheter 03/03/18 1816 Latex 1 day          Airway                 Airway - Non-Surgical 02/04/18 2329 Endotracheal Tube 28 days          Pressure Ulcer                 Pressure Injury 02/05/18 1045 Left medial Foot Deep tissue injury 28 days         Pressure Injury 02/05/18 1045 Right lateral Foot Deep tissue injury 28 days          Peripheral Intravenous Line                 Peripheral IV - Single Lumen 02/27/18 1753 Left;Posterior Forearm 5 days              Significant Labs:    CBC/Anemia Profile:  No results for input(s): WBC, HGB, HCT, PLT, MCV, RDW, IRON,  FERRITIN, RETIC, FOLATE, [id number], OCCULTBLOOD in the last 48 hours.     Chemistries:  No results for input(s): NA, K, CL, CO2, BUN, CREATININE, CALCIUM, ALBUMIN, PROT, BILITOT, ALKPHOS, ALT, AST, GLUCOSE, MG, PHOS in the last 48 hours.    All pertinent labs within the past 24 hours have been reviewed.    Significant Imaging:  I have reviewed all pertinent imaging results/findings within the past 24 hours.    Assessment/Plan:     Neuro   Seizure-like activity    - continue keppra         Dysphagia as late effect of cerebrovascular disease    - S/P PEG tube.  - patient not tolerating feeds 2/2 emesis with trickle feeds.  - tube feeds stopped after multiple attempts on different days to run trickle feeds but always has emesis        CVA, old, hemiparesis    -patient suffered multiple strokes in past, last one in 2008   -nonverbal at baseline, alert, makes eye contact but doesn't follow commands.    MRI (2/10)  Findings: The diffusion sequence demonstrates no evidence of acute infarct. There is severe white matter small vessel ischemic change. There is a remote infarct of the right occipital lobe. There is a mild hemosiderin staining of the brainstem probably from a prior subarachnoid hemorrhage. There is a small focus of hemosiderin posterior right midline medulla. Pituitary and craniocervical junction show nothing unusual. There is left frontal sinusitis change. There is involutional change.  - Records requested from [facility] and [facility]. Did not include brain images or neurology notes.  - Neurology consulted: differential includes but is not limited to CADASIL (given h/o strokes, extensive white matter involvement including the temporal lobes), adult onset adrenoleukodystrophy, chronic microvascular changes (less likely), vanishing white matter disease (less likely given predominance in children), or Binswanger disease (subcortical leukoencephalopathy or vascular dementia 2/2 chronic HTN).   - NOTCH3 genetic testing  sent, results pending        Derm   Unstageable pressure ulcer of left buttock    - multiple area.  - wound care per nursing.        Pulmonary   * Acute respiratory failure with hypoxia    - Intubated  - has large amount secretion (c/w glycopyrrol), as well as neurological disease, inability to follow commands        Aspiration pneumonia    Extensive consolidation of the right lung and debris in the right mainstem bronchus and its branches consistent with aspiration pneumonia.  - S/p course of rocephin; s/p vanco & cefepime & flagyl   - Not on any antibiotics currently  - Intubated        Cardiac/Vascular   Paroxysmal atrial fibrillation    - Pt home BB held due to hypotension           GI   Partial small bowel obstruction    - KUB in 02/14 showed distended stomach and partial SOB.  - CT abd pelvis w contrast showed no acute intra-abdominal abnormality.  - Emesis happens on/off, no residual. respond to antemetic and feed holding.          PEG (percutaneous endoscopic gastrostomy) status    .        Orthopedic   Pressure injury of deep tissue    - multiple areas.  - wound care per nursing.        Other   Palliative care encounter    - partial code  - Palliative care following.  - Plan for terminal extubation 2/26/18, however family has changed their minds  - F/u with Pall Care consult        Goals of care, counseling/discussion    - Palliative consulted.   - terminal extubation initially planned for 2/26/2018 discontinued due to family no longer wanting this  - plan for further goals of care discussion  - Family no-showed for 2/27 & 3/1 Pall Care meeting.                 Critical care was time spent personally by me on the following activities: development of treatment plan with patient or surrogate and bedside caregivers, discussions with consultants, evaluation of patient's response to treatment, examination of patient, ordering and performing treatments and interventions, ordering and review of laboratory studies,  ordering and review of radiographic studies, pulse oximetry, re-evaluation of patient's condition. This critical care time did not overlap with that of any other provider or involve time for any procedures.     Khalida Cheung MD  Critical Care Medicine  [facility]

## 2018-03-05 NOTE — PROGRESS NOTES
Informed MD Theppote pt BP 70-50s, new orders received to administer another 1L nS bolus. RN verbalizes understanding WCTM.

## 2018-03-05 NOTE — ASSESSMENT & PLAN NOTE
- S/P PEG tube.  - patient not tolerating feeds 2/2 emesis with trickle feeds.  - tube feeds stopped after multiple attempts on different days to run trickle feeds but always has emesis

## 2018-03-05 NOTE — PROGRESS NOTES
Informed MD Theppote pt SBP 60-70s. New orders received to pause morphine drip and administer fluid bolus.

## 2018-03-05 NOTE — SUBJECTIVE & OBJECTIVE
Interval History/Significant Events: NAEON    Review of Systems   Unable to perform ROS: Intubated     Objective:     Vital Signs (Most Recent):  Temp: 98.4 °F (36.9 °C) (03/05/18 1100)  Pulse: (!) 56 (03/05/18 1200)  Resp: 14 (03/05/18 1200)  BP: (!) 69/45 (03/05/18 1200)  SpO2: 100 % (03/05/18 1200) Vital Signs (24h Range):  Temp:  [98 °F (36.7 °C)-99.3 °F (37.4 °C)] 98.4 °F (36.9 °C)  Pulse:  [56-78] 56  Resp:  [14] 14  SpO2:  [96 %-100 %] 100 %  BP: (64-92)/(39-65) 69/45   Weight: 78.3 kg (172 lb 9.9 oz)  Body mass index is 26.25 kg/m².      Intake/Output Summary (Last 24 hours) at 03/05/18 1245  Last data filed at 03/05/18 1200   Gross per 24 hour   Intake          2693.82 ml   Output              155 ml   Net          2538.82 ml       Physical Exam   HENT:   Head: Normocephalic and atraumatic.   Eyes: Conjunctivae are normal. No scleral icterus.   Cardiovascular: Normal rate and regular rhythm.    Pulmonary/Chest: She has no wheezes. She has rales.   On the ventilator   Abdominal: Soft. She exhibits distension. There is no tenderness. There is no guarding.   Musculoskeletal: She exhibits no edema.   Upper and lower ext contratures   Neurological:   Opens eyes to voice, does not perform purposeful movements   Skin: Skin is warm. No rash noted.   Vitals reviewed.      Vents:  Vent Mode: A/C (03/05/18 1130)  Ventilator Initiated: Yes (02/04/18 2344)  Set Rate: 14 bmp (03/05/18 1130)  Vt Set: 400 mL (03/05/18 1130)  Pressure Support: 0 cmH20 (03/05/18 1130)  PEEP/CPAP: 5 cmH20 (03/05/18 1130)  Oxygen Concentration (%): 30 (03/05/18 1200)  Peak Airway Pressure: 27 cmH2O (03/05/18 1130)  Plateau Pressure: 19 cmH20 (03/05/18 1130)  Total Ve: 5.75 mL (03/05/18 1130)  F/VT Ratio<105 (RSBI): (!) 32.26 (03/05/18 1130)  Lines/Drains/Airways     Drain                 Gastrostomy/Enterostomy 10/25/16 1401 Percutaneous endoscopic gastrostomy (PEG) midline feeding 495 days         Urethral Catheter 03/03/18 1816 Latex 1 day           Airway                 Airway - Non-Surgical 02/04/18 2329 Endotracheal Tube 28 days          Pressure Ulcer                 Pressure Injury 02/05/18 1045 Left medial Foot Deep tissue injury 28 days         Pressure Injury 02/05/18 1045 Right lateral Foot Deep tissue injury 28 days          Peripheral Intravenous Line                 Peripheral IV - Single Lumen 02/27/18 1753 Left;Posterior Forearm 5 days              Significant Labs:    CBC/Anemia Profile:  No results for input(s): WBC, HGB, HCT, PLT, MCV, RDW, IRON, FERRITIN, RETIC, FOLATE, [id number], OCCULTBLOOD in the last 48 hours.     Chemistries:  No results for input(s): NA, K, CL, CO2, BUN, CREATININE, CALCIUM, ALBUMIN, PROT, BILITOT, ALKPHOS, ALT, AST, GLUCOSE, MG, PHOS in the last 48 hours.    All pertinent labs within the past 24 hours have been reviewed.    Significant Imaging:  I have reviewed all pertinent imaging results/findings within the past 24 hours.

## 2018-03-05 NOTE — PROGRESS NOTES
Informed MD Theppote received in report, pt was on comfort measures, morphine drip was started, was supposed to do vitals q12h, per orders, vitals are done routine, MD states to continue routine vital signs (q1h). RN verbalizes understanding, WCTM.

## 2018-03-05 NOTE — PLAN OF CARE
Problem: Patient Care Overview  Goal: Plan of Care Review  No acute events throughout day. See vital signs and assessments in flowsheets. Patient progressing towards goals as tolerated, plan of care communicated and reviewed with Jonathan Nieves and family.  Plan to continue GOC of care discussions with family on Wednesday. All concerns addressed. Will continue to monitor.

## 2018-03-05 NOTE — PROGRESS NOTES
Notified Dr. Cheung with [facility] of continued hypotension. No orders for comfort care placed. Orders received for 500ml NS bolus. Continue plan of care.

## 2018-03-05 NOTE — PLAN OF CARE
Problem: Patient Care Overview  Goal: Plan of Care Review  Outcome: Ongoing (interventions implemented as appropriate)  Pt was on morphine drip, stopped due to hypotension, 1L NS Bolus administered. Turned q2h. Wound care performed, wounds appears to be healing and sacrum wound smaller in size. Intubated, no s/s of resp distress, 02 sat >92%. POC: ethics committee involved, attempting to contact family members for goals of care. VS and assessment per flow sheet, patient progressing towards goals as tolerated, all concerns addressed, will continue to monitor.

## 2018-03-05 NOTE — PROGRESS NOTES
Notified [facility] fellow, Dr. Mora of HR 54, BP 64/39. IVF bolus infusing. Dr. Mora to notify attending, Dr. Hdz, of patient status. No new orders received at this time. Continue plan of care.

## 2018-03-05 NOTE — PROGRESS NOTES
[facility]  Palliative Medicine  Consult Note    Patient Name: Jonathan Nieves  MRN: [id number]  Admission Date: 2/4/2018  Hospital Length of Stay: 28 days  Code Status: Partial Code   Attending Provider: Maki Medeiros MD  Consulting Provider: Vladimir De La Torre MD  Primary Care Physician: Primary Doctor No  Principal Problem:Acute respiratory failure with hypoxia    Consults  Assessment/Plan:     Active Diagnoses:    Diagnosis Date Noted POA    PRINCIPAL PROBLEM:  Acute respiratory failure with hypoxia [J96.01] 02/05/2018 Yes    Decubitus ulcer of left elbow, stage 3 [L89.023] 02/27/2018 Yes    Partial small bowel obstruction [K56.600] 02/16/2018 Yes    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] 02/15/2018 Yes    Palliative care encounter [Z51.5] 02/14/2018 Not Applicable    Intubation of airway performed without difficulty [Z78.9] 02/05/2018 Yes    Unstageable pressure ulcer of left buttock [L89.320] 02/05/2018 Yes    Pressure injury of deep tissue [L89.90] 02/05/2018 Yes    Alteration in skin integrity due to moisture [R23.9] 02/05/2018 Yes    Seizure-like activity [R56.9] 05/22/2017 Yes    Goals of care, counseling/discussion [Z71.89] 05/20/2017 Not Applicable     Chronic    PEG (percutaneous endoscopic gastrostomy) status [Z93.1] 12/16/2014 Not Applicable    Aspiration pneumonia [J69.0] 10/13/2014 Yes    Paroxysmal atrial fibrillation [I48.0] 05/15/2014 Yes     Chronic    CVA, old, hemiparesis [I69.359] 05/15/2014 Not Applicable     Chronic    Dysphagia as late effect of cerebrovascular disease [I69.991] 05/15/2014 Not Applicable     Chronic      Problems Resolved During this Admission:    Diagnosis Date Noted Date Resolved POA    Circulatory failure [R57.9] 02/26/2018 02/26/2018 Yes    Hypernatremia [E87.0] 02/07/2018 02/26/2018 Yes    UTI (urinary tract infection) [N39.0] 10/13/2014 02/26/2018 Yes     Goals of care:  - 51 yo lady with several CVA and extensive white matter disease  "with wide differential (CADASIL).   - Reviewed Dr. Cherry note 2/12. Appears to have severe neurologic deficits that likely are irreversible. Poor prognosis for meaningful neurologic recovery.   - 2/19 made partial code per primary 2/2 to hypotensive and bradycardia episode  - Family did not show for meeting with palliative care but arrived late afternoon and had a discussion with Dr. Macias, critical care.   - no escalation of care and partial code confirmed. Plans for terminal extubation 2/26/18.No family available, so continued on MV.   - started on comfort measures and morphine infusion  - now hypotensive but seems comfortable.   - reached out to pt. Daughter. Expressed that her mother is dying and would benefit from family support at this point. She tells me she is on her way to the hospital.   - discussed with Dr. Medeiros. Agree that the pt. Chance of reasonable recovery is dismal and that her ongoing mechanical ventilation is causing discomfort without benefit. . I agree with transitioning to comfort measures. The family is aware.      I will follow along with you. Please call [phone] with questions.     Subjective:     HPI: Mr. Jonathan Nieves is a 53yo female with history of CVA with residual aphasia and dysphagia, non-verbal, s/p PEG tube placement and bed riddenness, osteomyelitis s/p amputation of toe, who came to the ED from [facility] via EMS in respiratory distress. Per ED/EMS the patient was noted to have "projectile vomiting" yesterday evening (24 hours prior to presentation), which was treated with zofran. During the event there is suspicion the patient possible aspirated. Currently, she opens eyes to stimulation, intubated, with contractures of knees/hips/elbows/hands and multiple areas of pressure injury.      Interval History: small doses of pressors today. Unable to completely wean pressors over night. Remains on AC mechanical ventilation.     Interval History: Difficulties " with family availability noted. Family has missed multiple family meetings.   Pt. Remains on the vent. Now on Morphine infusion  Hypotension noted. No response to IVF boluses.   Pt opens eyes to verbal stimuli.       Medications:  Continuous Infusions:  Scheduled Meds:   chlorhexidine  15 mL Mouth/Throat BID    glycopyrrolate  0.1 mg Intravenous Daily    scopolamine  1 patch Transdermal Q3 Days     PRN Meds:dextrose 50%, [] fentaNYL **FOLLOWED BY** fentaNYL, glucagon (human recombinant), LORazepam, ondansetron, sodium chloride 0.9%        Review of Systems   Unable to perform ROS: Dementia     Objective:     Vital Signs (Most Recent):  Temp: 98.4 °F (36.9 °C) (18 1100)  Pulse: (!) 52 (18 1301)  Resp: 14 (18 1301)  BP: (!) 68/43 (18 1301)  SpO2: 100 % (18 1301) Vital Signs (24h Range):  Temp:  [98 °F (36.7 °C)-99.3 °F (37.4 °C)] 98.4 °F (36.9 °C)  Pulse:  [52-78] 52  Resp:  [14] 14  SpO2:  [96 %-100 %] 100 %  BP: (64-92)/(39-65) 68/43     Physical Exam   Cardiovascular: Normal rate.    Pulmonary/Chest: Effort normal.   On the vent   Neurological:   Opens eyes, does not track         Laboratory:   CBC: No results for input(s): WBC, RBC, HGB, HCT, PLT, MCV, MCH, MCHC in the last 168 hours.    CMP: No results for input(s): GLU, CALCIUM, ALBUMIN, PROT, NA, K, CO2, CL, BUN, CREATININE, ALKPHOS, ALT, AST, BILITOT in the last 168 hours.    POCT Glucose   Date Value Ref Range Status   2018 85 70 - 110 mg/dL Final   2018 80 70 - 110 mg/dL Final   2018 77 70 - 110 mg/dL Final   2018 85 70 - 110 mg/dL Final   2018 78 70 - 110 mg/dL Final   2018 90 70 - 110 mg/dL Final   2018 90 70 - 110 mg/dL Final   2018 98 70 - 110 mg/dL Final   2018 123 (H) 70 - 110 mg/dL Final   2018 65 (L) 70 - 110 mg/dL Final   2018 84 70 - 110 mg/dL Final   2018 85 70 - 110 mg/dL Final   2018 88 70 - 110 mg/dL Final   2018 81 70 -  110 mg/dL Final   03/02/2018 85 70 - 110 mg/dL Final   03/02/2018 75 70 - 110 mg/dL Final   03/02/2018 81 70 - 110 mg/dL Final         Significant Imaging:     > 50% of 35 min visit spent in chart review, face to face discussion of goals of care,  symptom assessment, coordination of care and emotional support.    Vladimir De La Torre MD  Palliative Medicine  [facility]

## 2018-03-05 NOTE — PROGRESS NOTES
Three daughters arrived to bedside. Dr. Hdz and Dr. Mora notified of arrival to bedside. Physicians at bedside to discuss GOC of with family. Continue current treatment at this time. Plan for re-evaluation of GOC on Wednesday.

## 2018-03-05 NOTE — PHYSICIAN QUERY
PT Name: Jonathan Nieves  MR #: [id number]     Physician Query Form - Documentation Clarification      CDS/: Kaykay Nance RN CDI   Contact information: [email or web address]    This form is a permanent document in the medical record.     Query Date: March 5, 2018    By submitting this query, we are merely seeking further clarification of documentation. Please utilize your independent clinical judgment when addressing the question(s) below.    The Medical record reflects the following:    Supporting Clinical Findings Location in Medical Record     Informed MD Trevino pt SBP 60-70s. New orders received to pause morphine drip and administer fluid bolus.     MD Sales at bedside. New orders to administer another 500 cc bolus.    Informed MD Trevino pt BP 70-50s, new orders received to administer another 1L nS bolus.     Nursing note 3/5 416 am        Nursing progress note 3/5 500 am       Nursing progress note 3/5 617 am     Normal saline bolus  1000 mls IV once 3/5/0615  500 mls IV once 3/5 0511  500 mls IV once 3/5 451     Medication sheet                                                                            Doctor, Please specify diagnosis or diagnoses associated with above clinical findings.    Provider Use Only        [    ] Hypovolemic shock      [    ] Other shock, please specify___________.      [    ] Hypovolemia      [    ] Other, please specify_____________.                                                                                                             [ x ] Clinically undetermined

## 2018-03-06 NOTE — ASSESSMENT & PLAN NOTE
- partial code  - Palliative care following.  - Plan for terminal extubation 2/26/18, however family not ready for withdrawal of care, ongoing discussions with family but agreed to not escalate care at this time

## 2018-03-06 NOTE — ASSESSMENT & PLAN NOTE
- Palliative consulted.   - terminal extubation initially planned for 2/26/2018 discontinued due to family no longer wanting this  - plan for further goals of care discussion  - Family no-showed for 2/27 & 3/1 Pall Care meeting.   -family meeting on 3/5 with  Staff and fellow - discussed poor prognosis, family agreed to not escalate care. Another family meeting tentatively planned for 3/7

## 2018-03-06 NOTE — PROGRESS NOTES
[facility]  Palliative Medicine  Consult Note    Patient Name: Jonathan Nieves  MRN: [id number]  Admission Date: 2/4/2018  Hospital Length of Stay: 29 days  Code Status: Partial Code   Attending Provider: Quincy Hdz*  Consulting Provider: Vladimir De La Torre MD  Primary Care Physician: Primary Doctor No  Principal Problem:Acute respiratory failure with hypoxia    Consults  Assessment/Plan:     Active Diagnoses:    Diagnosis Date Noted POA    PRINCIPAL PROBLEM:  Acute respiratory failure with hypoxia [J96.01] 02/05/2018 Yes    Decubitus ulcer of left elbow, stage 3 [L89.023] 02/27/2018 Yes    Partial small bowel obstruction [K56.600] 02/16/2018 Yes    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] 02/15/2018 Yes    Palliative care encounter [Z51.5] 02/14/2018 Not Applicable    Intubation of airway performed without difficulty [Z78.9] 02/05/2018 Yes    Unstageable pressure ulcer of left buttock [L89.320] 02/05/2018 Yes    Pressure injury of deep tissue [L89.90] 02/05/2018 Yes    Alteration in skin integrity due to moisture [R23.9] 02/05/2018 Yes    Seizure-like activity [R56.9] 05/22/2017 Yes    Goals of care, counseling/discussion [Z71.89] 05/20/2017 Not Applicable     Chronic    PEG (percutaneous endoscopic gastrostomy) status [Z93.1] 12/16/2014 Not Applicable    Aspiration pneumonia [J69.0] 10/13/2014 Yes    Paroxysmal atrial fibrillation [I48.0] 05/15/2014 Yes     Chronic    CVA, old, hemiparesis [I69.359] 05/15/2014 Not Applicable     Chronic    Dysphagia as late effect of cerebrovascular disease [I69.991] 05/15/2014 Not Applicable     Chronic      Problems Resolved During this Admission:    Diagnosis Date Noted Date Resolved POA    Circulatory failure [R57.9] 02/26/2018 02/26/2018 Yes    Hypernatremia [E87.0] 02/07/2018 02/26/2018 Yes    UTI (urinary tract infection) [N39.0] 10/13/2014 02/26/2018 Yes     Goals of care:  - 53 yo lady with several CVA and extensive white matter  "disease with wide differential (CADASIL).   - Reviewed Dr. Cherry note 2/12. Appears to have severe neurologic deficits that likely are irreversible. Poor prognosis for meaningful neurologic recovery.   - 2/19 made partial code per primary 2/2 to hypotensive and bradycardia episode   - no escalation of care and partial code confirmed. Plans for terminal extubation 2/26/18.No family available, so continued on MV.   - was started on morphine infusion for comfort but became hypotensive, so discontinued  - awake, eyes open this AM. Pt. Is looking around but not tracking.   - case discussed with primary team, family did show last evening and had a discussion with Dr. Hdz regarding time limited trial for terminal extubation  - plans for poss. Transition to comfort care by the end of the week.   - agree with no escalation of therapy  - will follow.      I will follow along with you. Please call [phone] with questions.     Subjective:     HPI: Mr. Jonathan Nieves is a 53yo female with history of CVA with residual aphasia and dysphagia, non-verbal, s/p PEG tube placement and bed riddenness, osteomyelitis s/p amputation of toe, who came to the ED from [facility] via EMS in respiratory distress. Per ED/EMS the patient was noted to have "projectile vomiting" yesterday evening (24 hours prior to presentation), which was treated with zofran. During the event there is suspicion the patient possible aspirated. Currently, she opens eyes to stimulation, intubated, with contractures of knees/hips/elbows/hands and multiple areas of pressure injury.      Interval History: small doses of pressors today. Unable to completely wean pressors over night. Remains on AC mechanical ventilation.     Interval History: Difficulties with family availability noted. Family has missed multiple family meetings.   Pt. Remains on the vent. Now on Morphine infusion  Hypotension noted. No response to IVF boluses.   Pt opens eyes " to verbal stimuli.       Medications:  Continuous Infusions:  Scheduled Meds:   chlorhexidine  15 mL Mouth/Throat BID    glycopyrrolate  0.1 mg Intravenous Daily    scopolamine  1 patch Transdermal Q3 Days     PRN Meds:dextrose 50%, [] fentaNYL **FOLLOWED BY** fentaNYL, glucagon (human recombinant), LORazepam, ondansetron, sodium chloride 0.9%        Review of Systems   Unable to perform ROS: Dementia     Objective:     Vital Signs (Most Recent):  Temp: 99.3 °F (37.4 °C) (18 1100)  Pulse: 73 (18 1300)  Resp: 19 (18 1300)  BP: 97/63 (18 1300)  SpO2: 100 % (18 1300) Vital Signs (24h Range):  Temp:  [98.3 °F (36.8 °C)-99.3 °F (37.4 °C)] 99.3 °F (37.4 °C)  Pulse:  [52-80] 73  Resp:  [9-19] 19  SpO2:  [96 %-100 %] 100 %  BP: ()/(49-70) 97/63     Physical Exam   Cardiovascular: Normal rate.    Pulmonary/Chest: Effort normal.   On the vent   Neurological:   Opens eyes, does not track         Laboratory:   CBC: No results for input(s): WBC, RBC, HGB, HCT, PLT, MCV, MCH, MCHC in the last 168 hours.    CMP: No results for input(s): GLU, CALCIUM, ALBUMIN, PROT, NA, K, CO2, CL, BUN, CREATININE, ALKPHOS, ALT, AST, BILITOT in the last 168 hours.    POCT Glucose   Date Value Ref Range Status   2018 89 70 - 110 mg/dL Final   2018 74 70 - 110 mg/dL Final   2018 87 70 - 110 mg/dL Final   2018 85 70 - 110 mg/dL Final   2018 80 70 - 110 mg/dL Final   2018 77 70 - 110 mg/dL Final   2018 85 70 - 110 mg/dL Final   2018 78 70 - 110 mg/dL Final   2018 90 70 - 110 mg/dL Final   2018 90 70 - 110 mg/dL Final   2018 98 70 - 110 mg/dL Final   2018 123 (H) 70 - 110 mg/dL Final   2018 65 (L) 70 - 110 mg/dL Final         Significant Imaging:     > 50% of 35 min visit spent in chart review, face to face discussion of goals of care,  symptom assessment, coordination of care and emotional support.    Vladimir De La Torre,  MD  Palliative Medicine  [facility]

## 2018-03-06 NOTE — PROGRESS NOTES
"[facility]  Critical Care Medicine  Progress Note    Patient Name: Jonathan Nieves  MRN: [id number]  Admission Date: 2/4/2018  Hospital Length of Stay: 29 days  Code Status: Partial Code  Attending Provider: Quincy Hdz*  Primary Care Provider: Primary Doctor No   Principal Problem: Acute respiratory failure with hypoxia    Subjective:     HPI:  Mr. Jonathan Nieves is a 51yo female with history of CVA with residual aphasia and dysphagia, non-verbal, s/p PEG tube placement and bed riddenness, osteomyelitis s/p amputation of toe, who comes to the ED from [facility] via EMS in respiratory distress. Per ED/EMS the patient was noted to have "projectile vomiting" yesterday evening (24 hours prior to presentation), which was treated with zofran. During the event there is suspicion the patient possible aspirated. Today the patient was noted to be in resp distress with a fever.     Patient was satting 88% on 4L on EMS arrival, breathing 40 times per minute.  Improved to 96% on 15L NRB with RR high 20s.  Axillary temp 102.    CT chest concerning for aspiration pneumonia.  UA concern for a UTI.       Patient intubated in the ED & Critical Care consulted.         Hospital/ICU Course:  2/5: Critical Care Consulted. Patient intubated in the ED.   2/6: Patient doing well, on SBT; will extubate maria e. Continue treatment for aspiration PNA & UTI.   2/7: abx deescalated, continue to treat for PNA and UTI. Required pressors overnight. Off this morning.   2/8-9: still with secretions and questionable mentation? Baseline?. Will try to dry the secretions and extubated today or maria e.    2/10: MRI completed. Family called, had family meeting with one daughter. Need to re-visit with all daughters. Records requested from [facility] and [facility].   02/11-13: neurology consulted, no acute changes, wbc and LFT trending up, keep monitoring.  2/14: Concerns for CADASIL vs Binswanger syndrome per Neuro. NOTCH3 " genetic testing sent. Palliative consulted regarding goals of care.   02/15 pt is not tolerating tube feed, had two episodes of projectile vomiting, KUB 02/14 shows partial SOB and distended stomach.  02/16 CT abdomen was unremarkable, family meeting was done.  02/17 no more vomiting, tolerating 10 cc/hr tube feeds.  02/19 trial to advance tube feeding, had residual back to trickle feed will try to advance slowly. 02/19 PT became hypotensive this morning with MAPs of 50's started on levo, daughter was contacted regarding goals of care, pt status was changed to partial code upon family request.  02/20 Pt still requiring levo, try to wean off slowly, family did not show for the meeting.  02/21 family meeting was done yesterday, no acclation of care per daughter wishes.   02/25 Pt lost IV access,still unable to wean off the went, neurological status unchanged she is alert but not following commands, fails her SBT every morning.  2/27: Failed SBT. Family did not show up for Palliative Care meeting.  2/28: NAEON.   3/1: family no showed for meeting  3/2: ethics committee consult by palliative  3/3: spoke to daughter zainab, informed she was awaiting her sister to pick her up and they are coming to hospital. She no showed again  3/4: morphine drip started as patient appeared to be in pain, stopped when patient became hypotensive.  3/5: will attempt to contact family for another meeting today  3/6: Family meeting held yesterday with staff and fellow, discussed poor prognosis of pt. Family in agreement to not escalate care; however, not ready for withdrawal of care. Will revist with family meeting possibly tomorrow.     Interval History/Significant Events: NAEON    Review of Systems   Unable to perform ROS: Intubated     Objective:     Vital Signs (Most Recent):  Temp: 98.8 °F (37.1 °C) (03/06/18 0700)  Pulse: 79 (03/06/18 0700)  Resp: 17 (03/06/18 0700)  BP: 112/69 (03/06/18 0700)  SpO2: 100 % (03/06/18 0700) Vital Signs  (24h Range):  Temp:  [98.3 °F (36.8 °C)-99.1 °F (37.3 °C)] 98.8 °F (37.1 °C)  Pulse:  [52-79] 79  Resp:  [9-18] 17  SpO2:  [96 %-100 %] 100 %  BP: ()/(39-69) 112/69   Weight: 81.2 kg (179 lb 0.2 oz)  Body mass index is 27.22 kg/m².      Intake/Output Summary (Last 24 hours) at 03/06/18 0752  Last data filed at 03/06/18 0700   Gross per 24 hour   Intake             1215 ml   Output             1035 ml   Net              180 ml       Physical Exam   HENT:   Head: Normocephalic and atraumatic.   Eyes: Conjunctivae are normal. No scleral icterus.   Cardiovascular: Normal rate and regular rhythm.    Pulmonary/Chest: She has no wheezes. She has rales.   On the ventilator   Abdominal: Soft. She exhibits distension. There is no tenderness. There is no guarding.   Musculoskeletal: She exhibits no edema.   Upper and lower ext contratures   Neurological:   Opens eyes to voice, does not perform purposeful movements   Skin: Skin is warm. No rash noted.   Vitals reviewed.      Vents:  Vent Mode: A/C (03/06/18 0511)  Ventilator Initiated: Yes (02/04/18 2344)  Set Rate: 8 bmp (03/06/18 0511)  Vt Set: 400 mL (03/06/18 0511)  Pressure Support: 0 cmH20 (03/06/18 0511)  PEEP/CPAP: 5 cmH20 (03/06/18 0511)  Oxygen Concentration (%): 28 (03/06/18 0700)  Peak Airway Pressure: 31 cmH2O (03/06/18 0511)  Plateau Pressure: 19 cmH20 (03/06/18 0511)  Total Ve: 6.26 mL (03/06/18 0511)  F/VT Ratio<105 (RSBI): (!) 37.59 (03/06/18 0511)  Lines/Drains/Airways     Drain                 Gastrostomy/Enterostomy 10/25/16 1401 Percutaneous endoscopic gastrostomy (PEG) midline feeding 496 days         Urethral Catheter 03/03/18 1816 Latex 2 days          Airway                 Airway - Non-Surgical 02/04/18 2329 Endotracheal Tube 29 days          Pressure Ulcer                 Pressure Injury 02/05/18 1045 Left medial Foot Deep tissue injury 28 days         Pressure Injury 02/05/18 1045 Right lateral Foot Deep tissue injury 28 days           Peripheral Intravenous Line                 Peripheral IV - Single Lumen 02/27/18 1753 Left;Posterior Forearm 6 days              Significant Labs:    CBC/Anemia Profile:  No results for input(s): WBC, HGB, HCT, PLT, MCV, RDW, IRON, FERRITIN, RETIC, FOLATE, [id number], OCCULTBLOOD in the last 48 hours.     Chemistries:  No results for input(s): NA, K, CL, CO2, BUN, CREATININE, CALCIUM, ALBUMIN, PROT, BILITOT, ALKPHOS, ALT, AST, GLUCOSE, MG, PHOS in the last 48 hours.    All pertinent labs within the past 24 hours have been reviewed.    Significant Imaging:  I have reviewed all pertinent imaging results/findings within the past 24 hours.    Assessment/Plan:     Neuro   Seizure-like activity    - continue keppra         Dysphagia as late effect of cerebrovascular disease    - S/P PEG tube.  - patient not tolerating feeds 2/2 emesis with trickle feeds.  - tube feeds stopped after multiple attempts on different days to run trickle feeds but always has emesis  -restart trickle TFs and closely monitor for emesis        CVA, old, hemiparesis    -patient suffered multiple strokes in past, last one in 2008   -nonverbal at baseline, alert, makes eye contact but doesn't follow commands.    MRI (2/10)  Findings: The diffusion sequence demonstrates no evidence of acute infarct. There is severe white matter small vessel ischemic change. There is a remote infarct of the right occipital lobe. There is a mild hemosiderin staining of the brainstem probably from a prior subarachnoid hemorrhage. There is a small focus of hemosiderin posterior right midline medulla. Pituitary and craniocervical junction show nothing unusual. There is left frontal sinusitis change. There is involutional change.  - Records requested from [facility] and [facility]. Did not include brain images or neurology notes.  - Neurology consulted: differential includes but is not limited to CADASIL (given h/o strokes, extensive white matter involvement including the temporal  lobes), adult onset adrenoleukodystrophy, chronic microvascular changes (less likely), vanishing white matter disease (less likely given predominance in children), or Binswanger disease (subcortical leukoencephalopathy or vascular dementia 2/2 chronic HTN).   - NOTCH3 genetic testing sent, results pending        Derm   Unstageable pressure ulcer of left buttock    - multiple area.  - wound care per nursing.        Pulmonary   * Acute respiratory failure with hypoxia    - Intubated  - has large amount secretion (c/w glycopyrrol), as well as neurological disease, inability to follow commands        Aspiration pneumonia    Extensive consolidation of the right lung and debris in the right mainstem bronchus and its branches consistent with aspiration pneumonia.  - S/p course of rocephin; s/p vanco & cefepime & flagyl   - Not on any antibiotics currently  - Intubated        Cardiac/Vascular   Paroxysmal atrial fibrillation    - Pt home BB held due to hypotension           GI   Partial small bowel obstruction    - KUB in 02/14 showed distended stomach and partial SOB.  - CT abd pelvis w contrast showed no acute intra-abdominal abnormality.  - Emesis happens on/off, no residual. respond to antemetic and feed holding.          PEG (percutaneous endoscopic gastrostomy) status    .        Orthopedic   Pressure injury of deep tissue    - multiple areas.  - wound care per nursing.        Other   Palliative care encounter    - partial code  - Palliative care following.  - Plan for terminal extubation 2/26/18, however family not ready for withdrawal of care, ongoing discussions with family but agreed to not escalate care at this time          Goals of care, counseling/discussion    - Palliative consulted.   - terminal extubation initially planned for 2/26/2018 discontinued due to family no longer wanting this  - plan for further goals of care discussion  - Family no-showed for 2/27 & 3/1 Pall Care meeting.   -family meeting on 3/5  with  Staff and fellow - discussed poor prognosis, family agreed to not escalate care. Another family meeting tentatively later this week.        GRETTA Alcazar  Critical Care Medicine  [facility]

## 2018-03-06 NOTE — PLAN OF CARE
Family meeting held yesterday with [facility] physician team.  Plan for no escalation of care; however, not ready for withdrawal of care. Plan to revisit with family meeting possibly tomorrow. CM will continue to follow.     03/06/18 2372   Right Care Assessment   Can the patient answer the patient profile reliably? No, cognitively impaired;No historian available   How often would a person be available to care for the patient? Often  (facility resident)   Describe the patient's ability to walk at the present time. Does not walk or unable to take any steps at all   How does the patient rate their overall health at the present time? Poor   Number of comorbid conditions (as recorded on the chart) Five or more   During the past month, has the patient often been bothered by feeling down, depressed or hopeless? (JONH)   During the past month, has the patient often been bothered by little interest or pleasure in doing things? (JONH)   Lisa Landers, KATIE, BSN  Case Management  [facility]  Ext. [phone]

## 2018-03-06 NOTE — SUBJECTIVE & OBJECTIVE
Interval History/Significant Events: NAEON    Review of Systems   Unable to perform ROS: Intubated     Objective:     Vital Signs (Most Recent):  Temp: 98.8 °F (37.1 °C) (03/06/18 0700)  Pulse: 79 (03/06/18 0700)  Resp: 17 (03/06/18 0700)  BP: 112/69 (03/06/18 0700)  SpO2: 100 % (03/06/18 0700) Vital Signs (24h Range):  Temp:  [98.3 °F (36.8 °C)-99.1 °F (37.3 °C)] 98.8 °F (37.1 °C)  Pulse:  [52-79] 79  Resp:  [9-18] 17  SpO2:  [96 %-100 %] 100 %  BP: ()/(39-69) 112/69   Weight: 81.2 kg (179 lb 0.2 oz)  Body mass index is 27.22 kg/m².      Intake/Output Summary (Last 24 hours) at 03/06/18 0752  Last data filed at 03/06/18 0700   Gross per 24 hour   Intake             1215 ml   Output             1035 ml   Net              180 ml       Physical Exam   HENT:   Head: Normocephalic and atraumatic.   Eyes: Conjunctivae are normal. No scleral icterus.   Cardiovascular: Normal rate and regular rhythm.    Pulmonary/Chest: She has no wheezes. She has rales.   On the ventilator   Abdominal: Soft. She exhibits distension. There is no tenderness. There is no guarding.   Musculoskeletal: She exhibits no edema.   Upper and lower ext contratures   Neurological:   Opens eyes to voice, does not perform purposeful movements   Skin: Skin is warm. No rash noted.   Vitals reviewed.      Vents:  Vent Mode: A/C (03/06/18 0511)  Ventilator Initiated: Yes (02/04/18 2344)  Set Rate: 8 bmp (03/06/18 0511)  Vt Set: 400 mL (03/06/18 0511)  Pressure Support: 0 cmH20 (03/06/18 0511)  PEEP/CPAP: 5 cmH20 (03/06/18 0511)  Oxygen Concentration (%): 28 (03/06/18 0700)  Peak Airway Pressure: 31 cmH2O (03/06/18 0511)  Plateau Pressure: 19 cmH20 (03/06/18 0511)  Total Ve: 6.26 mL (03/06/18 0511)  F/VT Ratio<105 (RSBI): (!) 37.59 (03/06/18 0511)  Lines/Drains/Airways     Drain                 Gastrostomy/Enterostomy 10/25/16 1401 Percutaneous endoscopic gastrostomy (PEG) midline feeding 496 days         Urethral Catheter 03/03/18 1816 Latex 2 days           Airway                 Airway - Non-Surgical 02/04/18 2329 Endotracheal Tube 29 days          Pressure Ulcer                 Pressure Injury 02/05/18 1045 Left medial Foot Deep tissue injury 28 days         Pressure Injury 02/05/18 1045 Right lateral Foot Deep tissue injury 28 days          Peripheral Intravenous Line                 Peripheral IV - Single Lumen 02/27/18 1753 Left;Posterior Forearm 6 days              Significant Labs:    CBC/Anemia Profile:  No results for input(s): WBC, HGB, HCT, PLT, MCV, RDW, IRON, FERRITIN, RETIC, FOLATE, [id number], OCCULTBLOOD in the last 48 hours.     Chemistries:  No results for input(s): NA, K, CL, CO2, BUN, CREATININE, CALCIUM, ALBUMIN, PROT, BILITOT, ALKPHOS, ALT, AST, GLUCOSE, MG, PHOS in the last 48 hours.    All pertinent labs within the past 24 hours have been reviewed.    Significant Imaging:  I have reviewed all pertinent imaging results/findings within the past 24 hours.

## 2018-03-06 NOTE — PLAN OF CARE
Problem: Patient Care Overview  Goal: Plan of Care Review  Outcome: Ongoing (interventions implemented as appropriate)  Pt opens eyes spontaneously, tracks. Does not follow commands. Bathed and linens changed. BP and UOP better this night. De Leon care performed, UOP > 30 cc/hr. No s/s of resp distress, 02 sat 100% on monitor, continues to be intubated. Wound care performed. POC: GOC discussion with family members Wednesday. No acute events throughout shift, VS and assessment per flow sheet, patient progressing towards goals as tolerated, all concerns addressed, will continue to monitor.

## 2018-03-07 NOTE — PLAN OF CARE
Problem: Patient Care Overview  Goal: Plan of Care Review  Outcome: Ongoing (interventions implemented as appropriate)  No acute events throughout day. See vital signs and assessments in flowsheets. See below for updates on today's progress.     Pulmonary: Patient remains on mechanical ventilation with no changes to vent settings. Settings as follows: AC/28%/8/400/5+    Cardiovascular: NSR, BP WNL    Neurological: withdraws x4, contracted x4. Opens eyes spontaneously. Does not follow commands.    Gastrointestinal: Trickle feeds restarted, no residuals 1 episode of projectile vomiting at 1845, MD CCS made aware, TF turned off. 1 medium clear mucous BM.    Genitourinary: De Leon intact, yielding clear yellow with adequate UO.    Endocrine: 12.5 mg of glucose given IV for CBG of 66. Recheck 106.    Integumentary/Other: Multiple skin wounds, see flowsheets for charting.    Infusions: KVO    Patient progressing towards goals as tolerated, plan of care communicated and reviewed with Jonathan Nieves and family. All concerns addressed. Will continue to monitor. Family meeting tomorrow.

## 2018-03-07 NOTE — ASSESSMENT & PLAN NOTE
- Intubated  - has large amount secretion, as well as neurological disease, inability to follow commands  - glycopyrrolate dc, may be contributing to emesis

## 2018-03-07 NOTE — PROGRESS NOTES
RD follow-up scheduled for today. Per MD/CM notes, family meeting held yesterday. Plan for no escalation of care; however, not ready for withdrawal of care. Plan to revisit with family meeting possibly today. At this time, pt not tolerating enteral nutrition 2/2 nausea & vomiting. If pt situation changes & further nutrition intervention warranted, please re-consult RD.     Thanks! MICHAEL Verduzco [phone]

## 2018-03-07 NOTE — SUBJECTIVE & OBJECTIVE
Interval History/Significant Events: Did not tolerate TF overnight. BP stable.     Review of Systems   Unable to perform ROS: Intubated     Objective:     Vital Signs (Most Recent):  Temp: 99 °F (37.2 °C) (03/07/18 0300)  Pulse: 69 (03/07/18 0701)  Resp: 16 (03/07/18 0701)  BP: 119/76 (03/07/18 0701)  SpO2: 96 % (03/07/18 0701) Vital Signs (24h Range):  Temp:  [98.9 °F (37.2 °C)-99.3 °F (37.4 °C)] 99 °F (37.2 °C)  Pulse:  [69-90] 69  Resp:  [10-23] 16  SpO2:  [93 %-100 %] 96 %  BP: ()/(59-92) 119/76   Weight: 81.2 kg (179 lb 0.2 oz)  Body mass index is 27.22 kg/m².      Intake/Output Summary (Last 24 hours) at 03/07/18 0757  Last data filed at 03/07/18 0701   Gross per 24 hour   Intake              480 ml   Output              800 ml   Net             -320 ml       Physical Exam   HENT:   Head: Normocephalic and atraumatic.   Eyes: Conjunctivae are normal. No scleral icterus.   Cardiovascular: Normal rate and regular rhythm.    Pulmonary/Chest: She has no wheezes. She has rales.   On the ventilator   Abdominal: Soft. She exhibits distension. There is no tenderness. There is no guarding.   Musculoskeletal: She exhibits no edema.   Upper and lower ext contratures   Neurological:   Opens eyes to voice, does not perform purposeful movements   Skin: Skin is warm. No rash noted.   Vitals reviewed.      Vents:  Vent Mode: A/C (03/07/18 0537)  Ventilator Initiated: Yes (02/04/18 2344)  Set Rate: 8 bmp (03/07/18 0537)  Vt Set: 400 mL (03/07/18 0537)  Pressure Support: 0 cmH20 (03/07/18 0537)  PEEP/CPAP: 5 cmH20 (03/07/18 0537)  Oxygen Concentration (%): 28 (03/07/18 0701)  Peak Airway Pressure: 40 cmH2O (03/07/18 0537)  Plateau Pressure: 19 cmH20 (03/07/18 0537)  Total Ve: 6.17 mL (03/07/18 0537)  F/VT Ratio<105 (RSBI): (!) 33.98 (03/07/18 0537)  Lines/Drains/Airways     Drain                 Gastrostomy/Enterostomy 10/25/16 1401 Percutaneous endoscopic gastrostomy (PEG) midline feeding 497 days         Urethral  Catheter 03/03/18 1816 Latex 3 days          Airway                 Airway - Non-Surgical 02/04/18 2329 Endotracheal Tube 30 days          Pressure Ulcer                 Pressure Injury 02/05/18 1045 Left medial Foot Deep tissue injury 29 days         Pressure Injury 02/05/18 1045 Right lateral Foot Deep tissue injury 29 days          Peripheral Intravenous Line                 Peripheral IV - Single Lumen 02/27/18 1753 Left;Posterior Forearm 7 days              Significant Labs:    CBC/Anemia Profile:  No results for input(s): WBC, HGB, HCT, PLT, MCV, RDW, IRON, FERRITIN, RETIC, FOLATE, [id number], OCCULTBLOOD in the last 48 hours.     Chemistries:  No results for input(s): NA, K, CL, CO2, BUN, CREATININE, CALCIUM, ALBUMIN, PROT, BILITOT, ALKPHOS, ALT, AST, GLUCOSE, MG, PHOS in the last 48 hours.    All pertinent labs within the past 24 hours have been reviewed.    Significant Imaging:  No new imaging

## 2018-03-07 NOTE — PROGRESS NOTES
[facility]  Palliative Medicine  Consult Note    Patient Name: Jonathan Nieves  MRN: [id number]  Admission Date: 2/4/2018  Hospital Length of Stay: 30 days  Code Status: Partial Code   Attending Provider: Quincy Hdz*  Consulting Provider: Vladimir De La Torre MD  Primary Care Physician: Primary Doctor No  Principal Problem:Acute respiratory failure with hypoxia    Consults  Assessment/Plan:     Active Diagnoses:    Diagnosis Date Noted POA    PRINCIPAL PROBLEM:  Acute respiratory failure with hypoxia [J96.01] 02/05/2018 Yes    Decubitus ulcer of left elbow, stage 3 [L89.023] 02/27/2018 Yes    Partial small bowel obstruction [K56.600] 02/16/2018 Yes    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] 02/15/2018 Yes    Palliative care encounter [Z51.5] 02/14/2018 Not Applicable    Intubation of airway performed without difficulty [Z78.9] 02/05/2018 Yes    Unstageable pressure ulcer of left buttock [L89.320] 02/05/2018 Yes    Pressure injury of deep tissue [L89.90] 02/05/2018 Yes    Alteration in skin integrity due to moisture [R23.9] 02/05/2018 Yes    Seizure-like activity [R56.9] 05/22/2017 Yes    Goals of care, counseling/discussion [Z71.89] 05/20/2017 Not Applicable     Chronic    PEG (percutaneous endoscopic gastrostomy) status [Z93.1] 12/16/2014 Not Applicable    Aspiration pneumonia [J69.0] 10/13/2014 Yes    Paroxysmal atrial fibrillation [I48.0] 05/15/2014 Yes     Chronic    CVA, old, hemiparesis [I69.359] 05/15/2014 Not Applicable     Chronic    Dysphagia as late effect of cerebrovascular disease [I69.991] 05/15/2014 Not Applicable     Chronic      Problems Resolved During this Admission:    Diagnosis Date Noted Date Resolved POA    Circulatory failure [R57.9] 02/26/2018 02/26/2018 Yes    Hypernatremia [E87.0] 02/07/2018 02/26/2018 Yes    UTI (urinary tract infection) [N39.0] 10/13/2014 02/26/2018 Yes     Goals of care:  - 53 yo lady with several CVA and extensive white matter  "disease with wide differential (CADASIL).   - Reviewed Dr. Cherry note . Appears to have severe neurologic deficits that likely are irreversible. Poor prognosis for meaningful neurologic recovery.   -  made partial code per primary  to hypotensive and bradycardia episode   - no escalation of care and partial code confirmed. Plans for terminal extubation 18.No family available, so continued on MV.   - was started on morphine infusion for comfort but became hypotensive, so discontinued  - awake, eyes open today. Pt. Is looking around but not tracking.   - plans for extubation tomorrow after family meeting.      I will follow along with you. Please call [phone] with questions.     Subjective:     HPI: Mr. Jonathan Nieves is a 53yo female with history of CVA with residual aphasia and dysphagia, non-verbal, s/p PEG tube placement and bed riddenness, osteomyelitis s/p amputation of toe, who came to the ED from [facility] via EMS in respiratory distress. Per ED/EMS the patient was noted to have "projectile vomiting" yesterday evening (24 hours prior to presentation), which was treated with zofran. During the event there is suspicion the patient possible aspirated. Currently, she opens eyes to stimulation, intubated, with contractures of knees/hips/elbows/hands and multiple areas of pressure injury.      Interval History: small doses of pressors today. Unable to completely wean pressors over night. Remains on AC mechanical ventilation.     Interval History: status quo      Medications:  Continuous Infusions:  Scheduled Meds:   chlorhexidine  15 mL Mouth/Throat BID    heparin (porcine)  5,000 Units Subcutaneous Q8H    polyethylene glycol  17 g Per NG tube Daily    scopolamine  1 patch Transdermal Q3 Days    senna-docusate 8.6-50 mg  1 tablet Per NG tube Daily     PRN Meds:dextrose 50%, [] fentaNYL **FOLLOWED BY** fentaNYL, glucagon (human recombinant), LORazepam, ondansetron, " sodium chloride 0.9%        Review of Systems   Unable to perform ROS: Dementia     Objective:     Vital Signs (Most Recent):  Temp: 99 °F (37.2 °C) (03/07/18 0300)  Pulse: 67 (03/07/18 1106)  Resp: 17 (03/07/18 1106)  BP: 118/79 (03/07/18 1106)  SpO2: 97 % (03/07/18 1106) Vital Signs (24h Range):  Temp:  [98.9 °F (37.2 °C)-99.3 °F (37.4 °C)] 99 °F (37.2 °C)  Pulse:  [67-90] 67  Resp:  [10-34] 17  SpO2:  [90 %-100 %] 97 %  BP: ()/(60-92) 118/79     Physical Exam   Cardiovascular: Normal rate.    Pulmonary/Chest: Effort normal.   On the vent   Neurological:   Opens eyes, does not track         Laboratory:   CBC: No results for input(s): WBC, RBC, HGB, HCT, PLT, MCV, MCH, MCHC in the last 168 hours.    CMP: No results for input(s): GLU, CALCIUM, ALBUMIN, PROT, NA, K, CO2, CL, BUN, CREATININE, ALKPHOS, ALT, AST, BILITOT in the last 168 hours.    POCT Glucose   Date Value Ref Range Status   03/06/2018 84 70 - 110 mg/dL Final   03/06/2018 102 70 - 110 mg/dL Final   03/06/2018 66 (L) 70 - 110 mg/dL Final   03/06/2018 71 70 - 110 mg/dL Final   03/06/2018 89 70 - 110 mg/dL Final   03/05/2018 74 70 - 110 mg/dL Final   03/05/2018 87 70 - 110 mg/dL Final   03/05/2018 85 70 - 110 mg/dL Final   03/05/2018 80 70 - 110 mg/dL Final   03/05/2018 77 70 - 110 mg/dL Final   03/04/2018 85 70 - 110 mg/dL Final   03/04/2018 78 70 - 110 mg/dL Final         Significant Imaging:     > 50% of 25 min visit spent in chart review, face to face discussion of goals of care,  symptom assessment, coordination of care and emotional support.    Vladimir De La Torre MD  Palliative Medicine  [facility]

## 2018-03-07 NOTE — PROGRESS NOTES
Wound care follow-up:  DTIs to feet drying, intact, right hip improving- pink- right elbow has scab noted, no erythema  Unable to visualize buttocks and left elbow.  Plan of care discussed with patient who blinked for understanding.     03/07/18 1130       Wound 02/05/18 0300 posterior Thigh   Date First Assessed/Time First Assessed: 02/05/18 0300   Pre-existing: Yes  Side: Right  Orientation: posterior  Location: Thigh   Wound Image    Wound WDL ex   Dressing Appearance Dry;Intact;Clean   Drainage Amount None   Appearance Pink;Dry   Red (%), Wound Tissue Color 100 %   Periwound Area Intact;Dry   Wound Length (cm) 4   Wound Width (cm) 4   Depth (cm) 0   Care Skin Barrier       Pressure Injury 02/05/18 1045 Right lateral Foot Deep tissue injury   Date First Assessed/Time First Assessed: 02/05/18 1045   Pressure Injury Present on Admission: yes  Side: Right  Orientation: lateral  Location: (c) Foot  Staging: Deep tissue injury   Wound Image    Staging D   Dressing Appearance No dressing;Open to air   Drainage Amount None   Appearance Eschar;Dry   Black (%), Wound Tissue Color 100 %   Periwound Area Intact;Dry   Wound Length (cm) 4   Wound Width (cm) 1.5   Care Applied:;Povidone iodine       Pressure Injury 02/05/18 1045 Left medial Foot Deep tissue injury   Date First Assessed/Time First Assessed: 02/05/18 1045   Pressure Injury Present on Admission: yes  Side: Left  Orientation: medial  Location: (c) Foot  Staging: Deep tissue injury   Wound Image (see above pic)   Staging D   Dressing Appearance Open to air;No dressing   Drainage Amount None   Appearance Eschar   Black (%), Wound Tissue Color 100 %   Periwound Area Intact;Dry   Wound Length (cm) (1st toe= 3   medial=3.5)   Wound Width (cm) (1st toe= 1.5   medial=2)   Care Povidone iodine   Skin Interventions   Device Skin Pressure Protection positioning supports utilized;pressure points protected   Pressure Reduction Devices Pressure-redistributing mattress utilized    Pressure Reduction Techniques weight shift assistance provided;positioned off wounds;pressure points protected

## 2018-03-07 NOTE — ASSESSMENT & PLAN NOTE
- Palliative consulted.   - terminal extubation initially planned for 2/26/2018 discontinued due to family no longer wanting this  - plan for further goals of care discussion  - Family no-showed for 2/27 & 3/1 Pall Care meeting.   -family meeting on 3/5 with  Staff and fellow - discussed poor prognosis, family agreed to not escalate care.  -family meeting to readdress [facility] later this week

## 2018-03-07 NOTE — PROGRESS NOTES
"[facility]  Critical Care Medicine  Progress Note    Patient Name: Jonathan Nieves  MRN: [id number]  Admission Date: 2/4/2018  Hospital Length of Stay: 30 days  Code Status: Partial Code  Attending Provider: Quincy Hdz*  Primary Care Provider: Primary Doctor No   Principal Problem: Acute respiratory failure with hypoxia    Subjective:     HPI:  Mr. Jonathan Nieves is a 53yo female with history of CVA with residual aphasia and dysphagia, non-verbal, s/p PEG tube placement and bed riddenness, osteomyelitis s/p amputation of toe, who comes to the ED from [facility] via EMS in respiratory distress. Per ED/EMS the patient was noted to have "projectile vomiting" yesterday evening (24 hours prior to presentation), which was treated with zofran. During the event there is suspicion the patient possible aspirated. Today the patient was noted to be in resp distress with a fever.     Patient was satting 88% on 4L on EMS arrival, breathing 40 times per minute.  Improved to 96% on 15L NRB with RR high 20s.  Axillary temp 102.    CT chest concerning for aspiration pneumonia.  UA concern for a UTI.       Patient intubated in the ED & Critical Care consulted.         Hospital/ICU Course:  2/5: Critical Care Consulted. Patient intubated in the ED.   2/6: Patient doing well, on SBT; will extubate maria e. Continue treatment for aspiration PNA & UTI.   2/7: abx deescalated, continue to treat for PNA and UTI. Required pressors overnight. Off this morning.   2/8-9: still with secretions and questionable mentation? Baseline?. Will try to dry the secretions and extubated today or maria e.    2/10: MRI completed. Family called, had family meeting with one daughter. Need to re-visit with all daughters. Records requested from [facility] and [facility].   02/11-13: neurology consulted, no acute changes, wbc and LFT trending up, keep monitoring.  2/14: Concerns for CADASIL vs Binswanger syndrome per Neuro. NOTCH3 " genetic testing sent. Palliative consulted regarding goals of care.   02/15 pt is not tolerating tube feed, had two episodes of projectile vomiting, KUB 02/14 shows partial SOB and distended stomach.  02/16 CT abdomen was unremarkable, family meeting was done.  02/17 no more vomiting, tolerating 10 cc/hr tube feeds.  02/19 trial to advance tube feeding, had residual back to trickle feed will try to advance slowly. 02/19 PT became hypotensive this morning with MAPs of 50's started on levo, daughter was contacted regarding goals of care, pt status was changed to partial code upon family request.  02/20 Pt still requiring levo, try to wean off slowly, family did not show for the meeting.  02/21 family meeting was done yesterday, no acclation of care per daughter wishes.   02/25 Pt lost IV access,still unable to wean off the went, neurological status unchanged she is alert but not following commands, fails her SBT every morning.  2/27: Failed SBT. Family did not show up for Palliative Care meeting.  2/28: NAEON.   3/1: family no showed for meeting  3/2: ethics committee consult by palliative  3/3: spoke to daughter zainab, informed she was awaiting her sister to pick her up and they are coming to hospital. She no showed again  3/4: morphine drip started as patient appeared to be in pain, stopped when patient became hypotensive.  3/5: will attempt to contact family for another meeting today  3/6: Family meeting held yesterday with staff and fellow, discussed poor prognosis of pt. Family in agreement to not escalate care; however, not ready for withdrawal of care. Will revist with family meeting possibly tomorrow.   3/7: Pt was restarted on TF, however did not tolerate.     Interval History/Significant Events: Did not tolerate TF overnight. BP stable.     Review of Systems   Unable to perform ROS: Intubated     Objective:     Vital Signs (Most Recent):  Temp: 99 °F (37.2 °C) (03/07/18 0300)  Pulse: 69 (03/07/18  0701)  Resp: 16 (03/07/18 0701)  BP: 119/76 (03/07/18 0701)  SpO2: 96 % (03/07/18 0701) Vital Signs (24h Range):  Temp:  [98.9 °F (37.2 °C)-99.3 °F (37.4 °C)] 99 °F (37.2 °C)  Pulse:  [69-90] 69  Resp:  [10-23] 16  SpO2:  [93 %-100 %] 96 %  BP: ()/(59-92) 119/76   Weight: 81.2 kg (179 lb 0.2 oz)  Body mass index is 27.22 kg/m².      Intake/Output Summary (Last 24 hours) at 03/07/18 0757  Last data filed at 03/07/18 0701   Gross per 24 hour   Intake              480 ml   Output              800 ml   Net             -320 ml       Physical Exam   HENT:   Head: Normocephalic and atraumatic.   Eyes: Conjunctivae are normal. No scleral icterus.   Cardiovascular: Normal rate and regular rhythm.    Pulmonary/Chest: She has no wheezes. She has rales.   On the ventilator   Abdominal: Soft. She exhibits distension. There is no tenderness. There is no guarding.   Musculoskeletal: She exhibits no edema.   Upper and lower ext contratures   Neurological:   Opens eyes to voice, does not perform purposeful movements   Skin: Skin is warm. No rash noted.   Vitals reviewed.      Vents:  Vent Mode: A/C (03/07/18 0537)  Ventilator Initiated: Yes (02/04/18 2344)  Set Rate: 8 bmp (03/07/18 0537)  Vt Set: 400 mL (03/07/18 0537)  Pressure Support: 0 cmH20 (03/07/18 0537)  PEEP/CPAP: 5 cmH20 (03/07/18 0537)  Oxygen Concentration (%): 28 (03/07/18 0701)  Peak Airway Pressure: 40 cmH2O (03/07/18 0537)  Plateau Pressure: 19 cmH20 (03/07/18 0537)  Total Ve: 6.17 mL (03/07/18 0537)  F/VT Ratio<105 (RSBI): (!) 33.98 (03/07/18 0537)  Lines/Drains/Airways     Drain                 Gastrostomy/Enterostomy 10/25/16 1401 Percutaneous endoscopic gastrostomy (PEG) midline feeding 497 days         Urethral Catheter 03/03/18 1816 Latex 3 days          Airway                 Airway - Non-Surgical 02/04/18 2329 Endotracheal Tube 30 days          Pressure Ulcer                 Pressure Injury 02/05/18 1045 Left medial Foot Deep tissue injury 29 days          Pressure Injury 02/05/18 1045 Right lateral Foot Deep tissue injury 29 days          Peripheral Intravenous Line                 Peripheral IV - Single Lumen 02/27/18 1753 Left;Posterior Forearm 7 days              Significant Labs:    CBC/Anemia Profile:  No results for input(s): WBC, HGB, HCT, PLT, MCV, RDW, IRON, FERRITIN, RETIC, FOLATE, [id number], OCCULTBLOOD in the last 48 hours.     Chemistries:  No results for input(s): NA, K, CL, CO2, BUN, CREATININE, CALCIUM, ALBUMIN, PROT, BILITOT, ALKPHOS, ALT, AST, GLUCOSE, MG, PHOS in the last 48 hours.    All pertinent labs within the past 24 hours have been reviewed.    Significant Imaging:  No new imaging    Assessment/Plan:     Neuro   Seizure-like activity    - was on keppra, dc 3/4  -stable         Dysphagia as late effect of cerebrovascular disease    - S/P PEG tube.  - patient not tolerating feeds 2/2 emesis with trickle feeds.  - tube feeds stopped after multiple attempts on different days to run trickle feeds but always has emesis        CVA, old, hemiparesis    -patient suffered multiple strokes in past, last one in 2008   -nonverbal at baseline, alert, makes eye contact but doesn't follow commands.    MRI (2/10)  Findings: The diffusion sequence demonstrates no evidence of acute infarct. There is severe white matter small vessel ischemic change. There is a remote infarct of the right occipital lobe. There is a mild hemosiderin staining of the brainstem probably from a prior subarachnoid hemorrhage. There is a small focus of hemosiderin posterior right midline medulla. Pituitary and craniocervical junction show nothing unusual. There is left frontal sinusitis change. There is involutional change.  - Records requested from [facility] and [facility]. Did not include brain images or neurology notes.  - Neurology consulted: differential includes but is not limited to CADASIL (given h/o strokes, extensive white matter involvement including the temporal lobes), adult onset  adrenoleukodystrophy, chronic microvascular changes (less likely), vanishing white matter disease (less likely given predominance in children), or Binswanger disease (subcortical leukoencephalopathy or vascular dementia 2/2 chronic HTN).   - NOTCH3 genetic testing sent, results pending        Derm   Unstageable pressure ulcer of left buttock    - multiple area.  - wound care per nursing.        Pulmonary   * Acute respiratory failure with hypoxia    - Intubated  - has large amount secretion, as well as neurological disease, inability to follow commands  - glycopyrrolate dc, may be contributing to emesis        Aspiration pneumonia    Extensive consolidation of the right lung and debris in the right mainstem bronchus and its branches consistent with aspiration pneumonia.  - S/p course of rocephin; s/p vanco & cefepime & flagyl   - Not on any antibiotics currently  - Intubated        Cardiac/Vascular   Paroxysmal atrial fibrillation    - Pt home BB held due to hypotension           GI   Partial small bowel obstruction    - KUB in 02/14 showed distended stomach and partial SOB.  - CT abd pelvis w contrast showed no acute intra-abdominal abnormality.  - Emesis happens on/off, no residual. respond to antemetic and feed holding.          PEG (percutaneous endoscopic gastrostomy) status    .        Orthopedic   Pressure injury of deep tissue    - multiple areas.  - wound care per nursing.        Other   Palliative care encounter    - partial code  - Palliative care following.  - Plan for terminal extubation 2/26/18, however family not ready for withdrawal of care, ongoing discussions with family but agreed to not escalate care at this time          Goals of care, counseling/discussion    - Palliative consulted.   - terminal extubation initially planned for 2/26/2018 discontinued due to family no longer wanting this  - plan for further goals of care discussion  - Family no-showed for 2/27 & 3/1 Pall Care meeting.   -family  meeting on 3/5 with  Staff and fellow - discussed poor prognosis, family agreed to not escalate care.  -family meeting to readdress [facility] later this week                 GRETTA Alcazar  Critical Care Medicine  [facility]

## 2018-03-08 NOTE — PLAN OF CARE
Problem: Patient Care Overview  Goal: Plan of Care Review  Outcome: Ongoing (interventions implemented as appropriate)  No acute events noted within the shift. [facility]8-28[facility]+5 PF 60.  VSS.Had 1 unmeasured urine (blue pad soaked) .POC reviewed with pt. Will continue to monitor.

## 2018-03-08 NOTE — PROGRESS NOTES
"[facility]  Critical Care Medicine  Progress Note    Patient Name: Jonathan Nieves  MRN: [id number]  Admission Date: 2/4/2018  Hospital Length of Stay: 31 days  Code Status: Partial Code  Attending Provider: Quincy Hdz*  Primary Care Provider: Primary Doctor No   Principal Problem: Acute respiratory failure with hypoxia    Subjective:     HPI:  Mr. Jonathan Nieves is a 53yo female with history of CVA with residual aphasia and dysphagia, non-verbal, s/p PEG tube placement and bed riddenness, osteomyelitis s/p amputation of toe, who comes to the ED from [facility] via EMS in respiratory distress. Per ED/EMS the patient was noted to have "projectile vomiting" yesterday evening (24 hours prior to presentation), which was treated with zofran. During the event there is suspicion the patient possible aspirated. Today the patient was noted to be in resp distress with a fever.     Patient was satting 88% on 4L on EMS arrival, breathing 40 times per minute.  Improved to 96% on 15L NRB with RR high 20s.  Axillary temp 102.    CT chest concerning for aspiration pneumonia.  UA concern for a UTI.       Patient intubated in the ED & Critical Care consulted.         Hospital/ICU Course:  2/5: Critical Care Consulted. Patient intubated in the ED.   2/6: Patient doing well, on SBT; will extubate maria e. Continue treatment for aspiration PNA & UTI.   2/7: abx deescalated, continue to treat for PNA and UTI. Required pressors overnight. Off this morning.   2/8-9: still with secretions and questionable mentation? Baseline?. Will try to dry the secretions and extubated today or maria e.    2/10: MRI completed. Family called, had family meeting with one daughter. Need to re-visit with all daughters. Records requested from [facility] and [facility].   02/11-13: neurology consulted, no acute changes, wbc and LFT trending up, keep monitoring.  2/14: Concerns for CADASIL vs Binswanger syndrome per Neuro. NOTCH3 " genetic testing sent. Palliative consulted regarding goals of care.   02/15 pt is not tolerating tube feed, had two episodes of projectile vomiting, KUB 02/14 shows partial SOB and distended stomach.  02/16 CT abdomen was unremarkable, family meeting was done.  02/17 no more vomiting, tolerating 10 cc/hr tube feeds.  02/19 trial to advance tube feeding, had residual back to trickle feed will try to advance slowly. 02/19 PT became hypotensive this morning with MAPs of 50's started on levo, daughter was contacted regarding goals of care, pt status was changed to partial code upon family request.  02/20 Pt still requiring levo, try to wean off slowly, family did not show for the meeting.  02/21 family meeting was done yesterday, no acclation of care per daughter wishes.   02/25 Pt lost IV access,still unable to wean off the went, neurological status unchanged she is alert but not following commands, fails her SBT every morning.  2/27: Failed SBT. Family did not show up for Palliative Care meeting.  2/28: NAEON.   3/1: family no showed for meeting  3/2: ethics committee consult by palliative  3/3: spoke to daughter zainab, informed she was awaiting her sister to pick her up and they are coming to hospital. She no showed again  3/4: morphine drip started as patient appeared to be in pain, stopped when patient became hypotensive.  3/5: will attempt to contact family for another meeting today  3/6: Family meeting held yesterday with staff and fellow, discussed poor prognosis of pt. Family in agreement to not escalate care; however, not ready for withdrawal of care. Will revist with family meeting possibly tomorrow.   3/7: Pt was restarted on TF, however did not tolerate.     Interval History/Significant Events: Did not tolerate TF overnight. BP stable.     Review of Systems   Unable to perform ROS: Intubated     Objective:     Vital Signs (Most Recent):  Temp: 99.6 °F (37.6 °C) (03/08/18 1100)  Pulse: 62 (03/08/18  1307)  Resp: (!) 24 (03/08/18 1307)  BP: 102/60 (03/08/18 1300)  SpO2: 96 % (03/08/18 1307) Vital Signs (24h Range):  Temp:  [97.7 °F (36.5 °C)-99.6 °F (37.6 °C)] 99.6 °F (37.6 °C)  Pulse:  [53-73] 62  Resp:  [10-30] 24  SpO2:  [93 %-99 %] 96 %  BP: ()/(60-83) 102/60   Weight: 81.2 kg (179 lb 0.2 oz)  Body mass index is 27.22 kg/m².      Intake/Output Summary (Last 24 hours) at 03/08/18 1352  Last data filed at 03/08/18 1300   Gross per 24 hour   Intake              585 ml   Output               55 ml   Net              530 ml       Physical Exam   HENT:   Head: Normocephalic and atraumatic.   Eyes: Conjunctivae are normal. No scleral icterus.   Cardiovascular: Normal rate and regular rhythm.    Pulmonary/Chest: She has no wheezes. She has rales.   On the ventilator   Abdominal: Soft. She exhibits distension. There is no tenderness. There is no guarding.   Musculoskeletal: She exhibits no edema.   Upper and lower ext contratures   Neurological:   Opens eyes to voice, does not perform purposeful movements   Skin: Skin is warm. No rash noted.   Vitals reviewed.      Vents:  Vent Mode: A/C (03/08/18 1307)  Ventilator Initiated: Yes (02/04/18 1264)  Set Rate: 8 bmp (03/08/18 1307)  Vt Set: 400 mL (03/08/18 1307)  Pressure Support: 0 cmH20 (03/08/18 1307)  PEEP/CPAP: 5 cmH20 (03/08/18 1307)  Oxygen Concentration (%): 28 (03/08/18 1307)  Peak Airway Pressure: 27 cmH2O (03/08/18 1307)  Plateau Pressure: 19 cmH20 (03/08/18 1307)  Total Ve: 4.44 mL (03/08/18 1307)  F/VT Ratio<105 (RSBI): (!) 58.68 (03/08/18 1307)  Lines/Drains/Airways     Drain                 Gastrostomy/Enterostomy 10/25/16 1401 Percutaneous endoscopic gastrostomy (PEG) midline feeding 499 days          Airway                 Airway - Non-Surgical 02/04/18 2329 Endotracheal Tube 31 days          Pressure Ulcer                 Pressure Injury 02/05/18 1045 Left medial Foot Deep tissue injury 31 days         Pressure Injury 02/05/18 1045 Right lateral  Foot Deep tissue injury 31 days          Peripheral Intravenous Line                 Peripheral IV - Single Lumen 02/27/18 1753 Left;Posterior Forearm 8 days              Significant Labs:    CBC/Anemia Profile:  No results for input(s): WBC, HGB, HCT, PLT, MCV, RDW, IRON, FERRITIN, RETIC, FOLATE, [id number], OCCULTBLOOD in the last 48 hours.     Chemistries:  No results for input(s): NA, K, CL, CO2, BUN, CREATININE, CALCIUM, ALBUMIN, PROT, BILITOT, ALKPHOS, ALT, AST, GLUCOSE, MG, PHOS in the last 48 hours.    All pertinent labs within the past 24 hours have been reviewed.    Significant Imaging:  No new imaging    Assessment/Plan:     Neuro   Seizure-like activity    - was on keppra, dc 3/4  -stable         Dysphagia as late effect of cerebrovascular disease    - S/P PEG tube.  - patient not tolerating feeds 2/2 emesis with trickle feeds.  - tube feeds stopped after multiple attempts on different days to run trickle feeds but always has emesis        CVA, old, hemiparesis    -patient suffered multiple strokes in past, last one in 2008   -nonverbal at baseline, alert, makes eye contact but doesn't follow commands.    MRI (2/10)  Findings: The diffusion sequence demonstrates no evidence of acute infarct. There is severe white matter small vessel ischemic change. There is a remote infarct of the right occipital lobe. There is a mild hemosiderin staining of the brainstem probably from a prior subarachnoid hemorrhage. There is a small focus of hemosiderin posterior right midline medulla. Pituitary and craniocervical junction show nothing unusual. There is left frontal sinusitis change. There is involutional change.  - Records requested from [facility] and [facility]. Did not include brain images or neurology notes.  - Neurology consulted: differential includes but is not limited to CADASIL (given h/o strokes, extensive white matter involvement including the temporal lobes), adult onset adrenoleukodystrophy, chronic microvascular  changes (less likely), vanishing white matter disease (less likely given predominance in children), or Binswanger disease (subcortical leukoencephalopathy or vascular dementia 2/2 chronic HTN).   - NOTCH3 genetic testing sent, results pending        Derm   Unstageable pressure ulcer of left buttock    - multiple area.  - wound care per nursing.        Pulmonary   * Acute respiratory failure with hypoxia    - Intubated  - has large amount secretion, as well as neurological disease, inability to follow commands  - glycopyrrolate dc, may be contributing to emesis        Aspiration pneumonia    Extensive consolidation of the right lung and debris in the right mainstem bronchus and its branches consistent with aspiration pneumonia.  - S/p course of rocephin; s/p vanco & cefepime & flagyl   - Not on any antibiotics currently  - Intubated        Cardiac/Vascular   Paroxysmal atrial fibrillation    - Pt home BB held due to hypotension           GI   Partial small bowel obstruction    - KUB in 02/14 showed distended stomach and partial SOB.  - CT abd pelvis w contrast showed no acute intra-abdominal abnormality.  - Emesis happens on/off, no residual. respond to antemetic and feed holding.          PEG (percutaneous endoscopic gastrostomy) status    .        Orthopedic   Pressure injury of deep tissue    - multiple areas.  - wound care per nursing.        Other   Palliative care encounter    - partial code  - Palliative care following.  - Plan for terminal extubation 2/26/18, however family not ready for withdrawal of care, ongoing discussions with family but agreed to not escalate care at this time          Goals of care, counseling/discussion    - Palliative consulted.   - terminal extubation initially planned for 2/26/2018 discontinued due to family no longer wanting this  - plan for further goals of care discussion  - Family no-showed for 2/27 & 3/1 Pall Care meeting.   -family meeting on 3/5 with  Staff and fellow -  discussed poor prognosis, family agreed to not escalate care.  -family meeting to readdress [facility] later this week             Critical care was time spent personally by me on the following activities: development of treatment plan with patient or surrogate and bedside caregivers, discussions with consultants, evaluation of patient's response to treatment, examination of patient, ordering and performing treatments and interventions, ordering and review of laboratory studies, ordering and review of radiographic studies, pulse oximetry, re-evaluation of patient's condition. This critical care time did not overlap with that of any other provider or involve time for any procedures.     Khalida Cheung MD  Critical Care Medicine  [facility]

## 2018-03-08 NOTE — SUBJECTIVE & OBJECTIVE
Interval History/Significant Events: Did not tolerate TF overnight. BP stable.     Review of Systems   Unable to perform ROS: Intubated     Objective:     Vital Signs (Most Recent):  Temp: 99.6 °F (37.6 °C) (03/08/18 1100)  Pulse: 62 (03/08/18 1307)  Resp: (!) 24 (03/08/18 1307)  BP: 102/60 (03/08/18 1300)  SpO2: 96 % (03/08/18 1307) Vital Signs (24h Range):  Temp:  [97.7 °F (36.5 °C)-99.6 °F (37.6 °C)] 99.6 °F (37.6 °C)  Pulse:  [53-73] 62  Resp:  [10-30] 24  SpO2:  [93 %-99 %] 96 %  BP: ()/(60-83) 102/60   Weight: 81.2 kg (179 lb 0.2 oz)  Body mass index is 27.22 kg/m².      Intake/Output Summary (Last 24 hours) at 03/08/18 1352  Last data filed at 03/08/18 1300   Gross per 24 hour   Intake              585 ml   Output               55 ml   Net              530 ml       Physical Exam   HENT:   Head: Normocephalic and atraumatic.   Eyes: Conjunctivae are normal. No scleral icterus.   Cardiovascular: Normal rate and regular rhythm.    Pulmonary/Chest: She has no wheezes. She has rales.   On the ventilator   Abdominal: Soft. She exhibits distension. There is no tenderness. There is no guarding.   Musculoskeletal: She exhibits no edema.   Upper and lower ext contratures   Neurological:   Opens eyes to voice, does not perform purposeful movements   Skin: Skin is warm. No rash noted.   Vitals reviewed.      Vents:  Vent Mode: A/C (03/08/18 1307)  Ventilator Initiated: Yes (02/04/18 2344)  Set Rate: 8 bmp (03/08/18 1307)  Vt Set: 400 mL (03/08/18 1307)  Pressure Support: 0 cmH20 (03/08/18 1307)  PEEP/CPAP: 5 cmH20 (03/08/18 1307)  Oxygen Concentration (%): 28 (03/08/18 1307)  Peak Airway Pressure: 27 cmH2O (03/08/18 1307)  Plateau Pressure: 19 cmH20 (03/08/18 1307)  Total Ve: 4.44 mL (03/08/18 1307)  F/VT Ratio<105 (RSBI): (!) 58.68 (03/08/18 1307)  Lines/Drains/Airways     Drain                 Gastrostomy/Enterostomy 10/25/16 1401 Percutaneous endoscopic gastrostomy (PEG) midline feeding 499 days          Airway                  Airway - Non-Surgical 02/04/18 2329 Endotracheal Tube 31 days          Pressure Ulcer                 Pressure Injury 02/05/18 1045 Left medial Foot Deep tissue injury 31 days         Pressure Injury 02/05/18 1045 Right lateral Foot Deep tissue injury 31 days          Peripheral Intravenous Line                 Peripheral IV - Single Lumen 02/27/18 1753 Left;Posterior Forearm 8 days              Significant Labs:    CBC/Anemia Profile:  No results for input(s): WBC, HGB, HCT, PLT, MCV, RDW, IRON, FERRITIN, RETIC, FOLATE, [id number], OCCULTBLOOD in the last 48 hours.     Chemistries:  No results for input(s): NA, K, CL, CO2, BUN, CREATININE, CALCIUM, ALBUMIN, PROT, BILITOT, ALKPHOS, ALT, AST, GLUCOSE, MG, PHOS in the last 48 hours.    All pertinent labs within the past 24 hours have been reviewed.    Significant Imaging:  No new imaging

## 2018-03-08 NOTE — PROGRESS NOTES
[facility]  Palliative Medicine  Consult Note    Patient Name: Jonathan Nieves  MRN: [id number]  Admission Date: 2/4/2018  Hospital Length of Stay: 31 days  Code Status: Partial Code   Attending Provider: Quincy Hdz*  Consulting Provider: Vladimir De La Torre MD  Primary Care Physician: Primary Doctor No  Principal Problem:Acute respiratory failure with hypoxia    Consults  Assessment/Plan:     Active Diagnoses:    Diagnosis Date Noted POA    PRINCIPAL PROBLEM:  Acute respiratory failure with hypoxia [J96.01] 02/05/2018 Yes    Decubitus ulcer of left elbow, stage 3 [L89.023] 02/27/2018 Yes    Partial small bowel obstruction [K56.600] 02/16/2018 Yes    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] 02/15/2018 Yes    Palliative care encounter [Z51.5] 02/14/2018 Not Applicable    Intubation of airway performed without difficulty [Z78.9] 02/05/2018 Yes    Unstageable pressure ulcer of left buttock [L89.320] 02/05/2018 Yes    Pressure injury of deep tissue [L89.90] 02/05/2018 Yes    Alteration in skin integrity due to moisture [R23.9] 02/05/2018 Yes    Seizure-like activity [R56.9] 05/22/2017 Yes    Goals of care, counseling/discussion [Z71.89] 05/20/2017 Not Applicable     Chronic    PEG (percutaneous endoscopic gastrostomy) status [Z93.1] 12/16/2014 Not Applicable    Aspiration pneumonia [J69.0] 10/13/2014 Yes    Paroxysmal atrial fibrillation [I48.0] 05/15/2014 Yes     Chronic    CVA, old, hemiparesis [I69.359] 05/15/2014 Not Applicable     Chronic    Dysphagia as late effect of cerebrovascular disease [I69.991] 05/15/2014 Not Applicable     Chronic      Problems Resolved During this Admission:    Diagnosis Date Noted Date Resolved POA    Circulatory failure [R57.9] 02/26/2018 02/26/2018 Yes    Hypernatremia [E87.0] 02/07/2018 02/26/2018 Yes    UTI (urinary tract infection) [N39.0] 10/13/2014 02/26/2018 Yes     Goals of care:  - 51 yo lady with several CVA and extensive white matter  "disease with wide differential (CADASIL).   - Reviewed Dr. Cherry note . Appears to have severe neurologic deficits that likely are irreversible. Poor prognosis for meaningful neurologic recovery.   -  made partial code per primary  to hypotensive and bradycardia episode   - no escalation of care and partial code confirmed. Plans for terminal extubation 18.No family available, so continued on MV.   - was started on morphine infusion for comfort but became hypotensive, so discontinued  - would not tolerate Tube Feeding  - discussed with Dr. Mora. Family to arrive for terminal extubation by the end of the week.      I will follow along with you. Please call [phone] with questions.     Subjective:     HPI: Mr. Jonathan Nieves is a 51yo female with history of CVA with residual aphasia and dysphagia, non-verbal, s/p PEG tube placement and bed riddenness, osteomyelitis s/p amputation of toe, who came to the ED from [facility] via EMS in respiratory distress. Per ED/EMS the patient was noted to have "projectile vomiting" yesterday evening (24 hours prior to presentation), which was treated with zofran. During the event there is suspicion the patient possible aspirated. Currently, she opens eyes to stimulation, intubated, with contractures of knees/hips/elbows/hands and multiple areas of pressure injury.      Interval History: small doses of pressors today. Unable to completely wean pressors over night. Remains on AC mechanical ventilation.     Interval History: would not tolerate tube feeds.       Medications:  Continuous Infusions:  Scheduled Meds:   chlorhexidine  15 mL Mouth/Throat BID    heparin (porcine)  5,000 Units Subcutaneous Q8H    polyethylene glycol  17 g Per NG tube Daily    scopolamine  1 patch Transdermal Q3 Days    senna-docusate 8.6-50 mg  1 tablet Per NG tube Daily     PRN Meds:dextrose 50%, [] fentaNYL **FOLLOWED BY** fentaNYL, glucagon (human " recombinant), LORazepam, ondansetron, sodium chloride 0.9%        Review of Systems   Unable to perform ROS: Dementia     Objective:     Vital Signs (Most Recent):  Temp: 97.7 °F (36.5 °C) (03/08/18 0701)  Pulse: (!) 55 (03/08/18 1000)  Resp: 20 (03/08/18 1000)  BP: 98/62 (03/08/18 1000)  SpO2: 97 % (03/08/18 1000) Vital Signs (24h Range):  Temp:  [97.7 °F (36.5 °C)-99.4 °F (37.4 °C)] 97.7 °F (36.5 °C)  Pulse:  [53-73] 55  Resp:  [10-30] 20  SpO2:  [93 %-99 %] 97 %  BP: ()/(60-83) 98/62     Physical Exam   Cardiovascular: Normal rate.    Pulmonary/Chest: Effort normal.   On the vent   Neurological:   Opens eyes, does not track         Laboratory:   CBC: No results for input(s): WBC, RBC, HGB, HCT, PLT, MCV, MCH, MCHC in the last 168 hours.    CMP: No results for input(s): GLU, CALCIUM, ALBUMIN, PROT, NA, K, CO2, CL, BUN, CREATININE, ALKPHOS, ALT, AST, BILITOT in the last 168 hours.    POCT Glucose   Date Value Ref Range Status   03/08/2018 77 70 - 110 mg/dL Final   03/07/2018 82 70 - 110 mg/dL Final   03/07/2018 70 70 - 110 mg/dL Final   03/07/2018 70 70 - 110 mg/dL Final   03/06/2018 84 70 - 110 mg/dL Final   03/06/2018 102 70 - 110 mg/dL Final   03/06/2018 66 (L) 70 - 110 mg/dL Final   03/06/2018 71 70 - 110 mg/dL Final   03/06/2018 89 70 - 110 mg/dL Final   03/05/2018 74 70 - 110 mg/dL Final   03/05/2018 87 70 - 110 mg/dL Final   03/05/2018 85 70 - 110 mg/dL Final         Significant Imaging:     > 50% of 25 min visit spent in chart review, face to face discussion of goals of care,  symptom assessment, coordination of care and emotional support.    Vladimir De La Torre MD  Palliative Medicine  [facility]

## 2018-03-08 NOTE — PLAN OF CARE
Problem: Patient Care Overview  Goal: Plan of Care Review  Outcome: Ongoing (interventions implemented as appropriate)  PLan of care reviewed with patient. No acute events this shift. Will continue to monitor. See flowsheet for full documentation.

## 2018-03-08 NOTE — PLAN OF CARE
Problem: Patient Care Overview  Goal: Plan of Care Review  Outcome: Ongoing (interventions implemented as appropriate)  See vital signs and assessments in flowsheets. See below for updates on today's progress.     Pulmonary: AC 28% Fio2, o2sat >95%    Cardiovascular: SB-SR 50-70'. Bp systolic 's.     Neurological: afebrile. Pupils equal and reactive. Withdraws to pain.     Gastrointestinal: Bm x 1. NPO    Genitourinary: Uop x1. PEG with no residuals. 150 water bolus given x 2.     Endocrine: normoglycemic     Integumentary/Other: wound care done. Family conference planned for tomorrow or the next day.     Infusions: none    Patient progressing towards goals as tolerated, plan of care communicated and reviewed with patient and family. All concerns addressed. Will continue to monitor.

## 2018-03-08 NOTE — PT/OT/SLP PROGRESS
Physical Therapy      Patient Name:  Jonathan Nieves   MRN:  [id number]    Pt's B UE and B LE ROM assessed. Pt with contractures in majority of B UE and B LE joints. ROM not appropriate at this time. Pt alert but PT unable to assess history 2nd to pt intubated and not responding to yes/no questions. D/c from acute PT.      Janny Solis, PT, DPT  3/8/2018  [phone]

## 2018-03-09 NOTE — PROGRESS NOTES
[facility]  Palliative Medicine  Consult Note    Patient Name: Jonathan Nieves  MRN: [id number]  Admission Date: 2/4/2018  Hospital Length of Stay: 32 days  Code Status: Partial Code   Attending Provider: Quincy Hdz*  Consulting Provider: Vladimir De La Torre MD  Primary Care Physician: Primary Doctor No  Principal Problem:Acute respiratory failure with hypoxia    Consults  Assessment/Plan:     Active Diagnoses:    Diagnosis Date Noted POA    PRINCIPAL PROBLEM:  Acute respiratory failure with hypoxia [J96.01] 02/05/2018 Yes    Decubitus ulcer of left elbow, stage 3 [L89.023] 02/27/2018 Yes    Partial small bowel obstruction [K56.600] 02/16/2018 Yes    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] 02/15/2018 Yes    Palliative care encounter [Z51.5] 02/14/2018 Not Applicable    Intubation of airway performed without difficulty [Z78.9] 02/05/2018 Yes    Unstageable pressure ulcer of left buttock [L89.320] 02/05/2018 Yes    Pressure injury of deep tissue [L89.90] 02/05/2018 Yes    Alteration in skin integrity due to moisture [R23.9] 02/05/2018 Yes    Seizure-like activity [R56.9] 05/22/2017 Yes    Goals of care, counseling/discussion [Z71.89] 05/20/2017 Not Applicable     Chronic    PEG (percutaneous endoscopic gastrostomy) status [Z93.1] 12/16/2014 Not Applicable    Aspiration pneumonia [J69.0] 10/13/2014 Yes    Paroxysmal atrial fibrillation [I48.0] 05/15/2014 Yes     Chronic    CVA, old, hemiparesis [I69.359] 05/15/2014 Not Applicable     Chronic    Dysphagia as late effect of cerebrovascular disease [I69.991] 05/15/2014 Not Applicable     Chronic      Problems Resolved During this Admission:    Diagnosis Date Noted Date Resolved POA    Circulatory failure [R57.9] 02/26/2018 02/26/2018 Yes    Hypernatremia [E87.0] 02/07/2018 02/26/2018 Yes    UTI (urinary tract infection) [N39.0] 10/13/2014 02/26/2018 Yes     Goals of care:  - 53 yo lady with several CVA and extensive white matter  "disease with wide differential (CADASIL).   - Reviewed Dr. Cherry note 2/12. Appears to have severe neurologic deficits that likely are irreversible. Poor prognosis for meaningful neurologic recovery.   - 2/19 made partial code per primary 2/2 to hypotensive and bradycardia episode   - no escalation of care and partial code confirmed. Plans for terminal extubation 2/26/18.No family available, so continued on MV.   - was started on morphine infusion for comfort but became hypotensive, so discontinued  - would not tolerate Tube Feeding, hypoglycemia over night, now on D5.   - attempted to reach [place], no answer. Long conversation with Ms. RACIEL Contreras, pt sister. - She does not understand why the pt. Daughter is not more forthcoming with directing the pt. Care. She agrees with our suggestion of terminal extubation and will reach out to her niece.   - Will continue to attempt to reach the daughter. Hopefully we can agree on extubation this weekend.   - discussed with Dr. Mora.     ADDENDUM:  - spoke with Ms. Sims, pt. NOA and several other family members over speakerphone this afternoon  - expressed again our concern about the pt. Comfort and asked their status in reaching a decision about the pt. Care  - they would like to gather more family and want the pt. To remain on the ventilator until Tuesday 3/13.   - When I expressed concerns about them pushing back decisions the feedback was quite aggressive in a manner of "you can;t do anything without our consent, it's the law, so you need to keep her on the ventilator as long as we say".   - when asked what was important to them they clearly expressed that comfort was the main priority but that they were not ready to make a final decision about terminal extubation.   - when asked, they do understand that the pt. Has a terminal condition and that she is likely uncomfortable in her current state.   - they are ok with giving her pain medications even if her blood " "pressure should drop  - Additionally the family expresses that they have had trouble gathering the funds to pay for the mothers , which has been a challenge for them.   - I have offered help from social work, which they accepted.   - as it stands the family agrees to come to the hospital on 3/13 for terminal extubation.   - case discussed at length with Dr. Hdz.      I will follow along with you. Please call [phone] with questions.     Subjective:     HPI: Mr. Jonathan Nieves is a 53yo female with history of CVA with residual aphasia and dysphagia, non-verbal, s/p PEG tube placement and bed riddenness, osteomyelitis s/p amputation of toe, who came to the ED from [facility] via EMS in respiratory distress. Per ED/EMS the patient was noted to have "projectile vomiting" yesterday evening (24 hours prior to presentation), which was treated with zofran. During the event there is suspicion the patient possible aspirated. Currently, she opens eyes to stimulation, intubated, with contractures of knees/hips/elbows/hands and multiple areas of pressure injury.      Interval History: small doses of pressors today. Unable to completely wean pressors over night. Remains on AC mechanical ventilation.     Interval History: hypoglycemia over night, now on D5. Breathing over the vent at set rate of 8/min. Grimaces on my visit  Eyes open but does not track.       Medications:  Continuous Infusions:   dextrose 5 % 50 mL/hr at 18 1000     Scheduled Meds:   chlorhexidine  15 mL Mouth/Throat BID    dextrose 50 % in water (D50W)        heparin (porcine)  5,000 Units Subcutaneous Q8H    polyethylene glycol  17 g Per NG tube Daily    scopolamine  1 patch Transdermal Q3 Days    senna-docusate 8.6-50 mg  1 tablet Per NG tube Daily     PRN Meds:dextrose 50 % in water (D50W), [] fentaNYL **FOLLOWED BY** fentaNYL, glucagon (human recombinant), LORazepam, ondansetron, sodium chloride " 0.9%        Review of Systems   Unable to perform ROS: Dementia     Objective:     Vital Signs (Most Recent):  Temp: 99.3 °F (37.4 °C) (03/09/18 0701)  Pulse: 69 (03/09/18 1000)  Resp: 16 (03/09/18 1000)  BP: 125/83 (03/09/18 1000)  SpO2: 96 % (03/09/18 1000) Vital Signs (24h Range):  Temp:  [99.2 °F (37.3 °C)-99.6 °F (37.6 °C)] 99.3 °F (37.4 °C)  Pulse:  [] 69  Resp:  [13-36] 16  SpO2:  [93 %-99 %] 96 %  BP: (101-142)/(60-86) 125/83     Physical Exam   Cardiovascular: Normal rate.    Pulmonary/Chest: Effort normal.   On the vent   Neurological:   Opens eyes, does not track         Laboratory:   CBC: No results for input(s): WBC, RBC, HGB, HCT, PLT, MCV, MCH, MCHC in the last 168 hours.    CMP: No results for input(s): GLU, CALCIUM, ALBUMIN, PROT, NA, K, CO2, CL, BUN, CREATININE, ALKPHOS, ALT, AST, BILITOT in the last 168 hours.    POCT Glucose   Date Value Ref Range Status   03/09/2018 76 70 - 110 mg/dL Final   03/09/2018 109 70 - 110 mg/dL Final   03/09/2018 68 (L) 70 - 110 mg/dL Final   03/09/2018 69 (L) 70 - 110 mg/dL Final   03/08/2018 88 70 - 110 mg/dL Final   03/08/2018 60 (L) 70 - 110 mg/dL Final   03/08/2018 116 (H) 70 - 110 mg/dL Final   03/08/2018 59 (L) 70 - 110 mg/dL Final   03/08/2018 71 70 - 110 mg/dL Final   03/08/2018 77 70 - 110 mg/dL Final   03/07/2018 82 70 - 110 mg/dL Final   03/07/2018 70 70 - 110 mg/dL Final   03/07/2018 70 70 - 110 mg/dL Final   03/06/2018 84 70 - 110 mg/dL Final   03/06/2018 102 70 - 110 mg/dL Final   03/06/2018 66 (L) 70 - 110 mg/dL Final   03/06/2018 71 70 - 110 mg/dL Final         Significant Imaging:     > 50% of 40 min visit spent in chart review, face to face discussion of goals of care,  symptom assessment, coordination of care and emotional support.    Vladimir De La Torre MD  Palliative Medicine  [facility]

## 2018-03-09 NOTE — PROGRESS NOTES
"[facility]  Critical Care Medicine  Progress Note    Patient Name: Jonathan Nieves  MRN: [id number]  Admission Date: 2/4/2018  Hospital Length of Stay: 32 days  Code Status: Partial Code  Attending Provider: Quincy Hdz*  Primary Care Provider: Primary Doctor No   Principal Problem: Acute respiratory failure with hypoxia    Subjective:     HPI:  Mr. Jonathan Nieves is a 53yo female with history of CVA with residual aphasia and dysphagia, non-verbal, s/p PEG tube placement and bed riddenness, osteomyelitis s/p amputation of toe, who comes to the ED from [facility] via EMS in respiratory distress. Per ED/EMS the patient was noted to have "projectile vomiting" yesterday evening (24 hours prior to presentation), which was treated with zofran. During the event there is suspicion the patient possible aspirated. Today the patient was noted to be in resp distress with a fever.     Patient was satting 88% on 4L on EMS arrival, breathing 40 times per minute.  Improved to 96% on 15L NRB with RR high 20s.  Axillary temp 102.    CT chest concerning for aspiration pneumonia.  UA concern for a UTI.       Patient intubated in the ED & Critical Care consulted.         Hospital/ICU Course:  2/5: Critical Care Consulted. Patient intubated in the ED.   2/6: Patient doing well, on SBT; will extubate maria e. Continue treatment for aspiration PNA & UTI.   2/7: abx deescalated, continue to treat for PNA and UTI. Required pressors overnight. Off this morning.   2/8-9: still with secretions and questionable mentation? Baseline?. Will try to dry the secretions and extubated today or maria e.    2/10: MRI completed. Family called, had family meeting with one daughter. Need to re-visit with all daughters. Records requested from [facility] and [facility].   02/11-13: neurology consulted, no acute changes, wbc and LFT trending up, keep monitoring.  2/14: Concerns for CADASIL vs Binswanger syndrome per Neuro. NOTCH3 " genetic testing sent. Palliative consulted regarding goals of care.   02/15 pt is not tolerating tube feed, had two episodes of projectile vomiting, KUB 02/14 shows partial SOB and distended stomach.  02/16 CT abdomen was unremarkable, family meeting was done.  02/17 no more vomiting, tolerating 10 cc/hr tube feeds.  02/19 trial to advance tube feeding, had residual back to trickle feed will try to advance slowly. 02/19 PT became hypotensive this morning with MAPs of 50's started on levo, daughter was contacted regarding goals of care, pt status was changed to partial code upon family request.  02/20 Pt still requiring levo, try to wean off slowly, family did not show for the meeting.  02/21 family meeting was done yesterday, no acclation of care per daughter wishes.   02/25 Pt lost IV access,still unable to wean off the went, neurological status unchanged she is alert but not following commands, fails her SBT every morning.  2/27: Failed SBT. Family did not show up for Palliative Care meeting.  2/28: NAEON.   3/1: family no showed for meeting  3/2: ethics committee consult by palliative  3/3: spoke to daughter zainab, informed she was awaiting her sister to pick her up and they are coming to hospital. She no showed again  3/4: morphine drip started as patient appeared to be in pain, stopped when patient became hypotensive.  3/5: will attempt to contact family for another meeting today  3/6: Family meeting held yesterday with staff and fellow, discussed poor prognosis of pt. Family in agreement to not escalate care; however, not ready for withdrawal of care. Will revist with family meeting possibly tomorrow.   3/7: Pt was restarted on TF, however did not tolerate.   3/9: hypoglycemic overnight, started on D5 insulin gtt    Interval History/Significant Events: Hypoglycemic overnight, will start D5 gtt    Review of Systems   Unable to perform ROS: Intubated     Objective:     Vital Signs (Most Recent):  Temp: 99.3 °F  (37.4 °C) (03/09/18 0701)  Pulse: 70 (03/09/18 0743)  Resp: (!) 26 (03/09/18 0701)  BP: 121/78 (03/09/18 0701)  SpO2: 96 % (03/09/18 0743) Vital Signs (24h Range):  Temp:  [99.2 °F (37.3 °C)-99.6 °F (37.6 °C)] 99.3 °F (37.4 °C)  Pulse:  [] 70  Resp:  [13-36] 26  SpO2:  [93 %-99 %] 96 %  BP: ()/(60-86) 121/78   Weight: 81.2 kg (179 lb 0.2 oz)  Body mass index is 27.22 kg/m².      Intake/Output Summary (Last 24 hours) at 03/09/18 0755  Last data filed at 03/09/18 0701   Gross per 24 hour   Intake              735 ml   Output                0 ml   Net              735 ml       Physical Exam   HENT:   Head: Normocephalic and atraumatic.   Eyes: Conjunctivae are normal. No scleral icterus.   Cardiovascular: Normal rate and regular rhythm.    Pulmonary/Chest: She has no wheezes. She has rales.   On the ventilator   Abdominal: Soft. She exhibits distension. There is no tenderness. There is no guarding.   Musculoskeletal: She exhibits no edema.   Upper and lower ext contratures   Neurological:   Opens eyes to voice, does not perform purposeful movements   Skin: Skin is warm. No rash noted.   Vitals reviewed.      Vents:  Vent Mode: A/C (03/09/18 0743)  Ventilator Initiated: Yes (02/04/18 2344)  Set Rate: 8 bmp (03/09/18 0743)  Vt Set: 400 mL (03/09/18 0743)  Pressure Support: 0 cmH20 (03/09/18 0743)  PEEP/CPAP: 5 cmH20 (03/09/18 0743)  Oxygen Concentration (%): 28 (03/09/18 0743)  Peak Airway Pressure: 36 cmH2O (03/09/18 0743)  Plateau Pressure: 19 cmH20 (03/09/18 0743)  Total Ve: 5.48 mL (03/09/18 0743)  F/VT Ratio<105 (RSBI): (!) 39.26 (03/09/18 0500)  Lines/Drains/Airways     Drain                 Gastrostomy/Enterostomy 10/25/16 1401 Percutaneous endoscopic gastrostomy (PEG) midline feeding 499 days          Airway                 Airway - Non-Surgical 02/04/18 2329 Endotracheal Tube 32 days          Pressure Ulcer                 Pressure Injury 02/05/18 1045 Left medial Foot Deep tissue injury 31 days          Pressure Injury 02/05/18 1045 Right lateral Foot Deep tissue injury 31 days          Peripheral Intravenous Line                 Peripheral IV - Single Lumen 02/27/18 1753 Left;Posterior Forearm 9 days              Significant Labs:    CBC/Anemia Profile:  No results for input(s): WBC, HGB, HCT, PLT, MCV, RDW, IRON, FERRITIN, RETIC, FOLATE, [id number], OCCULTBLOOD in the last 48 hours.     Chemistries:  No results for input(s): NA, K, CL, CO2, BUN, CREATININE, CALCIUM, ALBUMIN, PROT, BILITOT, ALKPHOS, ALT, AST, GLUCOSE, MG, PHOS in the last 48 hours.    All pertinent labs within the past 24 hours have been reviewed.    Significant Imaging:  No new imaging    Assessment/Plan:     Neuro   Seizure-like activity    - was on keppra, dc 3/4  -stable         Dysphagia as late effect of cerebrovascular disease    - S/P PEG tube.  - patient not tolerating feeds 2/2 emesis with trickle feeds.  - tube feeds stopped after multiple attempts on different days to run trickle feeds but always has emesis  -D5 gtt for hypoglycemia  -will reattempt TFs given that glycopyrrolate dc        CVA, old, hemiparesis    -patient suffered multiple strokes in past, last one in 2008   -nonverbal at baseline, alert, makes eye contact but doesn't follow commands.    MRI (2/10)  Findings: The diffusion sequence demonstrates no evidence of acute infarct. There is severe white matter small vessel ischemic change. There is a remote infarct of the right occipital lobe. There is a mild hemosiderin staining of the brainstem probably from a prior subarachnoid hemorrhage. There is a small focus of hemosiderin posterior right midline medulla. Pituitary and craniocervical junction show nothing unusual. There is left frontal sinusitis change. There is involutional change.  - Records requested from [facility] and [facility]. Did not include brain images or neurology notes.  - Neurology consulted: differential includes but is not limited to CADASIL (given h/o strokes,  extensive white matter involvement including the temporal lobes), adult onset adrenoleukodystrophy, chronic microvascular changes (less likely), vanishing white matter disease (less likely given predominance in children), or Binswanger disease (subcortical leukoencephalopathy or vascular dementia 2/2 chronic HTN).   - NOTCH3 genetic testing sent, results pending        Derm   Unstageable pressure ulcer of left buttock    - multiple area.  - wound care per nursing.        Pulmonary   * Acute respiratory failure with hypoxia    - Intubated  - has large amount secretion, as well as neurological disease, inability to follow commands  - glycopyrrolate dc, may be contributing to emesis        Aspiration pneumonia    Extensive consolidation of the right lung and debris in the right mainstem bronchus and its branches consistent with aspiration pneumonia.  - S/p course of rocephin; s/p vanco & cefepime & flagyl   - Not on any antibiotics currently  - Intubated        Cardiac/Vascular   Paroxysmal atrial fibrillation    - Pt home BB held due to hypotension           GI   Partial small bowel obstruction    - KUB in 02/14 showed distended stomach and partial SOB.  - CT abd pelvis w contrast showed no acute intra-abdominal abnormality.  - Emesis happens on/off, no residual. respond to antemetic and feed holding.          PEG (percutaneous endoscopic gastrostomy) status    .        Orthopedic   Pressure injury of deep tissue    - multiple areas.  - wound care per nursing.        Other   Palliative care encounter    - partial code  - Palliative care following.  - Plan for terminal extubation 2/26/18, however family not ready for withdrawal of care, ongoing discussions with family but agreed to not escalate care at this time          Goals of care, counseling/discussion    - Palliative consulted.   - terminal extubation initially planned for 2/26/2018 discontinued due to family no longer wanting this  - plan for further goals of  care discussion  - Family no-showed for 2/27 & 3/1 Pall Care meeting.   -family meeting on 3/5 with  Staff and fellow - discussed poor prognosis, family agreed to not escalate care.  -family meeting to readdress GOC later this week            DVT ppx: heparin gtt  Diet: will reattempt trickle feeds, on D5  Dispo: pending family GOC         GRETTA Alcazar  Critical Care Medicine  [facility]

## 2018-03-09 NOTE — CONSULTS
Single lumen 18g x 10cm midline placed to LEFT BASILIC vein.  Max dwell date 04.06.2018, Lot# [id number].  Needle advances into vein under realtime Ultrasound guidance, image recorded and saved.

## 2018-03-09 NOTE — PLAN OF CARE
Palliative Care:      SW was asked by Pal Care Attending to speak with pt's dtr regarding resources for financial assistance for  services. SW agreed to contact dtr.      SW spoke with dtr Matias Nieves by phone ([phone]). SW informed Dtr that this SW was asked to contact her regarding resources for  homes. SW explained that unfortunately there isn't resources for funerals for adults. Dtr verbalized this and stated that  homes want the money up front that has caused a problem. SW informed Dtr that this SW has three  homes that have assisted people in the past. Dtr was open to receiving this information.      [employer]: [phone]  [employer]: [phone]  [facility] [phone]      Dtr verbalized appreciation for the information. Emotional Support provided.       Will continue to follow.    Alysha Enriquez, QUE, ACHP-PATRICK

## 2018-03-09 NOTE — PROGRESS NOTES
Notified  CCS of patient blood sugars over night. Requiring prn D50 x 2. Last blood sugar  This am 76. New orders to be placed.

## 2018-03-09 NOTE — PLAN OF CARE
Problem: Patient Care Overview  Goal: Plan of Care Review  Outcome: Ongoing (interventions implemented as appropriate)  See vital signs and assessments in flowsheets. See below for updates on today's progress.     Pulmonary: AC 28% fiO2. O2sat >95%. A lot of secretions    Cardiovascular: SR 60-80's. 's- 150's systolic    Neurological: withdraws to pain. Afebrile     Gastrointestinal: Tf to be started. Patient vomited so TF held. NPO. BMx1    Genitourinary: UOP x1    Endocrine: D5 drip started. Blood sugars normal     Integumentary/Other: wound care done. Plan for family conference    Infusions: D5    Patient progressing towards goals as tolerated, plan of care communicated and reviewed with patient and family. All concerns addressed. Will continue to monitor.

## 2018-03-09 NOTE — PLAN OF CARE
Problem: Patient Care Overview  Goal: Plan of Care Review  Outcome: Ongoing (interventions implemented as appropriate)  No acute events noted within the shift.Pt remains intubated to [facility] vent on A/C910-7-77% +5 PF-60. VSS.  Water boluses given per PEG. Latest CBG-102 mg/dl.  POC reviewed with patient. Will continue to monitor.

## 2018-03-09 NOTE — SUBJECTIVE & OBJECTIVE
Interval History/Significant Events: Hypoglycemic overnight, will start D5 gtt    Review of Systems   Unable to perform ROS: Intubated     Objective:     Vital Signs (Most Recent):  Temp: 99.3 °F (37.4 °C) (03/09/18 0701)  Pulse: 70 (03/09/18 0743)  Resp: (!) 26 (03/09/18 0701)  BP: 121/78 (03/09/18 0701)  SpO2: 96 % (03/09/18 0743) Vital Signs (24h Range):  Temp:  [99.2 °F (37.3 °C)-99.6 °F (37.6 °C)] 99.3 °F (37.4 °C)  Pulse:  [] 70  Resp:  [13-36] 26  SpO2:  [93 %-99 %] 96 %  BP: ()/(60-86) 121/78   Weight: 81.2 kg (179 lb 0.2 oz)  Body mass index is 27.22 kg/m².      Intake/Output Summary (Last 24 hours) at 03/09/18 0755  Last data filed at 03/09/18 0701   Gross per 24 hour   Intake              735 ml   Output                0 ml   Net              735 ml       Physical Exam   HENT:   Head: Normocephalic and atraumatic.   Eyes: Conjunctivae are normal. No scleral icterus.   Cardiovascular: Normal rate and regular rhythm.    Pulmonary/Chest: She has no wheezes. She has rales.   On the ventilator   Abdominal: Soft. She exhibits distension. There is no tenderness. There is no guarding.   Musculoskeletal: She exhibits no edema.   Upper and lower ext contratures   Neurological:   Opens eyes to voice, does not perform purposeful movements   Skin: Skin is warm. No rash noted.   Vitals reviewed.      Vents:  Vent Mode: A/C (03/09/18 0743)  Ventilator Initiated: Yes (02/04/18 2344)  Set Rate: 8 bmp (03/09/18 0743)  Vt Set: 400 mL (03/09/18 0743)  Pressure Support: 0 cmH20 (03/09/18 0743)  PEEP/CPAP: 5 cmH20 (03/09/18 0743)  Oxygen Concentration (%): 28 (03/09/18 0743)  Peak Airway Pressure: 36 cmH2O (03/09/18 0743)  Plateau Pressure: 19 cmH20 (03/09/18 0743)  Total Ve: 5.48 mL (03/09/18 0743)  F/VT Ratio<105 (RSBI): (!) 39.26 (03/09/18 0500)  Lines/Drains/Airways     Drain                 Gastrostomy/Enterostomy 10/25/16 1401 Percutaneous endoscopic gastrostomy (PEG) midline feeding 499 days          Airway                  Airway - Non-Surgical 02/04/18 2329 Endotracheal Tube 32 days          Pressure Ulcer                 Pressure Injury 02/05/18 1045 Left medial Foot Deep tissue injury 31 days         Pressure Injury 02/05/18 1045 Right lateral Foot Deep tissue injury 31 days          Peripheral Intravenous Line                 Peripheral IV - Single Lumen 02/27/18 1753 Left;Posterior Forearm 9 days              Significant Labs:    CBC/Anemia Profile:  No results for input(s): WBC, HGB, HCT, PLT, MCV, RDW, IRON, FERRITIN, RETIC, FOLATE, [id number], OCCULTBLOOD in the last 48 hours.     Chemistries:  No results for input(s): NA, K, CL, CO2, BUN, CREATININE, CALCIUM, ALBUMIN, PROT, BILITOT, ALKPHOS, ALT, AST, GLUCOSE, MG, PHOS in the last 48 hours.    All pertinent labs within the past 24 hours have been reviewed.    Significant Imaging:  No new imaging

## 2018-03-10 NOTE — ASSESSMENT & PLAN NOTE
- Palliative consulted.   - terminal extubation initially planned for 2/26/2018 discontinued due to family no longer wanting this  - plan for further goals of care discussion  - Family no-showed for 2/27 & 3/1 \[facility]\"" Care meeting.   -family meeting on 3/5 with  Staff and fellow - discussed poor prognosis, family agreed to not escalate care.  - plan was to establish goals on 3/9, however family not present. Dr. De La Torre from Palliative care has reached out to family via phone and current plan is for terminal extubation on 3/13

## 2018-03-10 NOTE — PROGRESS NOTES
"[facility]  Critical Care Medicine  Progress Note    Patient Name: Jonathan Nieves  MRN: [id number]  Admission Date: 2/4/2018  Hospital Length of Stay: 33 days  Code Status: Partial Code  Attending Provider: Quincy Hdz*  Primary Care Provider: Primary Doctor No   Principal Problem: Acute respiratory failure with hypoxia    Subjective:     HPI:  Mr. Jonathan Nieves is a 53yo female with history of CVA with residual aphasia and dysphagia, non-verbal, s/p PEG tube placement and bed riddenness, osteomyelitis s/p amputation of toe, who comes to the ED from [facility] via EMS in respiratory distress. Per ED/EMS the patient was noted to have "projectile vomiting" yesterday evening (24 hours prior to presentation), which was treated with zofran. During the event there is suspicion the patient possible aspirated. Today the patient was noted to be in resp distress with a fever.     Patient was satting 88% on 4L on EMS arrival, breathing 40 times per minute.  Improved to 96% on 15L NRB with RR high 20s.  Axillary temp 102.    CT chest concerning for aspiration pneumonia.  UA concern for a UTI.       Patient intubated in the ED & Critical Care consulted.         Hospital/ICU Course:  2/5: Critical Care Consulted. Patient intubated in the ED.   2/6: Patient doing well, on SBT; will extubate maria e. Continue treatment for aspiration PNA & UTI.   2/7: abx deescalated, continue to treat for PNA and UTI. Required pressors overnight. Off this morning.   2/8-9: still with secretions and questionable mentation? Baseline?. Will try to dry the secretions and extubated today or maria e.    2/10: MRI completed. Family called, had family meeting with one daughter. Need to re-visit with all daughters. Records requested from [facility] and [facility].   02/11-13: neurology consulted, no acute changes, wbc and LFT trending up, keep monitoring.  2/14: Concerns for CADASIL vs Binswanger syndrome per Neuro. NOTCH3 " genetic testing sent. Palliative consulted regarding goals of care.   02/15 pt is not tolerating tube feed, had two episodes of projectile vomiting, KUB 02/14 shows partial SOB and distended stomach.  02/16 CT abdomen was unremarkable, family meeting was done.  02/17 no more vomiting, tolerating 10 cc/hr tube feeds.  02/19 trial to advance tube feeding, had residual back to trickle feed will try to advance slowly. 02/19 PT became hypotensive this morning with MAPs of 50's started on levo, daughter was contacted regarding goals of care, pt status was changed to partial code upon family request.  02/20 Pt still requiring levo, try to wean off slowly, family did not show for the meeting.  02/21 family meeting was done yesterday, no acclation of care per daughter wishes.   02/25 Pt lost IV access,still unable to wean off the went, neurological status unchanged she is alert but not following commands, fails her SBT every morning.  2/27: Failed SBT. Family did not show up for Palliative Care meeting.  2/28: NAEON.   3/1: family no showed for meeting  3/2: ethics committee consult by palliative  3/3: spoke to daughter zainab, informed she was awaiting her sister to pick her up and they are coming to hospital. She no showed again  3/4: morphine drip started as patient appeared to be in pain, stopped when patient became hypotensive.  3/5: will attempt to contact family for another meeting today  3/6: Family meeting held yesterday with staff and fellow, discussed poor prognosis of pt. Family in agreement to not escalate care; however, not ready for withdrawal of care. Will revist with family meeting possibly tomorrow.   3/7: Pt was restarted on TF, however did not tolerate.   3/9: hypoglycemic overnight, started on D5 gtt. Palliative had discussion with patient's daughter, zainab. Plan is for terminal extubation on 3/13.     Interval History/Significant Events: NAEON    Review of Systems   Unable to perform ROS: Intubated      Objective:     Vital Signs (Most Recent):  Temp: 98.9 °F (37.2 °C) (03/10/18 0300)  Pulse: (!) 57 (03/10/18 0505)  Resp: (!) 9 (03/10/18 0505)  BP: 99/67 (03/10/18 0505)  SpO2: 100 % (03/10/18 0505) Vital Signs (24h Range):  Temp:  [97.4 °F (36.3 °C)-99.6 °F (37.6 °C)] 98.9 °F (37.2 °C)  Pulse:  [] 57  Resp:  [9-36] 9  SpO2:  [93 %-100 %] 100 %  BP: ()/(64-89) 99/67   Weight: 81.2 kg (179 lb 0.2 oz)  Body mass index is 27.22 kg/m².      Intake/Output Summary (Last 24 hours) at 03/10/18 0553  Last data filed at 03/10/18 0500   Gross per 24 hour   Intake           2848.5 ml   Output                0 ml   Net           2848.5 ml       Physical Exam   HENT:   Head: Normocephalic and atraumatic.   Eyes: Conjunctivae are normal. No scleral icterus.   Cardiovascular: Normal rate and regular rhythm.    Pulmonary/Chest: She has no wheezes. She has rales.   On the ventilator   Abdominal: Soft. She exhibits distension. There is no tenderness. There is no guarding.   Musculoskeletal: She exhibits no edema.   Upper and lower ext contratures   Neurological:   Opens eyes to voice, does not perform purposeful movements   Skin: Skin is warm. No rash noted.   Vitals reviewed.      Vents:  Vent Mode: A/C (03/10/18 0505)  Ventilator Initiated: Yes (02/04/18 9934)  Set Rate: 8 bmp (03/10/18 0505)  Vt Set: 400 mL (03/10/18 0505)  Pressure Support: 0 cmH20 (03/10/18 0505)  PEEP/CPAP: 5 cmH20 (03/10/18 0505)  Oxygen Concentration (%): 28 (03/10/18 0505)  Peak Airway Pressure: 29 cmH2O (03/10/18 0505)  Plateau Pressure: 19 cmH20 (03/10/18 0505)  Total Ve: 5.23 mL (03/10/18 0505)  F/VT Ratio<105 (RSBI): (!) 21.23 (03/10/18 0505)  Lines/Drains/Airways     Drain                 Gastrostomy/Enterostomy 10/25/16 1401 Percutaneous endoscopic gastrostomy (PEG) midline feeding 500 days          Airway                 Airway - Non-Surgical 02/04/18 2329 Endotracheal Tube 33 days          Pressure Ulcer                 Pressure Injury  02/05/18 1045 Left medial Foot Deep tissue injury 32 days         Pressure Injury 02/05/18 1045 Right lateral Foot Deep tissue injury 32 days          Peripheral Intravenous Line                 Peripheral IV - Single Lumen 02/27/18 1753 Left;Posterior Forearm 10 days         Midline Catheter Insertion/Assessment  - Single Lumen 03/09/18 1732 basilic vein (medial side of arm) 18g x 10cm less than 1 day              Significant Labs:    CBC/Anemia Profile:  No results for input(s): WBC, HGB, HCT, PLT, MCV, RDW, IRON, FERRITIN, RETIC, FOLATE, [id number], OCCULTBLOOD in the last 48 hours.     Chemistries:  No results for input(s): NA, K, CL, CO2, BUN, CREATININE, CALCIUM, ALBUMIN, PROT, BILITOT, ALKPHOS, ALT, AST, GLUCOSE, MG, PHOS in the last 48 hours.    All pertinent labs within the past 24 hours have been reviewed.    Significant Imaging:  No new imaging    Assessment/Plan:     Neuro   Seizure-like activity    - was on keppra, dc 3/4  -stable         Dysphagia as late effect of cerebrovascular disease    - S/P PEG tube.  - patient not tolerating feeds 2/2 emesis with trickle feeds.  - tube feeds stopped after multiple attempts on different days to run trickle feeds but always has emesis        CVA, old, hemiparesis    -patient suffered multiple strokes in past, last one in 2008   -nonverbal at baseline, alert, makes eye contact but doesn't follow commands.    MRI (2/10)  Findings: The diffusion sequence demonstrates no evidence of acute infarct. There is severe white matter small vessel ischemic change. There is a remote infarct of the right occipital lobe. There is a mild hemosiderin staining of the brainstem probably from a prior subarachnoid hemorrhage. There is a small focus of hemosiderin posterior right midline medulla. Pituitary and craniocervical junction show nothing unusual. There is left frontal sinusitis change. There is involutional change.  - Records requested from [facility] and [facility]. Did not include brain  images or neurology notes.  - Neurology consulted: differential includes but is not limited to CADASIL (given h/o strokes, extensive white matter involvement including the temporal lobes), adult onset adrenoleukodystrophy, chronic microvascular changes (less likely), vanishing white matter disease (less likely given predominance in children), or Binswanger disease (subcortical leukoencephalopathy or vascular dementia 2/2 chronic HTN).   - NOTCH3 genetic testing sent, results pending        Derm   Unstageable pressure ulcer of left buttock    - multiple area.  - wound care per nursing.        Pulmonary   * Acute respiratory failure with hypoxia    - Intubated  - has large amount secretion, as well as neurological disease, inability to follow commands  - glycopyrrolate dc, may be contributing to emesis        Aspiration pneumonia    Extensive consolidation of the right lung and debris in the right mainstem bronchus and its branches consistent with aspiration pneumonia.  - S/p course of rocephin; s/p vanco & cefepime & flagyl   - Not on any antibiotics currently  - Intubated        Cardiac/Vascular   Paroxysmal atrial fibrillation    - Pt home BB held due to hypotension           GI   Partial small bowel obstruction    - KUB in 02/14 showed distended stomach and partial SOB.  - CT abd pelvis w contrast showed no acute intra-abdominal abnormality.  - Emesis happens on/off, no residual. respond to antemetic and feed holding.          PEG (percutaneous endoscopic gastrostomy) status    .        Orthopedic   Pressure injury of deep tissue    - multiple areas.  - wound care per nursing.        Other   Palliative care encounter    - partial code  - Palliative care following.  - Plan for terminal extubation 2/26/18, however family not ready for withdrawal of care, ongoing discussions with family but agreed to not escalate care at this time  - palliative following, appreciate assistance. Discussion held between Dr. De La Torre of  palliative and patients daughter 3/9. Plan for terminal extubation 3/13         Goals of care, counseling/discussion    - Palliative consulted.   - terminal extubation initially planned for 2/26/2018 discontinued due to family no longer wanting this  - plan for further goals of care discussion  - Family no-showed for 2/27 & 3/1 Pall Care meeting.   -family meeting on 3/5 with  Staff and fellow - discussed poor prognosis, family agreed to not escalate care.  - plan was to establish goals on 3/9, however family not present. Dr. De La Torre from Palliative care has reached out to family via phone and current plan is for terminal extubation on 3/13             Critical care was time spent personally by me on the following activities: development of treatment plan with patient or surrogate and bedside caregivers, discussions with consultants, evaluation of patient's response to treatment, examination of patient, ordering and performing treatments and interventions, ordering and review of laboratory studies, ordering and review of radiographic studies, pulse oximetry, re-evaluation of patient's condition. This critical care time did not overlap with that of any other provider or involve time for any procedures.     Khalida Cheung MD  Critical Care Medicine  [facility]

## 2018-03-10 NOTE — PLAN OF CARE
Problem: Patient Care Overview  Goal: Individualization & Mutuality  Outcome: Ongoing (interventions implemented as appropriate)  Pt remains intubated; 28%/5 peep. Does not follow commands; withdraws to pain. D5 infusing. Pt turned q2h. VSS. Afebrile. NAEON. BM x1. 150cc water bolus 4x daily per PEG. Will continue to monitor.

## 2018-03-10 NOTE — SUBJECTIVE & OBJECTIVE
Interval History/Significant Events: NAEON    Review of Systems   Unable to perform ROS: Intubated     Objective:     Vital Signs (Most Recent):  Temp: 98.9 °F (37.2 °C) (03/10/18 0300)  Pulse: (!) 57 (03/10/18 0505)  Resp: (!) 9 (03/10/18 0505)  BP: 99/67 (03/10/18 0505)  SpO2: 100 % (03/10/18 0505) Vital Signs (24h Range):  Temp:  [97.4 °F (36.3 °C)-99.6 °F (37.6 °C)] 98.9 °F (37.2 °C)  Pulse:  [] 57  Resp:  [9-36] 9  SpO2:  [93 %-100 %] 100 %  BP: ()/(64-89) 99/67   Weight: 81.2 kg (179 lb 0.2 oz)  Body mass index is 27.22 kg/m².      Intake/Output Summary (Last 24 hours) at 03/10/18 0553  Last data filed at 03/10/18 0500   Gross per 24 hour   Intake           2848.5 ml   Output                0 ml   Net           2848.5 ml       Physical Exam   HENT:   Head: Normocephalic and atraumatic.   Eyes: Conjunctivae are normal. No scleral icterus.   Cardiovascular: Normal rate and regular rhythm.    Pulmonary/Chest: She has no wheezes. She has rales.   On the ventilator   Abdominal: Soft. She exhibits distension. There is no tenderness. There is no guarding.   Musculoskeletal: She exhibits no edema.   Upper and lower ext contratures   Neurological:   Opens eyes to voice, does not perform purposeful movements   Skin: Skin is warm. No rash noted.   Vitals reviewed.      Vents:  Vent Mode: A/C (03/10/18 0505)  Ventilator Initiated: Yes (02/04/18 3914)  Set Rate: 8 bmp (03/10/18 0505)  Vt Set: 400 mL (03/10/18 0505)  Pressure Support: 0 cmH20 (03/10/18 0505)  PEEP/CPAP: 5 cmH20 (03/10/18 0505)  Oxygen Concentration (%): 28 (03/10/18 0505)  Peak Airway Pressure: 29 cmH2O (03/10/18 0505)  Plateau Pressure: 19 cmH20 (03/10/18 0505)  Total Ve: 5.23 mL (03/10/18 0505)  F/VT Ratio<105 (RSBI): (!) 21.23 (03/10/18 0505)  Lines/Drains/Airways     Drain                 Gastrostomy/Enterostomy 10/25/16 1401 Percutaneous endoscopic gastrostomy (PEG) midline feeding 500 days          Airway                 Airway -  Non-Surgical 02/04/18 2329 Endotracheal Tube 33 days          Pressure Ulcer                 Pressure Injury 02/05/18 1045 Left medial Foot Deep tissue injury 32 days         Pressure Injury 02/05/18 1045 Right lateral Foot Deep tissue injury 32 days          Peripheral Intravenous Line                 Peripheral IV - Single Lumen 02/27/18 1753 Left;Posterior Forearm 10 days         Midline Catheter Insertion/Assessment  - Single Lumen 03/09/18 1732 basilic vein (medial side of arm) 18g x 10cm less than 1 day              Significant Labs:    CBC/Anemia Profile:  No results for input(s): WBC, HGB, HCT, PLT, MCV, RDW, IRON, FERRITIN, RETIC, FOLATE, [id number], OCCULTBLOOD in the last 48 hours.     Chemistries:  No results for input(s): NA, K, CL, CO2, BUN, CREATININE, CALCIUM, ALBUMIN, PROT, BILITOT, ALKPHOS, ALT, AST, GLUCOSE, MG, PHOS in the last 48 hours.    All pertinent labs within the past 24 hours have been reviewed.    Significant Imaging:  No new imaging

## 2018-03-10 NOTE — ASSESSMENT & PLAN NOTE
- partial code  - Palliative care following.  - Plan for terminal extubation 2/26/18, however family not ready for withdrawal of care, ongoing discussions with family but agreed to not escalate care at this time  - palliative following, appreciate assistance. Discussion held between Dr. De La Torre of palliative and patients daughter 3/9. Plan for terminal extubation 3/13

## 2018-03-11 NOTE — PROGRESS NOTES
"[facility]  Critical Care Medicine  Progress Note    Patient Name: Jonathan Nieves  MRN: [id number]  Admission Date: 2/4/2018  Hospital Length of Stay: 34 days  Code Status: Partial Code  Attending Provider: Quinyc Hdz*  Primary Care Provider: Primary Doctor No   Principal Problem: Acute respiratory failure with hypoxia    Subjective:     HPI:  Mr. Jonathan Nieves is a 53yo female with history of CVA with residual aphasia and dysphagia, non-verbal, s/p PEG tube placement and bed riddenness, osteomyelitis s/p amputation of toe, who comes to the ED from [facility] via EMS in respiratory distress. Per ED/EMS the patient was noted to have "projectile vomiting" yesterday evening (24 hours prior to presentation), which was treated with zofran. During the event there is suspicion the patient possible aspirated. Today the patient was noted to be in resp distress with a fever.     Patient was satting 88% on 4L on EMS arrival, breathing 40 times per minute.  Improved to 96% on 15L NRB with RR high 20s.  Axillary temp 102.    CT chest concerning for aspiration pneumonia.  UA concern for a UTI.       Patient intubated in the ED & Critical Care consulted.         Hospital/ICU Course:  2/5: Critical Care Consulted. Patient intubated in the ED.   2/6: Patient doing well, on SBT; will extubate maria e. Continue treatment for aspiration PNA & UTI.   2/7: abx deescalated, continue to treat for PNA and UTI. Required pressors overnight. Off this morning.   2/8-9: still with secretions and questionable mentation? Baseline?. Will try to dry the secretions and extubated today or marai e.    2/10: MRI completed. Family called, had family meeting with one daughter. Need to re-visit with all daughters. [facility] requested from [facility] and [facility].   02/11-13: neurology consulted, no acute changes, wbc and LFT trending up, keep monitoring.  2/14: Concerns for CADASIL vs Binswanger syndrome per Neuro. NOTCH3 " genetic testing sent. Palliative consulted regarding goals of care.   02/15 pt is not tolerating tube feed, had two episodes of projectile vomiting, KUB 02/14 shows partial SOB and distended stomach.  02/16 CT abdomen was unremarkable, family meeting was done.  02/17 no more vomiting, tolerating 10 cc/hr tube feeds.  02/19 trial to advance tube feeding, had residual back to trickle feed will try to advance slowly. 02/19 PT became hypotensive this morning with MAPs of 50's started on levo, daughter was contacted regarding goals of care, pt status was changed to partial code upon family request.  02/20 Pt still requiring levo, try to wean off slowly, family did not show for the meeting.  02/21 family meeting was done yesterday, no acclation of care per daughter wishes.   02/25 Pt lost IV access,still unable to wean off the went, neurological status unchanged she is alert but not following commands, fails her SBT every morning.  2/27: Failed SBT. Family did not show up for Palliative Care meeting.  2/28: NAEON.   3/1: family no showed for meeting  3/2: ethics committee consult by palliative  3/3: spoke to daughter zainab, informed she was awaiting her sister to pick her up and they are coming to hospital. She no showed again  3/4: morphine drip started as patient appeared to be in pain, stopped when patient became hypotensive.  3/5: will attempt to contact family for another meeting today  3/6: Family meeting held yesterday with staff and fellow, discussed poor prognosis of pt. Family in agreement to not escalate care; however, not ready for withdrawal of care. Will revist with family meeting possibly tomorrow.   3/7: Pt was restarted on TF, however did not tolerate.   3/9: hypoglycemic overnight, started on D5 gtt. Palliative had discussion with patient's daughter, zainab. Plan is for terminal extubation on 3/13.     Interval History/Significant Events: NAEON    Review of Systems   Unable to perform ROS: Intubated      Objective:     Vital Signs (Most Recent):  Temp: 99.2 °F (37.3 °C) (03/11/18 1100)  Pulse: 66 (03/11/18 1315)  Resp: 12 (03/11/18 1315)  BP: (!) 99/56 (03/11/18 1200)  SpO2: 97 % (03/11/18 1315) Vital Signs (24h Range):  Temp:  [99.1 °F (37.3 °C)-99.2 °F (37.3 °C)] 99.2 °F (37.3 °C)  Pulse:  [58-81] 66  Resp:  [11-33] 12  SpO2:  [93 %-100 %] 97 %  BP: ()/(53-76) 99/56   Weight: 81.2 kg (179 lb 0.2 oz)  Body mass index is 27.22 kg/m².      Intake/Output Summary (Last 24 hours) at 03/11/18 1339  Last data filed at 03/11/18 1200   Gross per 24 hour   Intake             1800 ml   Output                0 ml   Net             1800 ml       Physical Exam   HENT:   Head: Normocephalic and atraumatic.   Eyes: Conjunctivae are normal. No scleral icterus.   Cardiovascular: Normal rate and regular rhythm.    Pulmonary/Chest: She has no wheezes. She has rales.   On the ventilator   Abdominal: Soft. She exhibits distension. There is no tenderness. There is no guarding.   Musculoskeletal: She exhibits no edema.   Upper and lower ext contratures   Neurological:   Opens eyes to voice, does not perform purposeful movements   Skin: Skin is warm. No rash noted.   Vitals reviewed.      Vents:  Vent Mode: A/C (03/11/18 1315)  Ventilator Initiated: Yes (02/04/18 2344)  Set Rate: 8 bmp (03/11/18 1315)  Vt Set: 400 mL (03/11/18 1315)  Pressure Support: 0 cmH20 (03/11/18 1315)  PEEP/CPAP: 5 cmH20 (03/11/18 1315)  Oxygen Concentration (%): 28 (03/11/18 1315)  Peak Airway Pressure: 37 cmH2O (03/11/18 1315)  Plateau Pressure: 18 cmH20 (03/11/18 1315)  Total Ve: 4.32 mL (03/11/18 1315)  F/VT Ratio<105 (RSBI): (!) 29.85 (03/11/18 1315)  Lines/Drains/Airways     Drain                 Gastrostomy/Enterostomy 10/25/16 1401 Percutaneous endoscopic gastrostomy (PEG) midline feeding 501 days          Airway                 Airway - Non-Surgical 02/04/18 2329 Endotracheal Tube 34 days          Pressure Ulcer                 Pressure Injury  02/05/18 1045 Left medial Foot Deep tissue injury 34 days         Pressure Injury 02/05/18 1045 Right lateral Foot Deep tissue injury 34 days          Peripheral Intravenous Line                 Peripheral IV - Single Lumen 02/27/18 1753 Left;Posterior Forearm 11 days         Midline Catheter Insertion/Assessment  - Single Lumen 03/09/18 1732 basilic vein (medial side of arm) 18g x 10cm 1 day              Significant Labs:    CBC/Anemia Profile:    Recent Labs  Lab 03/10/18  1029   WBC 6.56   HGB 9.8*   HCT 32.9*      MCV 77*   RDW 19.2*        Chemistries:    Recent Labs  Lab 03/10/18  1029      K 2.6*      CO2 28   BUN 4*   CREATININE 0.7   CALCIUM 8.5*   ALBUMIN 2.6*   PROT 7.5   BILITOT 0.7   ALKPHOS 83   ALT 13   AST 15       Significant Imaging:  I have reviewed all pertinent imaging results/findings within the past 24 hours.    Assessment/Plan:     Neuro   Seizure-like activity    - was on keppra, dc 3/4  -stable         Dysphagia as late effect of cerebrovascular disease    - S/P PEG tube.  - patient not tolerating feeds 2/2 emesis with trickle feeds.  - tube feeds stopped after multiple attempts on different days to run trickle feeds but always has emesis        CVA, old, hemiparesis    -patient suffered multiple strokes in past, last one in 2008   -nonverbal at baseline, alert, makes eye contact but doesn't follow commands.    MRI (2/10)  Findings: The diffusion sequence demonstrates no evidence of acute infarct. There is severe white matter small vessel ischemic change. There is a remote infarct of the right occipital lobe. There is a mild hemosiderin staining of the brainstem probably from a prior subarachnoid hemorrhage. There is a small focus of hemosiderin posterior right midline medulla. Pituitary and craniocervical junction show nothing unusual. There is left frontal sinusitis change. There is involutional change.  - Records requested from [facility] and [facility]. Did not include brain  images or neurology notes.  - Neurology consulted: differential includes but is not limited to CADASIL (given h/o strokes, extensive white matter involvement including the temporal lobes), adult onset adrenoleukodystrophy, chronic microvascular changes (less likely), vanishing white matter disease (less likely given predominance in children), or Binswanger disease (subcortical leukoencephalopathy or vascular dementia 2/2 chronic HTN).   - NOTCH3 genetic testing sent, results pending        Derm   Unstageable pressure ulcer of left buttock    - multiple area.  - wound care per nursing.        Pulmonary   * Acute respiratory failure with hypoxia    - Intubated  - has large amount secretion, as well as neurological disease, inability to follow commands  - glycopyrrolate dc, may be contributing to emesis        Aspiration pneumonia    Extensive consolidation of the right lung and debris in the right mainstem bronchus and its branches consistent with aspiration pneumonia.  - S/p course of rocephin; s/p vanco & cefepime & flagyl   - Not on any antibiotics currently  - Intubated        Cardiac/Vascular   Paroxysmal atrial fibrillation    - Pt home BB held due to hypotension           GI   Partial small bowel obstruction    - KUB in 02/14 showed distended stomach and partial SOB.  - CT abd pelvis w contrast showed no acute intra-abdominal abnormality.  - Emesis happens on/off, no residual. respond to antemetic and feed holding.          PEG (percutaneous endoscopic gastrostomy) status    .        Orthopedic   Pressure injury of deep tissue    - multiple areas.  - wound care per nursing.        Other   Palliative care encounter    - partial code  - Palliative care following.  - Plan for terminal extubation 2/26/18, however family not ready for withdrawal of care, ongoing discussions with family but agreed to not escalate care at this time  - palliative following, appreciate assistance. Discussion held between Dr. De La Torre of  palliative and patients daughter 3/9. Plan for terminal extubation 3/13         Goals of care, counseling/discussion    - Palliative consulted.   - terminal extubation initially planned for 2/26/2018 discontinued due to family no longer wanting this  - plan for further goals of care discussion  - Family no-showed for 2/27 & 3/1 [facility] Care meeting.   -family meeting on 3/5 with  Staff and fellow - discussed poor prognosis, family agreed to not escalate care.  - plan was to establish goals on 3/9, however family not present. Dr. De La Torre from Palliative care has reached out to family via phone and current plan is for terminal extubation on 3/13           Critical Care Daily Checklist:    A: Awake: RASS Goal/Actual Goal: RASS Goal: 0-->alert and calm  Actual: Krishnamurthy Agitation Sedation Scale (RASS): Alert and calm   B: Spontaneous Breathing Trial Performed? Spon. Breathing Trial Initiated?: Not initiated (no plans for SBT at this time) (03/04/18 0756)   C: SAT & SBT Coordinated?  Yes                      D: Delirium: CAM-ICU Overall CAM-ICU: Positive   E: Early Mobility Performed? No   F: Feeding Goal: Goals: Meet % EEN, EPN  Status: Nutrition Goal Status: goal not met   Current Diet Order   Procedures    Diet NPO      AS: Analgesia/Sedation Yes   T: Thromboembolic Prophylaxis Yes   H: HOB > 300 Yes   U: Stress Ulcer Prophylaxis (if needed) Yes   G: Glucose Control NA   B: Bowel Function Stool Occurrence: 1   I: Indwelling Catheter (Lines & De [place]) Necessity Yes   D: De-escalation of Antimicrobials/Pharmacotherapies NA    Plan for the day/ETD Continue care as outlined    Code Status:  Family/Goals of Care: Partial Code         Critical care was time spent personally by me on the following activities: development of treatment plan with patient or surrogate and bedside caregivers, discussions with consultants, evaluation of patient's response to treatment, examination of patient, ordering and performing treatments  and interventions, ordering and review of laboratory studies, ordering and review of radiographic studies, pulse oximetry, re-evaluation of patient's condition. This critical care time did not overlap with that of any other provider or involve time for any procedures.     Volodymyr Campbell MD  Critical Care Medicine  [facility]

## 2018-03-11 NOTE — SUBJECTIVE & OBJECTIVE
Interval History/Significant Events: NAEON    Review of Systems   Unable to perform ROS: Intubated     Objective:     Vital Signs (Most Recent):  Temp: 99.2 °F (37.3 °C) (03/11/18 1100)  Pulse: 66 (03/11/18 1315)  Resp: 12 (03/11/18 1315)  BP: (!) 99/56 (03/11/18 1200)  SpO2: 97 % (03/11/18 1315) Vital Signs (24h Range):  Temp:  [99.1 °F (37.3 °C)-99.2 °F (37.3 °C)] 99.2 °F (37.3 °C)  Pulse:  [58-81] 66  Resp:  [11-33] 12  SpO2:  [93 %-100 %] 97 %  BP: ()/(53-76) 99/56   Weight: 81.2 kg (179 lb 0.2 oz)  Body mass index is 27.22 kg/m².      Intake/Output Summary (Last 24 hours) at 03/11/18 1339  Last data filed at 03/11/18 1200   Gross per 24 hour   Intake             1800 ml   Output                0 ml   Net             1800 ml       Physical Exam   HENT:   Head: Normocephalic and atraumatic.   Eyes: Conjunctivae are normal. No scleral icterus.   Cardiovascular: Normal rate and regular rhythm.    Pulmonary/Chest: She has no wheezes. She has rales.   On the ventilator   Abdominal: Soft. She exhibits distension. There is no tenderness. There is no guarding.   Musculoskeletal: She exhibits no edema.   Upper and lower ext contratures   Neurological:   Opens eyes to voice, does not perform purposeful movements   Skin: Skin is warm. No rash noted.   Vitals reviewed.      Vents:  Vent Mode: A/C (03/11/18 1315)  Ventilator Initiated: Yes (02/04/18 2344)  Set Rate: 8 bmp (03/11/18 1315)  Vt Set: 400 mL (03/11/18 1315)  Pressure Support: 0 cmH20 (03/11/18 1315)  PEEP/CPAP: 5 cmH20 (03/11/18 1315)  Oxygen Concentration (%): 28 (03/11/18 1315)  Peak Airway Pressure: 37 cmH2O (03/11/18 1315)  Plateau Pressure: 18 cmH20 (03/11/18 1315)  Total Ve: 4.32 mL (03/11/18 1315)  F/VT Ratio<105 (RSBI): (!) 29.85 (03/11/18 1315)  Lines/Drains/Airways     Drain                 Gastrostomy/Enterostomy 10/25/16 1401 Percutaneous endoscopic gastrostomy (PEG) midline feeding 501 days          Airway                 Airway - Non-Surgical  02/04/18 2329 Endotracheal Tube 34 days          Pressure Ulcer                 Pressure Injury 02/05/18 1045 Left medial Foot Deep tissue injury 34 days         Pressure Injury 02/05/18 1045 Right lateral Foot Deep tissue injury 34 days          Peripheral Intravenous Line                 Peripheral IV - Single Lumen 02/27/18 1753 Left;Posterior Forearm 11 days         Midline Catheter Insertion/Assessment  - Single Lumen 03/09/18 1732 basilic vein (medial side of arm) 18g x 10cm 1 day              Significant Labs:    CBC/Anemia Profile:    Recent Labs  Lab 03/10/18  1029   WBC 6.56   HGB 9.8*   HCT 32.9*      MCV 77*   RDW 19.2*        Chemistries:    Recent Labs  Lab 03/10/18  1029      K 2.6*      CO2 28   BUN 4*   CREATININE 0.7   CALCIUM 8.5*   ALBUMIN 2.6*   PROT 7.5   BILITOT 0.7   ALKPHOS 83   ALT 13   AST 15       Significant Imaging:  I have reviewed all pertinent imaging results/findings within the past 24 hours.

## 2018-03-11 NOTE — PLAN OF CARE
Problem: Patient Care Overview  Goal: Plan of Care Review  Outcome: Ongoing (interventions implemented as appropriate)  No acute events noted within the shift. VSS. Remains intubated to [facility] vent A/C 400-8-28%+5 PF-60. Remains with PEG, given water boluses. Had 1 BM and blue pad soaked with urine (2x). Remains on D5W at 50 ml/hr. POC reviewed with patient. Will continue to monitor.

## 2018-03-11 NOTE — ASSESSMENT & PLAN NOTE
- Palliative consulted.   - terminal extubation initially planned for 2/26/2018 discontinued due to family no longer wanting this  - plan for further goals of care discussion  - Family no-showed for 2/27 & 3/1 [facility] Care meeting.   -family meeting on 3/5 with  Staff and fellow - discussed poor prognosis, family agreed to not escalate care.  - plan was to establish goals on 3/9, however family not present. Dr. De La Torre from Palliative care has reached out to family via phone and current plan is for terminal extubation on 3/13

## 2018-03-11 NOTE — ASSESSMENT & PLAN NOTE
-patient suffered multiple strokes in past, last one in 2008   -nonverbal at baseline, alert, makes eye contact but doesn't follow commands.    MRI (2/10)  Findings: The diffusion sequence demonstrates no evidence of acute infarct. There is severe white matter small vessel ischemic change. There is a remote infarct of the right occipital lobe. There is a mild hemosiderin staining of the brainstem probably from a prior subarachnoid hemorrhage. There is a small focus of hemosiderin posterior right midline medulla. Pituitary and craniocervical junction show nothing unusual. There is left frontal sinusitis change. There is involutional change.  - Records requested from [facility] and [facility]. Did not include brain images or neurology notes.  - Neurology consulted: differential includes but is not limited to CADASIL (given h/o strokes, extensive white matter involvement including the temporal lobes), adult onset adrenoleukodystrophy, chronic microvascular changes (less likely), vanishing white matter disease (less likely given predominance in children), or Binswanger disease (subcortical leukoencephalopathy or vascular dementia 2/2 chronic HTN).   - NOTCH3 genetic testing sent, results pending

## 2018-03-12 NOTE — ASSESSMENT & PLAN NOTE
-patient suffered multiple strokes in past, last one in 2008   -nonverbal at baseline, alert, makes eye contact but doesn't follow commands.    MRI (2/10)  Findings: The diffusion sequence demonstrates no evidence of acute infarct. There is severe white matter small vessel ischemic change. There is a remote infarct of the right occipital lobe. There is a mild hemosiderin staining of the brainstem probably from a prior subarachnoid hemorrhage. There is a small focus of hemosiderin posterior right midline medulla. Pituitary and craniocervical junction show nothing unusual. There is left frontal sinusitis change. There is involutional change.  - Records requested from [facility] and [facility]. Did not include brain images or neurology notes.  - Neurology consulted: differential includes but is not limited to CADASIL (given h/o strokes, extensive white matter involvement including the temporal lobes), adult onset adrenoleukodystrophy, chronic microvascular changes (less likely), vanishing white matter disease (less likely given predominance in children), or Binswanger disease (subcortical leukoencephalopathy or vascular dementia 2/2 chronic HTN).   - NOTCH3 genetic testing sent, results pending

## 2018-03-12 NOTE — ASSESSMENT & PLAN NOTE
- S/P PEG tube.  - patient not tolerating feeds 2/2 emesis with trickle feeds.  - tube feeds stopped after multiple attempts on different days to run trickle feeds but always has emesis however pt was on glycopurlate  Has dc'd glyco and started bolus feeding which pt is reported to tolerate

## 2018-03-12 NOTE — NURSING
Dr. Velez's team called and notified about patient's critical potassium and temperature of 100.0, will continue to monitor.

## 2018-03-12 NOTE — ASSESSMENT & PLAN NOTE
- Palliative consulted.   - terminal extubation initially planned for 2/26/2018 discontinued due to family no longer wanting this  - plan for further goals of care discussion  - Family no-showed for 2/27 & 3/1 [facility] Care meeting.   -family meeting on 3/5 with  Staff and fellow - discussed poor prognosis, family agreed to not escalate care.  - plan was to establish goals on 3/9, however family not present. Dr. De La Torre from Palliative care has reached out to family via phone and current plan is for terminal extubation on 3/13

## 2018-03-12 NOTE — PROGRESS NOTES
"[facility]  Critical Care Medicine  Progress Note    Patient Name: Jonathan Nieves  MRN: [id number]  Admission Date: 2/4/2018  Hospital Length of Stay: 35 days  Code Status: Partial Code  Attending Provider: Jeronimo Velez MD  Primary Care Provider: Primary Doctor No   Principal Problem: Acute respiratory failure with hypoxia    Subjective:     HPI:  Mr. Jonathan Nieves is a 53yo female with history of CVA with residual aphasia and dysphagia, non-verbal, s/p PEG tube placement and bed riddenness, osteomyelitis s/p amputation of toe, who comes to the ED from [facility] via EMS in respiratory distress. Per ED/EMS the patient was noted to have "projectile vomiting" yesterday evening (24 hours prior to presentation), which was treated with zofran. During the event there is suspicion the patient possible aspirated. Today the patient was noted to be in resp distress with a fever.     Patient was satting 88% on 4L on EMS arrival, breathing 40 times per minute.  Improved to 96% on 15L NRB with RR high 20s.  Axillary temp 102.    CT chest concerning for aspiration pneumonia.  UA concern for a UTI.       Patient intubated in the ED & Critical Care consulted.         Hospital/ICU Course:  2/5: Critical Care Consulted. Patient intubated in the ED.   2/6: Patient doing well, on SBT; will extubate maria e. Continue treatment for aspiration PNA & UTI.   2/7: abx deescalated, continue to treat for PNA and UTI. Required pressors overnight. Off this morning.   2/8-9: still with secretions and questionable mentation? Baseline?. Will try to dry the secretions and extubated today or maria e.    2/10: MRI completed. Family called, had family meeting with one daughter. Need to re-visit with all daughters. Records requested from [facility] and [facility].   02/11-13: neurology consulted, no acute changes, wbc and LFT trending up, keep monitoring.  2/14: Concerns for CADASIL vs Binswanger syndrome per Neuro. NOTCH3 " genetic testing sent. Palliative consulted regarding goals of care.   02/15 pt is not tolerating tube feed, had two episodes of projectile vomiting, KUB 02/14 shows partial SOB and distended stomach.  02/16 CT abdomen was unremarkable, family meeting was done.  02/17 no more vomiting, tolerating 10 cc/hr tube feeds.  02/19 trial to advance tube feeding, had residual back to trickle feed will try to advance slowly. 02/19 PT became hypotensive this morning with MAPs of 50's started on levo, daughter was contacted regarding goals of care, pt status was changed to partial code upon family request.  02/20 Pt still requiring levo, try to wean off slowly, family did not show for the meeting.  02/21 family meeting was done yesterday, no acclation of care per daughter wishes.   02/25 Pt lost IV access,still unable to wean off the went, neurological status unchanged she is alert but not following commands, fails her SBT every morning.  2/27: Failed SBT. Family did not show up for Palliative Care meeting.  2/28: NAEON.   3/1: family no showed for meeting  3/2: ethics committee consult by palliative  3/3: spoke to daughter zainab, informed she was awaiting her sister to pick her up and they are coming to hospital. She no showed again  3/4: morphine drip started as patient appeared to be in pain, stopped when patient became hypotensive.  3/5: will attempt to contact family for another meeting today  3/6: Family meeting held yesterday with staff and fellow, discussed poor prognosis of pt. Family in agreement to not escalate care; however, not ready for withdrawal of care. Will revist with family meeting possibly tomorrow.   3/7: Pt was restarted on TF, however did not tolerate.   3/9: hypoglycemic overnight, started on D5 gtt. Palliative had discussion with patient's daughter, zainab. Plan is for terminal extubation on 3/13.   3/12: remains on D5 increased the rate from 50 to 100 for threshold of -180, failed SBT today.  plan for family tmw regarding withdrawal of life support    Interval History/Significant Events: katy PRITCHETT has multiple BM, DC'd stool softners    Review of Systems   Unable to perform ROS: Intubated     Objective:     Vital Signs (Most Recent):  Temp: 100 °F (37.8 °C) (03/12/18 1115)  Pulse: 105 (03/12/18 1400)  Resp: 19 (03/12/18 1400)  BP: 103/63 (03/12/18 1400)  SpO2: (!) 91 % (03/12/18 1400) Vital Signs (24h Range):  Temp:  [98.1 °F (36.7 °C)-100 °F (37.8 °C)] 100 °F (37.8 °C)  Pulse:  [] 105  Resp:  [0-32] 19  SpO2:  [70 %-100 %] 91 %  BP: ()/(59-82) 103/63   Weight: 81.2 kg (179 lb 0.2 oz)  Body mass index is 27.22 kg/m².      Intake/Output Summary (Last 24 hours) at 03/12/18 1436  Last data filed at 03/12/18 1400   Gross per 24 hour   Intake             1650 ml   Output                0 ml   Net             1650 ml       Physical Exam   Constitutional: No distress.   HENT:   Head: Normocephalic.   Mouth/Throat: No oropharyngeal exudate.   intuabted   Eyes: Right eye exhibits no discharge. Left eye exhibits no discharge. No scleral icterus.   Neck: Neck supple. No tracheal deviation present.   Cardiovascular: Normal rate and regular rhythm.  Exam reveals no gallop and no friction rub.    Pulmonary/Chest: Effort normal and breath sounds normal. No respiratory distress. She has no wheezes.   Abdominal: Soft. Bowel sounds are normal. She exhibits no distension. There is no tenderness. There is no rebound and no guarding.   Musculoskeletal: She exhibits deformity. She exhibits no edema.   contractures   Neurological:   Opens eye spontanously and unclear whether she is able to track with her eyes otherwise does not follow commands   Skin: Skin is warm and dry. She is not diaphoretic. No erythema.   decu ulcer       Vents:  Vent Mode: A/C (03/12/18 0954)  Ventilator Initiated: Yes (02/04/18 5474)  Set Rate: 8 bmp (03/12/18 0954)  Vt Set: 400 mL (03/12/18 0954)  Pressure Support: 0 cmH20 (03/12/18  0954)  PEEP/CPAP: 5 cmH20 (03/12/18 0954)  Oxygen Concentration (%): 28 (03/12/18 1400)  Peak Airway Pressure: 44 cmH2O (03/12/18 0954)  Plateau Pressure: 17 cmH20 (03/12/18 0954)  Total Ve: 7.3 mL (03/12/18 0954)  F/VT Ratio<105 (RSBI): (!) 33.1 (03/12/18 0954)  Lines/Drains/Airways     Drain                 Gastrostomy/Enterostomy 10/25/16 1401 Percutaneous endoscopic gastrostomy (PEG) midline feeding 503 days          Airway                 Airway - Non-Surgical 02/04/18 2329 Endotracheal Tube 35 days          Pressure Ulcer                 Pressure Injury 02/05/18 1045 Left medial Foot Deep tissue injury 35 days         Pressure Injury 02/05/18 1045 Right lateral Foot Deep tissue injury 35 days          Peripheral Intravenous Line                 Peripheral IV - Single Lumen 02/27/18 1753 Left;Posterior Forearm 12 days         Midline Catheter Insertion/Assessment  - Single Lumen 03/09/18 1732 basilic vein (medial side of arm) 18g x 10cm 2 days              Significant Labs:    CBC/Anemia Profile:  No results for input(s): WBC, HGB, HCT, PLT, MCV, RDW, IRON, FERRITIN, RETIC, FOLATE, [id number], OCCULTBLOOD in the last 48 hours.     Chemistries:    Recent Labs  Lab 03/12/18  0808      K 2.4*      CO2 24   BUN 2*   CREATININE 0.6   CALCIUM 8.2*       Recent Lab Results       03/12/18  0808 03/12/18  0804 03/12/18  0602 03/11/18  2158 03/11/18  1800      Anion Gap 9         BUN, Bld 2(L)         Calcium 8.2(L)         Chloride 105         CO2 24         Creatinine 0.6         eGFR if  >60.0         eGFR if non  >60.0  Comment:  Calculation used to obtain the estimated glomerular filtration  rate (eGFR) is the CKD-EPI equation.            Glucose 110         POCT Glucose  113(H) 107 100 98     Potassium 2.4  Comment:  *Critical value -  Results called to and read back by:JEANNIE REDMAN   RN  ()         Sodium 138                         Significant Imaging:  I have  reviewed all pertinent imaging results/findings within the past 24 hours.    Assessment/Plan:     Neuro   Seizure-like activity    - was on keppra, dc 3/4  -stable         Dysphagia as late effect of cerebrovascular disease    - S/P PEG tube.  - patient not tolerating feeds 2/2 emesis with trickle feeds.  - tube feeds stopped after multiple attempts on different days to run trickle feeds but always has emesis however pt was on glycopurlate  Has dc'd glyco and started bolus feeding which pt is reported to tolerate        CVA, old, hemiparesis    -patient suffered multiple strokes in past, last one in 2008   -nonverbal at baseline, alert, makes eye contact but doesn't follow commands.    MRI (2/10)  Findings: The diffusion sequence demonstrates no evidence of acute infarct. There is severe white matter small vessel ischemic change. There is a remote infarct of the right occipital lobe. There is a mild hemosiderin staining of the brainstem probably from a prior subarachnoid hemorrhage. There is a small focus of hemosiderin posterior right midline medulla. Pituitary and craniocervical junction show nothing unusual. There is left frontal sinusitis change. There is involutional change.  - [facility] requested from [facility] and [facility]. Did not include brain images or neurology notes.  - Neurology consulted: differential includes but is not limited to CADASIL (given h/o strokes, extensive white matter involvement including the temporal lobes), adult onset adrenoleukodystrophy, chronic microvascular changes (less likely), vanishing white matter disease (less likely given predominance in children), or Binswanger disease (subcortical leukoencephalopathy or vascular dementia 2/2 chronic HTN).   - NOTCH3 genetic testing sent, results pending        Derm   Unstageable pressure ulcer of left buttock    - multiple area.  - wound care per nursing.        Pulmonary   * Acute respiratory failure with hypoxia    - Intubated  - has large amount  secretion, as well as neurological disease, inability to follow commands  - glycopyrrolate dc, may be contributing to emesis  Changed to scopalamin, however continues to require oral suction        Aspiration pneumonia    Extensive consolidation of the right lung and debris in the right mainstem bronchus and its branches consistent with aspiration pneumonia.  - S/p course of rocephin; s/p vanco & cefepime & flagyl   - Not on any antibiotics currently  - Intubated        Cardiac/Vascular   Paroxysmal atrial fibrillation    - Pt home BB held due to hypotension           GI   Partial small bowel obstruction    - KUB in 02/14 showed distended stomach and partial SOB.  - CT abd pelvis w contrast showed no acute intra-abdominal abnormality.  - Emesis happens on/off, no residual. respond to antemetic and feed holding.  No more emesis noted and pt has multiple loose stool          PEG (percutaneous endoscopic gastrostomy) status    .        Orthopedic   Pressure injury of deep tissue    - multiple areas.  - wound care per nursing.        Other   Palliative care encounter    - partial code  - Palliative care following.  - Plan for terminal extubation 2/26/18, however family not ready for withdrawal of care, ongoing discussions with family but agreed to not escalate care at this time  - palliative following, appreciate assistance. Discussion held between Dr. De La Torre of palliative and patients daughter 3/9. Plan for withdrawal of life support  On 3/13         Goals of care, counseling/discussion    - Palliative consulted.   - terminal extubation initially planned for 2/26/2018 discontinued due to family no longer wanting this  - plan for further goals of care discussion  - Family no-showed for 2/27 & 3/1 Pall Care meeting.   -family meeting on 3/5 with  Staff and fellow - discussed poor prognosis, family agreed to not escalate care.  - plan was to establish goals on 3/9, however family not present. Dr. De La Torre from Palliative  care has reached out to family via phone and current plan is for terminal extubation on 3/13           Critical Care Daily Checklist:    A: Awake: RASS Goal/Actual Goal: RASS Goal: 0-->alert and calm  Actual: Krishnamurthy Agitation Sedation Scale (RASS): Alert and calm   B: Spontaneous Breathing Trial Performed? Spon. Breathing Trial Initiated?: Not initiated (no plans for SBT at this time) (03/04/18 3336)   C: SAT & SBT Coordinated?  NA                      D: Delirium: CAM-ICU Overall CAM-ICU: Negative   E: Early Mobility Performed? No   F: Feeding Goal: Goals: Meet % EEN, EPN  Status: Nutrition Goal Status: goal not met   Current Diet Order   Procedures    Diet NPO      AS: Analgesia/Sedation NA   T: Thromboembolic Prophylaxis heparin   H: HOB > 300 Yes   U: Stress Ulcer Prophylaxis (if needed) famotidine   G: Glucose Control D5 infusion   B: Bowel Function Stool Occurrence: 1   I: Indwelling Catheter (Lines & De Leon) Necessity Flexaseal    D: De-escalation of Antimicrobials/Pharmacotherapies Na    Plan for the day/ETD Discussion w family regarding goal of care    Code Status:  Family/Goals of Care: Partial Code          Sanjana Johnson MD  Critical Care Medicine  [facility]

## 2018-03-12 NOTE — NURSING
[facility] team notified that patient temperature was 101.0, Blood and urine cultures ordered. Per order stright cath was attempted but patient began to de-stat, heart rate increased to 140 sustained and patient looked distressed. Was not able to get urine culture. Attempted to get blood cultures and was only able to get one set per lab. Patient currently resting HR 98, blood pressure and stat normal, will continue to monitor.

## 2018-03-12 NOTE — ASSESSMENT & PLAN NOTE
- KUB in 02/14 showed distended stomach and partial SOB.  - CT abd pelvis w contrast showed no acute intra-abdominal abnormality.  - Emesis happens on/off, no residual. respond to antemetic and feed holding.  No more emesis noted and pt has multiple loose stool

## 2018-03-12 NOTE — PLAN OF CARE
Problem: Patient Care Overview  Goal: Plan of Care Review  Outcome: Ongoing (interventions implemented as appropriate)  No acute events within the shift. VSS. Remains intubated hooked to mech vent. Water boluses via PEG. Complete bath done. Remains on D5W 50cc/hr . Latest 6 am BG-107 mg/dl.  Plan of care reviewed with pt. Will continue to monitor.

## 2018-03-12 NOTE — SUBJECTIVE & OBJECTIVE
Interval History/Significant Events: katy PRITCHETT has multiple BM, DC'd stool softners    Review of Systems   Unable to perform ROS: Intubated     Objective:     Vital Signs (Most Recent):  Temp: 100 °F (37.8 °C) (03/12/18 1115)  Pulse: 105 (03/12/18 1400)  Resp: 19 (03/12/18 1400)  BP: 103/63 (03/12/18 1400)  SpO2: (!) 91 % (03/12/18 1400) Vital Signs (24h Range):  Temp:  [98.1 °F (36.7 °C)-100 °F (37.8 °C)] 100 °F (37.8 °C)  Pulse:  [] 105  Resp:  [0-32] 19  SpO2:  [70 %-100 %] 91 %  BP: ()/(59-82) 103/63   Weight: 81.2 kg (179 lb 0.2 oz)  Body mass index is 27.22 kg/m².      Intake/Output Summary (Last 24 hours) at 03/12/18 1436  Last data filed at 03/12/18 1400   Gross per 24 hour   Intake             1650 ml   Output                0 ml   Net             1650 ml       Physical Exam   Constitutional: No distress.   HENT:   Head: Normocephalic.   Mouth/Throat: No oropharyngeal exudate.   intuabted   Eyes: Right eye exhibits no discharge. Left eye exhibits no discharge. No scleral icterus.   Neck: Neck supple. No tracheal deviation present.   Cardiovascular: Normal rate and regular rhythm.  Exam reveals no gallop and no friction rub.    Pulmonary/Chest: Effort normal and breath sounds normal. No respiratory distress. She has no wheezes.   Abdominal: Soft. Bowel sounds are normal. She exhibits no distension. There is no tenderness. There is no rebound and no guarding.   Musculoskeletal: She exhibits deformity. She exhibits no edema.   contractures   Neurological:   Opens eye spontanously and unclear whether she is able to track with her eyes otherwise does not follow commands   Skin: Skin is warm and dry. She is not diaphoretic. No erythema.   decu ulcer       Vents:  Vent Mode: A/C (03/12/18 0954)  Ventilator Initiated: Yes (02/04/18 1094)  Set Rate: 8 bmp (03/12/18 0954)  Vt Set: 400 mL (03/12/18 0954)  Pressure Support: 0 cmH20 (03/12/18 0954)  PEEP/CPAP: 5 cmH20 (03/12/18 0954)  Oxygen Concentration  (%): 28 (03/12/18 1400)  Peak Airway Pressure: 44 cmH2O (03/12/18 0954)  Plateau Pressure: 17 cmH20 (03/12/18 0954)  Total Ve: 7.3 mL (03/12/18 0954)  F/VT Ratio<105 (RSBI): (!) 33.1 (03/12/18 0954)  Lines/Drains/Airways     Drain                 Gastrostomy/Enterostomy 10/25/16 1401 Percutaneous endoscopic gastrostomy (PEG) midline feeding 503 days          Airway                 Airway - Non-Surgical 02/04/18 2329 Endotracheal Tube 35 days          Pressure Ulcer                 Pressure Injury 02/05/18 1045 Left medial Foot Deep tissue injury 35 days         Pressure Injury 02/05/18 1045 Right lateral Foot Deep tissue injury 35 days          Peripheral Intravenous Line                 Peripheral IV - Single Lumen 02/27/18 1753 Left;Posterior Forearm 12 days         Midline Catheter Insertion/Assessment  - Single Lumen 03/09/18 1732 basilic vein (medial side of arm) 18g x 10cm 2 days              Significant Labs:    CBC/Anemia Profile:  No results for input(s): WBC, HGB, HCT, PLT, MCV, RDW, IRON, FERRITIN, RETIC, FOLATE, [id number], OCCULTBLOOD in the last 48 hours.     Chemistries:    Recent Labs  Lab 03/12/18  0808      K 2.4*      CO2 24   BUN 2*   CREATININE 0.6   CALCIUM 8.2*       Recent Lab Results       03/12/18  0808 03/12/18  0804 03/12/18  0602 03/11/18  2158 03/11/18  1800      Anion Gap 9         BUN, Bld 2(L)         Calcium 8.2(L)         Chloride 105         CO2 24         Creatinine 0.6         eGFR if  >60.0         eGFR if non  >60.0  Comment:  Calculation used to obtain the estimated glomerular filtration  rate (eGFR) is the CKD-EPI equation.            Glucose 110         POCT Glucose  113(H) 107 100 98     Potassium 2.4  Comment:  *Critical value -  Results called to and read back by:JEANNIE REDMAN RN  ()         Sodium 138                         Significant Imaging:  I have reviewed all pertinent imaging results/findings within the past 24  hours.

## 2018-03-12 NOTE — PLAN OF CARE
Palliative Care Social Work   Assessment  Name: Jonathan Nieves  MRN: [id number]  Date of Birth/Age:  1965  Sex: female  Ethnicity:     Primary Language:English   Needed: no    Attending Physician: Dr. Velez  Reason for Referral: assistance with clarification of goals of care  Consult Order Date: 2/14/18 0948  Primary CM/SW: Lisa Landers RN    Palliative Care Provider: Dr. De La Torre/CRISTOFER Farmer    Present during Interview: SW spoke with pt's dtr Catrine by phone ([phone])    Past & Current Medical Situation:   Diagnosis: Acute respiratory failure with hypoxia  PMH:   Past Medical History:   Diagnosis Date    Anemia     iron def    Depression     Dysarthria     HTN (hypertension)     Hyperlipemia     Osteomyelitis     Seizures     Stroke      Mental Health/Substance Use History: Depression  Non-traditional Health practices:     Understanding of diagnosis and prognosis: Unable to determine.  Experience/Comfort level with health care system:    Patients Mental Status: Not responsive.     Socio-Economic Factors/Resources:  Address: [address] [place] [address]  Phone Number: [phone] (home)     Marital Status:   Household Composition: Lived at [facility] since [facility].  Children: 6 children: 1 son and 5 daughters  Relationships with Family: Dtr Catrine stated that pt's son lives out of town but his other children live nearby. Dtr Catrine stated that pt has 14 grandchildren and no great grandchildren.    Pt's mother Kiana has Alzoehimer's.     Emergency Contacts:   Dtr: Matias Nieves: [phone]  Dtr: Gita Contreras: [phone]; [phone]  Dtr: Tiffani Nieves: [phone]  Mother: Kiana Fine: [phone]      Activities of Daily Living: Nees Assistance with ADLs. Wheelchair bound. 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    History: no    Financial Resources:Medicaid      Advanced Care Planning & Legal Concerns:   Advanced Directives/Living Will: no LAPOST in chart- Full Code.    Planning:  yes Services have been set up through [employer]. Per Dtr Catrine, they have agreed to "work with her" financially.     Power of : no  Surrogate Decision Maker: Children make decisions together.       Spirituality, Culture & Coping Mechanisms:  F- Vielka and Belief: Taoism     I - Importance: Strong Vielka    C - Community/Culture Values:     A - Address in Care: Followed by Spiritual Care      Strengths/Coping Strategies: Strong Vielka  Self-Care Activities/Hobbies: Enjoyed watching Soap operas on [employer] like Young and The Restless.     Goals/Hopes/Expectations: Unable to determine  Fears/Anxiety/Concerns: Family was concerned about paying for the  expenses.        Preferences about EOL Environment: (own bed, family nearby, pets, music, etc)  Not discussed.     Complicated Bereavement Risk Assessment Tool (CBRAT)  Reference:  [facility] Clinical Practice Group (May 2016). Bereavement Risk Screening and Management Guidelines.  Retrieved from: http://www.grpcc.com.au/wp-content/uploads//BYQST-Earwybkmgjf-Jqgiuxumw-and-Management-Guideline-2016.pdf      Client Characteristics (Bereaved Client)  ? Under 18      no  ? Was a Twin   no  ? Young Spouse   no  ? Elderly Spouse    no  ? Isolated    no  ? Lacks Meaningful Social Support   no  ? Dissatisfied with help available during illness   yes  ? New to Financial [place] no  ? New to Decision-Making   no   History of Loss (Bereaved Client)  ? Cumulative Multiple Losses   no  ? Previous Mental Health Illnesses   no  ? Current Mental Health Illness   no  ? Other Significant Health Issues   no   ? Migrant/Refugee  " " no    Illness  ? Inherited Disorder   no  ? Stigmatized Disease in the   no  ?  Family/Community   yes  ? Lengthy/Burdensome   yes Relationship with   ? Profound Lifelong Partner   no  ? Highly Dependent    no  ? Antagonistic   no  ? Ambivalent   yes  ? Deeply Connected   no  ? Culturally Defined   no   Death  ? Sudden or Unexpected   no  ? Traumatic Circumstances Associated with Death   no  ? Significant Cultural/Social Burdens as a result of Death   no   Risk Factors Scores  0-2  Low  3-5  Moderate  5+  High  All persons scoring moderate to high presume to be at risk**    (** It is acknowledged that protective factors and resilience may outweigh apparent risk factors.      Total Risk Factors Score:   Moderate      Dtr would benefit from continued support and bereavement care. Moderate to High risk due to the family's continual refusal to meet with teams regarding pt's plan of care.       Discharge Planning Needs/Plan of Care:       PATRICK made follow up phone call to pt's dtr Matias this afternnon. PATRICK had spoken with pt's dtr on Friday to provide  resources to her. Dtr stated that they have set up  services through [employer].    Attempted to begin establishing rapport to Dtr.    Dtr stated that the family is coming to the hospital tomorrow but would NOT give a time that they will be here. Dtr stated that she felt it would be "After Work". PATRICK encouraged dtr to visit as pt's plan of care will need to be determined to continue to keep her comfortable.     Emotional Support provided to dtr.      PATRICK spoke with Dr. Johnson, [facility] resident by phone after speaking with pt's dtr. PATRICK explained that Dtr Matias stated that she would be coming tomorrow (3/13) but would not give SW a time for the family's arrival. PATRICK informed MD that Dtr only stated that it would be "After Work."      Recommendation to [facility] team to have conversation over the phone if family does not visit tomorrow as " promised.     Will speak with Palliative Care Provider as appropriate.          Alysha Enriquez LCSW, ACHP-SW

## 2018-03-12 NOTE — ASSESSMENT & PLAN NOTE
- partial code  - Palliative care following.  - Plan for terminal extubation 2/26/18, however family not ready for withdrawal of care, ongoing discussions with family but agreed to not escalate care at this time  - palliative following, appreciate assistance. Discussion held between Dr. De La Torre of palliative and patients daughter 3/9. Plan for withdrawal of life support  On 3/13

## 2018-03-12 NOTE — PLAN OF CARE
Patient remains intubated.  Palliative care following to assist with GOC.  Plan for family meeting on 3/13/18, possible palliative extubation.  CM will continue to follow.     03/12/18 7158   Right Care Assessment   Can the patient answer the patient profile reliably? No, cognitively impaired;No historian available   How often would a person be available to care for the patient? Often  (facility resident)   Describe the patient's ability to walk at the present time. Does not walk or unable to take any steps at all   How does the patient rate their overall health at the present time? Poor   Number of comorbid conditions (as recorded on the chart) Five or more   During the past month, has the patient often been bothered by feeling down, depressed or hopeless? (JONH)   During the past month, has the patient often been bothered by little interest or pleasure in doing things? (JONH)   Lisa Landers RN, BSN  Case Management  [facility]  Ext. [phone]

## 2018-03-12 NOTE — ASSESSMENT & PLAN NOTE
- Intubated  - has large amount secretion, as well as neurological disease, inability to follow commands  - glycopyrrolate dc, may be contributing to emesis  Changed to scopalamin, however continues to require oral suction

## 2018-03-13 NOTE — ASSESSMENT & PLAN NOTE
- S/P PEG tube.  - patient not tolerating feeds 2/2 emesis with trickle feeds.  - tube feeds stopped after multiple attempts on different days to run trickle feeds but -always has emesis however pt was on glycopurlate  -Has dc'd glyco and started bolus feeding which pt is reported to tolerate

## 2018-03-13 NOTE — SUBJECTIVE & OBJECTIVE
Interval History/Significant Events:  No acute events overnight.  Unable to get second set of BCx 2/2 to contractures causing difficulty with access.  AM lab draw held.     Review of Systems   Unable to perform ROS: Intubated     Objective:     Vital Signs (Most Recent):  Temp: 99.4 °F (37.4 °C) (03/13/18 0300)  Pulse: 77 (03/13/18 0600)  Resp: 17 (03/13/18 0600)  BP: 108/81 (03/13/18 0600)  SpO2: 99 % (03/13/18 0600) Vital Signs (24h Range):  Temp:  [98.2 °F (36.8 °C)-101 °F (38.3 °C)] 99.4 °F (37.4 °C)  Pulse:  [] 77  Resp:  [12-33] 17  SpO2:  [70 %-100 %] 99 %  BP: ()/() 108/81   Weight: 81.2 kg (179 lb 0.2 oz)  Body mass index is 27.22 kg/m².      Intake/Output Summary (Last 24 hours) at 03/13/18 0621  Last data filed at 03/13/18 0600   Gross per 24 hour   Intake          4478.33 ml   Output              190 ml   Net          4288.33 ml     Physical Exam   Constitutional: No distress. She is intubated.   Cardiovascular: Normal rate, regular rhythm and intact distal pulses.  Exam reveals no gallop and no friction rub.    No murmur heard.  Pulmonary/Chest: She is intubated. No respiratory distress. She has no decreased breath sounds. She has no wheezes. She has no rhonchi. She has rales (Mild BL Lower lung field rales).   Exam limited by patient's ability to cooperate   Abdominal: Soft. Bowel sounds are normal. She exhibits no distension. There is no tenderness (Difficult to assess given patient's non-verbal status).   Neurological: She is alert.   E4VTM3  Sluggish pupillary response to light     Vents:  Vent Mode: A/C (03/13/18 0513)  Ventilator Initiated: Yes (02/04/18 2344)  Set Rate: 8 bmp (03/13/18 0513)  Vt Set: 400 mL (03/13/18 0513)  Pressure Support: 0 cmH20 (03/13/18 0513)  PEEP/CPAP: 5 cmH20 (03/13/18 0513)  Oxygen Concentration (%): 28 (03/13/18 0600)  Peak Airway Pressure: 27 cmH2O (03/13/18 0513)  Plateau Pressure: 17 cmH20 (03/13/18 0513)  Total Ve: 4.53 mL (03/13/18 0513)  F/VT  Ratio<105 (RSBI): (!) 36.95 (03/13/18 0513)  Lines/Drains/Airways     Drain                 Gastrostomy/Enterostomy 10/25/16 1401 Percutaneous endoscopic gastrostomy (PEG) midline feeding 503 days          Airway                 Airway - Non-Surgical 02/04/18 2329 Endotracheal Tube 36 days          Pressure Ulcer                 Pressure Injury 02/05/18 1045 Left medial Foot Deep tissue injury 35 days         Pressure Injury 02/05/18 1045 Right lateral Foot Deep tissue injury 35 days          Peripheral Intravenous Line                 Peripheral IV - Single Lumen 02/27/18 1753 Left;Posterior Forearm 13 days         Midline Catheter Insertion/Assessment  - Single Lumen 03/09/18 1732 basilic vein (medial side of arm) 18g x 10cm 3 days              Significant Labs:  None    Significant Imaging:  CXR yesterday w/ improved aeration compared to 3/9 CXR

## 2018-03-13 NOTE — PLAN OF CARE
Problem: Patient Care Overview  Goal: Plan of Care Review  Outcome: Ongoing (interventions implemented as appropriate)  ABCDEF Bundle. Patient intubated and alert Tidal volume 4, PEEP 5, and assist control 26.. Patient remain free from fall in injury throughout the shift. No Family at bedside. Bolus  Feeding done. Patient adequately voided per hour. 4 BM this shift.  Team notified.Side rails up times 2, call light in reach, will continue to monitor.

## 2018-03-13 NOTE — CARE UPDATE
Discussed pt's prognosis and complications with daughter Matias Nieves. Discussed that our focus at this time is patient comfort and no escalation of treatment. Daughter agreed with this and stated that the family had agreed on that during prior discussions as well. She states that she and her sister will come by the hospital today at 6:30 PM.    Varsha Mendez, PGY2  Critical Care Medicine

## 2018-03-13 NOTE — ASSESSMENT & PLAN NOTE
-Intubated for airway protection 2/2 to inability to protect airway  -Has large amount secretion, as well as neurological disease, inability to follow commands  -glycopyrrolate dc, may be contributing to emesis  -Changed to scopalamin, however continues to require oral suction

## 2018-03-13 NOTE — PROGRESS NOTES
[facility]  Critical Care Medicine  Progress Note    Patient Name: Jonathan Nieves  MRN: [id number]  Admission Date: 2/4/2018  Hospital Length of Stay: 36 days  Code Status: Partial Code  Attending Provider: Jeronimo Velez MD  Primary Care Provider: Primary Doctor No   Principal Problem: Acute respiratory failure with hypoxia    Subjective:     Hospital/ICU Course:  2/5: Critical Care Consulted. Patient intubated in the ED.   2/6: Patient doing well, on SBT; will extubate maria e. Continue treatment for aspiration PNA & UTI.   2/7: abx deescalated, continue to treat for PNA and UTI. Required pressors overnight. Off this morning.   2/8-9: still with secretions and questionable mentation? Baseline?. Will try to dry the secretions and extubated today or maria e.    2/10: MRI completed. Family called, had family meeting with one daughter. Need to re-visit with all daughters. Records requested from [facility] and [facility].   02/11-13: neurology consulted, no acute changes, wbc and LFT trending up, keep monitoring.  2/14: Concerns for CADASIL vs Binswanger syndrome per Neuro. NOTCH3 genetic testing sent. Palliative consulted regarding goals of care.   02/15 pt is not tolerating tube feed, had two episodes of projectile vomiting, KUB 02/14 shows partial SOB and distended stomach.  02/16 CT abdomen was unremarkable, family meeting was done.  02/17 no more vomiting, tolerating 10 cc/hr tube feeds.  02/19 trial to advance tube feeding, had residual back to trickle feed will try to advance slowly. 02/19 PT became hypotensive this morning with MAPs of 50's started on levo, daughter was contacted regarding goals of care, pt status was changed to partial code upon family request.  02/20 Pt still requiring levo, try to wean off slowly, family did not show for the meeting.  02/21 family meeting was done yesterday, no acclation of care per daughter wishes.   02/25 Pt lost IV access,still unable to wean off the went,  neurological status unchanged she is alert but not following commands, fails her SBT every morning.  2/27: Failed SBT. Family did not show up for Palliative Care meeting.  2/28: NAEON.   3/1: family no showed for meeting  3/2: ethics committee consult by palliative  3/3: spoke to daughter zainab, informed she was awaiting her sister to pick her up and they are coming to hospital. She no showed again  3/4: morphine drip started as patient appeared to be in pain, stopped when patient became hypotensive.  3/5: will attempt to contact family for another meeting today  3/6: Family meeting held yesterday with staff and fellow, discussed poor prognosis of pt. Family in agreement to not escalate care; however, not ready for withdrawal of care. Will revist with family meeting possibly tomorrow.   3/7: Pt was restarted on TF, however did not tolerate.   3/9: hypoglycemic overnight, started on D5 gtt. Palliative had discussion with patient's daughter, zainab. Plan is for terminal extubation on 3/13.   3/12: remains on D5 increased the rate from 50 to 100 for threshold of -180, failed SBT today. plan for family tmw regarding withdrawal of life support  3/13: No acute events overnight.  Plan for extubation to NIPPV vs palliative sedation later on today.    Interval History/Significant Events:  No acute events overnight.  Unable to get second set of BCx 2/2 to contractures causing difficulty with access.  AM lab draw held.     Review of Systems   Unable to perform ROS: Intubated     Objective:     Vital Signs (Most Recent):  Temp: 99.4 °F (37.4 °C) (03/13/18 0300)  Pulse: 77 (03/13/18 0600)  Resp: 17 (03/13/18 0600)  BP: 108/81 (03/13/18 0600)  SpO2: 99 % (03/13/18 0600) Vital Signs (24h Range):  Temp:  [98.2 °F (36.8 °C)-101 °F (38.3 °C)] 99.4 °F (37.4 °C)  Pulse:  [] 77  Resp:  [12-33] 17  SpO2:  [70 %-100 %] 99 %  BP: ()/() 108/81   Weight: 81.2 kg (179 lb 0.2 oz)  Body mass index is 27.22  kg/m².      Intake/Output Summary (Last 24 hours) at 03/13/18 0621  Last data filed at 03/13/18 0600   Gross per 24 hour   Intake          4478.33 ml   Output              190 ml   Net          4288.33 ml     Physical Exam   Constitutional: No distress. She is intubated.   Cardiovascular: Normal rate, regular rhythm and intact distal pulses.  Exam reveals no gallop and no friction rub.    No murmur heard.  Pulmonary/Chest: She is intubated. No respiratory distress. She has no decreased breath sounds. She has no wheezes. She has no rhonchi. She has rales (Mild BL Lower lung field rales).   Exam limited by patient's ability to cooperate   Abdominal: Soft. Bowel sounds are normal. She exhibits no distension. There is no tenderness (Difficult to assess given patient's non-verbal status).   Neurological: She is alert.   E4VTM3  Sluggish pupillary response to light     Vents:  Vent Mode: A/C (03/13/18 0513)  Ventilator Initiated: Yes (02/04/18 2344)  Set Rate: 8 bmp (03/13/18 0513)  Vt Set: 400 mL (03/13/18 0513)  Pressure Support: 0 cmH20 (03/13/18 0513)  PEEP/CPAP: 5 cmH20 (03/13/18 0513)  Oxygen Concentration (%): 28 (03/13/18 0600)  Peak Airway Pressure: 27 cmH2O (03/13/18 0513)  Plateau Pressure: 17 cmH20 (03/13/18 0513)  Total Ve: 4.53 mL (03/13/18 0513)  F/VT Ratio<105 (RSBI): (!) 36.95 (03/13/18 0513)  Lines/Drains/Airways     Drain                 Gastrostomy/Enterostomy 10/25/16 1401 Percutaneous endoscopic gastrostomy (PEG) midline feeding 503 days          Airway                 Airway - Non-Surgical 02/04/18 2329 Endotracheal Tube 36 days          Pressure Ulcer                 Pressure Injury 02/05/18 1045 Left medial Foot Deep tissue injury 35 days         Pressure Injury 02/05/18 1045 Right lateral Foot Deep tissue injury 35 days          Peripheral Intravenous Line                 Peripheral IV - Single Lumen 02/27/18 1753 Left;Posterior Forearm 13 days         Midline Catheter Insertion/Assessment  -  Single Lumen 03/09/18 1732 basilic vein (medial side of arm) 18g x 10cm 3 days              Significant Labs:  None    Significant Imaging:  CXR yesterday w/ improved aeration compared to 3/9 CXR    Assessment/Plan:     Neuro   Seizure-like activity    -Was on keppra, dc 3/4  -Stable         Dysphagia as late effect of cerebrovascular disease    - S/P PEG tube.  - patient not tolerating feeds 2/2 emesis with trickle feeds.  - tube feeds stopped after multiple attempts on different days to run trickle feeds but -always has emesis however pt was on glycopurlate  -Has dc'd glyco and started bolus feeding which pt is reported to tolerate        CVA, old, hemiparesis    -patient suffered multiple strokes in past, last one in 2008   -nonverbal at baseline, alert, makes eye contact but doesn't follow commands.    MRI (2/10)  Findings: The diffusion sequence demonstrates no evidence of acute infarct. There is severe white matter small vessel ischemic change. There is a remote infarct of the right occipital lobe. There is a mild hemosiderin staining of the brainstem probably from a prior subarachnoid hemorrhage. There is a small focus of hemosiderin posterior right midline medulla. Pituitary and craniocervical junction show nothing unusual. There is left frontal sinusitis change. There is involutional change.  - Records requested from [facility] and [facility]. Did not include brain images or neurology notes.  - Neurology consulted: differential includes but is not limited to CADASIL (given h/o strokes, extensive white matter involvement including the temporal lobes), adult onset adrenoleukodystrophy, chronic microvascular changes (less likely), vanishing white matter disease (less likely given predominance in children), or Binswanger disease (subcortical leukoencephalopathy or vascular dementia 2/2 chronic HTN).   - NOTCH3 genetic testing sent, results pending        Derm   Unstageable pressure ulcer of left buttock    -Multiple  area.  -Wound care per nursing.        Pulmonary   * Acute respiratory failure with hypoxia    -Intubated for airway protection 2/2 to inability to protect airway  -Has large amount secretion, as well as neurological disease, inability to follow commands  -glycopyrrolate dc, may be contributing to emesis  -Changed to scopalamin, however continues to require oral suction        Aspiration pneumonia    Extensive consolidation of the right lung and debris in the right mainstem bronchus and its branches consistent with aspiration pneumonia on admission that has significantly improved on CXR yesterday afternoon.  - S/p course of rocephin; s/p vanco & cefepime & flagyl   -Vanc & Zosyn restarted yesterday 2/2 to Tmax 101.  (ABX day 1)  - Intubated        Cardiac/Vascular   Paroxysmal atrial fibrillation    - Pt home BB held due to hypotension        GI   Partial small bowel obstruction    - KUB in 02/14 showed distended stomach and partial SOB.  - CT abd pelvis w contrast showed no acute intra-abdominal abnormality.  - Emesis happens on/off, no residual w/ adequate respond to antemetic and feed holding.  - No more emesis noted and pt has multiple loose stool        PEG (percutaneous endoscopic gastrostomy) status    .        Orthopedic   Pressure injury of deep tissue    - multiple areas.  - wound care per nursing.        Other   Palliative care encounter    - partial code  - Palliative care following.  - Plan for terminal extubation 2/26/18, however family not ready for withdrawal of care, ongoing discussions with family but agreed to not escalate care at this time  - palliative following, appreciate assistance. Discussion held between Dr. De La Torre of palliative and patients daughter 3/9. Plan for withdrawal of life support  On 3/13         Goals of care, counseling/discussion    - Palliative consulted, assistance appreciated  - terminal extubation initially planned for 2/26/2018 discontinued due to family no longer  wanting this  - plan for further goals of care discussion  - Family no-showed for 2/27 & 3/1 Pall Care meeting.   -family meeting on 3/5 with  Staff and fellow - discussed poor prognosis, family agreed to not escalate care.  -Plan for extubation to NIPPV v palliative sedation this afternoon           Critical Care Daily Checklist:    A: Awake: RASS Goal/Actual Goal: RASS Goal: 0-->alert and calm  Actual: Krishnamurthy Agitation Sedation Scale (RASS): Alert and calm   B: Spontaneous Breathing Trial Performed? Spon. Breathing Trial Initiated?: Not initiated (no plans for SBT at this time) (03/04/18 5706)   C: SAT & SBT Coordinated?  NA                      D: Delirium: CAM-ICU Overall CAM-ICU: Negative   E: Early Mobility Performed? Yes   F: Feeding Goal: Goals: Meet % EEN, EPN  Status: Nutrition Goal Status: goal not met   Current Diet Order   Procedures    Diet NPO      AS: Analgesia/Sedation Fentanyl 50 mcg qhour PRN to RASS 0   T: Thromboembolic Prophylaxis Heparin 5000 U TID   H: HOB > 300 No   U: Stress Ulcer Prophylaxis (if needed) Famotidine 20 mg BID   G: Glucose Control NA   B: Bowel Function Stool Occurrence: 4   I: Indwelling Catheter (Lines & [place]) Necessity As above   D: De-escalation of Antimicrobials/Pharmacotherapies Continue pending repeat BCx w/ NGDT x 48h    Plan for the day/ETD As above    Code Status:  Family/Goals of Care: Partial Code         Critical secondary to Patient has a condition that poses threat to life and bodily function: Severe Respiratory Distress      Critical care was time spent personally by me on the following activities: development of treatment plan with patient or surrogate and bedside caregivers, discussions with consultants, evaluation of patient's response to treatment, examination of patient, ordering and performing treatments and interventions, ordering and review of laboratory studies, ordering and review of radiographic studies, pulse oximetry, re-evaluation of  patient's condition. This critical care time did not overlap with that of any other provider or involve time for any procedures.     Dwight Francois MD  Critical Care Medicine  [facility]

## 2018-03-13 NOTE — ASSESSMENT & PLAN NOTE
Extensive consolidation of the right lung and debris in the right mainstem bronchus and its branches consistent with aspiration pneumonia on admission that has significantly improved on CXR yesterday afternoon.  - S/p course of rocephin; s/p vanco & cefepime & flagyl   -Vanc & Zosyn restarted yesterday 2/2 to Tmax 101.  (ABX day 1)  - Intubated

## 2018-03-13 NOTE — PLAN OF CARE
Problem: Patient Care Overview  Goal: Plan of Care Review  Outcome: Ongoing (interventions implemented as appropriate)  No acute events noted within the shift. VSS stable. Remains intubated to [facility] vent on A/C 400-8-28%+5 PF-60.  Team made aware that we still have pending urine culture and 1  Peripheral blood culture, Dr. Francois said no cultures anymore and canceled order. Remains on D5W 100ml/hr. CBG-119mgdl. Afebrile. Antibiotics given. Remains on PEG, purewick and flexi. For possible extubation today. Will continue to monitor.

## 2018-03-13 NOTE — ASSESSMENT & PLAN NOTE
-patient suffered multiple strokes in past, last one in 2008   -nonverbal at baseline, alert, makes eye contact but doesn't follow commands.    MRI (2/10)  Findings: The diffusion sequence demonstrates no evidence of acute infarct. There is severe white matter small vessel ischemic change. There is a remote infarct of the right occipital lobe. There is a mild hemosiderin staining of the brainstem probably from a prior subarachnoid hemorrhage. There is a small focus of hemosiderin posterior right midline medulla. Pituitary and craniocervical junction show nothing unusual. There is left frontal sinusitis change. There is involutional change.  - Records requested from [facility] and [facility]. Did not include brain images or neurology notes.  - Neurology consulted: differential includes but is not limited to CADASIL (given h/o strokes, extensive white matter involvement including the temporal lobes), adult onset adrenoleukodystrophy, chronic microvascular changes (less likely), vanishing white matter disease (less likely given predominance in children), or Binswanger disease (subcortical leukoencephalopathy or vascular dementia 2/2 chronic HTN).   - NOTCH3 genetic testing sent, results pending

## 2018-03-13 NOTE — ASSESSMENT & PLAN NOTE
- KUB in 02/14 showed distended stomach and partial SOB.  - CT abd pelvis w contrast showed no acute intra-abdominal abnormality.  - Emesis happens on/off, no residual w/ adequate respond to antemetic and feed holding.  - No more emesis noted and pt has multiple loose stool

## 2018-03-13 NOTE — PLAN OF CARE
Problem: Patient Care Overview  Goal: Plan of Care Review  Outcome: Ongoing (interventions implemented as appropriate)  Patient intubated and alert Tidal volume 400, PEEP 5, and assist control 26.. Patient remain free from fall in injury throughout the shift. No Family at bedside. Bolus Feeding done. Patient adequately voided per hour. 4 BM this shift.  Team notified.Side rails up times 2, call light in reach, will continue to monitor.

## 2018-03-13 NOTE — ASSESSMENT & PLAN NOTE
- Palliative consulted, assistance appreciated  - terminal extubation initially planned for 2/26/2018 discontinued due to family no longer wanting this  - plan for further goals of care discussion  - Family no-showed for 2/27 & 3/1 \[facility] [place] [facility]\"" Care meeting.   -family meeting on 3/5 with  Staff and fellow - discussed poor prognosis, family agreed to not escalate care.  -Plan for extubation to NIPPV v palliative sedation this afternoon

## 2018-03-14 NOTE — PLAN OF CARE
"Problem: Spiritual Distress, Risk/Actual (Adult,Obstetrics,Pediatric)  Intervention: Facilitate Personal Exploration/Expression of Spirituality  F - Vielka and Belief: Pt's daughter expressed pt was "a believer in God."    I - Importance: Daughter had no spiritual needs expressed.     C - Community: Spoke with daughter, Matias Nieves, over phone.     A - Address in Care: Called pt's daughter, Matias. Introduced and offered pastoral support. Matias expressed to  the family is supposed to be coming to the hospital, today.  will continue to follow.           "

## 2018-03-14 NOTE — PLAN OF CARE
Problem: Patient Care Overview  Goal: Plan of Care Review  Outcome: Ongoing (interventions implemented as appropriate)  No acute events throughout day. See vital signs and assessments in flowsheets. Fentanyl gtt started today for comfort, D5W gtt cont. Family decision made to extubate to BiPAP tomorrow. NSR 60s, -120s. Vent AC 28% FiO2, 5 PEEP, sats 100%. 1 small BM today, purewick output 550cc w/ large amount unmeasured urine in pad. Pt able to track w/ eyes, does not follow commands. Will continue to monitor.

## 2018-03-14 NOTE — ASSESSMENT & PLAN NOTE
Extensive consolidation of the right lung and debris in the right mainstem bronchus and its branches consistent with aspiration pneumonia on admission that has significantly improved on CXR yesterday afternoon.  - S/p course of rocephin; s/p vanco & cefepime & flagyl   -Vanc & Zosyn discontinued yesterday  - Intubated

## 2018-03-14 NOTE — PLAN OF CARE
Long discussion with all four of patients daughters about her hospital course and ultimately inability to get off ventilator + Neurological decline.     They all agree, that patient would not want a tracheostomy and that they do not want her to suffer. Given no progress, they begrudgingly understand that withdrawal of care would be patients preference.     They have one other brother who lives in [place]. They would prefer to contact him and give him the opportunity to visit with her one last time. Plan is for them to attempt to contact brother overnight and they return again tomorrow. IF unable to reach brother - they feel withdrawal of care would be next step.     Anat Rivera  Pulmonary Critical Care fellow.   [facility].   Cell:[phone]

## 2018-03-14 NOTE — SUBJECTIVE & OBJECTIVE
Interval History/Significant Events:  Family presented to bedside yesterday but deferred extubation until they attempted to contact their estranged brother in [place] via Facebook message.  If they do not hear back from him by the end of today, they will move forward with extubation as was originally planned for yesterday.    Review of Systems   Unable to perform ROS: Intubated     Objective:     Vital Signs (Most Recent):  Temp: 99 °F (37.2 °C) (03/14/18 0300)  Pulse: 72 (03/14/18 0727)  Resp: 15 (03/14/18 0600)  BP: 107/71 (03/14/18 0600)  SpO2: 100 % (03/14/18 0600) Vital Signs (24h Range):  Temp:  [98.7 °F (37.1 °C)-100 °F (37.8 °C)] 99 °F (37.2 °C)  Pulse:  [] 72  Resp:  [12-26] 15  SpO2:  [94 %-100 %] 100 %  BP: ()/(61-92) 107/71   Weight: 81.2 kg (179 lb 0.2 oz)  Body mass index is 27.22 kg/m².      Intake/Output Summary (Last 24 hours) at 03/14/18 0739  Last data filed at 03/14/18 0600   Gross per 24 hour   Intake          2441.67 ml   Output              500 ml   Net          1941.67 ml       Physical Exam   Constitutional: No distress. She is intubated.   Cardiovascular: Normal rate, regular rhythm and intact distal pulses.  Exam reveals no gallop and no friction rub.    No murmur heard.  Pulmonary/Chest: She is intubated. No respiratory distress. She has no decreased breath sounds. She has no wheezes. She has no rhonchi. She has rales (Mild BL Lower lung field rales).   Exam limited by patient's ability to cooperate   Abdominal: Soft. Bowel sounds are normal. She exhibits no distension. There is no tenderness (Difficult to assess given patient's non-verbal status).   Neurological: She is alert.   E4VTM3  Sluggish pupillary response to light       Vents:  Vent Mode: A/C (03/14/18 0727)  Ventilator Initiated: Yes (02/04/18 2344)  Set Rate: 8 bmp (03/14/18 0727)  Vt Set: 400 mL (03/14/18 0727)  Pressure Support: 0 cmH20 (03/14/18 0727)  PEEP/CPAP: 5 cmH20 (03/14/18 0727)  Oxygen Concentration (%): 28  (03/14/18 0727)  Peak Airway Pressure: 27 cmH2O (03/14/18 0727)  Plateau Pressure: 22 cmH20 (03/14/18 0727)  Total Ve: 5.97 mL (03/14/18 0727)  F/VT Ratio<105 (RSBI): (!) 60.1 (03/14/18 0557)  Lines/Drains/Airways     Drain                 Gastrostomy/Enterostomy 10/25/16 1401 Percutaneous endoscopic gastrostomy (PEG) midline feeding 504 days          Airway                 Airway - Non-Surgical 02/04/18 2329 Endotracheal Tube 37 days          Pressure Ulcer                 Pressure Injury 02/05/18 1045 Left medial Foot Deep tissue injury 36 days         Pressure Injury 02/05/18 1045 Right lateral Foot Deep tissue injury 36 days          Peripheral Intravenous Line                 Peripheral IV - Single Lumen 02/27/18 1753 Left;Posterior Forearm 14 days         Midline Catheter Insertion/Assessment  - Single Lumen 03/09/18 1732 basilic vein (medial side of arm) 18g x 10cm 4 days              Significant Labs:    CBC/Anemia Profile:  No results for input(s): WBC, HGB, HCT, PLT, MCV, RDW, IRON, FERRITIN, RETIC, FOLATE, [id number], OCCULTBLOOD in the last 48 hours.     Chemistries:    Recent Labs  Lab 03/12/18  0808 03/12/18  1622     --    K 2.4* 2.9*     --    CO2 24  --    BUN 2*  --    CREATININE 0.6  --    CALCIUM 8.2*  --        None    Significant Imaging:  I have reviewed and interpreted all pertinent imaging results/findings within the past 24 hours.

## 2018-03-14 NOTE — PROGRESS NOTES
"  [facility]  Adult Nutrition  Consult Note    SUMMARY     Recommendations    1. If medically appropriate, resume enteral nutrition. Recommend Isosource 1.5 @ 50 mL/hr to meet 103% EEN, 90% EPN.  2. RD to monitor & follow-up.    Goals: Meet % EEN, EPN  Nutrition Goal Status: goal not met  Communication of RD Recs: reviewed with RN    Reason for Assessment    Reason for Assessment: RD follow-up  Diagnosis: other (see comments) (Resp fx.)  Relevant Medical History: HTN, HLD  Interdisciplinary Rounds: attended    General Information Comments: Pt remains intubated. Possible withdraw of care today. Per MD notes, family attempting to contact pt's son. If they do not hear back from him by the end of today, they will move forward with terminal extubation as was originally planned for yesterday.  Nutrition Discharge Planning: If pt situation changes, please re-consult RD.     Nutrition/Diet History    Patient Reported Diet/Restrictions/Preferences: other (see comments) (JONH; On TFs via PEG)  Do you have any cultural, spiritual, Taoism conflicts, given your current situation?: jonh  Factors Affecting Nutritional Intake: NPO, on mechanical ventilation    Anthropometrics    Temp: 99 °F (37.2 °C)  Height: 5' 8" (172.7 cm)  Height (inches): 68 in  Weight Method: Bed Scale  Weight: 81.2 kg (179 lb 0.2 oz)  Weight (lb): 179.02 lb  Ideal Body Weight (IBW), Female: 140 lb  % Ideal Body Weight, Female (lb): 127.87 lb  BMI (Calculated): 27.3  BMI Grade: 25 - 29.9 - overweight  Usual Body Weight (UBW), kg:  (JONH)    Lab/Procedures/Meds    Pertinent Labs Reviewed: reviewed, pertinent  Pertinent Labs Comments: K 2.9  Pertinent Medications Reviewed: reviewed  Pertinent Medications Comments: D5    Physical Findings/Assessment    Overall Physical Appearance: nourished, on ventilator support  Tubes: gastrostomy tube  Oral/Mouth Cavity: WDL  Skin: other (see comments) (Wounds)    Estimated/Assessed Needs    Weight " "Used For Calorie Calculations: 91.1 kg (200 lb 13.4 oz)  Height: 5' 8" (172.7 cm)    Energy Calorie Requirements (kcal): 1751 kcal/d  Energy Need Method: [facility]    Protein Requirements:  g/d (1-1.3 g/kg)  Weight Used For Protein Calculations: 91.1 kg (200 lb 13.4 oz)    Fluid Need Method: RDA Method, other (see comments) (1 mL/kcal or per MD)    Nutrition Prescription Ordered    Current Diet Order: NPO    Nutrition Risk    Level of Risk/Frequency of Follow-up: other (see comments) (1x/week)     Assessment and Plan    Aspiration pneumonia      Nutrition Problem  Inadequate energy intake    Related to (etiology):   Inability to consume sufficient energy    Signs and Symptoms (as evidenced by):   NPO with no alternate means of nutrition     Nutrition Diagnosis Status:   Continues         Monitor and Evaluation    Food and Nutrient Intake: enteral nutrition intake  Food and Nutrient Adminstration: enteral and parenteral nutrition administration  Physical Activity and Function: nutrition-related ADLs and IADLs  Anthropometric Measurements: weight, weight change  Biochemical Data, Medical Tests and Procedures: lipid profile, inflammatory profile, glucose/endocrine profile, gastrointestinal profile, electrolyte and renal panel  Nutrition-Focused Physical Findings: overall appearance     Nutrition Follow-Up    RD Follow-up?: Yes  "

## 2018-03-14 NOTE — PROGRESS NOTES
[facility]  Critical Care Medicine  Progress Note    Patient Name: Jonathan Nieves  MRN: [id number]  Admission Date: 2/4/2018  Hospital Length of Stay: 37 days  Code Status: Partial Code  Attending Provider: Jeronimo Velez MD  Primary Care Provider: Primary Doctor No   Principal Problem: Acute respiratory failure with hypoxia    Subjective:     Hospital/ICU Course:  2/5: Critical Care Consulted. Patient intubated in the ED.   2/6: Patient doing well, on SBT; will extubate maria e. Continue treatment for aspiration PNA & UTI.   2/7: abx deescalated, continue to treat for PNA and UTI. Required pressors overnight. Off this morning.   2/8-9: still with secretions and questionable mentation? Baseline?. Will try to dry the secretions and extubated today or maria e.    2/10: MRI completed. Family called, had family meeting with one daughter. Need to re-visit with all daughters. Records requested from [facility] and [facility].   02/11-13: neurology consulted, no acute changes, wbc and LFT trending up, keep monitoring.  2/14: Concerns for CADASIL vs Binswanger syndrome per Neuro. NOTCH3 genetic testing sent. Palliative consulted regarding goals of care.   02/15 pt is not tolerating tube feed, had two episodes of projectile vomiting, KUB 02/14 shows partial SOB and distended stomach.  02/16 CT abdomen was unremarkable, family meeting was done.  02/17 no more vomiting, tolerating 10 cc/hr tube feeds.  02/19 trial to advance tube feeding, had residual back to trickle feed will try to advance slowly. 02/19 PT became hypotensive this morning with MAPs of 50's started on levo, daughter was contacted regarding goals of care, pt status was changed to partial code upon family request.  02/20 Pt still requiring levo, try to wean off slowly, family did not show for the meeting.  02/21 family meeting was done yesterday, no acclation of care per daughter wishes.   02/25 Pt lost IV access,still unable to wean off the went,  neurological status unchanged she is alert but not following commands, fails her SBT every morning.  2/27: Failed SBT. Family did not show up for Palliative Care meeting.  2/28: NAEON.   3/1: family no showed for meeting  3/2: ethics committee consult by palliative  3/3: spoke to daughter zainab, informed she was awaiting her sister to pick her up and they are coming to hospital. She no showed again  3/4: morphine drip started as patient appeared to be in pain, stopped when patient became hypotensive.  3/5: will attempt to contact family for another meeting today  3/6: Family meeting held yesterday with staff and fellow, discussed poor prognosis of pt. Family in agreement to not escalate care; however, not ready for withdrawal of care. Will revist with family meeting possibly tomorrow.   3/7: Pt was restarted on TF, however did not tolerate.   3/9: hypoglycemic overnight, started on D5 gtt. Palliative had discussion with patient's daughter, zainab. Plan is for terminal extubation on 3/13.   3/12: remains on D5 increased the rate from 50 to 100 for threshold of -180, failed SBT today. plan for family tmw regarding withdrawal of life support  3/13: No acute events overnight.  Plan for extubation to NIPPV vs palliative sedation later on today.   3/14:  Family presented to bedside yesterday but deferred extubation until they attempted to contact their estranged brother in [place] via Facebook message.  If they do not hear back from him by the end of today, they will move forward with extubation as was originally planned for yesterday.    Interval History/Significant Events:  Family presented to bedside yesterday but deferred extubation until they attempted to contact their estranged brother in [place] via Facebook message.  If they do not hear back from him by the end of today, they will move forward with extubation as was originally planned for yesterday.    Review of Systems   Unable to perform ROS: Intubated      Objective:     Vital Signs (Most Recent):  Temp: 99 °F (37.2 °C) (03/14/18 0300)  Pulse: 72 (03/14/18 0727)  Resp: 15 (03/14/18 0600)  BP: 107/71 (03/14/18 0600)  SpO2: 100 % (03/14/18 0600) Vital Signs (24h Range):  Temp:  [98.7 °F (37.1 °C)-100 °F (37.8 °C)] 99 °F (37.2 °C)  Pulse:  [] 72  Resp:  [12-26] 15  SpO2:  [94 %-100 %] 100 %  BP: ()/(61-92) 107/71   Weight: 81.2 kg (179 lb 0.2 oz)  Body mass index is 27.22 kg/m².      Intake/Output Summary (Last 24 hours) at 03/14/18 0739  Last data filed at 03/14/18 0600   Gross per 24 hour   Intake          2441.67 ml   Output              500 ml   Net          1941.67 ml       Physical Exam   Constitutional: No distress. She is intubated.   Cardiovascular: Normal rate, regular rhythm and intact distal pulses.  Exam reveals no gallop and no friction rub.    No murmur heard.  Pulmonary/Chest: She is intubated. No respiratory distress. She has no decreased breath sounds. She has no wheezes. She has no rhonchi. She has rales (Mild BL Lower lung field rales).   Exam limited by patient's ability to cooperate   Abdominal: Soft. Bowel sounds are normal. She exhibits no distension. There is no tenderness (Difficult to assess given patient's non-verbal status).   Neurological: She is alert.   E4VTM3  Sluggish pupillary response to light       Vents:  Vent Mode: A/C (03/14/18 0727)  Ventilator Initiated: Yes (02/04/18 2344)  Set Rate: 8 bmp (03/14/18 0727)  Vt Set: 400 mL (03/14/18 0727)  Pressure Support: 0 cmH20 (03/14/18 0727)  PEEP/CPAP: 5 cmH20 (03/14/18 0727)  Oxygen Concentration (%): 28 (03/14/18 0727)  Peak Airway Pressure: 27 cmH2O (03/14/18 0727)  Plateau Pressure: 22 cmH20 (03/14/18 0727)  Total Ve: 5.97 mL (03/14/18 0727)  F/VT Ratio<105 (RSBI): (!) 60.1 (03/14/18 0557)  Lines/Drains/Airways     Drain                 Gastrostomy/Enterostomy 10/25/16 1401 Percutaneous endoscopic gastrostomy (PEG) midline feeding 504 days          Airway                  Airway - Non-Surgical 02/04/18 2329 Endotracheal Tube 37 days          Pressure Ulcer                 Pressure Injury 02/05/18 1045 Left medial Foot Deep tissue injury 36 days         Pressure Injury 02/05/18 1045 Right lateral Foot Deep tissue injury 36 days          Peripheral Intravenous Line                 Peripheral IV - Single Lumen 02/27/18 1753 Left;Posterior Forearm 14 days         Midline Catheter Insertion/Assessment  - Single Lumen 03/09/18 1732 basilic vein (medial side of arm) 18g x 10cm 4 days              Significant Labs:    CBC/Anemia Profile:  No results for input(s): WBC, HGB, HCT, PLT, MCV, RDW, IRON, FERRITIN, RETIC, FOLATE, [id number], OCCULTBLOOD in the last 48 hours.     Chemistries:    Recent Labs  Lab 03/12/18  0808 03/12/18  1622     --    K 2.4* 2.9*     --    CO2 24  --    BUN 2*  --    CREATININE 0.6  --    CALCIUM 8.2*  --        None    Significant Imaging:  I have reviewed and interpreted all pertinent imaging results/findings within the past 24 hours.    Assessment/Plan:     Neuro   Seizure-like activity    -Was on keppra, dc 3/4  -Stable       Dysphagia as late effect of cerebrovascular disease    - S/P PEG tube.  - patient not tolerating feeds 2/2 emesis with trickle feeds.  - tube feeds stopped after multiple attempts on different days to run trickle feeds but -always has emesis however pt was on glycopurlate  -Has dc'd glyco and started bolus feeding which pt is reported to tolerate      CVA, old, hemiparesis    -patient suffered multiple strokes in past, last one in 2008   -nonverbal at baseline, alert, makes eye contact but doesn't follow commands.    MRI (2/10)  Findings: The diffusion sequence demonstrates no evidence of acute infarct. There is severe white matter small vessel ischemic change. There is a remote infarct of the right occipital lobe. There is a mild hemosiderin staining of the brainstem probably from a prior subarachnoid hemorrhage. There is a  small focus of hemosiderin posterior right midline medulla. Pituitary and craniocervical junction show nothing unusual. There is left frontal sinusitis change. There is involutional change.  - [facility] requested from [facility] and [facility]. Did not include brain images or neurology notes.  - Neurology consulted: differential includes but is not limited to CADASIL (given h/o strokes, extensive white matter involvement including the temporal lobes), adult onset adrenoleukodystrophy, chronic microvascular changes (less likely), vanishing white matter disease (less likely given predominance in children), or Binswanger disease (subcortical leukoencephalopathy or vascular dementia 2/2 chronic HTN).   - NOTCH3 genetic testing sent, results pending      Derm   Unstageable pressure ulcer of left buttock    -Multiple area.  -Wound care per nursing.      Pulmonary   * Acute respiratory failure with hypoxia    -Intubated for airway protection 2/2 to inability to protect airway  -Has large amount secretion, as well as neurological disease, inability to follow commands  -glycopyrrolate dc, may be contributing to emesis  -Changed to scopalamin, however continues to require oral suction      Aspiration pneumonia    Extensive consolidation of the right lung and debris in the right mainstem bronchus and its branches consistent with aspiration pneumonia on admission that has significantly improved on CXR yesterday afternoon.  - S/p course of rocephin; s/p vanco & cefepime & flagyl   -Vanc & Zosyn discontinued yesterday  - Intubated      Cardiac/Vascular   Paroxysmal atrial fibrillation    - Pt home BB held due to hypotension      GI   Partial small bowel obstruction    - KUB in 02/14 showed distended stomach and partial SOB.  - CT abd pelvis w contrast showed no acute intra-abdominal abnormality.  - Emesis happens on/off, no residual w/ adequate respond to antemetic and feed holding.  - No more emesis noted and pt has multiple loose stool         Orthopedic   Pressure injury of deep tissue    - multiple areas.  - wound care per nursing.      Other   Palliative care encounter    - partial code  - Palliative care following.  - Plan for terminal extubation 2/26/18, however family not ready for withdrawal of care, ongoing discussions with family but agreed to not escalate care at this time  - palliative following, appreciate assistance. Discussion held between Dr. De La Torre of palliative and patients daughter 3/9. Plan for withdrawal of life support  On 3/13       Goals of care, counseling/discussion    - Palliative consulted, assistance appreciated  - terminal extubation initially planned for 2/26/2018 discontinued due to family no longer wanting this  - plan for further goals of care discussion  - Family no-showed for 2/27 & 3/1 \[facility]\"" Care meeting.   -family meeting on 3/5 with  Staff and fellow - discussed poor prognosis, family agreed to not escalate care.  -Plan for extubation to NIPPV v palliative sedation this afternoon         Critical Care Daily Checklist:    A: Awake: RASS Goal/Actual Goal: RASS Goal: 0-->alert and calm  Actual: Krishnamurthy Agitation Sedation Scale (RASS): Alert and calm   B: Spontaneous Breathing Trial Performed? Spon. Breathing Trial Initiated?: Not initiated (no plans for SBT at this time) (03/04/18 0756)   C: SAT & SBT Coordinated?  NA                      D: Delirium: CAM-ICU Overall CAM-ICU: Positive   E: Early Mobility Performed? Yes   F: Feeding Goal: Goals: Meet % EEN, EPN  Status: Nutrition Goal Status: goal not met   Current Diet Order   Procedures    Diet NPO      AS: Analgesia/Sedation NA   T: Thromboembolic Prophylaxis Heparin 5000 U TID   H: HOB > 300 Yes   U: Stress Ulcer Prophylaxis (if needed) Famotidine 20 BID   G: Glucose Control NA   B: Bowel Function Stool Occurrence: 1   I: Indwelling Catheter (Lines & De Leon) Necessity As above   D: De-escalation of Antimicrobials/Pharmacotherapies As above    Plan for the  day/ETD Extubate    Code Status:  Family/Goals of Care: Partial Code         Critical secondary to Patient has a condition that poses threat to life and bodily function: Severe Respiratory Distress      Critical care was time spent personally by me on the following activities: development of treatment plan with patient or surrogate and bedside caregivers, discussions with consultants, evaluation of patient's response to treatment, examination of patient, ordering and performing treatments and interventions, ordering and review of laboratory studies, ordering and review of radiographic studies, pulse oximetry, re-evaluation of patient's condition. This critical care time did not overlap with that of any other provider or involve time for any procedures.     Dwight Francois MD  Critical Care Medicine  [facility]

## 2018-03-14 NOTE — PLAN OF CARE
Problem: Patient Care Overview  Goal: Plan of Care Review  Family discussion overnight. (see prev note). VSS during shift. No acute events overnight. Pt. Turned q2hr for comfort. POC for withdrawal of care pending family getting in touch with out of town brother. POC reviewed with pt. And family. All questions and concerns were addressed. Family verbalized understanding.

## 2018-03-15 PROBLEM — Z51.5 PALLIATIVE CARE ENCOUNTER: Status: RESOLVED | Noted: 2018-01-01 | Resolved: 2018-01-01

## 2018-03-15 PROBLEM — L89.96 PRESSURE INJURY OF DEEP TISSUE: Status: RESOLVED | Noted: 2018-01-01 | Resolved: 2018-01-01

## 2018-03-15 NOTE — ASSESSMENT & PLAN NOTE
- S/P PEG tube.  - Tube feeds held  - Continue glycopyrrolate 0.2 mg IV QID  - Continue D5 100 cc/h infusion

## 2018-03-15 NOTE — ASSESSMENT & PLAN NOTE
-KUB in 02/14 showed distended stomach and partial SOB.  -CT abd pelvis w contrast showed no acute intra-abdominal abnormality.  -Emesis happens on/off, no residual w/ adequate respond to antemetic and feed holding.  -No more emesis noted and pt has multiple loose stool

## 2018-03-15 NOTE — PLAN OF CARE
Problem: Patient Care Overview  Goal: Plan of Care Review  No acute events overnight. D5 and fentanyl gtt infusing as ordered. VSS during shift. POC is for planned extubation to bipap and possible comfort care for day shift. POC reviewed and discussed with family. All questions and concerns were addressed.

## 2018-03-15 NOTE — SUBJECTIVE & OBJECTIVE
Interval History/Significant Events:   Ms. Nieves's family did not show up yesterday afternoon and no word if they were able to contact the brother.  Patient started on scheduled glycopyrrolate 0.2 mg IV QID & fentanyl 25 mcg/hr gtt yesterday with cough assist q4h as well.  Tube feeds held yesterday.    Review of Systems   Unable to perform ROS: Intubated     Objective:     Vital Signs (Most Recent):  Temp: 98.1 °F (36.7 °C) (03/15/18 0300)  Pulse: 75 (03/15/18 0400)  Resp: 18 (03/15/18 0400)  BP: 107/65 (03/15/18 0400)  SpO2: 100 % (03/15/18 0400) Vital Signs (24h Range):  Temp:  [98.1 °F (36.7 °C)-99 °F (37.2 °C)] 98.1 °F (36.7 °C)  Pulse:  [57-79] 75  Resp:  [12-27] 18  SpO2:  [95 %-100 %] 100 %  BP: ()/(53-87) 107/65   Weight: 81.2 kg (179 lb 0.2 oz)  Body mass index is 27.22 kg/m².      Intake/Output Summary (Last 24 hours) at 03/15/18 0521  Last data filed at 03/15/18 0400   Gross per 24 hour   Intake          2536.76 ml   Output             1050 ml   Net          1486.76 ml       Physical Exam   Constitutional: No distress. She is intubated.   Cardiovascular: Normal rate, regular rhythm and intact distal pulses.  Exam reveals no gallop and no friction rub.    No murmur heard.  Pulmonary/Chest: She is intubated. No respiratory distress. She has no decreased breath sounds. She has no wheezes. She has no rhonchi. She has no rales.   Exam limited by patient's ability to cooperate   Abdominal: Soft. Bowel sounds are normal. She exhibits no distension. There is no tenderness (Difficult to assess given patient's non-verbal status).   Neurological: She is alert.   E4VTM3  Sluggish pupillary response to light  Eyes track but patient does not follow commands       Vents:  Vent Mode: A/C (03/15/18 4768)  Ventilator Initiated: Yes (02/04/18 0992)  Set Rate: 12 bmp (03/15/18 0357)  Vt Set: 400 mL (03/15/18 0357)  Pressure Support: 0 cmH20 (03/15/18 0357)  PEEP/CPAP: 5 cmH20 (03/15/18 0357)  Oxygen Concentration  (%): 28 (03/15/18 0400)  Peak Airway Pressure: 60 cmH2O (03/15/18 0357)  Plateau Pressure: 22 cmH20 (03/15/18 0357)  Total Ve: 5.51 mL (03/15/18 0357)  F/VT Ratio<105 (RSBI): (!) 37.69 (03/15/18 0108)  Lines/Drains/Airways     Drain                 Gastrostomy/Enterostomy 10/25/16 1401 Percutaneous endoscopic gastrostomy (PEG) midline feeding 505 days    Female External Urinary Catheter 03/14/18 0600 less than 1 day          Airway                 Airway - Non-Surgical 02/04/18 2329 Endotracheal Tube 38 days          Pressure Ulcer                 Pressure Injury 02/05/18 1045 Left medial Foot Deep tissue injury 37 days         Pressure Injury 02/05/18 1045 Right lateral Foot Deep tissue injury 37 days          Peripheral Intravenous Line                 Peripheral IV - Single Lumen 02/27/18 1753 Left;Posterior Forearm 15 days         Midline Catheter Insertion/Assessment  - Single Lumen 03/09/18 1732 basilic vein (medial side of arm) 18g x 10cm 5 days              Significant Labs:    None    Significant Imaging:  None

## 2018-03-15 NOTE — ASSESSMENT & PLAN NOTE
-Terminal extubation initially planned for 2/26/2018 discontinued due to family no longer wanting this  -Family no-showed for 2/27 & 3/1 [facility] Care meeting.   -Family meeting on 3/5 with  Staff and fellow - discussed poor prognosis, family agreed to not escalate care.  -Family meeting 3/13 w/ family in agreement with withdrawal of care but would like to contact their estranged brother in [place] prior to doing so; they will notify MICU team after they communicate with him and return on 3/14 for likely extubation  -Family did not return yesterday afternoon so unable to extubate & still no word on contact with their brother  -Extubation pending family's contact with brother

## 2018-03-15 NOTE — PROGRESS NOTES
[facility]  Critical Care Medicine  Progress Note    Patient Name: Jonathan Nieves  MRN: [id number]  Admission Date: 2/4/2018  Hospital Length of Stay: 38 days  Code Status: Partial Code  Attending Provider: Jeronimo Velez MD  Primary Care Provider: Primary Doctor No   Principal Problem: Acute respiratory failure with hypoxia    Subjective:     Hospital/ICU Course:  2/5: Critical Care Consulted. Patient intubated in the ED.   2/6: Patient doing well, on SBT; will extubate amria e. Continue treatment for aspiration PNA & UTI.   2/7: abx deescalated, continue to treat for PNA and UTI. Required pressors overnight. Off this morning.   2/8-9: still with secretions and questionable mentation? Baseline?. Will try to dry the secretions and extubated today or maria e.    2/10: MRI completed. Family called, had family meeting with one daughter. Need to re-visit with all daughters. Records requested from [facility] and [facility].   02/11-13: neurology consulted, no acute changes, wbc and LFT trending up, keep monitoring.  2/14: Concerns for CADASIL vs Binswanger syndrome per Neuro. NOTCH3 genetic testing sent. Palliative consulted regarding goals of care.   02/15 pt is not tolerating tube feed, had two episodes of projectile vomiting, KUB 02/14 shows partial SOB and distended stomach.  02/16 CT abdomen was unremarkable, family meeting was done.  02/17 no more vomiting, tolerating 10 cc/hr tube feeds.  02/19 trial to advance tube feeding, had residual back to trickle feed will try to advance slowly. 02/19 PT became hypotensive this morning with MAPs of 50's started on levo, daughter was contacted regarding goals of care, pt status was changed to partial code upon family request.  02/20 Pt still requiring levo, try to wean off slowly, family did not show for the meeting.  02/21 family meeting was done yesterday, no acclation of care per daughter wishes.   02/25 Pt lost IV access,still unable to wean off the went,  neurological status unchanged she is alert but not following commands, fails her SBT every morning.  2/27: Failed SBT. Family did not show up for Palliative Care meeting.  2/28: NAEON.   3/1: family no showed for meeting  3/2: ethics committee consult by palliative  3/3: spoke to daughter zainab, informed she was awaiting her sister to pick her up and they are coming to hospital. She no showed again  3/4: morphine drip started as patient appeared to be in pain, stopped when patient became hypotensive.  3/5: will attempt to contact family for another meeting today  3/6: Family meeting held yesterday with staff and fellow, discussed poor prognosis of pt. Family in agreement to not escalate care; however, not ready for withdrawal of care. Will revist with family meeting possibly tomorrow.   3/7: Pt was restarted on TF, however did not tolerate.   3/9: hypoglycemic overnight, started on D5 gtt. Palliative had discussion with patient's daughter, zainab. Plan is for terminal extubation on 3/13.   3/12: remains on D5 increased the rate from 50 to 100 for threshold of -180, failed SBT today. plan for family tmw regarding withdrawal of life support  3/13: No acute events overnight.  Plan for extubation to NIPPV vs palliative sedation later on today.   3/14:  Family presented to bedside yesterday but deferred extubation until they attempted to contact their estranged brother in [place] via Facebook message.  If they do not hear back from him by the end of today, they will move forward with extubation as was originally planned for yesterday.  3/15:  Ms. Nieves's family did not show up yesterday afternoon and no word if they were able to contact the brother.  Patient started on scheduled glycopyrrolate 0.2 mg IV QID & fentanyl 25 mcg/hr gtt yesterday with cough assist q4h as well.  Tube feeds held yesterday.    Interval History/Significant Events:   Ms. Nieves's family did not show up yesterday afternoon and no word if  they were able to contact the brother.  Patient started on scheduled glycopyrrolate 0.2 mg IV QID & fentanyl 25 mcg/hr gtt yesterday with cough assist q4h as well.  Tube feeds held yesterday.    Review of Systems   Unable to perform ROS: Intubated     Objective:     Vital Signs (Most Recent):  Temp: 98.1 °F (36.7 °C) (03/15/18 0300)  Pulse: 75 (03/15/18 0400)  Resp: 18 (03/15/18 0400)  BP: 107/65 (03/15/18 0400)  SpO2: 100 % (03/15/18 0400) Vital Signs (24h Range):  Temp:  [98.1 °F (36.7 °C)-99 °F (37.2 °C)] 98.1 °F (36.7 °C)  Pulse:  [57-79] 75  Resp:  [12-27] 18  SpO2:  [95 %-100 %] 100 %  BP: ()/(53-87) 107/65   Weight: 81.2 kg (179 lb 0.2 oz)  Body mass index is 27.22 kg/m².      Intake/Output Summary (Last 24 hours) at 03/15/18 0521  Last data filed at 03/15/18 0400   Gross per 24 hour   Intake          2536.76 ml   Output             1050 ml   Net          1486.76 ml       Physical Exam   Constitutional: No distress. She is intubated.   Cardiovascular: Normal rate, regular rhythm and intact distal pulses.  Exam reveals no gallop and no friction rub.    No murmur heard.  Pulmonary/Chest: She is intubated. No respiratory distress. She has no decreased breath sounds. She has no wheezes. She has no rhonchi. She has no rales.   Exam limited by patient's ability to cooperate   Abdominal: Soft. Bowel sounds are normal. She exhibits no distension. There is no tenderness (Difficult to assess given patient's non-verbal status).   Neurological: She is alert.   [id number]  Sluggish pupillary response to light  Eyes track but patient does not follow commands       Vents:  Vent Mode: A/C (03/15/18 0357)  Ventilator Initiated: Yes (02/04/18 2344)  Set Rate: 12 bmp (03/15/18 0357)  Vt Set: 400 mL (03/15/18 0357)  Pressure Support: 0 cmH20 (03/15/18 0357)  PEEP/CPAP: 5 cmH20 (03/15/18 0357)  Oxygen Concentration (%): 28 (03/15/18 0400)  Peak Airway Pressure: 60 cmH2O (03/15/18 0357)  Plateau Pressure: 22 cmH20 (03/15/18  0357)  Total Ve: 5.51 mL (03/15/18 0357)  F/VT Ratio<105 (RSBI): (!) 37.69 (03/15/18 0108)  Lines/Drains/Airways     Drain                 Gastrostomy/Enterostomy 10/25/16 1401 Percutaneous endoscopic gastrostomy (PEG) midline feeding 505 days    Female External Urinary Catheter 03/14/18 0600 less than 1 day          Airway                 Airway - Non-Surgical 02/04/18 2329 Endotracheal Tube 38 days          Pressure Ulcer                 Pressure Injury 02/05/18 1045 Left medial Foot Deep tissue injury 37 days         Pressure Injury 02/05/18 1045 Right lateral Foot Deep tissue injury 37 days          Peripheral Intravenous Line                 Peripheral IV - Single Lumen 02/27/18 1753 Left;Posterior Forearm 15 days         Midline Catheter Insertion/Assessment  - Single Lumen 03/09/18 1732 basilic vein (medial side of arm) 18g x 10cm 5 days              Significant Labs:    None    Significant Imaging:  None    Assessment/Plan:     Neuro   Seizure-like activity    -Was on keppra, dc 3/4  -Stable         Dysphagia as late effect of cerebrovascular disease    - S/P PEG tube.  - Tube feeds held  - Continue glycopyrrolate 0.2 mg IV QID  - Continue D5 100 cc/h infusion        CVA, old, hemiparesis    -patient suffered multiple strokes in past, last one in 2008   -nonverbal at baseline, alert, makes eye contact but doesn't follow commands.    MRI (2/10)  Findings: The diffusion sequence demonstrates no evidence of acute infarct. There is severe white matter small vessel ischemic change. There is a remote infarct of the right occipital lobe. There is a mild hemosiderin staining of the brainstem probably from a prior subarachnoid hemorrhage. There is a small focus of hemosiderin posterior right midline medulla. Pituitary and craniocervical junction show nothing unusual. There is left frontal sinusitis change. There is involutional change.  - Records requested from [facility] and [facility]. Did not include brain images or neurology  notes.  - Neurology consulted: differential includes but is not limited to CADASIL (given h/o strokes, extensive white matter involvement including the temporal lobes), adult onset adrenoleukodystrophy, chronic microvascular changes (less likely), vanishing white matter disease (less likely given predominance in children), or Binswanger disease (subcortical leukoencephalopathy or vascular dementia 2/2 chronic HTN).   - NOTCH3 genetic testing sent, results pending        Derm   Unstageable pressure ulcer of left buttock    -Multiple area.  -Wound care per nursing.        Pulmonary   * Acute respiratory failure with hypoxia    -Intubated for airway protection 2/2 to inability to protect airway  -Has large amount secretion, as well as neurological disease, inability to follow commands  -Glycopyrrolate 0.2 mg IV QID resumed yesterday along with cough assist q4h  -Continue scopolamine 1 mg patch every 3 days  -Continue fentanyl gtt        Aspiration pneumonia    Extensive consolidation of the right lung and debris in the right mainstem bronchus and its branches consistent with aspiration pneumonia on admission that has significantly improved on CXR yesterday afternoon.  - S/p course of rocephin; s/p vanco & cefepime & flagyl   -Vanc & Zosyn discontinued yesterday  - Intubated        Cardiac/Vascular   Paroxysmal atrial fibrillation    - Pt home BB held due to hypotension        GI   Partial small bowel obstruction    -KUB in 02/14 showed distended stomach and partial SOB.  -CT abd pelvis w contrast showed no acute intra-abdominal abnormality.  -Emesis happens on/off, no residual w/ adequate respond to antemetic and feed holding.  -No more emesis noted and pt has multiple loose stool        Other   Goals of care, counseling/discussion    -Terminal extubation initially planned for 2/26/2018 discontinued due to family no longer wanting this  -Family no-showed for 2/27 & 3/1 Pall Care meeting.   -Family meeting on 3/5 with   Staff and fellow - discussed poor prognosis, family agreed to not escalate care.  -Family meeting 3/13 w/ family in agreement with withdrawal of care but would like to contact their estranged brother in [place] prior to doing so; they will notify MICU team after they communicate with him and return on 3/14 for likely extubation  -Family did not return yesterday afternoon so unable to extubate & still no word on contact with their brother  -Extubation pending family's contact with brother           Critical Care Daily Checklist:    A: Awake: RASS Goal/Actual Goal: RASS Goal: 0-->alert and calm  Actual: Krishnamurthy Agitation Sedation Scale (RASS): Alert and calm   B: Spontaneous Breathing Trial Performed? Spon. Breathing Trial Initiated?: Not initiated (no plans for SBT at this time) (03/04/18 5286)   C: SAT & SBT Coordinated?  Yes                      D: Delirium: CAM-ICU Overall CAM-ICU: Negative   E: Early Mobility Performed? Yes   F: Feeding Goal: Goals: Meet % EEN, EPN  Status: Nutrition Goal Status: goal not met   Current Diet Order   Procedures    Diet NPO      AS: Analgesia/Sedation Fentanyl gtt w/ PRN pushes   T: Thromboembolic Prophylaxis Heparin 5000 U TID   H: HOB > 300 Yes   U: Stress Ulcer Prophylaxis (if needed) Famotidine   G: Glucose Control NA   B: Bowel Function Stool Occurrence: 1   I: Indwelling Catheter (Lines & [place]) Necessity As above   D: De-escalation of Antimicrobials/Pharmacotherapies NA    Plan for the day/ETD Contact family re: final decision for extubation    Code Status:  Family/Goals of Care: Partial Code         Critical secondary to Patient has a condition that poses threat to life and bodily function: Severe Respiratory Distress      Critical care was time spent personally by me on the following activities: development of treatment plan with patient or surrogate and bedside caregivers, discussions with consultants, evaluation of patient's response to treatment, examination of  patient, ordering and performing treatments and interventions, ordering and review of laboratory studies, ordering and review of radiographic studies, pulse oximetry, re-evaluation of patient's condition. This critical care time did not overlap with that of any other provider or involve time for any procedures.     Dwight Francois MD  Critical Care Medicine  [facility]

## 2018-03-15 NOTE — ASSESSMENT & PLAN NOTE
-Intubated for airway protection 2/2 to inability to protect airway  -Has large amount secretion, as well as neurological disease, inability to follow commands  -Glycopyrrolate 0.2 mg IV QID resumed yesterday along with cough assist q4h  -Continue scopolamine 1 mg patch every 3 days  -Continue fentanyl gtt

## 2018-03-15 NOTE — PLAN OF CARE
Problem: Patient Care Overview  Goal: Plan of Care Review  Outcome: Ongoing (interventions implemented as appropriate)  Pt transitioned to comfort care this afternoon, extubated to BiPAP. On versed and fentanyl gtts w/ PRN boluses as needed for comfort. VSS. Will continue to monitor.

## 2018-03-16 PROBLEM — L89.023: Status: RESOLVED | Noted: 2018-01-01 | Resolved: 2018-01-01

## 2018-03-16 NOTE — PLAN OF CARE
Problem: Patient Care Overview  Goal: Plan of Care Review  Outcome: Ongoing (interventions implemented as appropriate)  Comfort measures continued throughout shift. Pt remains on Bipap with oxygen saturation 100%. RR 10-20 with no dyspnea observed. Fentanyl and Versed gtts continued. Puriwik intact to reduce skin breakdown with incontinence. No family at bedside. No nonverbal indicators of pain present at this time. Plan to continue comfort care with possible hospice consult.

## 2018-03-16 NOTE — ASSESSMENT & PLAN NOTE
-patient suffered multiple strokes in past, last one in 2008   -nonverbal at baseline, alert, makes eye contact but doesn't follow commands.  -Decreased responsiveness today likely 2/2 to versed gtt

## 2018-03-16 NOTE — PLAN OF CARE
Patient extubated to BiPAP yesterday, [facility] physician team attempting to wean BiPAP.  Patient remains NPO.  GOC for comfort - Versed and Fentanyl continuous infusions.  Plan for discussions with family to surround inpatient hospice today.  CM will continue to follow.     03/16/18 1104   Right Care Assessment   Can the patient answer the patient profile reliably? No, cognitively impaired;No historian available   How often would a person be available to care for the patient? Often  (facility resident)   Describe the patient's ability to walk at the present time. Does not walk or unable to take any steps at all   How does the patient rate their overall health at the present time? Poor   Number of comorbid conditions (as recorded on the chart) Five or more   During the past month, has the patient often been bothered by feeling down, depressed or hopeless? (JONH)   During the past month, has the patient often been bothered by little interest or pleasure in doing things? (JONH)   Lisa Landers, RN, BSN  Case Management  [facility]  Ext. [phone]

## 2018-03-16 NOTE — SUBJECTIVE & OBJECTIVE
Interval History/Significant Events:   Ms. Nieves was extubated to BiPAP yesterday which she has been tolerating well.  Her versed gtt was also increased yesterday for comfort.  Family not present at bedside.    Review of Systems   Unable to perform ROS: Patient nonverbal     Objective:     Vital Signs (Most Recent):  Temp: 97.2 °F (36.2 °C) (03/16/18 0702)  Pulse: 71 (03/16/18 0802)  Resp: 16 (03/16/18 0802)  BP: 120/82 (03/16/18 0702)  SpO2: 100 % (03/16/18 0802) Vital Signs (24h Range):  Temp:  [97.2 °F (36.2 °C)-98.3 °F (36.8 °C)] 97.2 °F (36.2 °C)  Pulse:  [59-79] 71  Resp:  [7-26] 16  SpO2:  [87 %-100 %] 100 %  BP: ()/(35-82) 120/82   Weight: 81.2 kg (179 lb 0.2 oz)  Body mass index is 27.22 kg/m².      Intake/Output Summary (Last 24 hours) at 03/16/18 0808  Last data filed at 03/16/18 0702   Gross per 24 hour   Intake          1047.17 ml   Output             1400 ml   Net          -352.83 ml       Physical Exam   Constitutional: No distress.   Unconscious AAF wearing BiPAP in NAD   Cardiovascular: Normal rate, regular rhythm and intact distal pulses.  Exam reveals no gallop and no friction rub.    No murmur heard.  Pulmonary/Chest: No respiratory distress. She has no decreased breath sounds. She has no wheezes. She has no rhonchi. She has no rales.   Exam limited by patient's ability to cooperate   Abdominal: Soft. Bowel sounds are normal. She exhibits no distension. There is no tenderness (Difficult to assess given patient's non-verbal status).   Neurological: She is unresponsive.   E1V1M3  Sluggish pupillary response to light  She does not open her eyes to sternal run       Vents:  Vent Mode: Spont (03/15/18 1737)  Ventilator Initiated: Yes (02/04/18 2344)  Set Rate: 0 bmp (03/15/18 1737)  Vt Set: 400 mL (03/15/18 1737)  Pressure Support: 15 cmH20 (03/15/18 1737)  PEEP/CPAP: 5 cmH20 (03/15/18 1737)  Oxygen Concentration (%): 30 (03/16/18 0802)  Peak Airway Pressure: 20 cmH2O (03/15/18 1737)  Plateau  Pressure: 22 cmH20 (03/15/18 1737)  Total Ve: 0 mL (03/15/18 1737)  F/VT Ratio<105 (RSBI): (!) 36.84 (03/15/18 0532)  Lines/Drains/Airways     Drain                 Gastrostomy/Enterostomy 10/25/16 1401 Percutaneous endoscopic gastrostomy (PEG) midline feeding 506 days    Female External Urinary Catheter 03/14/18 0600 2 days          Pressure Ulcer                 Pressure Injury 02/05/18 1045 Left medial Foot Deep tissue injury 38 days         Pressure Injury 02/05/18 1045 Right lateral Foot Deep tissue injury 38 days          Peripheral Intravenous Line                 Peripheral IV - Single Lumen 02/27/18 1753 Left;Posterior Forearm 16 days         Midline Catheter Insertion/Assessment  - Single Lumen 03/09/18 1732 basilic vein (medial side of arm) 18g x 10cm 6 days              Significant Labs:  None    Significant Imaging:  I have reviewed and interpreted all pertinent imaging results/findings within the past 24 hours.

## 2018-03-16 NOTE — PROGRESS NOTES
[facility]  Critical Care Medicine  Progress Note    Patient Name: Jonathan Nieves  MRN: [id number]  Admission Date: 2/4/2018  Hospital Length of Stay: 39 days  Code Status: DNR  Attending Provider: Jeronimo Velez MD  Primary Care Provider: Primary Doctor No   Principal Problem: Acute respiratory failure with hypoxia    Subjective:     Interval History/Significant Events:   Ms. Nieves was extubated to BiPAP yesterday which she has been tolerating well.  Her versed gtt was also increased yesterday for comfort.  Family not present at bedside.    Review of Systems   Unable to perform ROS: Patient nonverbal     Objective:     Vital Signs (Most Recent):  Temp: 97.2 °F (36.2 °C) (03/16/18 0702)  Pulse: 71 (03/16/18 0802)  Resp: 16 (03/16/18 0802)  BP: 120/82 (03/16/18 0702)  SpO2: 100 % (03/16/18 0802) Vital Signs (24h Range):  Temp:  [97.2 °F (36.2 °C)-98.3 °F (36.8 °C)] 97.2 °F (36.2 °C)  Pulse:  [59-79] 71  Resp:  [7-26] 16  SpO2:  [87 %-100 %] 100 %  BP: ()/(35-82) 120/82   Weight: 81.2 kg (179 lb 0.2 oz)  Body mass index is 27.22 kg/m².      Intake/Output Summary (Last 24 hours) at 03/16/18 0808  Last data filed at 03/16/18 0702   Gross per 24 hour   Intake          1047.17 ml   Output             1400 ml   Net          -352.83 ml       Physical Exam   Constitutional: No distress.   Unconscious AAF wearing BiPAP in NAD   Cardiovascular: Normal rate, regular rhythm and intact distal pulses.  Exam reveals no gallop and no friction rub.    No murmur heard.  Pulmonary/Chest: No respiratory distress. She has no decreased breath sounds. She has no wheezes. She has no rhonchi. She has no rales.   Exam limited by patient's ability to cooperate   Abdominal: Soft. Bowel sounds are normal. She exhibits no distension. There is no tenderness (Difficult to assess given patient's non-verbal status).   Neurological: She is unresponsive.   E1V1M3  Sluggish pupillary response to light  She does not  open her eyes to sternal run       Vents:  Vent Mode: Spont (03/15/18 1737)  Ventilator Initiated: Yes (02/04/18 2344)  Set Rate: 0 bmp (03/15/18 1737)  Vt Set: 400 mL (03/15/18 1737)  Pressure Support: 15 cmH20 (03/15/18 1737)  PEEP/CPAP: 5 cmH20 (03/15/18 1737)  Oxygen Concentration (%): 30 (03/16/18 0802)  Peak Airway Pressure: 20 cmH2O (03/15/18 1737)  Plateau Pressure: 22 cmH20 (03/15/18 1737)  Total Ve: 0 mL (03/15/18 1737)  F/VT Ratio<105 (RSBI): (!) 36.84 (03/15/18 0532)  Lines/Drains/Airways     Drain                 Gastrostomy/Enterostomy 10/25/16 1401 Percutaneous endoscopic gastrostomy (PEG) midline feeding 506 days    Female External Urinary Catheter 03/14/18 0600 2 days          Pressure Ulcer                 Pressure Injury 02/05/18 1045 Left medial Foot Deep tissue injury 38 days         Pressure Injury 02/05/18 1045 Right lateral Foot Deep tissue injury 38 days          Peripheral Intravenous Line                 Peripheral IV - Single Lumen 02/27/18 1753 Left;Posterior Forearm 16 days         Midline Catheter Insertion/Assessment  - Single Lumen 03/09/18 1732 basilic vein (medial side of arm) 18g x 10cm 6 days              Significant Labs:  None    Significant Imaging:  I have reviewed and interpreted all pertinent imaging results/findings within the past 24 hours.    Assessment/Plan:     Neuro   Seizure-like activity    -Was on keppra, dc 3/4  -Stable         Dysphagia as late effect of cerebrovascular disease    - S/P PEG tube w/ tube feeds held 2/2 to recurrent N/V        CVA, old, hemiparesis    -patient suffered multiple strokes in past, last one in 2008   -nonverbal at baseline, alert, makes eye contact but doesn't follow commands.  -Decreased responsiveness today likely 2/2 to versed gtt        Derm   Unstageable pressure ulcer of left buttock    -Multiple area.  -Wound care per nursing.        Pulmonary   * Acute respiratory failure with hypoxia    -Extubated and breathing over BiPAP,  will attempt to wean off BiPAP if able  -Glycopyrrolate 0.2 mg IV QID resumed yesterday along with cough assist q4h  -Continue scopolamine 1 mg patch every 3 days  -Continue fentanyl gtt        Aspiration pneumonia    Extensive consolidation of the right lung and debris in the right mainstem bronchus and its branches consistent with aspiration pneumonia on admission that has significantly improved on CXR yesterday afternoon.  - S/p course of rocephin; s/p vanco & cefepime & flagyl   -Concern that if patient resumed enteral feeds, she may have another aspiration event, keep NPO        Cardiac/Vascular   Paroxysmal atrial fibrillation    - Pt home BB held due to hypotension        GI   Partial small bowel obstruction    -KUB in 02/14 showed distended stomach and partial SOB.  -CT abd pelvis w contrast showed no acute intra-abdominal abnormality.  -Emesis happens on/off, no residual w/ adequate respond to antemetic and feed holding.  -No more emesis noted and pt has multiple loose stool        Other   Goals of care, counseling/discussion    -Terminal extubation initially planned for 2/26/2018 discontinued due to family no longer wanting this  -Family no-showed for 2/27 & 3/1 Pall Care meeting.   -Family meeting on 3/5 with  Staff and fellow - discussed poor prognosis, family agreed to not escalate care.  -Family meeting 3/13 w/ family in agreement with withdrawal of care but would like to contact their estranged brother in [place] prior to doing so; they will notify MICU team after they communicate with him and return on 3/14 for likely extubation  -Family made it clear yesterday that they would like for her to be on comfort care and have accepted that their mother will pass very soon.    -Continue w/ fentanyl 25 mcg/hr & versed gtt 2 mg/h  -Patient would benefit from a transfer to an inpatient hospice facility; will reach out to family later today to discuss this option           Critical Care Daily Checklist:    A:  Awake: RASS Goal/Actual Goal: RASS Goal: -3-->moderate sedation  Actual: Krishnamurthy Agitation Sedation Scale (RASS): Moderate sedation   B: Spontaneous Breathing Trial Performed? Spon. Breathing Trial Initiated?: Not initiated (no plans for SBT at this time) (03/04/18 7766)   C: SAT & SBT Coordinated?  NA                      D: Delirium: CAM-ICU Overall CAM-ICU: Positive   E: Early Mobility Performed? Yes   F: Feeding Goal: Goals: Meet % EEN, EPN  Status: Nutrition Goal Status: goal not met   Current Diet Order   Procedures    Diet NPO      AS: Analgesia/Sedation As above   T: Thromboembolic Prophylaxis Held   H: HOB > 300 Yes   U: Stress Ulcer Prophylaxis (if needed) NA   G: Glucose Control NA   B: Bowel Function Stool Occurrence: 0   I: Indwelling Catheter (Lines & De Leon) Necessity As above   D: De-escalation of Antimicrobials/Pharmacotherapies NA    Plan for the day/ETD Speak with family re: possible inpatient hospice    Code Status:  Family/Goals of Care: DNR  Comfort       Critical secondary to Patient has a condition that poses threat to life and bodily function: Severe Respiratory Distress      Critical care was time spent personally by me on the following activities: development of treatment plan with patient or surrogate and bedside caregivers, discussions with consultants, evaluation of patient's response to treatment, examination of patient, ordering and performing treatments and interventions, ordering and review of laboratory studies, ordering and review of radiographic studies, pulse oximetry, re-evaluation of patient's condition. This critical care time did not overlap with that of any other provider or involve time for any procedures.     Dwight Francois MD  Critical Care Medicine  [facility]

## 2018-03-16 NOTE — ASSESSMENT & PLAN NOTE
Extensive consolidation of the right lung and debris in the right mainstem bronchus and its branches consistent with aspiration pneumonia on admission that has significantly improved on CXR yesterday afternoon.  - S/p course of rocephin; s/p vanco & cefepime & flagyl   -Concern that if patient resumed enteral feeds, she may have another aspiration event, keep NPO

## 2018-03-16 NOTE — ASSESSMENT & PLAN NOTE
-Extubated and breathing over BiPAP, will attempt to wean off BiPAP if able  -Glycopyrrolate 0.2 mg IV QID resumed yesterday along with cough assist q4h  -Continue scopolamine 1 mg patch every 3 days  -Continue fentanyl gtt

## 2018-03-16 NOTE — ASSESSMENT & PLAN NOTE
-Terminal extubation initially planned for 2/26/2018 discontinued due to family no longer wanting this  -Family no-showed for 2/27 & 3/1 [facility] Care meeting.   -Family meeting on 3/5 with  Staff and fellow - discussed poor prognosis, family agreed to not escalate care.  -Family meeting 3/13 w/ family in agreement with withdrawal of care but would like to contact their estranged brother in [place] prior to doing so; they will notify MICU team after they communicate with him and return on 3/14 for likely extubation  -Family made it clear yesterday that they would like for her to be on comfort care and have accepted that their mother will pass very soon.    -Continue w/ fentanyl 25 mcg/hr & versed gtt 2 mg/h  -Patient would benefit from a transfer to an inpatient hospice facility; will reach out to family later today to discuss this option

## 2018-03-17 NOTE — PROGRESS NOTES
[facility]  Critical Care Medicine  Progress Note    Patient Name: Jonathan Nieves  MRN: [id number]  Admission Date: 2/4/2018  Hospital Length of Stay: 40 days  Code Status: DNR  Attending Provider: Jeronimo Velez MD  Primary Care Provider: Primary Doctor No   Principal Problem: Acute respiratory failure with hypoxia    Subjective:     Hospital/ICU Course:  2/5: Critical Care Consulted. Patient intubated in the ED.   2/6: Patient doing well, on SBT; will extubate maria e. Continue treatment for aspiration PNA & UTI.   2/7: abx deescalated, continue to treat for PNA and UTI. Required pressors overnight. Off this morning.   2/8-9: still with secretions and questionable mentation? Baseline?. Will try to dry the secretions and extubated today or maria e.    2/10: MRI completed. Family called, had family meeting with one daughter. Need to re-visit with all daughters. Records requested from [facility] and [facility].   02/11-13: neurology consulted, no acute changes, wbc and LFT trending up, keep monitoring.  2/14: Concerns for CADASIL vs Binswanger syndrome per Neuro. NOTCH3 genetic testing sent. Palliative consulted regarding goals of care.   02/15 pt is not tolerating tube feed, had two episodes of projectile vomiting, KUB 02/14 shows partial SOB and distended stomach.  02/16 CT abdomen was unremarkable, family meeting was done.  02/17 no more vomiting, tolerating 10 cc/hr tube feeds.  02/19 trial to advance tube feeding, had residual back to trickle feed will try to advance slowly. 02/19 PT became hypotensive this morning with MAPs of 50's started on levo, daughter was contacted regarding goals of care, pt status was changed to partial code upon family request.  02/20 Pt still requiring levo, try to wean off slowly, family did not show for the meeting.  02/21 family meeting was done yesterday, no acclation of care per daughter wishes.   02/25 Pt lost IV access,still unable to wean off the went, neurological  status unchanged she is alert but not following commands, fails her SBT every morning.  2/27: Failed SBT. Family did not show up for Palliative Care meeting.  2/28: NAEON.   3/1: family no showed for meeting  3/2: ethics committee consult by palliative  3/3: spoke to daughter zainab, informed she was awaiting her sister to pick her up and they are coming to hospital. She no showed again  3/4: morphine drip started as patient appeared to be in pain, stopped when patient became hypotensive.  3/5: will attempt to contact family for another meeting today  3/6: Family meeting held yesterday with staff and fellow, discussed poor prognosis of pt. Family in agreement to not escalate care; however, not ready for withdrawal of care. Will revist with family meeting possibly tomorrow.   3/7: Pt was restarted on TF, however did not tolerate.   3/9: hypoglycemic overnight, started on D5 gtt. Palliative had discussion with patient's daughter, zainab. Plan is for terminal extubation on 3/13.   3/12: remains on D5 increased the rate from 50 to 100 for threshold of -180, failed SBT today. plan for family tmw regarding withdrawal of life support  3/13: No acute events overnight.  Plan for extubation to NIPPV vs palliative sedation later on today.   3/14:  Family presented to bedside yesterday but deferred extubation until they attempted to contact their estranged brother in [place] via Facebook message.  If they do not hear back from him by the end of today, they will move forward with extubation as was originally planned for yesterday.  3/15:  Ms. Nieves's family did not show up yesterday afternoon and no word if they were able to contact the brother.  Patient started on scheduled glycopyrrolate 0.2 mg IV QID & fentanyl 25 mcg/hr gtt yesterday with cough assist q4h as well.  Tube feeds held yesterday.  3:16:  Ms. Nieves's family spoke with MICU team yesterday and per their wishes she was extubated to BiPAP yesterday which she  has been tolerating well.  Her versed gtt was also increased yesterday for comfort.  Family not present at bedside  3/17:  Patient still tolerating BiPAP well but has failed attempts to wean any further.  Patient's family spoke with MICU yesterday and per discussion, they have come to accept the evenuality of her passing.    Interval History/Significant Events:   Patient still tolerating BiPAP well but has failed attempts to wean any further.  Patient's family spoke with MICU yesterday and per discussion, they have come to accept the evenuality of her passing.    Review of Systems   Unable to perform ROS: Patient nonverbal     Objective:     Vital Signs (Most Recent):  Temp: 99.1 °F (37.3 °C) (03/16/18 2300)  Pulse: 81 (03/17/18 0445)  Resp: (!) 22 (03/17/18 0445)  BP: 106/70 (03/17/18 0400)  SpO2: (!) 89 % (03/17/18 0445) Vital Signs (24h Range):  Temp:  [97.2 °F (36.2 °C)-99.1 °F (37.3 °C)] 99.1 °F (37.3 °C)  Pulse:  [70-92] 81  Resp:  [15-33] 22  SpO2:  [89 %-100 %] 89 %  BP: ()/(58-82) 106/70   Weight: 81.2 kg (179 lb 0.2 oz)  Body mass index is 27.22 kg/m².      Intake/Output Summary (Last 24 hours) at 03/17/18 0550  Last data filed at 03/17/18 0400   Gross per 24 hour   Intake              154 ml   Output              400 ml   Net             -246 ml       Physical Exam   Constitutional: No distress.   Unconscious AAF wearing BiPAP in NAD   Cardiovascular: Normal rate, regular rhythm and intact distal pulses.  Exam reveals no gallop and no friction rub.    No murmur heard.  Pulmonary/Chest: No respiratory distress. She has no decreased breath sounds. She has no wheezes. She has no rhonchi. She has no rales.   Exam limited by patient's ability to cooperate   Abdominal: Soft. Bowel sounds are normal. She exhibits no distension. There is no tenderness (Difficult to assess given patient's non-verbal status).   Neurological: She is unresponsive.   E1V1M3  Sluggish pupillary response to light  She does not  open her eyes to sternal run       Vents:  Vent Mode: Spont (03/15/18 1737)  Ventilator Initiated: Yes (02/04/18 2344)  Set Rate: 0 bmp (03/15/18 1737)  Vt Set: 400 mL (03/15/18 1737)  Pressure Support: 15 cmH20 (03/15/18 1737)  PEEP/CPAP: 5 cmH20 (03/15/18 1737)  Oxygen Concentration (%): 40 (03/17/18 0445)  Peak Airway Pressure: 20 cmH2O (03/15/18 1737)  Plateau Pressure: 22 cmH20 (03/15/18 1737)  Total Ve: 0 mL (03/15/18 1737)  F/VT Ratio<105 (RSBI): (!) 36.84 (03/15/18 0532)  Lines/Drains/Airways     Drain                 Gastrostomy/Enterostomy 10/25/16 1401 Percutaneous endoscopic gastrostomy (PEG) midline feeding 507 days    Female External Urinary Catheter 03/14/18 0600 2 days          Pressure Ulcer                 Pressure Injury 02/05/18 1045 Left medial Foot Deep tissue injury 39 days         Pressure Injury 02/05/18 1045 Right lateral Foot Deep tissue injury 39 days          Peripheral Intravenous Line                 Peripheral IV - Single Lumen 02/27/18 1753 Left;Posterior Forearm 17 days         Midline Catheter Insertion/Assessment  - Single Lumen 03/09/18 1732 basilic vein (medial side of arm) 18g x 10cm 7 days              Significant Labs:    None    Significant Imaging:  None    Assessment/Plan:     Neuro   Seizure-like activity    -Was on keppra, dc 3/4  -Stable         Dysphagia as late effect of cerebrovascular disease    - S/P PEG tube w/ tube feeds held 2/2 to recurrent N/V        CVA, old, hemiparesis    -patient suffered multiple strokes in past, last one in 2008   -nonverbal at baseline, alert, makes eye contact but doesn't follow commands.  -Decreased responsiveness today likely 2/2 to versed gtt        Derm   Unstageable pressure ulcer of left buttock    -Multiple area.  -Wound care per nursing.        Pulmonary   * Acute respiratory failure with hypoxia    -Extubated and breathing over BiPAP, will attempt to wean off BiPAP if able  -Glycopyrrolate 0.2 mg IV QID resumed yesterday along  with cough assist q4h  -Continue scopolamine 1 mg patch every 3 days  -Continue fentanyl gtt        Aspiration pneumonia    Extensive consolidation of the right lung and debris in the right mainstem bronchus and its branches consistent with aspiration pneumonia on admission that has significantly improved on CXR yesterday afternoon.  - S/p course of rocephin; s/p vanco & cefepime & flagyl   -Concern that if patient resumed enteral feeds, she may have another aspiration event, keep NPO        Cardiac/Vascular   Paroxysmal atrial fibrillation    - Pt home BB held due to hypotension        GI   Partial small bowel obstruction    -KUB in 02/14 showed distended stomach and partial SOB.  -CT abd pelvis w contrast showed no acute intra-abdominal abnormality.  -Emesis happens on/off, no residual w/ adequate respond to antemetic and feed holding.  -No more emesis noted and pt has multiple loose stool        Other   Goals of care, counseling/discussion    -Terminal extubation initially planned for 2/26/2018 discontinued due to family no longer wanting this  -Family no-showed for 2/27 & 3/1 Pall Care meeting.   -Family meeting on 3/5 with  Staff and fellow - discussed poor prognosis, family agreed to not escalate care.  -Family meeting 3/13 w/ family in agreement with withdrawal of care but would like to contact their estranged brother in [place] prior to doing so; they will notify MICU team after they communicate with him and return on 3/14 for likely extubation  -Family made it clear yesterday that they would like for her to be on comfort care and have accepted that their mother will pass very soon.    -Continue w/ fentanyl 25 mcg/hr & versed gtt 2 mg/h  -Patient would benefit from a transfer to an inpatient hospice facility  -Reach out to family again to discuss inpatient hospice           Critical Care Daily Checklist:     A: Awake: RASS Goal/Actual Goal: RASS Goal: -3-->moderate sedation  Actual: Krishnamurthy Agitation Sedation  Scale (RASS): Moderate sedation   B: Spontaneous Breathing Trial Performed? Spon. Breathing Trial Initiated?: Not initiated (no plans for SBT at this time) (03/04/18 1840)   C: SAT & SBT Coordinated?  NA                      D: Delirium: CAM-ICU Overall CAM-ICU: Positive   E: Early Mobility Performed? Yes   F: Feeding Goal: Goals: Meet % EEN, EPN  Status: Nutrition Goal Status: goal not met       Current Diet Order   Procedures    Diet NPO       AS: Analgesia/Sedation As above   T: Thromboembolic Prophylaxis Held   H: HOB > 300 Yes   U: Stress Ulcer Prophylaxis (if needed) NA   G: Glucose Control NA   B: Bowel Function Stool Occurrence: 0   I: Indwelling Catheter (Lines & De Leon) Necessity As above   D: De-escalation of Antimicrobials/Pharmacotherapies NA     Plan for the day/ETD Speak with family re: possible inpatient hospice     Code Status:  Family/Goals of Care: DNR  Comfort         Critical secondary to Severe Respiratory Distress      Critical care was time spent personally by me on the following activities: development of treatment plan with patient or surrogate and bedside caregivers, discussions with consultants, evaluation of patient's response to treatment, examination of patient, ordering and performing treatments and interventions, ordering and review of laboratory studies, ordering and review of radiographic studies, pulse oximetry, re-evaluation of patient's condition. This critical care time did not overlap with that of any other provider or involve time for any procedures.     Dwihgt Francois MD  Critical Care Medicine  [facility]

## 2018-03-17 NOTE — SUBJECTIVE & OBJECTIVE
Interval History/Significant Events:   Patient still tolerating BiPAP well but has failed attempts to wean any further.  Patient's family spoke with MICU yesterday and per discussion, they have come to accept the evenuality of her passing.    Review of Systems   Unable to perform ROS: Patient nonverbal     Objective:     Vital Signs (Most Recent):  Temp: 99.1 °F (37.3 °C) (03/16/18 2300)  Pulse: 81 (03/17/18 0445)  Resp: (!) 22 (03/17/18 0445)  BP: 106/70 (03/17/18 0400)  SpO2: (!) 89 % (03/17/18 0445) Vital Signs (24h Range):  Temp:  [97.2 °F (36.2 °C)-99.1 °F (37.3 °C)] 99.1 °F (37.3 °C)  Pulse:  [70-92] 81  Resp:  [15-33] 22  SpO2:  [89 %-100 %] 89 %  BP: ()/(58-82) 106/70   Weight: 81.2 kg (179 lb 0.2 oz)  Body mass index is 27.22 kg/m².      Intake/Output Summary (Last 24 hours) at 03/17/18 0550  Last data filed at 03/17/18 0400   Gross per 24 hour   Intake              154 ml   Output              400 ml   Net             -246 ml       Physical Exam   Constitutional: No distress.   Unconscious AAF wearing BiPAP in NAD   Cardiovascular: Normal rate, regular rhythm and intact distal pulses.  Exam reveals no gallop and no friction rub.    No murmur heard.  Pulmonary/Chest: No respiratory distress. She has no decreased breath sounds. She has no wheezes. She has no rhonchi. She has no rales.   Exam limited by patient's ability to cooperate   Abdominal: Soft. Bowel sounds are normal. She exhibits no distension. There is no tenderness (Difficult to assess given patient's non-verbal status).   Neurological: She is unresponsive.   E1V1M3  Sluggish pupillary response to light  She does not open her eyes to sternal run       Vents:  Vent Mode: Spont (03/15/18 1737)  Ventilator Initiated: Yes (02/04/18 2344)  Set Rate: 0 bmp (03/15/18 1737)  Vt Set: 400 mL (03/15/18 1737)  Pressure Support: 15 cmH20 (03/15/18 5487)  PEEP/CPAP: 5 cmH20 (03/15/18 1737)  Oxygen Concentration (%): 40 (03/17/18 3704)  Peak Airway  Pressure: 20 cmH2O (03/15/18 1737)  Plateau Pressure: 22 cmH20 (03/15/18 1737)  Total Ve: 0 mL (03/15/18 1737)  F/VT Ratio<105 (RSBI): (!) 36.84 (03/15/18 0532)  Lines/Drains/Airways     Drain                 Gastrostomy/Enterostomy 10/25/16 1401 Percutaneous endoscopic gastrostomy (PEG) midline feeding 507 days    Female External Urinary Catheter 03/14/18 0600 2 days          Pressure Ulcer                 Pressure Injury 02/05/18 1045 Left medial Foot Deep tissue injury 39 days         Pressure Injury 02/05/18 1045 Right lateral Foot Deep tissue injury 39 days          Peripheral Intravenous Line                 Peripheral IV - Single Lumen 02/27/18 1753 Left;Posterior Forearm 17 days         Midline Catheter Insertion/Assessment  - Single Lumen 03/09/18 1732 basilic vein (medial side of arm) 18g x 10cm 7 days              Significant Labs:    None    Significant Imaging:  None

## 2018-03-17 NOTE — ASSESSMENT & PLAN NOTE
-Terminal extubation initially planned for 2/26/2018 discontinued due to family no longer wanting this  -Family no-showed for 2/27 & 3/1 [facility] Care meeting.   -Family meeting on 3/5 with  Staff and fellow - discussed poor prognosis, family agreed to not escalate care.  -Family meeting 3/13 w/ family in agreement with withdrawal of care but would like to contact their estranged brother in [place] prior to doing so; they will notify MICU team after they communicate with him and return on 3/14 for likely extubation  -Family made it clear yesterday that they would like for her to be on comfort care and have accepted that their mother will pass very soon.    -Continue w/ fentanyl 25 mcg/hr & versed gtt 2 mg/h  -Patient would benefit from a transfer to an inpatient hospice facility  -Reach out to family again to discuss inpatient hospice

## 2018-03-17 NOTE — PLAN OF CARE
Problem: Patient Care Overview  Goal: Plan of Care Review  Outcome: Ongoing (interventions implemented as appropriate)   Plan of care reviewed with family on Wednesday. Patient breathing on BiPap. Cam score positive, patient sedated for a respiration goal of less than 30 breaths per min. No new signs of skin breakdown. Patient remain free from fall in injury throughout the shift. Family not  at bedside this shift. Tube feeding held. Still maintaining comfort  Care. Patient adequately voided per hour. Side rails up times 2, call light in reach, will continue to monitor.

## 2018-03-17 NOTE — PROGRESS NOTES
Discussed D5 continuous order with Dr. Rodriguez. MD stated not to give and he would discontinue order shortly.

## 2018-03-17 NOTE — PLAN OF CARE
Problem: Patient Care Overview  Goal: Plan of Care Review  Outcome: Ongoing (interventions implemented as appropriate)  Comfort measures continued throughout shift. Pt remains on Bipap with oxygen saturation 93-96%. FIO2 increased from 21%  To 40% after desaturation with bath. RR 20-28 with no dyspnea observed. Fentanyl and Versed gtts continued. Puriwik intact to reduce skin breakdown with incontinence. Pt opens eyes to physical stimulation but is otherwise nonresponsive. No family at bedside. No nonverbal indicators of pain present at this time. Plan to continue comfort care with possible hospice consult.

## 2018-03-18 NOTE — PROGRESS NOTES
Updated Ms. Nieves's daughter Catrine about her mother's current condition.  Informed her that the oxygen level had decreased, that her mother remain on the non-invasive support and that we were titrating medications to assure comfort.  I advised her that a peaceful death may be expected over the next several days.  She acknowledged that she understood and stated that she would update other family members.  On BIPAP 8/5, FIO2 30% oxygen saturation is 85-87% and respirations are 16-24 and unlabored.  No escalation of care is planned as death due to complications of a progressive neurodegenerative disease is expected.      Jeronimo Velez MD

## 2018-03-18 NOTE — ASSESSMENT & PLAN NOTE
Extensive consolidation of the right lung and debris in the right mainstem bronchus and its branches consistent with aspiration pneumonia on admission that has significantly improved on CXR  - S/p course of rocephin; s/p vanco & cefepime & flagyl   -Concern that if patient resumed enteral feeds, she may have another aspiration event, keep NPO

## 2018-03-18 NOTE — PROGRESS NOTES
"[facility]  Critical Care Medicine  Progress Note    Patient Name: Jonathan Nieves  MRN: [id number]  Admission Date: 2/4/2018  Hospital Length of Stay: 41 days  Code Status: DNR  Attending Provider: Jeronimo Velez MD  Primary Care Provider: Primary Doctor No   Principal Problem: Acute respiratory failure with hypoxia    Subjective:     HPI:  Mr. Jonathan Nieves is a 51yo female with history of CVA with residual aphasia and dysphagia, non-verbal, s/p PEG tube placement and bed riddenness, osteomyelitis s/p amputation of toe, who comes to the ED from [facility] via EMS in respiratory distress. Per ED/EMS the patient was noted to have "projectile vomiting" yesterday evening (24 hours prior to presentation), which was treated with zofran. During the event there is suspicion the patient possible aspirated. Today the patient was noted to be in resp distress with a fever.     Patient was satting 88% on 4L on EMS arrival, breathing 40 times per minute.  Improved to 96% on 15L NRB with RR high 20s.  Axillary temp 102.    CT chest concerning for aspiration pneumonia.  UA concern for a UTI.       Patient intubated in the ED & Critical Care consulted.         Hospital/ICU Course:  2/5: Critical Care Consulted. Patient intubated in the ED.   2/6: Patient doing well, on SBT; will extubate maria e. Continue treatment for aspiration PNA & UTI.   2/7: abx deescalated, continue to treat for PNA and UTI. Required pressors overnight. Off this morning.   2/8-9: still with secretions and questionable mentation? Baseline?. Will try to dry the secretions and extubated today or maria e.    2/10: MRI completed. Family called, had family meeting with one daughter. Need to re-visit with all daughters. Records requested from [facility] and [facility].   02/11-13: neurology consulted, no acute changes, wbc and LFT trending up, keep monitoring.  2/14: Concerns for CADASIL vs Binswanger syndrome per Neuro. NOTCH3 genetic " testing sent. Palliative consulted regarding goals of care.   02/15 pt is not tolerating tube feed, had two episodes of projectile vomiting, KUB 02/14 shows partial SOB and distended stomach.  02/16 CT abdomen was unremarkable, family meeting was done.  02/17 no more vomiting, tolerating 10 cc/hr tube feeds.  02/19 trial to advance tube feeding, had residual back to trickle feed will try to advance slowly. 02/19 PT became hypotensive this morning with MAPs of 50's started on levo, daughter was contacted regarding goals of care, pt status was changed to partial code upon family request.  02/20 Pt still requiring levo, try to wean off slowly, family did not show for the meeting.  02/21 family meeting was done yesterday, no acclation of care per daughter wishes.   02/25 Pt lost IV access,still unable to wean off the went, neurological status unchanged she is alert but not following commands, fails her SBT every morning.  2/27: Failed SBT. Family did not show up for Palliative Care meeting.  2/28: NAEON.   3/1: family no showed for meeting  3/2: ethics committee consult by palliative  3/3: spoke to daughter zainab, informed she was awaiting her sister to pick her up and they are coming to hospital. She no showed again  3/4: morphine drip started as patient appeared to be in pain, stopped when patient became hypotensive.  3/5: will attempt to contact family for another meeting today  3/6: Family meeting held yesterday with staff and fellow, discussed poor prognosis of pt. Family in agreement to not escalate care; however, not ready for withdrawal of care. Will revist with family meeting possibly tomorrow.   3/7: Pt was restarted on TF, however did not tolerate.   3/9: hypoglycemic overnight, started on D5 gtt. Palliative had discussion with patient's daughter, zainab. Plan is for terminal extubation on 3/13.   3/12: remains on D5 increased the rate from 50 to 100 for threshold of -180, failed SBT today. plan for  family tmw regarding withdrawal of life support  3/13: No acute events overnight.  Plan for extubation to NIPPV vs palliative sedation later on today.   3/14:  Family presented to bedside yesterday but deferred extubation until they attempted to contact their estranged brother in [place] via Facebook message.  If they do not hear back from him by the end of today, they will move forward with extubation as was originally planned for yesterday.  3/15:  Ms. Nieves's family did not show up yesterday afternoon and no word if they were able to contact the brother.  Patient started on scheduled glycopyrrolate 0.2 mg IV QID & fentanyl 25 mcg/hr gtt yesterday with cough assist q4h as well.  Tube feeds held yesterday.  3:16:  Ms. Nieves's family spoke with MICU team yesterday and per their wishes she was extubated to BiPAP yesterday which she has been tolerating well.  Her versed gtt was also increased yesterday for comfort.  Family not present at bedside  3/17:  Patient still tolerating BiPAP well but has failed attempts to wean any further.  Patient's family spoke with MICU yesterday and per discussion, they have come to accept the evenuality of her passing.  3/18: NAEON. BiPAP weaned to 10/5. Will update family with any changes in patients condition.    Interval History/Significant Events: NAEON. Respiratory status stable but continues to require BiPAP support.    Review of Systems   Unable to perform ROS: Patient nonverbal     Objective:     Vital Signs (Most Recent):  Temp: 97 °F (36.1 °C) (03/18/18 0715)  Pulse: 87 (03/18/18 0916)  Resp: (!) 24 (03/18/18 0916)  BP: 108/62 (03/18/18 0900)  SpO2: (!) 90 % (03/18/18 0916) Vital Signs (24h Range):  Temp:  [97 °F (36.1 °C)-99.8 °F (37.7 °C)] 97 °F (36.1 °C)  Pulse:  [75-89] 87  Resp:  [16-34] 24  SpO2:  [87 %-99 %] 90 %  BP: ()/(49-70) 108/62   Weight: 81.2 kg (179 lb 0.2 oz)  Body mass index is 27.22 kg/m².      Intake/Output Summary (Last 24 hours) at 03/18/18  1034  Last data filed at 03/18/18 0900   Gross per 24 hour   Intake           225.15 ml   Output                0 ml   Net           225.15 ml       Physical Exam   Constitutional: No distress.   AAF wearing BiPAP in NAD. Eyes are open but does not track or explicitly respond to presence of examiner.   Cardiovascular: Normal rate, regular rhythm and intact distal pulses.  Exam reveals no gallop and no friction rub.    No murmur heard.  Pulmonary/Chest: No respiratory distress. She has no decreased breath sounds. She has no wheezes. She has no rhonchi. She has no rales.   Exam limited by patient's ability to cooperate   Abdominal: Soft. Bowel sounds are normal. She exhibits no distension. There is no tenderness (Difficult to assess given patient's non-verbal status).   Neurological: She is unresponsive.   [id number]       Vents:  Vent Mode: Spont (03/15/18 1737)  Ventilator Initiated: Yes (02/04/18 2344)  Set Rate: 0 bmp (03/15/18 1737)  Vt Set: 400 mL (03/15/18 1737)  Pressure Support: 15 cmH20 (03/15/18 1737)  PEEP/CPAP: 5 cmH20 (03/15/18 1737)  Oxygen Concentration (%): 30 (03/18/18 0900)  Peak Airway Pressure: 20 cmH2O (03/15/18 1737)  Plateau Pressure: 22 cmH20 (03/15/18 1737)  Total Ve: 0 mL (03/15/18 1737)  F/VT Ratio<105 (RSBI): (!) 36.84 (03/15/18 0532)  Lines/Drains/Airways     Drain                 Gastrostomy/Enterostomy 10/25/16 1401 Percutaneous endoscopic gastrostomy (PEG) midline feeding 508 days    Female External Urinary Catheter 03/14/18 0600 4 days          Pressure Ulcer                 Pressure Injury 02/05/18 1045 Left medial Foot Deep tissue injury 40 days         Pressure Injury 02/05/18 1045 Right lateral Foot Deep tissue injury 40 days          Peripheral Intravenous Line                 Peripheral IV - Single Lumen 02/27/18 1753 Left;Posterior Forearm 18 days         Midline Catheter Insertion/Assessment  - Single Lumen 03/09/18 1732 basilic vein (medial side of arm) 18g x 10cm 8 days               Significant Labs:    CBC/Anemia Profile:  No results for input(s): WBC, HGB, HCT, PLT, MCV, RDW, IRON, FERRITIN, RETIC, FOLATE, [id number], OCCULTBLOOD in the last 48 hours.     Chemistries:  No results for input(s): NA, K, CL, CO2, BUN, CREATININE, CALCIUM, ALBUMIN, PROT, BILITOT, ALKPHOS, ALT, AST, GLUCOSE, MG, PHOS in the last 48 hours.    Recent Lab Results     None          Significant Imaging:  I have reviewed all pertinent imaging results/findings within the past 24 hours.    Assessment/Plan:     Neuro   Seizure-like activity    -Was on keppra, dc 3/4  -Stable         Dysphagia as late effect of cerebrovascular disease    - S/P PEG tube w/ tube feeds held 2/2 to recurrent N/V        CVA, old, hemiparesis    -patient suffered multiple strokes in past, last one in 2008   -nonverbal at baseline, alert, makes eye contact but doesn't follow commands.  -Increased responsiveness today despite increase in fentanyl overnight        Derm   Unstageable pressure ulcer of left buttock    -Multiple area.  -Wound care per nursing.        Pulmonary   * Acute respiratory failure with hypoxia    -Extubated and breathing over BiPAP, will attempt to wean off BiPAP if able  -Glycopyrrolate 0.2 mg IV QID resumed yesterday along with cough assist q4h  -Continue scopolamine 1 mg patch every 3 days  -Continue fentanyl gtt        Aspiration pneumonia    Extensive consolidation of the right lung and debris in the right mainstem bronchus and its branches consistent with aspiration pneumonia on admission that has significantly improved on CXR  - S/p course of rocephin; s/p vanco & cefepime & flagyl   -Concern that if patient resumed enteral feeds, she may have another aspiration event, keep NPO        Cardiac/Vascular   Paroxysmal atrial fibrillation    - Pt home BB held due to hypotension  - Therapy goal focused on comfort of patient        GI   Partial small bowel obstruction    -KUB in 02/14 showed distended stomach and partial  SOB.  -CT abd pelvis w contrast showed no acute intra-abdominal abnormality.  -Emesis happens on/off, no residual w/ adequate respond to antemetic and feed holding.  -No more emesis noted and pt has multiple loose stool        Other   Goals of care, counseling/discussion    -Terminal extubation initially planned for 2/26/2018 discontinued due to family no longer wanting this  -Family no-showed for 2/27 & 3/1 hospitals Care meeting.   -Family meeting on 3/5 with  Staff and fellow - discussed poor prognosis, family agreed to not escalate care.  -Family meeting 3/13 w/ family in agreement with withdrawal of care but would like to contact their estranged brother in [place] prior to doing so; they will notify MICU team after they communicate with him and return on 3/14 for likely extubation  -Family made it clear that they would like for her to be on comfort care and have accepted that their mother will pass very soon.    -Continue w/ fentanyl 25 mcg/hr & versed gtt 2 mg/h  -Patient would benefit from a transfer to an inpatient hospice facility  -Reach out to family again to discuss inpatient hospice        Jihan Jarrett MD  Critical Care Medicine  [facility]

## 2018-03-18 NOTE — PLAN OF CARE
Problem: Patient Care Overview  Goal: Plan of Care Review  Outcome: Ongoing (interventions implemented as appropriate)  Plan of care reviewed with family today by Dr Velez. Patient breathing on BiPap. Cam score positive, patient sedated for a respiration goal of less than 30 breaths per min. No new signs of skin breakdown. Patient remain free from fall in injury throughout the shift. Family not at bedside this shift. Tube feeding held. Still maintaining comfort care. Patient adequately voided per hour. Side rails up times 2, call light in reach, will continue to monitor

## 2018-03-18 NOTE — PLAN OF CARE
Problem: Patient Care Overview  Goal: Plan of Care Review  Outcome: Ongoing (interventions implemented as appropriate)  Comfort measures continued throughout shift. Pt remains on Bipap with oxygen saturation 91-96%. FIO2 decreased to 30%. RR 14-22 with no dyspnea observed. Fentanyl and Versed gtts continued. Puriwik intact to reduce skin breakdown with incontinence. Pt opens eyes to physical stimulation but is otherwise nonresponsive. No family at bedside. No nonverbal indicators of pain present at this time. Plan to continue comfort care with possible hospice consult and step down to floor today.

## 2018-03-18 NOTE — ASSESSMENT & PLAN NOTE
-patient suffered multiple strokes in past, last one in 2008   -nonverbal at baseline, alert, makes eye contact but doesn't follow commands.  -Increased responsiveness today despite increase in fentanyl overnight

## 2018-03-18 NOTE — SUBJECTIVE & OBJECTIVE
Interval History/Significant Events: NAEON. Respiratory status stable but continues to require BiPAP support.    Review of Systems   Unable to perform ROS: Patient nonverbal     Objective:     Vital Signs (Most Recent):  Temp: 97 °F (36.1 °C) (03/18/18 0715)  Pulse: 87 (03/18/18 0916)  Resp: (!) 24 (03/18/18 0916)  BP: 108/62 (03/18/18 0900)  SpO2: (!) 90 % (03/18/18 0916) Vital Signs (24h Range):  Temp:  [97 °F (36.1 °C)-99.8 °F (37.7 °C)] 97 °F (36.1 °C)  Pulse:  [75-89] 87  Resp:  [16-34] 24  SpO2:  [87 %-99 %] 90 %  BP: ()/(49-70) 108/62   Weight: 81.2 kg (179 lb 0.2 oz)  Body mass index is 27.22 kg/m².      Intake/Output Summary (Last 24 hours) at 03/18/18 1034  Last data filed at 03/18/18 0900   Gross per 24 hour   Intake           225.15 ml   Output                0 ml   Net           225.15 ml       Physical Exam   Constitutional: No distress.   AAF wearing BiPAP in NAD. Eyes are open but does not track or explicitly respond to presence of examiner.   Cardiovascular: Normal rate, regular rhythm and intact distal pulses.  Exam reveals no gallop and no friction rub.    No murmur heard.  Pulmonary/Chest: No respiratory distress. She has no decreased breath sounds. She has no wheezes. She has no rhonchi. She has no rales.   Exam limited by patient's ability to cooperate   Abdominal: Soft. Bowel sounds are normal. She exhibits no distension. There is no tenderness (Difficult to assess given patient's non-verbal status).   Neurological: She is unresponsive.   [id number]       Vents:  Vent Mode: Spont (03/15/18 1737)  Ventilator Initiated: Yes (02/04/18 2344)  Set Rate: 0 bmp (03/15/18 1737)  Vt Set: 400 mL (03/15/18 1737)  Pressure Support: 15 cmH20 (03/15/18 1737)  PEEP/CPAP: 5 cmH20 (03/15/18 1737)  Oxygen Concentration (%): 30 (03/18/18 0900)  Peak Airway Pressure: 20 cmH2O (03/15/18 1737)  Plateau Pressure: 22 cmH20 (03/15/18 1737)  Total Ve: 0 mL (03/15/18 1737)  F/VT Ratio<105 (RSBI): (!) 36.84 (03/15/18  0532)  Lines/Drains/Airways     Drain                 Gastrostomy/Enterostomy 10/25/16 1401 Percutaneous endoscopic gastrostomy (PEG) midline feeding 508 days    Female External Urinary Catheter 03/14/18 0600 4 days          Pressure Ulcer                 Pressure Injury 02/05/18 1045 Left medial Foot Deep tissue injury 40 days         Pressure Injury 02/05/18 1045 Right lateral Foot Deep tissue injury 40 days          Peripheral Intravenous Line                 Peripheral IV - Single Lumen 02/27/18 1753 Left;Posterior Forearm 18 days         Midline Catheter Insertion/Assessment  - Single Lumen 03/09/18 1732 basilic vein (medial side of arm) 18g x 10cm 8 days              Significant Labs:    CBC/Anemia Profile:  No results for input(s): WBC, HGB, HCT, PLT, MCV, RDW, IRON, FERRITIN, RETIC, FOLATE, [id number], OCCULTBLOOD in the last 48 hours.     Chemistries:  No results for input(s): NA, K, CL, CO2, BUN, CREATININE, CALCIUM, ALBUMIN, PROT, BILITOT, ALKPHOS, ALT, AST, GLUCOSE, MG, PHOS in the last 48 hours.    Recent Lab Results     None          Significant Imaging:  I have reviewed all pertinent imaging results/findings within the past 24 hours.

## 2018-03-18 NOTE — ASSESSMENT & PLAN NOTE
-Terminal extubation initially planned for 2/26/2018 discontinued due to family no longer wanting this  -Family no-showed for 2/27 & 3/1 [facility]"" Care meeting.   -Family meeting on 3/5 with  Staff and fellow - discussed poor prognosis, family agreed to not escalate care.  -Family meeting 3/13 w/ family in agreement with withdrawal of care but would like to contact their estranged brother in [place] prior to doing so; they will notify MICU team after they communicate with him and return on 3/14 for likely extubation  -Family made it clear that they would like for her to be on comfort care and have accepted that their mother will pass very soon.    -Continue w/ fentanyl 25 mcg/hr & versed gtt 2 mg/h  -Patient would benefit from a transfer to an inpatient hospice facility  -Reach out to family again to discuss inpatient hospice

## 2018-03-19 NOTE — ASSESSMENT & PLAN NOTE
Have been following patient from a distance as family does not arrive for family meetings or been willing to make decisions (chart review). Stopped by early this am and family had not been there.  At 2 pm, 3 daughters were in the room and they were just leaving. I asked if we could talk and they said they made a decision to transfer patient to inpatient hospice. They then left without further discussion. I am happy to help if needed.

## 2018-03-19 NOTE — PLAN OF CARE
CM received call from Dr. Andrews inquiring about medication changes prior to transfer to inpatient hospice as patient is on continuous Fentanyl and Versed.  CM communicated inpt hospice likely can accomodate Fentanyl and suggested Dr. Andrews communicate with nursing as recent barrier has been transferring the patient with controlled substance from a nursing perspective.  CM to continue to follow and await inpatient hospice orders.    Lisa Landers RN, BSN  Case Management  [facility]  Ext. [phone]

## 2018-03-19 NOTE — PLAN OF CARE
Problem: Patient Care Overview  Goal: Plan of Care Review  Outcome: Ongoing (interventions implemented as appropriate)  Outcome: Ongoing (interventions implemented as appropriate)  Comfort measures continued throughout shift. Pt remains on Bipap with oxygen saturation 85-90%. FIO2 decreased to 30%. RR 14-28 with no dyspnea observed. Fentanyl and Versed gtts continued. Puriwik intact to reduce skin breakdown with incontinence. Pt opens eyes to physical stimulation but is otherwise nonresponsive. No family at bedside. No nonverbal indicators of pain present at this time. Plan to continue comfort care with possible hospice consult and step down to floor today.

## 2018-03-19 NOTE — PROGRESS NOTES
"[facility]  Critical Care Medicine  Progress Note    Patient Name: Jonathan Nieves  MRN: [id number]  Admission Date: 2/4/2018  Hospital Length of Stay: 42 days  Code Status: DNR  Attending Provider: Maki Medeiros MD  Primary Care Provider: Primary Doctor No   Principal Problem: Acute respiratory failure with hypoxia    Subjective:     HPI:  Mr. Jonathan Nieves is a 53yo female with history of CVA with residual aphasia and dysphagia, non-verbal, s/p PEG tube placement and bed riddenness, osteomyelitis s/p amputation of toe, who comes to the ED from [facility] via EMS in respiratory distress. Per ED/EMS the patient was noted to have "projectile vomiting" yesterday evening (24 hours prior to presentation), which was treated with zofran. During the event there is suspicion the patient possible aspirated. Today the patient was noted to be in resp distress with a fever.     Patient was satting 88% on 4L on EMS arrival, breathing 40 times per minute.  Improved to 96% on 15L NRB with RR high 20s.  Axillary temp 102.    CT chest concerning for aspiration pneumonia.  UA concern for a UTI.       Patient intubated in the ED & Critical Care consulted.         Hospital/ICU Course:  2/5: Critical Care Consulted. Patient intubated in the ED.   2/6: Patient doing well, on SBT; will extubate maria e. Continue treatment for aspiration PNA & UTI.   2/7: abx deescalated, continue to treat for PNA and UTI. Required pressors overnight. Off this morning.   2/8-9: still with secretions and questionable mentation? Baseline?. Will try to dry the secretions and extubated today or maria e.    2/10: MRI completed. Family called, had family meeting with one daughter. Need to re-visit with all daughters. Records requested from [facility] and [facility].   02/11-13: neurology consulted, no acute changes, wbc and LFT trending up, keep monitoring.  2/14: Concerns for CADASIL vs Binswanger syndrome per Neuro. NOTCH3 genetic testing " sent. Palliative consulted regarding goals of care.   02/15 pt is not tolerating tube feed, had two episodes of projectile vomiting, KUB 02/14 shows partial SOB and distended stomach.  02/16 CT abdomen was unremarkable, family meeting was done.  02/17 no more vomiting, tolerating 10 cc/hr tube feeds.  02/19 trial to advance tube feeding, had residual back to trickle feed will try to advance slowly. 02/19 PT became hypotensive this morning with MAPs of 50's started on levo, daughter was contacted regarding goals of care, pt status was changed to partial code upon family request.  02/20 Pt still requiring levo, try to wean off slowly, family did not show for the meeting.  02/21 family meeting was done yesterday, no acclation of care per daughter wishes.   02/25 Pt lost IV access,still unable to wean off the went, neurological status unchanged she is alert but not following commands, fails her SBT every morning.  2/27: Failed SBT. Family did not show up for Palliative Care meeting.  2/28: NAEON.   3/1: family no showed for meeting  3/2: ethics committee consult by palliative  3/3: spoke to daughter zainab, informed she was awaiting her sister to pick her up and they are coming to hospital. She no showed again  3/4: morphine drip started as patient appeared to be in pain, stopped when patient became hypotensive.  3/5: will attempt to contact family for another meeting today  3/6: Family meeting held yesterday with staff and fellow, discussed poor prognosis of pt. Family in agreement to not escalate care; however, not ready for withdrawal of care. Will revist with family meeting possibly tomorrow.   3/7: Pt was restarted on TF, however did not tolerate.   3/9: hypoglycemic overnight, started on D5 gtt. Palliative had discussion with patient's daughter, zainab. Plan is for terminal extubation on 3/13.   3/12: remains on D5 increased the rate from 50 to 100 for threshold of -180, failed SBT today. plan for family  tmw regarding withdrawal of life support  3/13: No acute events overnight.  Plan for extubation to NIPPV vs palliative sedation later on today.   3/14:  Family presented to bedside yesterday but deferred extubation until they attempted to contact their estranged brother in [place] via Facebook message.  If they do not hear back from him by the end of today, they will move forward with extubation as was originally planned for yesterday.  3/15:  Ms. Nieves's family did not show up yesterday afternoon and no word if they were able to contact the brother.  Patient started on scheduled glycopyrrolate 0.2 mg IV QID & fentanyl 25 mcg/hr gtt yesterday with cough assist q4h as well.  Tube feeds held yesterday.  3:16:  Ms. Nieves's family spoke with MICU team yesterday and per their wishes she was extubated to BiPAP yesterday which she has been tolerating well.  Her versed gtt was also increased yesterday for comfort.  Family not present at bedside  3/17:  Patient still tolerating BiPAP well but has failed attempts to wean any further.  Patient's family spoke with MICU yesterday and per discussion, they have come to accept the evenuality of her passing.  3/18: NAEON. BiPAP weaned to 10/5. Will update family with any changes in patients condition.  3/19: Family updated with current plan, and are agreeable to comfort care. Will convert patient from fentanyl to morphine and will work to step down from MICU.     Interval History/Significant Events:     Family updated with current plan, and are agreeable to comfort care. Will convert patient from fentanyl to morphine and will work to step down from MICU.     Review of Systems   Unable to perform ROS: Patient nonverbal     Objective:     Vital Signs (Most Recent):  Temp: 99.3 °F (37.4 °C) (03/18/18 2300)  Pulse: 87 (03/19/18 1151)  Resp: 17 (03/19/18 1151)  BP: (!) 95/56 (03/19/18 0705)  SpO2: (!) 87 % (03/19/18 1151) Vital Signs (24h Range):  Temp:  [99.2 °F (37.3 °C)-99.9 °F (37.7  °C)] 99.3 °F (37.4 °C)  Pulse:  [81-97] 87  Resp:  [15-28] 17  SpO2:  [81 %-92 %] 87 %  BP: ()/(45-75) 95/56   Weight: 81.2 kg (179 lb 0.2 oz)  Body mass index is 27.22 kg/m².      Intake/Output Summary (Last 24 hours) at 03/19/18 1234  Last data filed at 03/19/18 1153   Gross per 24 hour   Intake           432.09 ml   Output               30 ml   Net           402.09 ml       Physical Exam   Constitutional: No distress.   AAF wearing BiPAP in NAD. Eyes are open but does not track or explicitly respond to presence of examiner.   Cardiovascular: Normal rate, regular rhythm and intact distal pulses.  Exam reveals no gallop and no friction rub.    No murmur heard.  Pulmonary/Chest: No respiratory distress. She has no decreased breath sounds. She has no wheezes. She has no rhonchi. She has no rales.   Exam limited by patient's ability to cooperate   Abdominal: Soft. Bowel sounds are normal. She exhibits no distension. There is no tenderness (Difficult to assess given patient's non-verbal status).   Neurological: She is unresponsive.   [id number]       Vents:  Vent Mode: Spont (03/15/18 1737)  Ventilator Initiated: Yes (02/04/18 2344)  Set Rate: 0 bmp (03/15/18 1737)  Vt Set: 400 mL (03/15/18 1737)  Pressure Support: 15 cmH20 (03/15/18 1737)  PEEP/CPAP: 5 cmH20 (03/15/18 1737)  Oxygen Concentration (%): 30 (03/19/18 1151)  Peak Airway Pressure: 20 cmH2O (03/15/18 1737)  Plateau Pressure: 22 cmH20 (03/15/18 1737)  Total Ve: 0 mL (03/15/18 1737)  F/VT Ratio<105 (RSBI): (!) 36.84 (03/15/18 0532)  Lines/Drains/Airways     Drain                 Gastrostomy/Enterostomy 10/25/16 1401 Percutaneous endoscopic gastrostomy (PEG) midline feeding 509 days    Female External Urinary Catheter 03/14/18 0600 5 days          Pressure Ulcer                 Pressure Injury 02/05/18 1045 Left medial Foot Deep tissue injury 42 days         Pressure Injury 02/05/18 1045 Right lateral Foot Deep tissue injury 42 days          Peripheral  Intravenous Line                 Peripheral IV - Single Lumen 02/27/18 1753 Left;Posterior Forearm 19 days         Midline Catheter Insertion/Assessment  - Single Lumen 03/09/18 1732 basilic vein (medial side of arm) 18g x 10cm 9 days              Significant Labs:    None    Significant Imaging:  I have reviewed and interpreted all pertinent imaging results/findings within the past 24 hours.    Assessment/Plan:     Neuro   Seizure-like activity    -Was on keppra, dc 3/4  -Stable         Dysphagia as late effect of cerebrovascular disease    - S/P PEG tube w/ tube feeds held 2/2 to recurrent N/V        CVA, old, hemiparesis    -patient suffered multiple strokes in past, last one in 2008   -nonverbal at baseline, alert, makes eye contact but doesn't follow commands.  -Increased responsiveness today despite increase in fentanyl overnight        Derm   Unstageable pressure ulcer of left buttock    -Multiple area.  -Wound care per nursing.        Pulmonary   * Acute respiratory failure with hypoxia    -Extubated and breathing over BiPAP, will attempt to wean off BiPAP if able  -Glycopyrrolate 0.2 mg IV QID resumed yesterday along with cough assist q4h  -Continue scopolamine 1 mg patch every 3 days  -Continue fentanyl gtt        Aspiration pneumonia    Extensive consolidation of the right lung and debris in the right mainstem bronchus and its branches consistent with aspiration pneumonia on admission that has significantly improved on CXR  - S/p course of rocephin; s/p vanco & cefepime & flagyl   -Concern that if patient resumed enteral feeds, she may have another aspiration event, keep NPO        Cardiac/Vascular   Paroxysmal atrial fibrillation    - Pt home BB held due to hypotension  - Therapy goal focused on comfort of patient        GI   Partial small bowel obstruction    -KUB in 02/14 showed distended stomach and partial SOB.  -CT abd pelvis w contrast showed no acute intra-abdominal abnormality.  -Emesis happens  on/off, no residual w/ adequate respond to antemetic and feed holding.  -No more emesis noted and pt has multiple loose stool        Other   Palliative care encounter    As above in Goals of Care        Goals of care, counseling/discussion    -Terminal extubation initially planned for 2/26/2018 discontinued due to family no longer wanting this  -Family no-showed for 2/27 & 3/1 Pall Care meeting.   -Family meeting on 3/5 with  Staff and fellow - discussed poor prognosis, family agreed to not escalate care.  -Family meeting 3/13 w/ family in agreement with withdrawal of care but would like to contact their estranged brother in [place] prior to doing so; they will notify MICU team after they communicate with him and return on 3/14 for likely extubation  -Family made it clear that they would like for her to be on comfort care and have accepted that their mother will pass very soon.    -Transition fentanyl 25 mcg/hr & versed gtt 2 mg/h to morphine for inpatient hospice care.  -Patient to transfer to inpatient hospice          Critical care was time spent personally by me on the following activities: development of treatment plan with patient or surrogate and bedside caregivers, discussions with consultants, evaluation of patient's response to treatment, examination of patient, ordering and performing treatments and interventions, ordering and review of laboratory studies, ordering and review of radiographic studies, pulse oximetry, re-evaluation of patient's condition. This critical care time did not overlap with that of any other provider or involve time for any procedures.     Alejandro Hensley MD  Critical Care Medicine  [facility]

## 2018-03-19 NOTE — SUBJECTIVE & OBJECTIVE
Interval History: Family at bedside and left as I asked if I could talk with them.    Medications:  Continuous Infusions:   fentanyl 200 mcg/hr (18 1153)    midazolam (VERSED) infusion (non-titrating) 3 mg/hr (18 0600)     Scheduled Meds:   glycopyrrolate  0.2 mg Intravenous QID    scopolamine  1 patch Transdermal Q3 Days     PRN Meds:dextrose 50 % in water (D50W), [] fentaNYL **FOLLOWED BY** fentaNYL, glucagon (human recombinant), LORazepam, midazolam, ondansetron, sodium chloride 0.9%    Objective:     Vital Signs (Most Recent):  Temp: 99.3 °F (37.4 °C) (18 2300)  Pulse: 87 (18 1151)  Resp: 17 (18 1151)  BP: (!) 95/56 (18 0705)  SpO2: (!) 87 % (18 1151) Vital Signs (24h Range):  Temp:  [99.2 °F (37.3 °C)-99.9 °F (37.7 °C)] 99.3 °F (37.4 °C)  Pulse:  [81-97] 87  Resp:  [15-28] 17  SpO2:  [81 %-92 %] 87 %  BP: ()/(45-75) 95/56     Weight: 81.2 kg (179 lb 0.2 oz)  Body mass index is 27.22 kg/m².    Review of Symptoms  Symptom Assessment (ESAS 0-10 scale)  ESAS 0 1 2 3 4 5 6 7 8 9 10   Pain x             Dyspnea x             Anxiety x             Nausea x             Depression  x             Anorexia x             Fatigue x             Insomnia x             Restlessness  x             Agitation x             CAM / Delirium _x_ --  ___+   Constipation     _x_ --  ___+   Diarrhea           _x_ --  ___+  Bowel Management Plan (BMP): No    Comments: None necessary. No feedings tolerated.    Pain Assessment: On fentanyl and versed    OME in 24 hours: fentanyl drip    Performance Status: 10    ECOG Performance Status Grade: 4 - Completely disabled    Physical Exam    Significant Labs: All pertinent labs within the past 24 hours have been reviewed.  CBC:   No results for input(s): WBC, HGB, HCT, MCV, PLT in the last 168 hours.  BMP:  No results for input(s): GLU, NA, K, CL, CO2, BUN, CREATININE, CALCIUM, MG in the last 24 hours.  LFT:  Lab Results   Component  Value Date    AST 15 03/10/2018    ALKPHOS 83 03/10/2018    BILITOT 0.7 03/10/2018     Albumin:   Albumin   Date Value Ref Range Status   03/10/2018 2.6 (L) 3.5 - 5.2 g/dL Final     Protein:   Total Protein   Date Value Ref Range Status   03/10/2018 7.5 6.0 - 8.4 g/dL Final     Lactic acid:   Lab Results   Component Value Date    LACTATE 0.9 02/07/2018    LACTATE 1.6 02/05/2018       Significant Imaging: I have reviewed all pertinent imaging results/findings within the past 24 hours.    Advanced Directives::  Living Will: No  LaPOST: No  Do Not Resuscitate Status: Yes  Medical Power of : No    Decision-Making Capacity: Family answered questions, Patient unable to communicate due to disease severity/cognitive impairment    Living Arrangements: Lives in nursing home    Psychosocial/Cultural:  Patient's most important priorities:  Unable to discern    Patient's biggest concerns/fears:  Unable to discern    Previous death/end of life care history:  Unable to discern    Patient's goals/hopes:  Unable to discern    Spiritual:     F- Vielka and Belief: Very important to children  I - Importance:Very important to children.  C - Community:Very important to children  A - Address in Care:Very important to children

## 2018-03-19 NOTE — ASSESSMENT & PLAN NOTE
-Terminal extubation initially planned for 2/26/2018 discontinued due to family no longer wanting this  -Family no-showed for 2/27 & 3/1 \[facility]\"" Care meeting.   -Family meeting on 3/5 with  Staff and fellow - discussed poor prognosis, family agreed to not escalate care.  -Family meeting 3/13 w/ family in agreement with withdrawal of care but would like to contact their estranged brother in [place] prior to doing so; they will notify MICU team after they communicate with him and return on 3/14 for likely extubation  -Family made it clear that they would like for her to be on comfort care and have accepted that their mother will pass very soon.    -Transition fentanyl 25 mcg/hr & versed gtt 2 mg/h to morphine for inpatient hospice care.  -Patient to transfer to inpatient hospice

## 2018-03-19 NOTE — PLAN OF CARE
SW met w/daughters and family gave consent for ip [facility] referral. SW made referral to [facility] through [facility] fax system. Emergency  is bhavik Aranda.,cell# [phone] for all consents.    Ginger Madrigal LMSW  Ext. [phone]

## 2018-03-19 NOTE — SUBJECTIVE & OBJECTIVE
Interval History/Significant Events:     Family updated with current plan, and are agreeable to comfort care. Will convert patient from fentanyl to morphine and will work to step down from MICU.     Review of Systems   Unable to perform ROS: Patient nonverbal     Objective:     Vital Signs (Most Recent):  Temp: 99.3 °F (37.4 °C) (03/18/18 2300)  Pulse: 87 (03/19/18 1151)  Resp: 17 (03/19/18 1151)  BP: (!) 95/56 (03/19/18 0705)  SpO2: (!) 87 % (03/19/18 1151) Vital Signs (24h Range):  Temp:  [99.2 °F (37.3 °C)-99.9 °F (37.7 °C)] 99.3 °F (37.4 °C)  Pulse:  [81-97] 87  Resp:  [15-28] 17  SpO2:  [81 %-92 %] 87 %  BP: ()/(45-75) 95/56   Weight: 81.2 kg (179 lb 0.2 oz)  Body mass index is 27.22 kg/m².      Intake/Output Summary (Last 24 hours) at 03/19/18 1234  Last data filed at 03/19/18 1153   Gross per 24 hour   Intake           432.09 ml   Output               30 ml   Net           402.09 ml       Physical Exam   Constitutional: No distress.   AAF wearing BiPAP in NAD. Eyes are open but does not track or explicitly respond to presence of examiner.   Cardiovascular: Normal rate, regular rhythm and intact distal pulses.  Exam reveals no gallop and no friction rub.    No murmur heard.  Pulmonary/Chest: No respiratory distress. She has no decreased breath sounds. She has no wheezes. She has no rhonchi. She has no rales.   Exam limited by patient's ability to cooperate   Abdominal: Soft. Bowel sounds are normal. She exhibits no distension. There is no tenderness (Difficult to assess given patient's non-verbal status).   Neurological: She is unresponsive.   [id number]       Vents:  Vent Mode: Spont (03/15/18 1737)  Ventilator Initiated: Yes (02/04/18 2344)  Set Rate: 0 bmp (03/15/18 1737)  Vt Set: 400 mL (03/15/18 1737)  Pressure Support: 15 cmH20 (03/15/18 1737)  PEEP/CPAP: 5 cmH20 (03/15/18 1737)  Oxygen Concentration (%): 30 (03/19/18 1151)  Peak Airway Pressure: 20 cmH2O (03/15/18 1737)  Plateau Pressure: 22 cmH20  (03/15/18 1737)  Total Ve: 0 mL (03/15/18 1737)  F/VT Ratio<105 (RSBI): (!) 36.84 (03/15/18 0532)  Lines/Drains/Airways     Drain                 Gastrostomy/Enterostomy 10/25/16 1401 Percutaneous endoscopic gastrostomy (PEG) midline feeding 509 days    Female External Urinary Catheter 03/14/18 0600 5 days          Pressure Ulcer                 Pressure Injury 02/05/18 1045 Left medial Foot Deep tissue injury 42 days         Pressure Injury 02/05/18 1045 Right lateral Foot Deep tissue injury 42 days          Peripheral Intravenous Line                 Peripheral IV - Single Lumen 02/27/18 1753 Left;Posterior Forearm 19 days         Midline Catheter Insertion/Assessment  - Single Lumen 03/09/18 1732 basilic vein (medial side of arm) 18g x 10cm 9 days              Significant Labs:    None    Significant Imaging:  I have reviewed and interpreted all pertinent imaging results/findings within the past 24 hours.

## 2018-03-19 NOTE — PROGRESS NOTES
"[facility]  Palliative Medicine  Progress Note    Patient Name: Jonathan Nieves  MRN: [id number]  Admission Date: 2/4/2018  Hospital Length of Stay: 42 days  Code Status: DNR   Attending Provider: Maki Medeiros MD  Consulting Provider: Mariella Rivas MD  Primary Care Physician: Primary Doctor No  Principal Problem:Acute respiratory failure with hypoxia    Patient information was obtained from nursing home and past medical records.      Assessment/Plan:     Palliative care encounter    Have been following patient from a distance as family does not arrive for family meetings or been willing to make decisions (chart review). Stopped by early this am and family had not been there.  At 2 pm, 3 daughters were in the room and they were just leaving. I asked if we could talk and they said they made a decision to transfer patient to inpatient hospice. They then left without further discussion. I am happy to help if needed.            I will sign off. Please contact us if you have any additional questions.    Subjective:     Chief Complaint:   Chief Complaint   Patient presents with    Respiratory Distress     [facility] to ED with suspected aspiration following episode of reported projectile vomiting 3 Jan. Upon EMs arrival pt was 88% on 4L NC.        HPI:   Asked to see patient by Dr. Sales for assistance with goals of care and symptom management. Chart reviewed, patient examined.  Mr. Jonathan Nieves is a 53yo female with history of CVA with residual aphasia and dysphagia, non-verbal, s/p PEG tube placement and bed riddenness, osteomyelitis s/p amputation of toe, who came to the ED from [facility] via EMS in respiratory distress. Per ED/EMS the patient was noted to have "projectile vomiting" yesterday evening (24 hours prior to presentation), which was treated with zofran. During the event there is suspicion the patient possible aspirated. Currently, she opens " eyes to stimulation, intubated, with contractures of knees/hips/elbows/hands and multiple areas of pressure injury.   I spoke with Daughter Matias about her mother. Family meeting for today was cancelled due to lack of participation.    Hospital Course:  No notes on file    Interval History: Family at bedside and left as I asked if I could talk with them.    Medications:  Continuous Infusions:   fentanyl 200 mcg/hr (18 1153)    midazolam (VERSED) infusion (non-titrating) 3 mg/hr (18 0600)     Scheduled Meds:   glycopyrrolate  0.2 mg Intravenous QID    scopolamine  1 patch Transdermal Q3 Days     PRN Meds:dextrose 50 % in water (D50W), [] fentaNYL **FOLLOWED BY** fentaNYL, glucagon (human recombinant), LORazepam, midazolam, ondansetron, sodium chloride 0.9%    Objective:     Vital Signs (Most Recent):  Temp: 99.3 °F (37.4 °C) (18 2300)  Pulse: 87 (18 1151)  Resp: 17 (18 1151)  BP: (!) 95/56 (18 0705)  SpO2: (!) 87 % (18 1151) Vital Signs (24h Range):  Temp:  [99.2 °F (37.3 °C)-99.9 °F (37.7 °C)] 99.3 °F (37.4 °C)  Pulse:  [81-97] 87  Resp:  [15-28] 17  SpO2:  [81 %-92 %] 87 %  BP: ()/(45-75) 95/56     Weight: 81.2 kg (179 lb 0.2 oz)  Body mass index is 27.22 kg/m².    Review of Symptoms  Symptom Assessment (ESAS 0-10 scale)  ESAS 0 1 2 3 4 5 6 7 8 9 10   Pain x             Dyspnea x             Anxiety x             Nausea x             Depression  x             Anorexia x             Fatigue x             Insomnia x             Restlessness  x             Agitation x             CAM / Delirium _x_ --  ___+   Constipation     _x_ --  ___+   Diarrhea           _x_ --  ___+  Bowel Management Plan (BMP): No    Comments: None necessary. No feedings tolerated.    Pain Assessment: On fentanyl and versed    OME in 24 hours: fentanyl drip    Performance Status: 10    ECOG Performance Status Grade: 4 - Completely disabled    Physical Exam    Significant Labs: All  pertinent labs within the past 24 hours have been reviewed.  CBC:   No results for input(s): WBC, HGB, HCT, MCV, PLT in the last 168 hours.  BMP:  No results for input(s): GLU, NA, K, CL, CO2, BUN, CREATININE, CALCIUM, MG in the last 24 hours.  LFT:  Lab Results   Component Value Date    AST 15 03/10/2018    ALKPHOS 83 03/10/2018    BILITOT 0.7 03/10/2018     Albumin:   Albumin   Date Value Ref Range Status   03/10/2018 2.6 (L) 3.5 - 5.2 g/dL Final     Protein:   Total Protein   Date Value Ref Range Status   03/10/2018 7.5 6.0 - 8.4 g/dL Final     Lactic acid:   Lab Results   Component Value Date    LACTATE 0.9 02/07/2018    LACTATE 1.6 02/05/2018       Significant Imaging: I have reviewed all pertinent imaging results/findings within the past 24 hours.    Advanced Directives::  Living Will: No  LaPOST: No  Do Not Resuscitate Status: Yes  Medical Power of : No    Decision-Making Capacity: Family answered questions, Patient unable to communicate due to disease severity/cognitive impairment    Living Arrangements: Lives in nursing home    Psychosocial/Cultural:  Patient's most important priorities:  Unable to discern    Patient's biggest concerns/fears:  Unable to discern    Previous death/end of life care history:  Unable to discern    Patient's goals/hopes:  Unable to discern    Spiritual:     F- Vielka and Belief: Very important to children  I - Importance:Very important to children.  C - Community:Very important to children  A - Address in Care:Very important to children      > 50% of 20 min visit spent in chart review, face to face discussion of goals of care,  symptom assessment, coordination of care and emotional support.    Mariella Rivas MD  Palliative Medicine  [facility]

## 2018-03-20 NOTE — ASSESSMENT & PLAN NOTE
-Terminal extubation initially planned for 2/26/2018 discontinued due to family no longer wanting this  -Family no-showed for 2/27 & 3/1 [facility] Care meeting.   -Family meeting on 3/5 with  Staff and fellow - discussed poor prognosis, family agreed to not escalate care.  -Family meeting 3/13 w/ family in agreement with withdrawal of care but would like to contact their estranged brother in [place] prior to doing so; they will notify MICU team after they communicate with him and return on 3/14 for likely extubation  -Family made it clear that they would like for her to be on comfort care and have accepted that their mother will pass very soon.    -Transition fentanyl 25 mcg/hr & versed gtt 2 mg/h to morphine for inpatient hospice care.  -Patient to transfer to inpatient hospice hopefully later on today

## 2018-03-20 NOTE — SIGNIFICANT EVENT
Critical Care Medicine                                                                                       Death Note      Called to bedside by patient's nurse. Nursing supervisor notified. Family not present at bedside but has been called.  has been called and is also at bedside.  Patient is not responding to verbal or tactile stimuli. Patient does not have a pupillary or corneal reflex. Her pupils are fixed and dilated. No heart or breath sounds on auscultation. No respirations. No palpable pulses.     Time of death: 9:34AM.     Cause of Death: Aspiration pneumonia    Jihan Jarrett MD

## 2018-03-20 NOTE — PLAN OF CARE
Problem: Patient Care Overview  Goal: Plan of Care Review  Outcome: Ongoing (interventions implemented as appropriate)  Pt continues to receive comfort care measures. Opens eyes spontaneously to voice but otherwise unresponsive. Fentanyl and Versed gtt ongoing for pain and anxiety. RR 14-28 w Bipap in place, no dyspnea noted. No family present overnight. Pt turned regularly for comfort, purewick remains in place to avoid skin breakdown, scant output. Plans for pt to be discharged to hospice.

## 2018-03-20 NOTE — DISCHARGE SUMMARY
"[facility]  Critical Care Medicine  Discharge Summary      Patient Name: Jonathan Nieves  MRN: [id number]  Admission Date: 2/4/2018  Hospital Length of Stay: 43 days  Discharge Date and Time: 3/20/2018     Attending Physician: Maki Medeiros MD   Discharging Provider: Jihan Jarrett MD  Primary Care Provider: Primary Doctor No  Reason for Admission:     HPI:   Mr. Jonathan Nieves is a 51yo female with history of CVA with residual aphasia and dysphagia, non-verbal, s/p PEG tube placement and bed riddenness, osteomyelitis s/p amputation of toe, who comes to the ED from [facility] via EMS in respiratory distress. Per ED/EMS the patient was noted to have "projectile vomiting" yesterday evening (24 hours prior to presentation), which was treated with zofran. During the event there is suspicion the patient possible aspirated. Today the patient was noted to be in resp distress with a fever.     Patient was satting 88% on 4L on EMS arrival, breathing 40 times per minute.  Improved to 96% on 15L NRB with RR high 20s.  Axillary temp 102.    CT chest concerning for aspiration pneumonia.  UA concern for a UTI.       Patient intubated in the ED & Critical Care consulted.         * No surgery found *    Indwelling Lines/Drains at Time of Discharge:   Lines/Drains/Airways     Drain                 Gastrostomy/Enterostomy 10/25/16 1401 Percutaneous endoscopic gastrostomy (PEG) midline feeding 510 days    Female External Urinary Catheter 03/14/18 0600 6 days          Pressure Ulcer                 Pressure Injury 02/05/18 1045 Left medial Foot Deep tissue injury 43 days         Pressure Injury 02/05/18 1045 Right lateral Foot Deep tissue injury 43 days              Hospital Course:   2/5: Critical Care Consulted. Patient intubated in the ED.   2/6: Patient doing well, on SBT; will extubate mraia e. Continue treatment for aspiration PNA & UTI.   2/7: abx deescalated, continue to treat for PNA and UTI. " Required pressors overnight. Off this morning.   2/8-9: still with secretions and questionable mentation? Baseline?. Will try to dry the secretions and extubated today or maria e.    2/10: MRI completed. Family called, had family meeting with one daughter. Need to re-visit with all daughters. Records requested from [facility] and [facility].   02/11-13: neurology consulted, no acute changes, wbc and LFT trending up, keep monitoring.  2/14: Concerns for CADASIL vs Binswanger syndrome per Neuro. NOTCH3 genetic testing sent. Palliative consulted regarding goals of care.   02/15 pt is not tolerating tube feed, had two episodes of projectile vomiting, KUB 02/14 shows partial SOB and distended stomach.  02/16 CT abdomen was unremarkable, family meeting was done.  02/17 no more vomiting, tolerating 10 cc/hr tube feeds.  02/19 trial to advance tube feeding, had residual back to trickle feed will try to advance slowly. 02/19 PT became hypotensive this morning with MAPs of 50's started on levo, daughter was contacted regarding goals of care, pt status was changed to partial code upon family request.  02/20 Pt still requiring levo, try to wean off slowly, family did not show for the meeting.  02/21 family meeting was done yesterday, no acclation of care per daughter wishes.   02/25 Pt lost IV access,still unable to wean off the went, neurological status unchanged she is alert but not following commands, fails her SBT every morning.  2/27: Failed SBT. Family did not show up for Palliative Care meeting.  2/28: NAEON.   3/1: family no showed for meeting  3/2: ethics committee consult by palliative  3/3: spoke to daughter zainab, informed she was awaiting her sister to pick her up and they are coming to hospital. She no showed again  3/4: morphine drip started as patient appeared to be in pain, stopped when patient became hypotensive.  3/5: will attempt to contact family for another meeting today  3/6: Family meeting held yesterday with staff  and fellow, discussed poor prognosis of pt. Family in agreement to not escalate care; however, not ready for withdrawal of care. Will revist with family meeting possibly tomorrow.   3/7: Pt was restarted on TF, however did not tolerate.   3/9: hypoglycemic overnight, started on D5 gtt. Palliative had discussion with patient's daughter, zainab. Plan is for terminal extubation on 3/13.   3/12: remains on D5 increased the rate from 50 to 100 for threshold of -180, failed SBT today. plan for family tmw regarding withdrawal of life support  3/13: No acute events overnight.  Plan for extubation to NIPPV vs palliative sedation later on today.   3/14:  Family presented to bedside yesterday but deferred extubation until they attempted to contact their estranged brother in [place] via Facebook message.  If they do not hear back from him by the end of today, they will move forward with extubation as was originally planned for yesterday.  3/15:  Ms. Nieves's family did not show up yesterday afternoon and no word if they were able to contact the brother.  Patient started on scheduled glycopyrrolate 0.2 mg IV QID & fentanyl 25 mcg/hr gtt yesterday with cough assist q4h as well.  Tube feeds held yesterday.  3:16:  Ms. Nieves's family spoke with MICU team yesterday and per their wishes she was extubated to BiPAP yesterday which she has been tolerating well.  Her versed gtt was also increased yesterday for comfort.  Family not present at bedside  3/17:  Patient still tolerating BiPAP well but has failed attempts to wean any further.  Patient's family spoke with MICU yesterday and per discussion, they have come to accept the evenuality of her passing.  3/18: NAEON. BiPAP weaned to 10/5. Will update family with any changes in patients condition.  3/19: Family updated with current plan, and are agreeable to comfort care. Will convert patient from fentanyl to morphine and will work to step down from MICU.   3/20: Patient  at  9:34AM.      Consults         Status Ordering Provider     Inpatient consult to Critical Care Medicine  Once     Provider:  (Not yet assigned)    Completed SOPHIA JEROME     Inpatient consult to Infectious Diseases  Once     Provider:  (Not yet assigned)    Completed DESTINEE VELASQUEZ     Inpatient consult to Midline team  Once     Provider:  (Not yet assigned)    Completed TANG CORTEZ H.     Inpatient consult to Midline team  Once     Provider:  (Not yet assigned)    Completed STEPHANIE HANDY     Inpatient consult to Midline team  Once     Provider:  (Not yet assigned)    Completed JEET KOTHARI     Inpatient consult to Midline team  Once     Provider:  (Not yet assigned)    Completed THELIVIEROTE, JANET S.     Inpatient consult to Neurology  Once     Provider:  (Not yet assigned)    Completed TANG CORTEZ HAMOSMAN H.     Inpatient consult to Palliative Care  Once     Provider:  (Not yet assigned)    Completed THEPPOTE, JANET S.     Inpatient consult to Registered Dietitian/Nutritionist  Once     Provider:  (Not yet assigned)    Completed TANG CORTEZ HAMAD H.        Significant Labs:  All pertinent labs within the past 24 hours have been reviewed.    Significant Imaging:  I have reviewed all pertinent imaging results/findings within the past 24 hours.    Pending Diagnostic Studies:     Procedure Component Value Units Date/Time    CBC auto differential [[id number]] Collected:  03/01/18 0805    Order Status:  Sent Lab Status:  In process Updated:  03/01/18 0805    Specimen:  Blood from Blood     Comprehensive metabolic panel [[id number]] Collected:  03/01/18 0805    Order Status:  Sent Lab Status:  In process Updated:  03/01/18 0805    Specimen:  Blood from Blood     Magnesium [[id number]] Collected:  03/01/18 0805    Order Status:  Sent Lab Status:  In process Updated:  03/01/18 0805    Specimen:  Blood from Blood     Phosphorus [[id number]] Collected:  03/01/18 0805    Order Status:  Sent Lab  Status:  In process Updated:  18 0805    Specimen:  Blood from Blood         Final Active Diagnoses:    Diagnosis Date Noted POA    PRINCIPAL PROBLEM:  Acute respiratory failure with hypoxia [J96.01] 2018 Yes    Partial small bowel obstruction [K56.600] 2018 Yes    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] 02/15/2018 Yes    Palliative care encounter [Z51.5] 2018 Not Applicable    Intubation of airway performed without difficulty [Z78.9] 2018 Yes    Unstageable pressure ulcer of left buttock [L89.320] 2018 Yes    Alteration in skin integrity due to moisture [R23.9] 2018 Yes    Seizure-like activity [R56.9] 2017 Yes    Goals of care, counseling/discussion [Z71.89] 2017 Not Applicable    Aspiration pneumonia [J69.0] 10/13/2014 Yes    Paroxysmal atrial fibrillation [I48.0] 05/15/2014 Yes    CVA, old, hemiparesis [I69.359] 05/15/2014 Not Applicable    Dysphagia as late effect of cerebrovascular disease [I69.991] 05/15/2014 Not Applicable      Problems Resolved During this Admission:    Diagnosis Date Noted Date Resolved POA    Decubitus ulcer of left elbow, stage 3 [L89.023] 2018 Yes    Circulatory failure [R57.9] 2018 Yes    Hypernatremia [E87.0] 2018 Yes    Pressure injury of deep tissue [L89.90] 2018 03/15/2018 Yes    PEG (percutaneous endoscopic gastrostomy) status [Z93.1] 2014 03/15/2018 Not Applicable    UTI (urinary tract infection) [N39.0] 10/13/2014 2018 Yes     No new Assessment & Plan notes have been filed under this hospital service since the last note was generated.  Service: Critical Care Medicine    Discharged Condition:     Disposition:     Follow-up Information     Primary Doctor No.               Patient Instructions:   No discharge procedures on file.  Medications:  None (patient  at medical facility)     Jihan Jarrett MD  Critical Care Medicine  [facility]

## 2018-03-20 NOTE — PROGRESS NOTES
"[facility]  Critical Care Medicine  Progress Note    Patient Name: Jonathan Nieves  MRN: [id number]  Admission Date: 2/4/2018  Hospital Length of Stay: 43 days  Code Status: DNR  Attending Provider: Maki Medeiros MD  Primary Care Provider: Primary Doctor No   Principal Problem: Acute respiratory failure with hypoxia    Subjective:     HPI:  Mr. Jonathan Nieves is a 51yo female with history of CVA with residual aphasia and dysphagia, non-verbal, s/p PEG tube placement and bed riddenness, osteomyelitis s/p amputation of toe, who comes to the ED from [facility] via EMS in respiratory distress. Per ED/EMS the patient was noted to have "projectile vomiting" yesterday evening (24 hours prior to presentation), which was treated with zofran. During the event there is suspicion the patient possible aspirated. Today the patient was noted to be in resp distress with a fever.     Patient was satting 88% on 4L on EMS arrival, breathing 40 times per minute.  Improved to 96% on 15L NRB with RR high 20s.  Axillary temp 102.    CT chest concerning for aspiration pneumonia.  UA concern for a UTI.       Patient intubated in the ED & Critical Care consulted.         Hospital/ICU Course:  2/5: Critical Care Consulted. Patient intubated in the ED.   2/6: Patient doing well, on SBT; will extubate maria e. Continue treatment for aspiration PNA & UTI.   2/7: abx deescalated, continue to treat for PNA and UTI. Required pressors overnight. Off this morning.   2/8-9: still with secretions and questionable mentation? Baseline?. Will try to dry the secretions and extubated today or maria e.    2/10: MRI completed. Family called, had family meeting with one daughter. Need to re-visit with all daughters. Records requested from [facility] and [facility].   02/11-13: neurology consulted, no acute changes, wbc and LFT trending up, keep monitoring.  2/14: Concerns for CADASIL vs Binswanger syndrome per Neuro. NOTCH3 genetic testing " sent. Palliative consulted regarding goals of care.   02/15 pt is not tolerating tube feed, had two episodes of projectile vomiting, KUB 02/14 shows partial SOB and distended stomach.  02/16 CT abdomen was unremarkable, family meeting was done.  02/17 no more vomiting, tolerating 10 cc/hr tube feeds.  02/19 trial to advance tube feeding, had residual back to trickle feed will try to advance slowly. 02/19 PT became hypotensive this morning with MAPs of 50's started on levo, daughter was contacted regarding goals of care, pt status was changed to partial code upon family request.  02/20 Pt still requiring levo, try to wean off slowly, family did not show for the meeting.  02/21 family meeting was done yesterday, no acclation of care per daughter wishes.   02/25 Pt lost IV access,still unable to wean off the went, neurological status unchanged she is alert but not following commands, fails her SBT every morning.  2/27: Failed SBT. Family did not show up for Palliative Care meeting.  2/28: NAEON.   3/1: family no showed for meeting  3/2: ethics committee consult by palliative  3/3: spoke to daughter zainab, informed she was awaiting her sister to pick her up and they are coming to hospital. She no showed again  3/4: morphine drip started as patient appeared to be in pain, stopped when patient became hypotensive.  3/5: will attempt to contact family for another meeting today  3/6: Family meeting held yesterday with staff and fellow, discussed poor prognosis of pt. Family in agreement to not escalate care; however, not ready for withdrawal of care. Will revist with family meeting possibly tomorrow.   3/7: Pt was restarted on TF, however did not tolerate.   3/9: hypoglycemic overnight, started on D5 gtt. Palliative had discussion with patient's daughter, zainab. Plan is for terminal extubation on 3/13.   3/12: remains on D5 increased the rate from 50 to 100 for threshold of -180, failed SBT today. plan for family  tmw regarding withdrawal of life support  3/13: No acute events overnight.  Plan for extubation to NIPPV vs palliative sedation later on today.   3/14:  Family presented to bedside yesterday but deferred extubation until they attempted to contact their estranged brother in [place] via Facebook message.  If they do not hear back from him by the end of today, they will move forward with extubation as was originally planned for yesterday.  3/15:  Ms. Nieves's family did not show up yesterday afternoon and no word if they were able to contact the brother.  Patient started on scheduled glycopyrrolate 0.2 mg IV QID & fentanyl 25 mcg/hr gtt yesterday with cough assist q4h as well.  Tube feeds held yesterday.  3:16:  Ms. Nieves's family spoke with MICU team yesterday and per their wishes she was extubated to BiPAP yesterday which she has been tolerating well.  Her versed gtt was also increased yesterday for comfort.  Family not present at bedside  3/17:  Patient still tolerating BiPAP well but has failed attempts to wean any further.  Patient's family spoke with MICU yesterday and per discussion, they have come to accept the evenuality of her passing.  3/18: NAEON. BiPAP weaned to 10/5. Will update family with any changes in patients condition.  3/19: Family updated with current plan, and are agreeable to comfort care. Will convert patient from fentanyl to morphine and will work to step down from MICU.   3/20: No acute events overnight and will seek to transfer patient to home hospice today vs step down to floor w/ comfort care    Interval History/Significant Events:  No acute events overnight.  Per discussion with family yesterday, current goal is comfort care.  Patient still unable to maintain adequate NIPPV.    Review of Systems   Unable to perform ROS: Mental status change     Objective:     Vital Signs (Most Recent):  Temp: 98.4 °F (36.9 °C) (03/19/18 1901)  Pulse: 94 (03/20/18 0754)  Resp: 16 (03/20/18 0754)  BP: (!)  88/55 (03/20/18 0600)  SpO2: (!) 84 % (03/20/18 0754) Vital Signs (24h Range):  Temp:  [98.1 °F (36.7 °C)-98.5 °F (36.9 °C)] 98.4 °F (36.9 °C)  Pulse:  [74-94] 94  Resp:  [11-17] 16  SpO2:  [84 %-95 %] 84 %  BP: ()/(46-75) 88/55   Weight: 81.2 kg (179 lb 0.2 oz)  Body mass index is 27.22 kg/m².      Intake/Output Summary (Last 24 hours) at 03/20/18 0758  Last data filed at 03/20/18 0600   Gross per 24 hour   Intake           794.96 ml   Output              200 ml   Net           594.96 ml       Physical Exam   Constitutional: No distress.   Awake AAF wearing BiPAP in NAD   Cardiovascular: Normal rate, regular rhythm and intact distal pulses.  Exam reveals no gallop and no friction rub.    No murmur heard.  Pulmonary/Chest: No tachypnea. No respiratory distress. She has no decreased breath sounds. She has no wheezes. She has no rhonchi. She has rales (BL diffuse, not noted on previous exams).   Abdominal: Soft. Bowel sounds are normal. She exhibits no distension. There is no tenderness (Difficult to assess given patient's non-verbal status).   Neurological: She is unresponsive.   E4V1M1 - worsening from prior exams w/o any muscle response to pain (previously flexion to pain)  Sluggish pupillary response to light       Vents:  Vent Mode: Spont (03/15/18 1737)  Ventilator Initiated: Yes (02/04/18 2344)  Set Rate: 0 bmp (03/15/18 1737)  Vt Set: 400 mL (03/15/18 1737)  Pressure Support: 15 cmH20 (03/15/18 1737)  PEEP/CPAP: 5 cmH20 (03/15/18 1737)  Oxygen Concentration (%): 30 (03/20/18 0754)  Peak Airway Pressure: 20 cmH2O (03/15/18 1737)  Plateau Pressure: 22 cmH20 (03/15/18 1737)  Total Ve: 0 mL (03/15/18 1737)  F/VT Ratio<105 (RSBI): (!) 36.84 (03/15/18 0532)  Lines/Drains/Airways     Drain                 Gastrostomy/Enterostomy 10/25/16 1401 Percutaneous endoscopic gastrostomy (PEG) midline feeding 510 days    Female External Urinary Catheter 03/14/18 0600 6 days          Pressure Ulcer                  Pressure Injury 02/05/18 1045 Left medial Foot Deep tissue injury 42 days         Pressure Injury 02/05/18 1045 Right lateral Foot Deep tissue injury 42 days          Peripheral Intravenous Line                 Peripheral IV - Single Lumen 02/27/18 1753 Left;Posterior Forearm 20 days         Midline Catheter Insertion/Assessment  - Single Lumen 03/09/18 1732 basilic vein (medial side of arm) 18g x 10cm 10 days              Significant Labs:  NONE      Significant Imaging:  NONE    Assessment/Plan:     Neuro   Seizure-like activity    -Was on keppra, dc 3/4  -Stable         Dysphagia as late effect of cerebrovascular disease    - S/P PEG tube w/ tube feeds held 2/2 to recurrent N/V        CVA, old, hemiparesis    -Patient suffered multiple strokes in past, last one in 2008   -Nonverbal at baseline, alert, makes eye contact but doesn't follow commands.  -Decreased GCS today from 7 on prior exams to 5 today        Derm   Unstageable pressure ulcer of left buttock    -Multiple area.  -Wound care per nursing.        Pulmonary   * Acute respiratory failure with hypoxia    -Extubated and breathing over BiPAP, will attempt to wean off BiPAP if able  -Glycopyrrolate 0.2 mg IV QID resumed yesterday along with cough assist q4h  -Continue scopolamine 1 mg patch every 3 days  -Continue fentanyl gtt        Aspiration pneumonia    Extensive consolidation of the right lung and debris in the right mainstem bronchus and its branches consistent with aspiration pneumonia on admission that has significantly improved on CXR  - S/p course of rocephin; s/p vanco & cefepime & flagyl   -Concern that if patient resumed enteral feeds, she may have another aspiration event, keep NPO        Cardiac/Vascular   Paroxysmal atrial fibrillation    - Pt home BB held due to hypotension  - Therapy goal focused on comfort of patient        GI   Partial small bowel obstruction    -KUB in 02/14 showed distended stomach and partial SOB.  -CT abd pelvis w  contrast showed no acute intra-abdominal abnormality.  -Emesis happens on/off, no residual w/ adequate respond to antemetic and feed holding.  -No more emesis noted and pt has multiple loose stool        Other   Palliative care encounter    As above in Goals of Care        Goals of care, counseling/discussion    -Terminal extubation initially planned for 2/26/2018 discontinued due to family no longer wanting this  -Family no-showed for 2/27 & 3/1 Pall Care meeting.   -Family meeting on 3/5 with  Staff and fellow - discussed poor prognosis, family agreed to not escalate care.  -Family meeting 3/13 w/ family in agreement with withdrawal of care but would like to contact their estranged brother in [place] prior to doing so; they will notify MICU team after they communicate with him and return on 3/14 for likely extubation  -Family made it clear that they would like for her to be on comfort care and have accepted that their mother will pass very soon.    -Transition fentanyl 25 mcg/hr & versed gtt 2 mg/h to morphine for inpatient hospice care.  -Patient to transfer to inpatient hospice hopefully later on today           Critical Care Daily Checklist:    A: Awake: RASS Goal/Actual Goal: RASS Goal: -1-->drowsy  Actual: Krishnamurthy Agitation Sedation Scale (RASS): Drowsy   B: Spontaneous Breathing Trial Performed? Spon. Breathing Trial Initiated?: Not initiated (no plans for SBT at this time) (03/04/18 5906)   C: SAT & SBT Coordinated?  NA                      D: Delirium: CAM-ICU Overall CAM-ICU: Negative   E: Early Mobility Performed? Yes   F: Feeding Goal: Goals: Meet % EEN, EPN  Status: Nutrition Goal Status: goal not met   Current Diet Order   Procedures    Diet NPO      AS: Analgesia/Sedation As above w/ versed & fentanyl   T: Thromboembolic Prophylaxis Heparin 5000 U TID held 2/2 to comfort care   H: HOB > 300 Yes   U: Stress Ulcer Prophylaxis (if needed) NA   G: Glucose Control NA   B: Bowel Function Stool  Occurrence: 0   I: Indwelling Catheter (Lines & De Leon) Necessity As above   D: De-escalation of Antimicrobials/Pharmacotherapies NA    Plan for the day/ETD Transfer to inpatient hospice    Code Status:  Family/Goals of Care: DNR  Comfort care       Critical secondary to Patient has a condition that poses threat to life and bodily function: Severe Respiratory Distress      Critical care was time spent personally by me on the following activities: development of treatment plan with patient or surrogate and bedside caregivers, discussions with consultants, evaluation of patient's response to treatment, examination of patient, ordering and performing treatments and interventions, ordering and review of laboratory studies, ordering and review of radiographic studies, pulse oximetry, re-evaluation of patient's condition. This critical care time did not overlap with that of any other provider or involve time for any procedures.     Dwight Francois MD  Critical Care Medicine  [facility]

## 2018-03-20 NOTE — PLAN OF CARE
18 0952   Final Note   Assessment Type Final Discharge Note   Discharge Disposition Exp Medical   Hospital Follow Up  Appt(s) scheduled? No   Discharge plans and expectations educations in teach back method with documentation complete? No   Right Care Referral Info   Post Acute Recommendation Other  ( in medical facility)   Lisa Landers, RN, BSN  Case Management  [facility]  Ext. [phone]

## 2018-03-20 NOTE — PLAN OF CARE
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]  Yes    Palliative care encounter [Z51.5]  Not Applicable    Intubation of airway performed without difficulty [Z78.9]  Yes    Unstageable pressure ulcer of left buttock [L89.320]  Yes    Alteration in skin integrity due to moisture [R23.9]  Yes    Seizure-like activity [R56.9]  Yes    Goals of care, counseling/discussion [Z71.89]  Not Applicable    Aspiration pneumonia [J69.0]  Yes    Paroxysmal atrial fibrillation [I48.0]  Yes    CVA, old, hemiparesis [I69.359]  Not Applicable    Dysphagia as late effect of cerebrovascular disease [I69.991]  Not Applicable      Resolved Hospital Problems    Diagnosis Date Resolved POA    Decubitus ulcer of left elbow, stage 3 [L89.023] 03/16/2018 Yes    Circulatory failure [R57.9] 02/26/2018 Yes    Hypernatremia [E87.0] 02/26/2018 Yes    Pressure injury of deep tissue [L89.90] 03/15/2018 Yes    PEG (percutaneous endoscopic gastrostomy) status [Z93.1] 03/15/2018 Not Applicable    UTI (urinary tract infection) [N39.0] 02/26/2018 Yes       Hospice Qualifying Diagnoses: 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    Vital Signs: Routine per Hospice Protocol.    Allergies:  Review of patient's allergies indicates:   Allergen Reactions    Amoxicillin Other (See Comments)     Per daughter always allergic, unknown rxn       Diet: NPO    Activities: As tolerated    Nursing: Per Hospice Routine    Future  Orders:  Hospice Medical Director may dictate new orders for comfortable care measures & sign death certificate.    Medications:         Comfort Care Medications Only       _________________________________  Dwight Francois MD  03/20/2018

## 2018-03-20 NOTE — NURSING
At approximately 0900 pt noticed to have frequent ectopy and gasping for air. Fentanyl and Versed gtt rates increased to improve pt's comfort/anxiety. Shortly after pt's daughter Bethany was called and urged to come to see her mother as she was making physiologic changes towards the end of her life. At 0934 the pt passed away with MD Jarrett at bedside to pronounce pt . Daughter Bethany was updated about her mother's passing over the phone at approximately 0940 and per Bethany, she and her sisters are coming to visit their mother in her hospital room shortly.

## 2018-03-20 NOTE — PLAN OF CARE
Problem: Patient Care Overview  Goal: Plan of Care Review  Outcome: Ongoing (interventions implemented as appropriate)  Pt continues on comfort care measures. She is on Fentanyl and Versed infusions to manage pain and anxiety. All three daughters were at bedside this afternoon to visit with her. Per  Ginger pt will be going to hospice after makes the proper arrangements. Please see flowsheets for further details.

## 2018-03-20 NOTE — SUBJECTIVE & OBJECTIVE
Interval History/Significant Events:  No acute events overnight.  Per discussion with family yesterday, current goal is comfort care.  Patient still unable to maintain adequate NIPPV.    Review of Systems   Unable to perform ROS: Mental status change     Objective:     Vital Signs (Most Recent):  Temp: 98.4 °F (36.9 °C) (03/19/18 1901)  Pulse: 94 (03/20/18 0754)  Resp: 16 (03/20/18 0754)  BP: (!) 88/55 (03/20/18 0600)  SpO2: (!) 84 % (03/20/18 0754) Vital Signs (24h Range):  Temp:  [98.1 °F (36.7 °C)-98.5 °F (36.9 °C)] 98.4 °F (36.9 °C)  Pulse:  [74-94] 94  Resp:  [11-17] 16  SpO2:  [84 %-95 %] 84 %  BP: ()/(46-75) 88/55   Weight: 81.2 kg (179 lb 0.2 oz)  Body mass index is 27.22 kg/m².      Intake/Output Summary (Last 24 hours) at 03/20/18 0758  Last data filed at 03/20/18 0600   Gross per 24 hour   Intake           794.96 ml   Output              200 ml   Net           594.96 ml       Physical Exam   Constitutional: No distress.   Awake AAF wearing BiPAP in NAD   Cardiovascular: Normal rate, regular rhythm and intact distal pulses.  Exam reveals no gallop and no friction rub.    No murmur heard.  Pulmonary/Chest: No tachypnea. No respiratory distress. She has no decreased breath sounds. She has no wheezes. She has no rhonchi. She has rales (BL diffuse, not noted on previous exams).   Abdominal: Soft. Bowel sounds are normal. She exhibits no distension. There is no tenderness (Difficult to assess given patient's non-verbal status).   Neurological: She is unresponsive.   E4V1M1 - worsening from prior exams w/o any muscle response to pain (previously flexion to pain)  Sluggish pupillary response to light       Vents:  Vent Mode: Spont (03/15/18 1737)  Ventilator Initiated: Yes (02/04/18 2344)  Set Rate: 0 bmp (03/15/18 1737)  Vt Set: 400 mL (03/15/18 1737)  Pressure Support: 15 cmH20 (03/15/18 0417)  PEEP/CPAP: 5 cmH20 (03/15/18 1737)  Oxygen Concentration (%): 30 (03/20/18 0754)  Peak Airway Pressure: 20 cmH2O  (03/15/18 1737)  Plateau Pressure: 22 cmH20 (03/15/18 1737)  Total Ve: 0 mL (03/15/18 1737)  F/VT Ratio<105 (RSBI): (!) 36.84 (03/15/18 0532)  Lines/Drains/Airways     Drain                 Gastrostomy/Enterostomy 10/25/16 1401 Percutaneous endoscopic gastrostomy (PEG) midline feeding 510 days    Female External Urinary Catheter 03/14/18 0600 6 days          Pressure Ulcer                 Pressure Injury 02/05/18 1045 Left medial Foot Deep tissue injury 42 days         Pressure Injury 02/05/18 1045 Right lateral Foot Deep tissue injury 42 days          Peripheral Intravenous Line                 Peripheral IV - Single Lumen 02/27/18 1753 Left;Posterior Forearm 20 days         Midline Catheter Insertion/Assessment  - Single Lumen 03/09/18 1732 basilic vein (medial side of arm) 18g x 10cm 10 days              Significant Labs:  NONE      Significant Imaging:  NONE

## 2018-03-23 LAB — MAYO MISCELLANEOUS RESULT (REF): NORMAL

## 2021-01-14 NOTE — PLAN OF CARE
5/19/17  Urine:  VRE.  Please maintain contact isolation with appropriate use of PPE and hand hygiene.  Contact Infection Control @ [phone] for guidelines on discontinuation of isolation.   detailed exam

## 2022-03-23 NOTE — ASSESSMENT & PLAN NOTE
UA (Bacteria, WBC, leukocyte positive)    - History of VRE (previously tx with dapto and rocephin)  - Urine cultures proteus, Identification and susceptibility pending  - cefepime    [Heartburn] : denies heartburn [Nausea] : denies nausea [Vomiting] : denies vomiting [Diarrhea] : denies diarrhea [Constipation] : denies constipation English [Yellow Skin Or Eyes (Jaundice)] : denies jaundice [Abdominal Pain] : denies abdominal pain [Abdominal Swelling] : denies abdominal swelling [Rectal Pain] : denies rectal pain [Abdominal Surgery] : abdominal surgery [Cholecystectomy] : cholecystectomy [GERD] : no gastroesophageal reflux disease [Hiatus Hernia] : no hiatus hernia [Peptic Ulcer Disease] : no peptic ulcer disease [Pancreatitis] : no pancreatitis [Cholelithiasis] : no cholelithiasis [Kidney Stone] : no kidney stone [Inflammatory Bowel Disease] : no inflammatory bowel disease [Irritable Bowel Syndrome] : no irritable bowel syndrome [Diverticulitis] : no diverticulitis [Alcohol Abuse] : no alcohol abuse [Malignancy] : no malignancy [Appendectomy] : no appendectomy [de-identified] : 65 yo female, hx of htn, due for coloncancer screening. She has no change in bowel habits. Her bp is well controlled. She takes MVI and calcium/vit d.

## 2023-08-21 NOTE — ASSESSMENT & PLAN NOTE
On 5/20 and again on 5/22 she had another episode of hyperventilation, diaphoresis, and limb contracture (worse than baseline). She was given 2 mg IV ativan and the episode quickly resolved. She was then mildly hypotensive which resolved with IV fluids. Repeat continuous EEG was ordered at that time and neurology was consulted and have recommended repeat head imaging with CT.        Thalidomide Counseling: I discussed with the patient the risks of thalidomide including but not limited to birth defects, anxiety, weakness, chest pain, dizziness, cough and severe allergy.

## 2023-09-03 NOTE — ASSESSMENT & PLAN NOTE
-patient suffered multiple strokes in past, last one in 2008   -nonverbal at baseline, wheelchair/bedbound    MRI (2/10)  Findings: The diffusion sequence demonstrates no evidence of acute infarct. There is severe white matter small vessel ischemic change. There is a remote infarct of the right occipital lobe. There is a mild hemosiderin staining of the brainstem probably from a prior subarachnoid hemorrhage. There is a small focus of hemosiderin posterior right midline medulla. Pituitary and craniocervical junction show nothing unusual. There is left frontal sinusitis change. There is involutional change.  - [facility] from [facility] and [facility]. Did not include brain images or neurology notes.  - Neurology consulted: differential includes but is not limited to CADASIL (given h/o strokes, extensive white matter involvement including the temporal lobes), adult onset adrenoleukodystrophy, chronic microvascular changes (less likely), vanishing white matter disease (less likely given predominance in children), or Binswanger disease (subcortical leukoencephalopathy or vascular dementia 2/2 chronic HTN).   - NOTCH3 genetic testing sent, results pending   Speaking Coherently

## 2024-02-17 NOTE — ED NOTES
Per IM, pt not to transport to floor until after blood work done. Will attempt blood draw.    denies pain/discomfort (Rating = 0)

## 2024-06-06 NOTE — ASSESSMENT & PLAN NOTE
- KUB in 02/14 showed distended stomach and partial SOB.  - CT abd pelvis w contrast showed no acute intra-abdominal abnormality.  - no more emesis, tube feeds resumed tolerating.     RN Triage    Patient Contact    Attempt # 1    Was call answered?  No.  Left message on voicemail with information to call me back.    Upon patient callback please advise of the below from provider:     HETAL Chong CNP  6/6/2024 11:48 AM CDT       [facility] patient, KV covering. Hemoglobin A1C is slightly improved, but still above goal. Please let patient know that his primary is out of clinic and based on office visit notes I will have him reach out to patient when he returns with medication recommendations/changes.        HETAL Mcgrath CNP  Questions or concerns please feel free to send me a [employer] message or call me  Phone : [phone]     ABEL Serrano, RN  [facility] ~ Registered Nurse  Clinic Triage  June 6, 2024